# Patient Record
Sex: FEMALE | Race: WHITE | NOT HISPANIC OR LATINO | Employment: OTHER | URBAN - METROPOLITAN AREA
[De-identification: names, ages, dates, MRNs, and addresses within clinical notes are randomized per-mention and may not be internally consistent; named-entity substitution may affect disease eponyms.]

---

## 2017-01-30 DIAGNOSIS — E78.5 HYPERLIPIDEMIA, UNSPECIFIED HYPERLIPIDEMIA TYPE: ICD-10-CM

## 2017-01-30 RX ORDER — ATORVASTATIN CALCIUM 20 MG/1
TABLET, FILM COATED ORAL
Qty: 90 TABLET | Refills: 1 | Status: SHIPPED | OUTPATIENT
Start: 2017-01-30 | End: 2017-05-25 | Stop reason: SDUPTHER

## 2017-05-08 ENCOUNTER — LAB VISIT (OUTPATIENT)
Dept: LAB | Facility: HOSPITAL | Age: 77
End: 2017-05-08
Attending: INTERNAL MEDICINE
Payer: MEDICARE

## 2017-05-08 DIAGNOSIS — I10 ESSENTIAL HYPERTENSION: ICD-10-CM

## 2017-05-08 DIAGNOSIS — E78.5 HYPERLIPIDEMIA, UNSPECIFIED HYPERLIPIDEMIA TYPE: ICD-10-CM

## 2017-05-08 LAB
ALBUMIN SERPL BCP-MCNC: 3.6 G/DL
ALP SERPL-CCNC: 83 U/L
ALT SERPL W/O P-5'-P-CCNC: 18 U/L
ANION GAP SERPL CALC-SCNC: 8 MMOL/L
AST SERPL-CCNC: 19 U/L
BASOPHILS # BLD AUTO: 0.03 K/UL
BASOPHILS NFR BLD: 0.4 %
BILIRUB SERPL-MCNC: 0.5 MG/DL
BUN SERPL-MCNC: 15 MG/DL
CALCIUM SERPL-MCNC: 9.4 MG/DL
CHLORIDE SERPL-SCNC: 107 MMOL/L
CHOLEST/HDLC SERPL: 3.1 {RATIO}
CO2 SERPL-SCNC: 27 MMOL/L
CREAT SERPL-MCNC: 0.8 MG/DL
DIFFERENTIAL METHOD: NORMAL
EOSINOPHIL # BLD AUTO: 0.5 K/UL
EOSINOPHIL NFR BLD: 5.6 %
ERYTHROCYTE [DISTWIDTH] IN BLOOD BY AUTOMATED COUNT: 14.1 %
EST. GFR  (AFRICAN AMERICAN): >60 ML/MIN/1.73 M^2
EST. GFR  (NON AFRICAN AMERICAN): >60 ML/MIN/1.73 M^2
GLUCOSE SERPL-MCNC: 98 MG/DL
HCT VFR BLD AUTO: 40.1 %
HDL/CHOLESTEROL RATIO: 32.8 %
HDLC SERPL-MCNC: 177 MG/DL
HDLC SERPL-MCNC: 58 MG/DL
HGB BLD-MCNC: 13.3 G/DL
LDLC SERPL CALC-MCNC: 77.4 MG/DL
LYMPHOCYTES # BLD AUTO: 3.5 K/UL
LYMPHOCYTES NFR BLD: 43 %
MCH RBC QN AUTO: 29.4 PG
MCHC RBC AUTO-ENTMCNC: 33.2 %
MCV RBC AUTO: 89 FL
MONOCYTES # BLD AUTO: 0.7 K/UL
MONOCYTES NFR BLD: 8.3 %
NEUTROPHILS # BLD AUTO: 3.5 K/UL
NEUTROPHILS NFR BLD: 42.5 %
NONHDLC SERPL-MCNC: 119 MG/DL
PLATELET # BLD AUTO: 198 K/UL
PMV BLD AUTO: 11.3 FL
POTASSIUM SERPL-SCNC: 4.2 MMOL/L
PROT SERPL-MCNC: 6.4 G/DL
RBC # BLD AUTO: 4.53 M/UL
SODIUM SERPL-SCNC: 142 MMOL/L
TRIGL SERPL-MCNC: 208 MG/DL
TSH SERPL DL<=0.005 MIU/L-ACNC: 3.13 UIU/ML
WBC # BLD AUTO: 8.24 K/UL

## 2017-05-08 PROCEDURE — 85025 COMPLETE CBC W/AUTO DIFF WBC: CPT

## 2017-05-08 PROCEDURE — 80053 COMPREHEN METABOLIC PANEL: CPT

## 2017-05-08 PROCEDURE — 84443 ASSAY THYROID STIM HORMONE: CPT

## 2017-05-08 PROCEDURE — 36415 COLL VENOUS BLD VENIPUNCTURE: CPT | Mod: PO

## 2017-05-08 PROCEDURE — 80061 LIPID PANEL: CPT

## 2017-05-25 ENCOUNTER — OFFICE VISIT (OUTPATIENT)
Dept: INTERNAL MEDICINE | Facility: CLINIC | Age: 77
End: 2017-05-25
Payer: MEDICARE

## 2017-05-25 ENCOUNTER — HOSPITAL ENCOUNTER (OUTPATIENT)
Dept: RADIOLOGY | Facility: HOSPITAL | Age: 77
Discharge: HOME | End: 2017-05-25
Attending: INTERNAL MEDICINE
Payer: MEDICARE

## 2017-05-25 VITALS
SYSTOLIC BLOOD PRESSURE: 118 MMHG | OXYGEN SATURATION: 97 % | HEART RATE: 88 BPM | WEIGHT: 141.13 LBS | HEIGHT: 64 IN | DIASTOLIC BLOOD PRESSURE: 72 MMHG | BODY MASS INDEX: 24.1 KG/M2

## 2017-05-25 DIAGNOSIS — M85.80 OSTEOPENIA, UNSPECIFIED LOCATION: ICD-10-CM

## 2017-05-25 DIAGNOSIS — I77.9 MILD CAROTID ARTERY DISEASE: ICD-10-CM

## 2017-05-25 DIAGNOSIS — Z12.39 BREAST CANCER SCREENING: ICD-10-CM

## 2017-05-25 DIAGNOSIS — Z12.31 ENCOUNTER FOR SCREENING MAMMOGRAM FOR MALIGNANT NEOPLASM OF BREAST: ICD-10-CM

## 2017-05-25 DIAGNOSIS — E78.5 HYPERLIPIDEMIA, UNSPECIFIED HYPERLIPIDEMIA TYPE: ICD-10-CM

## 2017-05-25 DIAGNOSIS — Z00.00 PREVENTATIVE HEALTH CARE: Primary | ICD-10-CM

## 2017-05-25 DIAGNOSIS — I10 ESSENTIAL HYPERTENSION: ICD-10-CM

## 2017-05-25 DIAGNOSIS — M79.671 RIGHT FOOT PAIN: ICD-10-CM

## 2017-05-25 PROCEDURE — 77067 SCR MAMMO BI INCL CAD: CPT | Mod: TC

## 2017-05-25 PROCEDURE — 99499 UNLISTED E&M SERVICE: CPT | Mod: S$GLB,,, | Performed by: INTERNAL MEDICINE

## 2017-05-25 PROCEDURE — 99999 PR PBB SHADOW E&M-EST. PATIENT-LVL III: CPT | Mod: PBBFAC,,, | Performed by: INTERNAL MEDICINE

## 2017-05-25 PROCEDURE — 99397 PER PM REEVAL EST PAT 65+ YR: CPT | Mod: S$GLB,,, | Performed by: INTERNAL MEDICINE

## 2017-05-25 PROCEDURE — 77067 SCR MAMMO BI INCL CAD: CPT | Mod: 26,,, | Performed by: RADIOLOGY

## 2017-05-25 PROCEDURE — 77063 BREAST TOMOSYNTHESIS BI: CPT | Mod: 26,,, | Performed by: RADIOLOGY

## 2017-05-25 RX ORDER — LISINOPRIL 2.5 MG/1
2.5 TABLET ORAL DAILY
Qty: 90 TABLET | Refills: 1 | Status: SHIPPED | OUTPATIENT
Start: 2017-05-25 | End: 2017-09-26 | Stop reason: SDUPTHER

## 2017-05-25 RX ORDER — ATORVASTATIN CALCIUM 20 MG/1
TABLET, FILM COATED ORAL
Qty: 90 TABLET | Refills: 1 | Status: SHIPPED | OUTPATIENT
Start: 2017-05-25 | End: 2017-12-01 | Stop reason: SDUPTHER

## 2017-05-25 NOTE — PROGRESS NOTES
Subjective:       Patient ID: Sofía Mcwilliams is a 76 y.o. female.    Chief Complaint: Annual Exam    HPI 76-year-old female presents to clinic today for annual physical exam and follow-up of hypertension dyslipidemia.  She is due for her annual mammogram.  She does have some problems with the right foot would like to see  treating for consultation she underwent podiatry surgery in the past.  Review of Systems  otherwise negative  Objective:      Physical Exam  General: Well-appearing, well-nourished.  No distress  HEENT: conjunctivae are normal.  Pupils are equal and reative to light.  TM's are clear and intact bilaterally.  Hearing is grossly normal.  Nasopharynx is clear.  Oropharynx is clear.  Neck: Supple.  No thyroid megaly.  No bruits.  Lymph: No cervical or supraclavicular adenopathy.  Heart: Regular rate and rhythm, without murmur, rub or gallop.  Lungs: Clear to auscultation; respiratory effort normal.  Abdomen: Soft, nontender, nondistended.  Normoactive bowel sounds.  No hepatomegaly.  No masses.  Extremities: Good distal pulses.  No edema.  Musculoskeletal: 5/5 Strength bilateral upper and lower extremities; free range of motion.  Neuro: No focal deficits.  DTR's are 2+ and equal throughout.  Sensation intact. Normal gait.  Skin: No lesions seen.  Psychiatric: Oriented to time, person, place.  Judgment and insight seem unimpaired.  Breasts: No masses, discharge, or tenderness.    Assessment:       1. Preventative health care    2. Breast cancer screening    3. Encounter for screening mammogram for malignant neoplasm of breast     4. Osteopenia, unspecified location    5. Essential hypertension    6. Right foot pain    7. Hyperlipidemia, unspecified hyperlipidemia type    8. Mild carotid artery disease        Plan:       Soífa CARRILLO was seen today for annual exam.    Diagnoses and all orders for this visit:    Preventative health care  Performed and updated today  Breast cancer screening  -   Mammo Digital  Screening Bilat with CAD; Future    Encounter for screening mammogram for malignant neoplasm of breast   -    Mammo Digital Screening Bilat with CAD; Future    Osteopenia, unspecified location  Continue current regimen  Essential hypertension  -     lisinopril (PRINIVIL,ZESTRIL) 2.5 MG tablet; Take 1 tablet (2.5 mg total) by mouth once daily.  Controlled.  Continue current medical regimen.  Prescription refills addressed.  Followup advised. See after visit summary.  Right foot pain  -     Ambulatory consult to Orthopedics    Hyperlipidemia, unspecified hyperlipidemia type  -     atorvastatin (LIPITOR) 20 MG tablet; TAKE 1 TABLET(20 MG) BY MOUTH EVERY DAY  Controlled.  Continue current medical regimen.  Prescription refills addressed.  Followup advised. See after visit summary.  Mild carotid artery disease  Asymptomatic surveillance as needed

## 2017-06-26 ENCOUNTER — HOSPITAL ENCOUNTER (OUTPATIENT)
Dept: RADIOLOGY | Facility: HOSPITAL | Age: 77
Discharge: HOME | End: 2017-06-26
Attending: ORTHOPAEDIC SURGERY
Payer: MEDICARE

## 2017-06-26 ENCOUNTER — OFFICE VISIT (OUTPATIENT)
Dept: ORTHOPEDICS | Facility: CLINIC | Age: 77
End: 2017-06-26
Payer: MEDICARE

## 2017-06-26 VITALS — HEIGHT: 64 IN | BODY MASS INDEX: 23.73 KG/M2 | WEIGHT: 139 LBS

## 2017-06-26 DIAGNOSIS — M19.079 ARTHRITIS OF BIG TOE: ICD-10-CM

## 2017-06-26 DIAGNOSIS — M79.671 FOOT PAIN, RIGHT: ICD-10-CM

## 2017-06-26 DIAGNOSIS — M79.671 FOOT PAIN, RIGHT: Primary | ICD-10-CM

## 2017-06-26 PROCEDURE — 73630 X-RAY EXAM OF FOOT: CPT | Mod: 26,RT,, | Performed by: RADIOLOGY

## 2017-06-26 PROCEDURE — 1125F AMNT PAIN NOTED PAIN PRSNT: CPT | Mod: S$GLB,,, | Performed by: ORTHOPAEDIC SURGERY

## 2017-06-26 PROCEDURE — 99213 OFFICE O/P EST LOW 20 MIN: CPT | Mod: S$GLB,,, | Performed by: ORTHOPAEDIC SURGERY

## 2017-06-26 PROCEDURE — 73630 X-RAY EXAM OF FOOT: CPT | Mod: TC,RT

## 2017-06-26 PROCEDURE — 99999 PR PBB SHADOW E&M-EST. PATIENT-LVL II: CPT | Mod: PBBFAC,,, | Performed by: ORTHOPAEDIC SURGERY

## 2017-06-26 PROCEDURE — 1159F MED LIST DOCD IN RCRD: CPT | Mod: S$GLB,,, | Performed by: ORTHOPAEDIC SURGERY

## 2017-06-26 NOTE — PROGRESS NOTES
DATE: 6/26/2017  PATIENT: Sofía Mcwilliams    CHIEF COMPLAINT: right great toe pain    HISTORY:  Sofía Mcwilliams is a 76 y.o. female  here for initial evaluation of right great toe pain. The pain has been present for 2 years.  Pt has had 2 surgeries to right great toe including hallux valgus reconstruction in ~2000 and Distal metatarsal osteotomy for hallux valgus revision in 2009.  Reports that her alignment is good but now with stiffness and dorsal bump. There is no history of trauma. The patient describes the pain as dull and like her other arthritis.  The pain is worse with ambulation and improved by rest. There is no associated numbness and tingling.  Prior treatments have included none; unable to take NSAIDs 2/2 GI upset.      PAST MEDICAL/SURGICAL HISTORY:  Past Medical History:   Diagnosis Date    Anxiety 5/2/2016    Arthritis     Basal cell carcinoma 2013    left buttock    Cataract     Hypertension     Other and unspecified hyperlipidemia      Past Surgical History:   Procedure Laterality Date    APPENDECTOMY      bunion      right    COLONOSCOPY N/A 9/28/2015    Procedure: COLONOSCOPY;  Surgeon: Joe Amos MD;  Location: [address]);  Service: Endoscopy;  Laterality: N/A;    HYSTERECTOMY      partial    TONSILLECTOMY         Current Medications:   Current Outpatient Prescriptions:     aspirin (ECOTRIN) 81 MG EC tablet, Take 1 tablet by mouth., Disp: , Rfl:     atorvastatin (LIPITOR) 20 MG tablet, TAKE 1 TABLET(20 MG) BY MOUTH EVERY DAY, Disp: 90 tablet, Rfl: 1    CALCIUM CARB/VIT D3/MINERALS (CALCIUM-VITAMIN D ORAL), once daily. , Disp: , Rfl:     cetirizine (ZYRTEC) 10 MG tablet, Take 10 mg by mouth once daily., Disp: , Rfl:     coenzyme Q10 10 mg capsule, Take by mouth., Disp: , Rfl:     lisinopril (PRINIVIL,ZESTRIL) 2.5 MG tablet, Take 1 tablet (2.5 mg total) by mouth once daily., Disp: 90 tablet, Rfl: 1    magnesium glycinate 100 mg Tab, Take 1 tablet by mouth every evening.,  "Disp: , Rfl:     multivitamin (MULTIPLE VITAMIN) per tablet, Take 1 tablet by mouth., Disp: , Rfl:     tretinoin (RETIN-A) 0.05 % cream, Apply topically every evening., Disp: 45 g, Rfl: 1    VITAMIN B COMPLEX ORAL, Take by mouth once daily. , Disp: , Rfl:     vitamin D 1000 units Tab, Take 185 mg by mouth once daily., Disp: , Rfl:     Social History:   Social History     Social History    Marital status:      Spouse name: N/A    Number of children: N/A    Years of education: N/A     Occupational History    RN      Social History Main Topics    Smoking status: Former Smoker     Quit date: 11/13/1965    Smokeless tobacco: Never Used    Alcohol use Yes      Comment: Occ.    Drug use: No    Sexual activity: Yes     Other Topics Concern    Are You Pregnant Or Think You May Be? No    Breast-Feeding No     Social History Narrative    No narrative on file       REVIEW OF SYSTEMS:  Constitution: Negative. Negative for chills, fever and night sweats.   Cardiovascular: Negative for chest pain and syncope.   Respiratory: Negative for cough and shortness of breath.   Gastrointestinal: See HPI. Negative for nausea/vomiting. Negative for abdominal pain.  Genitourinary: See HPI. Negative for discoloration or dysuria.  Skin: Negative for dry skin, itching and rash.   Hematologic/Lymphatic: Negative for bleeding problem. Does not bruise/bleed easily.   Musculoskeletal: Negative for falls and muscle weakness.   Neurological: See HPI. No seizures.   Endocrine: Negative for polydipsia, polyphagia and polyuria.   Allergic/Immunologic: Negative for hives and persistent infections.    PHYSICAL EXAMINATION:    Ht 5' 4" (1.626 m)   Wt 63 kg (139 lb)   BMI 23.86 kg/m²     General: The patient is a  76 y.o. female in no apparent distress, the patient is orientatied to person, place and time.   Psych: Normal mood and affect  HEENT:  NCAT  Lungs:  Respirations are equal and unlabored.  CV:  2+ bilateral upper and lower " extremity pulses.  Skin:  Intact throughout.    MSK:    RLE:  - right great toe MTPJ with stiffness and dorsal protuberance  - TA/EHL/Gastroc/FHL assessed in isolation without deficit  - SILT throughout  - DP and PT palpated  2+  - Capillary Refill <3s          IMAGING:     Radiographs of the right foot were ordered and personally reviewed with the patient today.  They show hardware in place, 1st MTPJ significant DJD with dorsal osteophyte.    ASSESSMENT/PLAN:    Sofía CARRILLO was seen today for big toe.    Diagnoses and all orders for this visit:    Foot pain, right  -     X-Ray Foot Complete Right; Future    Arthritis of big toe, right      No Follow-up on file.    Pt not interested in surgical intervention at this time.  Unable to take NSAIDs, recommend stiff shoe that will limit great toe motion. Pt will call if she decides to pursue surgery.  I have personally taken the history and examined this patient and agree with the residents note as stated above.

## 2017-06-26 NOTE — LETTER
June 27, 2017      Natalia Stubbs MD  [address] [address]           [facility] - Orthopedics  [address], [address]  [facility] [address]  Phone: [phone]          Patient: Sofía Mcwilliams   MR Number: [id number]   YOB: 1940   Date of Visit: 6/26/2017       Dear Dr. Natalia Stubbs:    Thank you for referring Sofía Mcwilliams to me for evaluation. Attached you will find relevant portions of my assessment and plan of care.    If you have questions, please do not hesitate to call me. I look forward to following Sofía Mcwilliams along with you.    Sincerely,    Cj Garsia MD    Enclosure  CC:  No Recipients    If you would like to receive this communication electronically, please contact [email or web address] or [phone] to request more information on [email or web address] Link access.    For providers and/or their staff who would like to refer a patient to [facility], please contact us through our one-stop-shop provider referral line, [facility] , at [phone].    If you feel you have received this communication in error or would no longer like to receive these types of communications, please e-mail [email or web address]

## 2017-09-26 DIAGNOSIS — I10 ESSENTIAL HYPERTENSION: ICD-10-CM

## 2017-09-27 RX ORDER — LISINOPRIL 2.5 MG/1
TABLET ORAL
Qty: 90 TABLET | Refills: 1 | Status: SHIPPED | OUTPATIENT
Start: 2017-09-27 | End: 2018-04-30 | Stop reason: SDUPTHER

## 2017-10-18 ENCOUNTER — OFFICE VISIT (OUTPATIENT)
Dept: OPTOMETRY | Facility: CLINIC | Age: 77
End: 2017-10-18
Payer: MEDICARE

## 2017-10-18 DIAGNOSIS — Z98.890 HISTORY OF REPAIR OF RETINAL TEAR BY LASER PHOTOCOAGULATION: Primary | ICD-10-CM

## 2017-10-18 DIAGNOSIS — H25.13 NUCLEAR SCLEROSIS, BILATERAL: ICD-10-CM

## 2017-10-18 DIAGNOSIS — H52.4 PRESBYOPIA: ICD-10-CM

## 2017-10-18 DIAGNOSIS — Z13.5 SCREENING FOR GLAUCOMA: ICD-10-CM

## 2017-10-18 PROCEDURE — 92014 COMPRE OPH EXAM EST PT 1/>: CPT | Mod: S$GLB,,, | Performed by: OPTOMETRIST

## 2017-10-18 PROCEDURE — 99999 PR PBB SHADOW E&M-EST. PATIENT-LVL II: CPT | Mod: PBBFAC,,, | Performed by: OPTOMETRIST

## 2017-10-18 PROCEDURE — 92015 DETERMINE REFRACTIVE STATE: CPT | Mod: S$GLB,,, | Performed by: OPTOMETRIST

## 2017-11-27 ENCOUNTER — LAB VISIT (OUTPATIENT)
Dept: LAB | Facility: HOSPITAL | Age: 77
End: 2017-11-27
Attending: INTERNAL MEDICINE
Payer: MEDICARE

## 2017-11-27 DIAGNOSIS — I10 ESSENTIAL HYPERTENSION: ICD-10-CM

## 2017-11-27 DIAGNOSIS — E78.5 HYPERLIPIDEMIA, UNSPECIFIED HYPERLIPIDEMIA TYPE: ICD-10-CM

## 2017-11-27 LAB
ALBUMIN SERPL BCP-MCNC: 3.7 G/DL
ALP SERPL-CCNC: 82 U/L
ALT SERPL W/O P-5'-P-CCNC: 19 U/L
ANION GAP SERPL CALC-SCNC: 7 MMOL/L
AST SERPL-CCNC: 17 U/L
BILIRUB SERPL-MCNC: 0.5 MG/DL
BUN SERPL-MCNC: 23 MG/DL
CALCIUM SERPL-MCNC: 9.5 MG/DL
CHLORIDE SERPL-SCNC: 106 MMOL/L
CHOLEST SERPL-MCNC: 181 MG/DL
CHOLEST/HDLC SERPL: 2.5 {RATIO}
CO2 SERPL-SCNC: 29 MMOL/L
CREAT SERPL-MCNC: 0.8 MG/DL
EST. GFR  (AFRICAN AMERICAN): >60 ML/MIN/1.73 M^2
EST. GFR  (NON AFRICAN AMERICAN): >60 ML/MIN/1.73 M^2
GLUCOSE SERPL-MCNC: 92 MG/DL
HDLC SERPL-MCNC: 71 MG/DL
HDLC SERPL: 39.2 %
LDLC SERPL CALC-MCNC: 92 MG/DL
NONHDLC SERPL-MCNC: 110 MG/DL
POTASSIUM SERPL-SCNC: 4.8 MMOL/L
PROT SERPL-MCNC: 6.6 G/DL
SODIUM SERPL-SCNC: 142 MMOL/L
TRIGL SERPL-MCNC: 90 MG/DL

## 2017-11-27 PROCEDURE — 36415 COLL VENOUS BLD VENIPUNCTURE: CPT | Mod: PO

## 2017-11-27 PROCEDURE — 80061 LIPID PANEL: CPT

## 2017-11-27 PROCEDURE — 80053 COMPREHEN METABOLIC PANEL: CPT

## 2017-12-01 ENCOUNTER — OFFICE VISIT (OUTPATIENT)
Dept: INTERNAL MEDICINE | Facility: CLINIC | Age: 77
End: 2017-12-01
Payer: MEDICARE

## 2017-12-01 VITALS
BODY MASS INDEX: 24.1 KG/M2 | DIASTOLIC BLOOD PRESSURE: 70 MMHG | WEIGHT: 141.13 LBS | HEIGHT: 64 IN | HEART RATE: 60 BPM | SYSTOLIC BLOOD PRESSURE: 110 MMHG

## 2017-12-01 DIAGNOSIS — I10 ESSENTIAL HYPERTENSION: Primary | ICD-10-CM

## 2017-12-01 DIAGNOSIS — Z23 NEED FOR IMMUNIZATION AGAINST INFLUENZA: ICD-10-CM

## 2017-12-01 DIAGNOSIS — E78.5 HYPERLIPIDEMIA, UNSPECIFIED HYPERLIPIDEMIA TYPE: ICD-10-CM

## 2017-12-01 PROCEDURE — 90662 IIV NO PRSV INCREASED AG IM: CPT | Mod: S$GLB,,, | Performed by: INTERNAL MEDICINE

## 2017-12-01 PROCEDURE — 99999 PR PBB SHADOW E&M-EST. PATIENT-LVL III: CPT | Mod: PBBFAC,,, | Performed by: INTERNAL MEDICINE

## 2017-12-01 PROCEDURE — 99499 UNLISTED E&M SERVICE: CPT | Mod: S$GLB,,, | Performed by: INTERNAL MEDICINE

## 2017-12-01 PROCEDURE — 99213 OFFICE O/P EST LOW 20 MIN: CPT | Mod: S$GLB,,, | Performed by: INTERNAL MEDICINE

## 2017-12-01 PROCEDURE — G0008 ADMIN INFLUENZA VIRUS VAC: HCPCS | Mod: S$GLB,,, | Performed by: INTERNAL MEDICINE

## 2017-12-01 RX ORDER — MELOXICAM 15 MG/1
15 TABLET ORAL DAILY
COMMUNITY
End: 2019-07-12

## 2017-12-01 RX ORDER — ATORVASTATIN CALCIUM 20 MG/1
TABLET, FILM COATED ORAL
Qty: 90 TABLET | Refills: 1 | Status: SHIPPED | OUTPATIENT
Start: 2017-12-01 | End: 2018-06-01 | Stop reason: SDUPTHER

## 2017-12-01 NOTE — PROGRESS NOTES
Subjective:       Patient ID: Sofía Mcwilliams is a 77 y.o. female.    Chief Complaint: Follow-up    HPI 77-year-old female presents to clinic today for follow-up of hypertension dyslipidemia compliant with medication denies side effects or issues.  Needs a flu shot.  Review of Systems  otherwise negative  Objective:      Physical Exam  General: Well-appearing, well-nourished.  No distress  HEENT: conjunctivae are normal.  Pupils are equal and reative to light.  TM's are clear and intact bilaterally.  Hearing is grossly normal.  Nasopharynx is clear.  Oropharynx is clear.  Neck: Supple.  No thyroid megaly.  No bruits.  Lymph: No cervical or supraclavicular adenopathy.  Heart: Regular rate and rhythm, without murmur, rub or gallop.  Lungs: Clear to auscultation; respiratory effort normal.  Abdomen: Soft, nontender, nondistended.  Normoactive bowel sounds.  No hepatomegaly.  No masses.  Extremities: Good distal pulses.  No edema.  Psych: Oriented to time person place.  Judgment and insight seem unimpaired.  Mood and affect are appropriate.  Assessment:       1. Essential hypertension    2. Need for immunization against influenza    3. Hyperlipidemia, unspecified hyperlipidemia type        Plan:       Sofía CARRILLO was seen today for follow-up.    Diagnoses and all orders for this visit:    Essential hypertension  -     CBC auto differential; Future  -     TSH; Future  Controlled.  Continue current medical regimen.  Prescription refills addressed.  Followup advised. See after visit summary.  Need for immunization against influenza  -     Influenza - High Dose (65+) (PF) (IM)    Hyperlipidemia, unspecified hyperlipidemia type  -     atorvastatin (LIPITOR) 20 MG tablet; TAKE 1 TABLET(20 MG) BY MOUTH EVERY DAY  -     CBC auto differential; Future  -     TSH; Future  Controlled.  Continue current medical regimen.  Prescription refills addressed.  Followup advised. See after visit summary.

## 2018-01-18 ENCOUNTER — PES CALL (OUTPATIENT)
Dept: ADMINISTRATIVE | Facility: CLINIC | Age: 78
End: 2018-01-18

## 2018-02-16 ENCOUNTER — NURSE TRIAGE (OUTPATIENT)
Dept: ADMINISTRATIVE | Facility: CLINIC | Age: 78
End: 2018-02-16

## 2018-02-17 ENCOUNTER — OFFICE VISIT (OUTPATIENT)
Dept: URGENT CARE | Facility: CLINIC | Age: 78
End: 2018-02-17
Payer: MEDICARE

## 2018-02-17 VITALS
BODY MASS INDEX: 24.59 KG/M2 | TEMPERATURE: 98 F | SYSTOLIC BLOOD PRESSURE: 130 MMHG | HEART RATE: 90 BPM | DIASTOLIC BLOOD PRESSURE: 68 MMHG | HEIGHT: 64 IN | WEIGHT: 144 LBS | OXYGEN SATURATION: 97 % | RESPIRATION RATE: 18 BRPM

## 2018-02-17 DIAGNOSIS — J02.9 PHARYNGITIS, UNSPECIFIED ETIOLOGY: Primary | ICD-10-CM

## 2018-02-17 DIAGNOSIS — J02.9 SORE THROAT: ICD-10-CM

## 2018-02-17 LAB
CTP QC/QA: YES
FLUAV AG NPH QL: NEGATIVE
FLUBV AG NPH QL: NEGATIVE

## 2018-02-17 PROCEDURE — 1159F MED LIST DOCD IN RCRD: CPT | Mod: S$GLB,,, | Performed by: NURSE PRACTITIONER

## 2018-02-17 PROCEDURE — 99213 OFFICE O/P EST LOW 20 MIN: CPT | Mod: S$GLB,,, | Performed by: NURSE PRACTITIONER

## 2018-02-17 PROCEDURE — 87804 INFLUENZA ASSAY W/OPTIC: CPT | Mod: QW,S$GLB,, | Performed by: NURSE PRACTITIONER

## 2018-02-17 PROCEDURE — 3008F BODY MASS INDEX DOCD: CPT | Mod: S$GLB,,, | Performed by: NURSE PRACTITIONER

## 2018-02-17 NOTE — PATIENT INSTRUCTIONS
Please drink plenty of fluids.  Please get plenty of rest.  Please return here or go to the Emergency Department for any concerns or worsening of condition.  If you were prescribed antibiotics, please take them to completion.  If you were given wait & see antibiotics, please wait 5-7 days before taking them, and only take them if your symptoms have worsened or not improved.  If you do begin taking the antibiotics, please take them to completion.  If you were prescribed a narcotic medication, do not drive or operate heavy equipment or machinery while taking these medications.  If you were given a steroid shot in the clinic and have also been given a prescription for a steroid such as Prednisone or a Medrol Dose Pack, please begin taking them tomorrow.  If you do not have Hypertension or any history of palpitations, it is ok to take over the counter Sudafed or Mucinex D or Allegra-D or Claritin-D or Zyrtec-D.  If you do take one of the above, it is ok to combine that with plain over the counter Mucinex or Allegra or Claritin or Zyrtec.  If for example you are taking Zyrtec -D, you can combine that with Mucinex, but not Mucinex-D.  If you are taking Mucinex-D, you can combine that with plain Allegra or Claritin or Zyrtec.   If you do have Hypertension or palpitations, it is safe to take Coricidin HBP for relief of sinus symptoms.  If not allergic, please take over the counter Tylenol (Acetaminophen) and/or Motrin (Ibuprofen) as directed for control of pain and/or fever.  Please follow up with your primary care doctor or specialist as needed.    If you  smoke, please stop smoking.  When You Have a Sore Throat    A sore throat can be painful. There are many reasons why you may have a sore throat. Your healthcare provider will work with you to find the cause of your sore throat. He or she will also find the best treatment for you.  What causes a sore throat?  Sore throats can be caused or worsened by:  · Cold or flu  viruses  · Bacteria  · Irritants such as tobacco smoke or air pollution  · Acid reflux  A healthy throat  The tonsils are on the sides of the throat near the base of the tongue. They collect viruses and bacteria and help fight infection. The throat (pharynx) is the passage for air. Mucus from the nasal cavity also moves down the passage.  An inflamed throat  The tonsils and pharynx can become inflamed due to a cold or flu virus. Postnasal drip (excess mucus draining from the nasal cavity) can irritate the throat. It can also make the throat or tonsils more likely to be infected by bacteria. Severe, untreated tonsillitis in children or adults can cause a pocket of pus (abscess) to form near the tonsil.  Your evaluation  A medical evaluation can help find the cause of your sore throat. It can also help your healthcare provider choose the best treatment for you. The evaluation may include a health history, physical exam, and diagnostic tests.  Health history  Your healthcare provider may ask you the following:  · How long has the sore throat lasted and how have you been treating it?  · Do you have any other symptoms, such as body aches, fever, or cough?  · Does your sore throat recur? If so, how often? How many days of school or work have you missed because of a sore throat?  · Do you have trouble eating or swallowing?  · Have you been told that you snore or have other sleep problems?  · Do you have bad breath?  · Do you cough up bad-tasting mucus?  Physical exam  During the exam, your healthcare provider checks your ears, nose, and throat for problems. He or she also checks for swelling in the neck, and may listen to your chest.  Possible tests  Other tests your healthcare provider may perform include:  · A throat swab to check for bacteria such as streptococcus (the bacteria that causes strep throat)  · A blood test to check for mononucleosis (a viral infection)  · A chest X-ray to rule out pneumonia, especially if  "you have a cough  Treating a sore throat  Treatment depends on many factors. What is the likely cause? Is the problem recent? Does it keep coming back? In many cases, the best thing to do is to treat the symptoms, rest, and let the problem heal itself. Antibiotics may help clear up some bacterial infections. For cases of severe or recurring tonsillitis, the tonsils may need to be removed.  Relieving your symptoms  · Dont smoke, and avoid secondhand smoke.  · For children, try throat sprays or Popsicles. Adults and older children may try lozenges.  · Drink warm liquids to soothe the throat and help thin mucus. Avoid alcohol, spicy foods, and acidic drinks such as orange juice. These can irritate the throat.  · Gargle with warm saltwater (1 teaspoon of salt to 8 ounces of warm water).  · Use a humidifier to keep air moist and relieve throat dryness.  · Try over-the-counter pain relievers such as acetaminophen or ibuprofen. Use as directed, and dont exceed the recommended dose. Dont give aspirin to children.   Are antibiotics needed?  If your sore throat is due to a bacterial infection, antibiotics may speed healing and prevent complications. Although group A streptococcus ("strep throat" or GAS) is the major treatable infection for a sore throat, GAS causes only 5% to 15% of sore throats in adults who seek medical care. Most sore throats are caused by cold or flu viruses. And antibiotics dont treat viral illness. In fact, using antibiotics when theyre not needed may produce bacteria that are harder to kill. Your healthcare provider will prescribe antibiotics only if he or she thinks they are likely to help.  If antibiotics are prescribed  Take the medicine exactly as directed. Be sure to finish your prescription even if youre feeling better. And be sure to ask your healthcare provider or pharmacist what side effects are common and what to do about them.  Is surgery needed?  In some cases, tonsils need to be " removed. This is often done as outpatient (same-day) surgery. Your healthcare provider may advise removing the tonsils in cases of:  · Several severe bouts of tonsillitis in a year. Severe episodes include those that lead to missed days of school or work, or that need to be treated with antibiotics.  · Tonsillitis that causes breathing problems during sleep  · Tonsillitis caused by food particles collecting in pouches in the tonsils (cryptic tonsillitis)  Call your healthcare provider if any of the following occur:  · Symptoms worsen, or new symptoms develop.  · Swollen tonsils make breathing difficult.  · The pain is severe enough to keep you from drinking liquids.  · A skin rash, hives, or wheezing develops. Any of these could signal an allergic reaction to antibiotics.  · Symptoms dont improve within a week.  · Symptoms dont improve within 2 to 3 days of starting antibiotics.   Date Last Reviewed: 10/1/2016  © 3141-8106 [employer]. [address] [place] [address], [place] [address]. All rights reserved. This information is not intended as a substitute for professional medical care. Always follow your healthcare professional's instructions.

## 2018-02-17 NOTE — PROGRESS NOTES
"Subjective:       Patient ID: Sofía Mcwilliams is a 77 y.o. female.    Vitals:  height is 5' 4" (1.626 m) and weight is 65.3 kg (144 lb). Her temperature is 97.8 °F (36.6 °C). Her blood pressure is 130/68 and her pulse is 90. Her respiration is 18 and oxygen saturation is 97%.     Chief Complaint: Cough    PATIENT STATES THAT SHE BEGAN HAVING A SORE THROAT YESTERDAY. SHE WENT TO [facility] AND WAS GIVEN AMOXICILLIN. SHE PRESENTS TODAY TO GET FLU TESTED.       Cough   This is a new problem. The current episode started yesterday. The problem has been unchanged. The problem occurs constantly. The cough is non-productive. Associated symptoms include chills, a fever and a sore throat. Pertinent negatives include no chest pain, ear pain, eye redness, headaches, myalgias, shortness of breath or wheezing. Nothing aggravates the symptoms. She has tried nothing for the symptoms. The treatment provided no relief.     Review of Systems   Constitution: Positive for chills and fever. Negative for malaise/fatigue.   HENT: Positive for sore throat. Negative for congestion, ear pain and hoarse voice.    Eyes: Negative for discharge and redness.   Cardiovascular: Negative for chest pain, dyspnea on exertion and leg swelling.   Respiratory: Negative for cough, shortness of breath, sputum production and wheezing.    Musculoskeletal: Negative for myalgias.   Gastrointestinal: Negative for abdominal pain and nausea.   Neurological: Negative for headaches.       Objective:      Physical Exam   Constitutional: She is oriented to person, place, and time. She appears well-developed and well-nourished. She is cooperative.  Non-toxic appearance. She does not appear ill. No distress.   HENT:   Head: Normocephalic and atraumatic.   Right Ear: Hearing, tympanic membrane, external ear and ear canal normal.   Left Ear: Hearing, tympanic membrane, external ear and ear canal normal.   Nose: Nose normal. No mucosal edema, rhinorrhea or nasal deformity. No " epistaxis. Right sinus exhibits no maxillary sinus tenderness and no frontal sinus tenderness. Left sinus exhibits no maxillary sinus tenderness and no frontal sinus tenderness.   Mouth/Throat: Uvula is midline, oropharynx is clear and moist and mucous membranes are normal. No trismus in the jaw. Normal dentition. No uvula swelling. No posterior oropharyngeal erythema.   Eyes: Conjunctivae and lids are normal. No scleral icterus.   Sclera clear bilat   Neck: Trachea normal, full passive range of motion without pain and phonation normal. Neck supple.   Cardiovascular: Normal rate, regular rhythm, normal heart sounds, intact distal pulses and normal pulses.    Pulmonary/Chest: Effort normal and breath sounds normal. No respiratory distress.   Abdominal: Soft. Normal appearance and bowel sounds are normal. She exhibits no distension. There is no tenderness.   Musculoskeletal: Normal range of motion. She exhibits no edema or deformity.   Neurological: She is alert and oriented to person, place, and time. She exhibits normal muscle tone. Coordination normal.   Skin: Skin is warm, dry and intact. She is not diaphoretic. No pallor.   Psychiatric: She has a normal mood and affect. Her speech is normal and behavior is normal. Judgment and thought content normal. Cognition and memory are normal.   Nursing note and vitals reviewed.      Assessment:       1. Pharyngitis, unspecified etiology    2. Sore throat        Plan:         Pharyngitis, unspecified etiology    Sore throat  -     POCT Influenza A/B      Patient Instructions     Please drink plenty of fluids.  Please get plenty of rest.  Please return here or go to the Emergency Department for any concerns or worsening of condition.  If you were prescribed antibiotics, please take them to completion.  If you were given wait & see antibiotics, please wait 5-7 days before taking them, and only take them if your symptoms have worsened or not improved.  If you do begin taking  the antibiotics, please take them to completion.  If you were prescribed a narcotic medication, do not drive or operate heavy equipment or machinery while taking these medications.  If you were given a steroid shot in the clinic and have also been given a prescription for a steroid such as Prednisone or a Medrol Dose Pack, please begin taking them tomorrow.  If you do not have Hypertension or any history of palpitations, it is ok to take over the counter Sudafed or Mucinex D or Allegra-D or Claritin-D or Zyrtec-D.  If you do take one of the above, it is ok to combine that with plain over the counter Mucinex or Allegra or Claritin or Zyrtec.  If for example you are taking Zyrtec -D, you can combine that with Mucinex, but not Mucinex-D.  If you are taking Mucinex-D, you can combine that with plain Allegra or Claritin or Zyrtec.   If you do have Hypertension or palpitations, it is safe to take Coricidin HBP for relief of sinus symptoms.  If not allergic, please take over the counter Tylenol (Acetaminophen) and/or Motrin (Ibuprofen) as directed for control of pain and/or fever.  Please follow up with your primary care doctor or specialist as needed.    If you  smoke, please stop smoking.  When You Have a Sore Throat    A sore throat can be painful. There are many reasons why you may have a sore throat. Your healthcare provider will work with you to find the cause of your sore throat. He or she will also find the best treatment for you.  What causes a sore throat?  Sore throats can be caused or worsened by:  · Cold or flu viruses  · Bacteria  · Irritants such as tobacco smoke or air pollution  · Acid reflux  A healthy throat  The tonsils are on the sides of the throat near the base of the tongue. They collect viruses and bacteria and help fight infection. The throat (pharynx) is the passage for air. Mucus from the nasal cavity also moves down the passage.  An inflamed throat  The tonsils and pharynx can become inflamed  due to a cold or flu virus. Postnasal drip (excess mucus draining from the nasal cavity) can irritate the throat. It can also make the throat or tonsils more likely to be infected by bacteria. Severe, untreated tonsillitis in children or adults can cause a pocket of pus (abscess) to form near the tonsil.  Your evaluation  A medical evaluation can help find the cause of your sore throat. It can also help your healthcare provider choose the best treatment for you. The evaluation may include a health history, physical exam, and diagnostic tests.  Health history  Your healthcare provider may ask you the following:  · How long has the sore throat lasted and how have you been treating it?  · Do you have any other symptoms, such as body aches, fever, or cough?  · Does your sore throat recur? If so, how often? How many days of school or work have you missed because of a sore throat?  · Do you have trouble eating or swallowing?  · Have you been told that you snore or have other sleep problems?  · Do you have bad breath?  · Do you cough up bad-tasting mucus?  Physical exam  During the exam, your healthcare provider checks your ears, nose, and throat for problems. He or she also checks for swelling in the neck, and may listen to your chest.  Possible tests  Other tests your healthcare provider may perform include:  · A throat swab to check for bacteria such as streptococcus (the bacteria that causes strep throat)  · A blood test to check for mononucleosis (a viral infection)  · A chest X-ray to rule out pneumonia, especially if you have a cough  Treating a sore throat  Treatment depends on many factors. What is the likely cause? Is the problem recent? Does it keep coming back? In many cases, the best thing to do is to treat the symptoms, rest, and let the problem heal itself. Antibiotics may help clear up some bacterial infections. For cases of severe or recurring tonsillitis, the tonsils may need to be removed.  Relieving  "your symptoms  · Dont smoke, and avoid secondhand smoke.  · For children, try throat sprays or Popsicles. Adults and older children may try lozenges.  · Drink warm liquids to soothe the throat and help thin mucus. Avoid alcohol, spicy foods, and acidic drinks such as orange juice. These can irritate the throat.  · Gargle with warm saltwater (1 teaspoon of salt to 8 ounces of warm water).  · Use a humidifier to keep air moist and relieve throat dryness.  · Try over-the-counter pain relievers such as acetaminophen or ibuprofen. Use as directed, and dont exceed the recommended dose. Dont give aspirin to children.   Are antibiotics needed?  If your sore throat is due to a bacterial infection, antibiotics may speed healing and prevent complications. Although group A streptococcus ("strep throat" or GAS) is the major treatable infection for a sore throat, GAS causes only 5% to 15% of sore throats in adults who seek medical care. Most sore throats are caused by cold or flu viruses. And antibiotics dont treat viral illness. In fact, using antibiotics when theyre not needed may produce bacteria that are harder to kill. Your healthcare provider will prescribe antibiotics only if he or she thinks they are likely to help.  If antibiotics are prescribed  Take the medicine exactly as directed. Be sure to finish your prescription even if youre feeling better. And be sure to ask your healthcare provider or pharmacist what side effects are common and what to do about them.  Is surgery needed?  In some cases, tonsils need to be removed. This is often done as outpatient (same-day) surgery. Your healthcare provider may advise removing the tonsils in cases of:  · Several severe bouts of tonsillitis in a year. Severe episodes include those that lead to missed days of school or work, or that need to be treated with antibiotics.  · Tonsillitis that causes breathing problems during sleep  · Tonsillitis caused by food particles " collecting in pouches in the tonsils (cryptic tonsillitis)  Call your healthcare provider if any of the following occur:  · Symptoms worsen, or new symptoms develop.  · Swollen tonsils make breathing difficult.  · The pain is severe enough to keep you from drinking liquids.  · A skin rash, hives, or wheezing develops. Any of these could signal an allergic reaction to antibiotics.  · Symptoms dont improve within a week.  · Symptoms dont improve within 2 to 3 days of starting antibiotics.   Date Last Reviewed: 10/1/2016  © 1905-1409 [employer]. [address] [place] [id number]. All rights reserved. This information is not intended as a substitute for professional medical care. Always follow your healthcare professional's instructions.

## 2018-02-17 NOTE — TELEPHONE ENCOUNTER
"    Reason for Disposition   [1] Probable mild influenza (no fever) or a common cold, with no complications (all triage questions negative) AND [2] NOT HIGH RISK    Answer Assessment - Initial Assessment Questions  1. WORST SYMPTOM: "What is your worst symptom?" (e.g., cough, runny nose, muscle aches, headache, sore throat, fever)       Sore throat and aches  2. ONSET: "When did your flu symptoms start?"       Today   3. COUGH: "How bad is the cough?"        no  4. RESPIRATORY DISTRESS: "Describe your breathing."       no  5. FEVER: "Do you have a fever?" If so, ask: "What is your temperature, how was it measured, and when did it start?"      unknown  6. EXPOSURE: "Were you exposed to someone with influenza?"        unknown  7. FLU VACCINE: "Did you get a flu shot this year?"      yes  8. HIGH RISK DISEASE: "Do you any major health problems?" (e.g., heart or lung disease, asthma, weak immune system, or other HIGH RISK conditions)      no  9. PREGNANCY: "Is there any chance you are pregnant?" "When was your last menstrual period?"      n/a  10. OTHER SYMPTOMS: "Do you have any other symptoms?"  (e.g., runny nose, muscle aches, headache, sore throat)        Sore throat, generalized achiness different from normal arthritis pain.    Protocols used:  INFLUENZA - SEASONAL-A-    Patient started having chills and a sore throat today. She went to an urgent care and was not swabbed for the flu. She states after she took the tylenol, the chills  went away. She had her flu shot this season. Patient given the information for her nearest [facility] urgent care to get tested tomorrow and to see if she is a candidate for tamiflu. Ms. Mcwilliams verbalized understanding.   "

## 2018-04-25 ENCOUNTER — PES CALL (OUTPATIENT)
Dept: ADMINISTRATIVE | Facility: CLINIC | Age: 78
End: 2018-04-25

## 2018-04-30 DIAGNOSIS — I10 ESSENTIAL HYPERTENSION: ICD-10-CM

## 2018-04-30 RX ORDER — LISINOPRIL 2.5 MG/1
TABLET ORAL
Qty: 90 TABLET | Refills: 1 | Status: SHIPPED | OUTPATIENT
Start: 2018-04-30 | End: 2018-11-16 | Stop reason: SDUPTHER

## 2018-05-28 ENCOUNTER — LAB VISIT (OUTPATIENT)
Dept: LAB | Facility: HOSPITAL | Age: 78
End: 2018-05-28
Attending: INTERNAL MEDICINE
Payer: MEDICARE

## 2018-05-28 DIAGNOSIS — E78.5 HYPERLIPIDEMIA, UNSPECIFIED HYPERLIPIDEMIA TYPE: ICD-10-CM

## 2018-05-28 DIAGNOSIS — I10 ESSENTIAL HYPERTENSION: ICD-10-CM

## 2018-05-28 LAB
ALBUMIN SERPL BCP-MCNC: 3.8 G/DL
ALP SERPL-CCNC: 86 U/L
ALT SERPL W/O P-5'-P-CCNC: 22 U/L
ANION GAP SERPL CALC-SCNC: 7 MMOL/L
AST SERPL-CCNC: 22 U/L
BASOPHILS # BLD AUTO: 0.03 K/UL
BASOPHILS NFR BLD: 0.4 %
BILIRUB SERPL-MCNC: 0.6 MG/DL
BUN SERPL-MCNC: 20 MG/DL
CALCIUM SERPL-MCNC: 9.6 MG/DL
CHLORIDE SERPL-SCNC: 105 MMOL/L
CHOLEST SERPL-MCNC: 165 MG/DL
CHOLEST/HDLC SERPL: 2.6 {RATIO}
CO2 SERPL-SCNC: 27 MMOL/L
CREAT SERPL-MCNC: 0.7 MG/DL
DIFFERENTIAL METHOD: ABNORMAL
EOSINOPHIL # BLD AUTO: 0.4 K/UL
EOSINOPHIL NFR BLD: 5.1 %
ERYTHROCYTE [DISTWIDTH] IN BLOOD BY AUTOMATED COUNT: 13.7 %
EST. GFR  (AFRICAN AMERICAN): >60 ML/MIN/1.73 M^2
EST. GFR  (NON AFRICAN AMERICAN): >60 ML/MIN/1.73 M^2
GLUCOSE SERPL-MCNC: 99 MG/DL
HCT VFR BLD AUTO: 42.7 %
HDLC SERPL-MCNC: 63 MG/DL
HDLC SERPL: 38.2 %
HGB BLD-MCNC: 13.6 G/DL
IMM GRANULOCYTES # BLD AUTO: 0.01 K/UL
IMM GRANULOCYTES NFR BLD AUTO: 0.1 %
LDLC SERPL CALC-MCNC: 77 MG/DL
LYMPHOCYTES # BLD AUTO: 3.4 K/UL
LYMPHOCYTES NFR BLD: 42.9 %
MCH RBC QN AUTO: 29.2 PG
MCHC RBC AUTO-ENTMCNC: 31.9 G/DL
MCV RBC AUTO: 92 FL
MONOCYTES # BLD AUTO: 0.6 K/UL
MONOCYTES NFR BLD: 7.7 %
NEUTROPHILS # BLD AUTO: 3.4 K/UL
NEUTROPHILS NFR BLD: 43.8 %
NONHDLC SERPL-MCNC: 102 MG/DL
NRBC BLD-RTO: 0 /100 WBC
PLATELET # BLD AUTO: 191 K/UL
PMV BLD AUTO: 11.5 FL
POTASSIUM SERPL-SCNC: 4.4 MMOL/L
PROT SERPL-MCNC: 6.4 G/DL
RBC # BLD AUTO: 4.66 M/UL
SODIUM SERPL-SCNC: 139 MMOL/L
TRIGL SERPL-MCNC: 125 MG/DL
TSH SERPL DL<=0.005 MIU/L-ACNC: 2.25 UIU/ML
WBC # BLD AUTO: 7.83 K/UL

## 2018-05-28 PROCEDURE — 36415 COLL VENOUS BLD VENIPUNCTURE: CPT | Mod: PO

## 2018-05-28 PROCEDURE — 80061 LIPID PANEL: CPT

## 2018-05-28 PROCEDURE — 80053 COMPREHEN METABOLIC PANEL: CPT

## 2018-05-28 PROCEDURE — 84443 ASSAY THYROID STIM HORMONE: CPT

## 2018-05-28 PROCEDURE — 85025 COMPLETE CBC W/AUTO DIFF WBC: CPT

## 2018-06-01 ENCOUNTER — OFFICE VISIT (OUTPATIENT)
Dept: INTERNAL MEDICINE | Facility: CLINIC | Age: 78
End: 2018-06-01
Payer: MEDICARE

## 2018-06-01 VITALS
WEIGHT: 141.13 LBS | HEART RATE: 68 BPM | DIASTOLIC BLOOD PRESSURE: 82 MMHG | SYSTOLIC BLOOD PRESSURE: 136 MMHG | BODY MASS INDEX: 24.1 KG/M2 | HEIGHT: 64 IN

## 2018-06-01 DIAGNOSIS — E78.5 HYPERLIPIDEMIA, UNSPECIFIED HYPERLIPIDEMIA TYPE: ICD-10-CM

## 2018-06-01 DIAGNOSIS — Z00.00 PREVENTATIVE HEALTH CARE: Primary | ICD-10-CM

## 2018-06-01 DIAGNOSIS — Z12.39 BREAST CANCER SCREENING: ICD-10-CM

## 2018-06-01 DIAGNOSIS — Z78.0 ASYMPTOMATIC MENOPAUSAL STATE: ICD-10-CM

## 2018-06-01 DIAGNOSIS — M85.80 OSTEOPENIA, UNSPECIFIED LOCATION: ICD-10-CM

## 2018-06-01 DIAGNOSIS — I10 ESSENTIAL HYPERTENSION: ICD-10-CM

## 2018-06-01 DIAGNOSIS — I77.9 MILD CAROTID ARTERY DISEASE: ICD-10-CM

## 2018-06-01 PROCEDURE — 99397 PER PM REEVAL EST PAT 65+ YR: CPT | Mod: S$GLB,,, | Performed by: INTERNAL MEDICINE

## 2018-06-01 PROCEDURE — 99999 PR PBB SHADOW E&M-EST. PATIENT-LVL III: CPT | Mod: PBBFAC,,, | Performed by: INTERNAL MEDICINE

## 2018-06-01 PROCEDURE — 99499 UNLISTED E&M SERVICE: CPT | Mod: S$PBB,,, | Performed by: INTERNAL MEDICINE

## 2018-06-01 PROCEDURE — 3075F SYST BP GE 130 - 139MM HG: CPT | Mod: CPTII,S$GLB,, | Performed by: INTERNAL MEDICINE

## 2018-06-01 PROCEDURE — 3079F DIAST BP 80-89 MM HG: CPT | Mod: CPTII,S$GLB,, | Performed by: INTERNAL MEDICINE

## 2018-06-01 RX ORDER — ATORVASTATIN CALCIUM 20 MG/1
TABLET, FILM COATED ORAL
Qty: 90 TABLET | Refills: 1 | Status: SHIPPED | OUTPATIENT
Start: 2018-06-01 | End: 2019-01-31 | Stop reason: SDUPTHER

## 2018-06-01 RX ORDER — ATORVASTATIN CALCIUM 20 MG/1
TABLET, FILM COATED ORAL
Qty: 90 TABLET | Refills: 1 | Status: SHIPPED | OUTPATIENT
Start: 2018-06-01 | End: 2018-06-01 | Stop reason: SDUPTHER

## 2018-06-01 NOTE — PROGRESS NOTES
Subjective:       Patient ID: Sofía Mcwilliams is a 77 y.o. female.    Chief Complaint: Annual Exam    HPI 77-year-old female presents to clinic today for annual physical exam follow-up of hypertension dyslipidemia she is expecting her 1st grandchild in September very happy about this.  She is without any acute complaints.  Review of Systems  otherwise negative  Objective:      Physical Exam  General: Well-appearing, well-nourished.  No distress  HEENT: conjunctivae are normal.  Pupils are equal and reative to light.  TM's are clear and intact bilaterally.  Hearing is grossly normal.  Nasopharynx is clear.  Oropharynx is clear.  Neck: Supple.  No thyroid megaly.  No bruits.  Lymph: No cervical or supraclavicular adenopathy.  Heart: Regular rate and rhythm, without murmur, rub or gallop.  Lungs: Clear to auscultation; respiratory effort normal.  Abdomen: Soft, nontender, nondistended.  Normoactive bowel sounds.  No hepatomegaly.  No masses.  Extremities: Good distal pulses.  No edema.  Musculoskeletal: 5/5 Strength bilateral upper and lower extremities; free range of motion.  Neuro: No focal deficits.  DTR's are 2+ and equal throughout.  Sensation intact. Normal gait.  Skin: No lesions seen.  Psychiatric: Oriented to time, person, place.  Judgment and insight seem unimpaired.  Breasts: No masses, discharge, or tenderness.    Assessment:       1. Preventative health care    2. Essential hypertension    3. Hyperlipidemia, unspecified hyperlipidemia type    4. Mild carotid artery disease    5. Breast cancer screening    6. Osteopenia, unspecified location    7. Asymptomatic menopausal state         Plan:       Sofía CARRILLO was seen today for annual exam.    Diagnoses and all orders for this visit:    Preventative health care  Healthcare maintenance updated review today  Essential hypertension  -     Comprehensive metabolic panel; Future  -     Lipid panel; Future  Controlled.  Continue current medical regimen.  Prescription  refills addressed.  Followup advised. See after visit summary.  Hyperlipidemia, unspecified hyperlipidemia type  -     -     atorvastatin (LIPITOR) 20 MG tablet; TAKE 1 TABLET(20 MG) BY MOUTH EVERY DAY  -     Comprehensive metabolic panel; Future  -     Lipid panel; Future  Controlled.  Continue current medical regimen.  Prescription refills addressed.  Followup advised. See after visit summary.  Mild carotid artery disease  Continue risk factor management  Breast cancer screening  -     Mammo Digital Screening Bilat with CAD; Future    Osteopenia, unspecified location  -     DXA Bone Density Spine And Hip; Future    Asymptomatic menopausal state   -     DXA Bone Density Spine And Hip; Future

## 2018-06-08 ENCOUNTER — HOSPITAL ENCOUNTER (OUTPATIENT)
Dept: RADIOLOGY | Facility: CLINIC | Age: 78
Discharge: HOME | End: 2018-06-08
Attending: INTERNAL MEDICINE
Payer: MEDICARE

## 2018-06-08 ENCOUNTER — HOSPITAL ENCOUNTER (OUTPATIENT)
Dept: RADIOLOGY | Facility: HOSPITAL | Age: 78
Discharge: HOME | End: 2018-06-08
Attending: INTERNAL MEDICINE
Payer: MEDICARE

## 2018-06-08 DIAGNOSIS — Z78.0 ASYMPTOMATIC MENOPAUSAL STATE: ICD-10-CM

## 2018-06-08 DIAGNOSIS — Z12.39 BREAST CANCER SCREENING: ICD-10-CM

## 2018-06-08 DIAGNOSIS — M85.80 OSTEOPENIA, UNSPECIFIED LOCATION: ICD-10-CM

## 2018-06-08 PROCEDURE — 77063 BREAST TOMOSYNTHESIS BI: CPT | Mod: 26,,, | Performed by: RADIOLOGY

## 2018-06-08 PROCEDURE — 77067 SCR MAMMO BI INCL CAD: CPT | Mod: 26,,, | Performed by: RADIOLOGY

## 2018-06-08 PROCEDURE — 77080 DXA BONE DENSITY AXIAL: CPT | Mod: TC

## 2018-06-08 PROCEDURE — 77080 DXA BONE DENSITY AXIAL: CPT | Mod: 26,,, | Performed by: INTERNAL MEDICINE

## 2018-06-08 PROCEDURE — 77067 SCR MAMMO BI INCL CAD: CPT | Mod: TC

## 2018-07-17 ENCOUNTER — PES CALL (OUTPATIENT)
Dept: ADMINISTRATIVE | Facility: CLINIC | Age: 78
End: 2018-07-17

## 2018-08-30 ENCOUNTER — PATIENT MESSAGE (OUTPATIENT)
Dept: INTERNAL MEDICINE | Facility: CLINIC | Age: 78
End: 2018-08-30

## 2018-10-19 ENCOUNTER — OFFICE VISIT (OUTPATIENT)
Dept: OPTOMETRY | Facility: CLINIC | Age: 78
End: 2018-10-19
Payer: MEDICARE

## 2018-10-19 DIAGNOSIS — Z98.890 HISTORY OF REPAIR OF RETINAL TEAR BY LASER PHOTOCOAGULATION: Primary | ICD-10-CM

## 2018-10-19 DIAGNOSIS — Z13.5 SCREENING FOR GLAUCOMA: ICD-10-CM

## 2018-10-19 DIAGNOSIS — H52.4 PRESBYOPIA: ICD-10-CM

## 2018-10-19 DIAGNOSIS — H25.13 NUCLEAR SCLEROSIS, BILATERAL: ICD-10-CM

## 2018-10-19 PROCEDURE — 99999 PR PBB SHADOW E&M-EST. PATIENT-LVL II: CPT | Mod: PBBFAC,,, | Performed by: OPTOMETRIST

## 2018-10-19 PROCEDURE — 92015 DETERMINE REFRACTIVE STATE: CPT | Mod: ,,, | Performed by: OPTOMETRIST

## 2018-10-19 PROCEDURE — 92014 COMPRE OPH EXAM EST PT 1/>: CPT | Mod: S$PBB,,, | Performed by: OPTOMETRIST

## 2018-10-19 PROCEDURE — 99212 OFFICE O/P EST SF 10 MIN: CPT | Mod: PBBFAC,PO | Performed by: OPTOMETRIST

## 2018-11-16 DIAGNOSIS — I10 ESSENTIAL HYPERTENSION: ICD-10-CM

## 2018-11-16 RX ORDER — LISINOPRIL 2.5 MG/1
TABLET ORAL
Qty: 90 TABLET | Refills: 1 | Status: SHIPPED | OUTPATIENT
Start: 2018-11-16 | End: 2019-06-21 | Stop reason: SDUPTHER

## 2018-11-30 ENCOUNTER — LAB VISIT (OUTPATIENT)
Dept: LAB | Facility: HOSPITAL | Age: 78
End: 2018-11-30
Attending: INTERNAL MEDICINE
Payer: MEDICARE

## 2018-11-30 DIAGNOSIS — I10 ESSENTIAL HYPERTENSION: ICD-10-CM

## 2018-11-30 DIAGNOSIS — E78.5 HYPERLIPIDEMIA, UNSPECIFIED HYPERLIPIDEMIA TYPE: ICD-10-CM

## 2018-11-30 LAB
ALBUMIN SERPL BCP-MCNC: 3.7 G/DL
ALP SERPL-CCNC: 90 U/L
ALT SERPL W/O P-5'-P-CCNC: 23 U/L
ANION GAP SERPL CALC-SCNC: 5 MMOL/L
AST SERPL-CCNC: 22 U/L
BILIRUB SERPL-MCNC: 0.5 MG/DL
BUN SERPL-MCNC: 15 MG/DL
CALCIUM SERPL-MCNC: 9.4 MG/DL
CHLORIDE SERPL-SCNC: 107 MMOL/L
CHOLEST SERPL-MCNC: 151 MG/DL
CHOLEST/HDLC SERPL: 2.4 {RATIO}
CO2 SERPL-SCNC: 30 MMOL/L
CREAT SERPL-MCNC: 0.7 MG/DL
EST. GFR  (AFRICAN AMERICAN): >60 ML/MIN/1.73 M^2
EST. GFR  (NON AFRICAN AMERICAN): >60 ML/MIN/1.73 M^2
GLUCOSE SERPL-MCNC: 97 MG/DL
HDLC SERPL-MCNC: 63 MG/DL
HDLC SERPL: 41.7 %
LDLC SERPL CALC-MCNC: 66.2 MG/DL
NONHDLC SERPL-MCNC: 88 MG/DL
POTASSIUM SERPL-SCNC: 4.3 MMOL/L
PROT SERPL-MCNC: 6.5 G/DL
SODIUM SERPL-SCNC: 142 MMOL/L
TRIGL SERPL-MCNC: 109 MG/DL

## 2018-11-30 PROCEDURE — 80061 LIPID PANEL: CPT | Mod: HCNC

## 2018-11-30 PROCEDURE — 80053 COMPREHEN METABOLIC PANEL: CPT | Mod: HCNC

## 2018-11-30 PROCEDURE — 36415 COLL VENOUS BLD VENIPUNCTURE: CPT | Mod: HCNC,PO

## 2018-12-03 ENCOUNTER — HOSPITAL ENCOUNTER (OUTPATIENT)
Dept: RADIOLOGY | Facility: HOSPITAL | Age: 78
Discharge: HOME | End: 2018-12-03
Attending: INTERNAL MEDICINE
Payer: MEDICARE

## 2018-12-03 ENCOUNTER — OFFICE VISIT (OUTPATIENT)
Dept: INTERNAL MEDICINE | Facility: CLINIC | Age: 78
End: 2018-12-03
Payer: MEDICARE

## 2018-12-03 VITALS
SYSTOLIC BLOOD PRESSURE: 120 MMHG | DIASTOLIC BLOOD PRESSURE: 60 MMHG | WEIGHT: 140.88 LBS | HEART RATE: 64 BPM | HEIGHT: 64 IN | BODY MASS INDEX: 24.05 KG/M2

## 2018-12-03 DIAGNOSIS — M25.552 PAIN OF LEFT HIP JOINT: Primary | ICD-10-CM

## 2018-12-03 DIAGNOSIS — M25.552 PAIN OF LEFT HIP JOINT: ICD-10-CM

## 2018-12-03 DIAGNOSIS — I10 HYPERTENSION, UNSPECIFIED TYPE: ICD-10-CM

## 2018-12-03 DIAGNOSIS — M16.11 ARTHRITIS OF RIGHT HIP: ICD-10-CM

## 2018-12-03 DIAGNOSIS — M54.42 LOW BACK PAIN WITH LEFT-SIDED SCIATICA, UNSPECIFIED BACK PAIN LATERALITY, UNSPECIFIED CHRONICITY: ICD-10-CM

## 2018-12-03 DIAGNOSIS — M70.72 BURSITIS OF LEFT HIP, UNSPECIFIED BURSA: ICD-10-CM

## 2018-12-03 DIAGNOSIS — Z00.00 ROUTINE GENERAL MEDICAL EXAMINATION AT A HEALTH CARE FACILITY: ICD-10-CM

## 2018-12-03 PROCEDURE — 73521 X-RAY EXAM HIPS BI 2 VIEWS: CPT | Mod: TC,FY,HCNC,PO

## 2018-12-03 PROCEDURE — 3288F FALL RISK ASSESSMENT DOCD: CPT | Mod: CPTII,HCNC,S$GLB, | Performed by: INTERNAL MEDICINE

## 2018-12-03 PROCEDURE — 72100 X-RAY EXAM L-S SPINE 2/3 VWS: CPT | Mod: 26,HCNC,, | Performed by: RADIOLOGY

## 2018-12-03 PROCEDURE — 73521 X-RAY EXAM HIPS BI 2 VIEWS: CPT | Mod: 26,HCNC,, | Performed by: RADIOLOGY

## 2018-12-03 PROCEDURE — 99999 PR PBB SHADOW E&M-EST. PATIENT-LVL V: CPT | Mod: PBBFAC,HCNC,, | Performed by: INTERNAL MEDICINE

## 2018-12-03 PROCEDURE — 99214 OFFICE O/P EST MOD 30 MIN: CPT | Mod: HCNC,S$GLB,, | Performed by: INTERNAL MEDICINE

## 2018-12-03 PROCEDURE — 3074F SYST BP LT 130 MM HG: CPT | Mod: CPTII,HCNC,S$GLB, | Performed by: INTERNAL MEDICINE

## 2018-12-03 PROCEDURE — 72100 X-RAY EXAM L-S SPINE 2/3 VWS: CPT | Mod: TC,FY,HCNC,PO

## 2018-12-03 PROCEDURE — 3078F DIAST BP <80 MM HG: CPT | Mod: CPTII,HCNC,S$GLB, | Performed by: INTERNAL MEDICINE

## 2018-12-03 PROCEDURE — 1100F PTFALLS ASSESS-DOCD GE2>/YR: CPT | Mod: CPTII,HCNC,S$GLB, | Performed by: INTERNAL MEDICINE

## 2018-12-03 NOTE — PROGRESS NOTES
Subjective:       Patient ID: Sofía Mcwilliams is a 78 y.o. female.    Chief Complaint: Follow-up    HPI 78-year-old female presents to clinic today she complains of persistent pain in her left hip.  She has been diagnosed with arthritis advanced the right before.  The previous orthopedic specialist that she saw felt the left hip was more consistent with bursitis she received 2 steroid injections maybe a year apart for this helped for a couple months when she received on.  She really is looking for more sustained pain relief and wants to resume her regular exercise and aerobic activities.  She enjoys most when she is exercising and gardening.  She does have some pain in the lower back and buttock region this is not previously been evaluated.  Review of Systems  otherwise negative  Objective:      Physical Exam  General: Well-appearing, well-nourished.  No distress  HEENT: conjunctivae are normal.  Pupils are equal and reative to light.  TM's are clear and intact bilaterally.  Hearing is grossly normal.  Nasopharynx is clear.  Oropharynx is clear.  Neck: Supple.  No thyroid megaly.  No bruits.  Lymph: No cervical or supraclavicular adenopathy.  Heart: Regular rate and rhythm, without murmur, rub or gallop.  Lungs: Clear to auscultation; respiratory effort normal.  Abdomen: Soft, nontender, nondistended.  Normoactive bowel sounds.  No hepatomegaly.  No masses.  Extremities: Good distal pulses.  No edema.  Psych: Oriented to time person place.  Judgment and insight seem unimpaired.  Mood and affect are appropriate.  Muscle skeletal left hip pain to the lateral trochanteric bursa region  Assessment:       1. Pain of left hip joint    2. Bursitis of left hip, unspecified bursa    3. Low back pain with left-sided sciatica, unspecified back pain laterality, unspecified chronicity    4. Arthritis of right hip    5. Routine general medical examination at a health care facility    6. Hypertension, unspecified type        Plan:        Sofía CARRILLO was seen today for follow-up.    Diagnoses and all orders for this visit:    Pain of left hip joint  -     Cancel: Ambulatory consult to Orthopedics  -     X-Ray Hips Bilateral 2 View Incl AP Pelvis; Future  -     Ambulatory referral to Pain Clinic  -     Ambulatory referral to Physical Medicine Rehab    Bursitis of left hip, unspecified bursa  -     Cancel: Ambulatory consult to Orthopedics  -     Ambulatory referral to Pain Clinic  -     Ambulatory referral to Physical Medicine Rehab  Plan to refer to pain management patient may benefit from a bursa left hip injection and also possibly may require some type of SI injection as well.  Also after the pain management visit and like for her to see physical medicine rehab specialist Dr. Sen as she will be an excellent fit to help advise patient on physical activities that will help her get stronger and not exacerbate her problems or pain.  Low back pain with left-sided sciatica, unspecified back pain laterality, unspecified chronicity  -     X-Ray Lumbar Spine AP And Lateral; Future  -     Ambulatory referral to Pain Clinic  -     Ambulatory referral to Physical Medicine Rehab    Arthritis of right hip  -     X-Ray Hips Bilateral 2 View Incl AP Pelvis; Future  -     Ambulatory referral to Pain Clinic  -     Ambulatory referral to Physical Medicine Rehab    Routine general medical examination at a health care facility    Hypertension, unspecified type  -     Lipid panel; Future  -     TSH; Future  -     CBC auto differential; Future  -     Comprehensive metabolic panel; Future

## 2018-12-04 ENCOUNTER — PATIENT MESSAGE (OUTPATIENT)
Dept: INTERNAL MEDICINE | Facility: CLINIC | Age: 78
End: 2018-12-04

## 2018-12-28 ENCOUNTER — TELEPHONE (OUTPATIENT)
Dept: SPINE | Facility: CLINIC | Age: 78
End: 2018-12-28

## 2018-12-28 NOTE — TELEPHONE ENCOUNTER
Staff contacted the patient to reschedule 1/24/19 9 am Consultation with Dr. Herb MD due to the provider not being available. At this time the appointment is cancelled. Staff apologize for any inconvenience. Patient can reschedule by contacting our office at [phone].    Patient accepted an appointment for 1/28/19 at 2:30pm. Patient stated that worked out great due to having a dentist appointment on 1/24/19. Patient was pleased, verbalized understanding and expressed thanks.

## 2019-01-03 ENCOUNTER — OFFICE VISIT (OUTPATIENT)
Dept: PAIN MEDICINE | Facility: CLINIC | Age: 79
End: 2019-01-03
Attending: ANESTHESIOLOGY
Payer: MEDICARE

## 2019-01-03 VITALS — WEIGHT: 145.5 LBS | HEIGHT: 64 IN | TEMPERATURE: 98 F | RESPIRATION RATE: 18 BRPM | BODY MASS INDEX: 24.84 KG/M2

## 2019-01-03 DIAGNOSIS — G89.29 HIP PAIN, CHRONIC, UNSPECIFIED LATERALITY: Primary | ICD-10-CM

## 2019-01-03 DIAGNOSIS — M25.559 HIP PAIN, CHRONIC, UNSPECIFIED LATERALITY: Primary | ICD-10-CM

## 2019-01-03 DIAGNOSIS — M25.552 LEFT HIP PAIN: Primary | ICD-10-CM

## 2019-01-03 DIAGNOSIS — M16.10 PRIMARY OSTEOARTHRITIS OF HIP, UNSPECIFIED LATERALITY: ICD-10-CM

## 2019-01-03 DIAGNOSIS — M16.12 OSTEOARTHRITIS OF LEFT HIP, UNSPECIFIED OSTEOARTHRITIS TYPE: ICD-10-CM

## 2019-01-03 PROCEDURE — 99204 OFFICE O/P NEW MOD 45 MIN: CPT | Mod: HCNC,GC,S$GLB, | Performed by: ANESTHESIOLOGY

## 2019-01-03 PROCEDURE — 1101F PR PT FALLS ASSESS DOC 0-1 FALLS W/OUT INJ PAST YR: ICD-10-PCS | Mod: CPTII,HCNC,S$GLB, | Performed by: ANESTHESIOLOGY

## 2019-01-03 PROCEDURE — 99999 PR PBB SHADOW E&M-EST. PATIENT-LVL III: CPT | Mod: PBBFAC,HCNC,, | Performed by: ANESTHESIOLOGY

## 2019-01-03 PROCEDURE — 99204 PR OFFICE/OUTPT VISIT, NEW, LEVL IV, 45-59 MIN: ICD-10-PCS | Mod: HCNC,GC,S$GLB, | Performed by: ANESTHESIOLOGY

## 2019-01-03 PROCEDURE — 99999 PR PBB SHADOW E&M-EST. PATIENT-LVL III: ICD-10-PCS | Mod: PBBFAC,HCNC,, | Performed by: ANESTHESIOLOGY

## 2019-01-03 PROCEDURE — 1101F PT FALLS ASSESS-DOCD LE1/YR: CPT | Mod: CPTII,HCNC,S$GLB, | Performed by: ANESTHESIOLOGY

## 2019-01-03 NOTE — PROGRESS NOTES
Chronic Pain - New Consult    Referring Physician: Natalia Stubbs MD    Chief Complaint: hip pain     SUBJECTIVE:    Sofía Mcwilliams presents to the clinic for the evaluation of left hip pain. The pain started 2 1/2 years ago following unknown and symptoms have been worsening.The pain is located in the left hip area and radiates to the left buttock and left leg pain.  The pain is described as aching, burning, throbbing and constant and intermittent and is rated as 0/10. The pain is rated with a score of  7/10 on the BEST day and a score of 4/10 on the WORST day.  Symptoms interfere with daily activity. The pain is exacerbated by Walking.  The pain is mitigated by heat, physical therapy and rest. She reports spending  hours per day reclining. The patient reports 7-8 hours of uninterrupted sleep per night. She does wake up occassionally with left hip     Ms. Mcwilliams presents with atraumatic left hip pain for roughly 2/12 years. She was evaluated by Dr. [facility] roughly 2 years ago, no surgery performed at the time, and she  also evaluated by Dr. Cohen for  left hip pain and had a left hip/bursa injection, prescribed meloxicam and started PT all of which provided relief. She noticed her left hip pain has worsened over the past 5 months and is interfering with her daily activities.      Patient denies night fever/night sweats, urinary incontinence, bowel incontinence, significant weight loss, significant motor weakness and loss of sensations.    Physical Therapy/Home Exercise: yes provides temporary relief    Pain Disability Index Review:  Last 3 PDI Scores 1/3/2019   Pain Disability Index (PDI) 25       Pain Medications:    - Adjuvant Medications: meloxicam     report:  Reviewed and consistent with medication use as prescribed.    Pain Procedures: none    Imagin18 Bilateral Hip xray  Narrative     EXAMINATION:  XR HIPS BILATERAL 2 VIEW INCL AP PELVIS    CLINICAL HISTORY:  Pain in left hip    TECHNIQUE:  AP  view of the pelvis and frogleg lateral views of both hips were performed.    COMPARISON:  None.    FINDINGS:  Significant DJD and hip joint space narrowing identified bilaterally.  No fracture or bone destruction identified.  Mild coxa valga      Impression       See above      Electronically signed by: Papo Montero MD  Date: 12/03/2018  Time: 09:41        12/3/18 Xray lumbar spine   Narrative     EXAMINATION:  XR LUMBAR SPINE AP AND LATERAL    CLINICAL HISTORY:  Low back pain, >6wks conservative tx, persistent-progressive sx, surgical candidate;Lumbago with sciatica, left side    TECHNIQUE:  AP, lateral and spot images were performed of the lumbar spine.    COMPARISON:  None    FINDINGS:  Mild DJD and lumbar scoliosis.  The disc spaces are narrowed between the L2 and L5 vertebral segments.  Slight narrowing of the lumbosacral disc space also noted.  No fracture, spondylolisthesis or bone destruction identified      Impression       See above      Electronically signed by: Papo Montero MD  Date: 12/03/2018  Time: 09:41         Past Medical History:   Diagnosis Date    Anxiety 5/2/2016    Arthritis     Basal cell carcinoma 2013    left buttock    Cataract     Hypertension     Other and unspecified hyperlipidemia      Past Surgical History:   Procedure Laterality Date    APPENDECTOMY      bunion      right    COLONOSCOPY N/A 9/28/2015    Performed by [facility] at [facility] ([facility] F[facility])    HYSTERECTOMY      partial    TONSILLECTOMY       Social History     Socioeconomic History    Marital status:      Spouse name: Not on file    Number of children: Not on file    Years of education: Not on file    Highest education level: Not on file   Social Needs    Financial resource strain: Not on file    Food insecurity - worry: Not on file    Food insecurity - inability: Not on file    Transportation needs - medical: Not on file    Transportation needs - non-medical: Not on file   Occupational  History    Occupation: RN   Tobacco Use    Smoking status: Former Smoker     Last attempt to quit: 1965     Years since quittin.1    Smokeless tobacco: Never Used   Substance and Sexual Activity    Alcohol use: Yes     Comment: Occ.    Drug use: No    Sexual activity: Yes   Other Topics Concern    Are you pregnant or think you may be? No    Breast-feeding No   Social History Narrative    Not on file     Family History   Adopted: Yes       Review of patient's allergies indicates:   Allergen Reactions    Celecoxib      Other reaction(s): Swelling    Sulfa (sulfonamide antibiotics)      Other reaction(s): Hives       Current Outpatient Medications   Medication Sig    aspirin (ECOTRIN) 81 MG EC tablet Take 1 tablet by mouth.    atorvastatin (LIPITOR) 20 MG tablet TAKE 1 TABLET(20 MG) BY MOUTH EVERY DAY    CALCIUM CARB/VIT D3/MINERALS (CALCIUM-VITAMIN D ORAL) once daily.     coenzyme Q10 10 mg capsule Take by mouth.    lisinopril (PRINIVIL,ZESTRIL) 2.5 MG tablet TAKE 1 TABLET EVERY DAY    meloxicam (MOBIC) 15 MG tablet Take 15 mg by mouth once daily.    multivitamin (MULTIPLE VITAMIN) per tablet Take 1 tablet by mouth.    VITAMIN B COMPLEX ORAL Take by mouth once daily.     vitamin D 1000 units Tab Take 185 mg by mouth once daily.     No current facility-administered medications for this visit.        REVIEW OF SYSTEMS:    GENERAL:  No weight loss, malaise or fevers.  HEENT:  Negative for frequent or significant headaches.  NECK:  Negative for lumps, goiter, pain and significant neck swelling.  RESPIRATORY:  Negative for cough, wheezing or shortness of breath.  CARDIOVASCULAR:  Negative for chest pain, leg swelling or palpitations. +HTN   GI:  Negative for abdominal discomfort, blood in stools or black stools or change in bowel habits.  MUSCULOSKELETAL:  See HPI.  SKIN:  Negative for lesions, rash, and itching.  PSYCH:  Negative for sleep disturbance, mood disorder and recent psychosocial  "stressors.  HEMATOLOGY/LYMPHOLOGY:  Negative for prolonged bleeding, bruising easily or swollen nodes.  NEURO:   No history of headaches, syncope, paralysis, seizures or tremors.  All other reviewed and negative other than HPI.    OBJECTIVE:    Temp 97.9 °F (36.6 °C) (Oral)   Resp 18   Ht 5' 4" (1.626 m)   Wt 66 kg (145 lb 8 oz)   BMI 24.98 kg/m²     PHYSICAL EXAMINATION:    General appearance: Well appearing, in no acute distress, alert and oriented x3.  Psych:  Mood and affect appropriate.  Skin: Skin color, texture, turgor normal, no rashes or lesions, in both upper and lower body.  Head/face:  Normocephalic, atraumatic. No palpable lymph nodes.  Cor: RRR  Pulm: CTA  GI:  Soft and non-tender.  Back: Straight leg raising in the sitting and supine positions is negative to radicular pain. No pain to palpation over the spine or costovertebral angles. Normal range of motion without pain reproduction.  Extremities: Peripheral joint ROM is full and pain free without obvious instability or laxity in all four extremities. No deformities, edema, or skin discoloration. Good capillary refill.  Musculoskeletal:  hip, sacroiliac and knee provocative maneuvers are positive on the left . TTP over left GTB  Bilateral  lower extremity strength is normal and symmetric.  No atrophy or tone abnormalities are noted.  Neuro: Bilateral  lower extremity coordination and muscle stretch reflexes are physiologic and symmetric.  Plantar response are downgoing. No loss of sensation is noted.  Gait: normal.    ASSESSMENT: 78 y.o. year old female with left hip  pain, consistent with diagnosis below     1. Hip pain, chronic, unspecified laterality     2. Primary osteoarthritis of hip, unspecified laterality           PLAN:     Previous imaging was reviewed and discussed with the patient today.     -Schedule for left hip intraarticular hip injection and left bursa injection    -Consider left hip RFA in the future if the hip injections do not " provide relief, she was provided a coolief pamphlet    -Consider a left SIJ injection in the future, positive left Scarlett's and TTP on the left sacroiliac joint     - I have stressed the importance of physical activity and a home exercise plan to help with pain and improve health.    - Patient can continue with medications for now since they are providing benefits, using them appropriately, and without side effects.    - RTC 4 weeks after left hip intraarticular injection and greater trochanteric bursa injection     - Counseled patient regarding the importance of activity modification, constant sleeping habits and physical therapy.    The above plan and management options were discussed at length with patient. Patient is in agreement with the above and verbalized understanding. It will be communicated with the referring physician via electronic record, fax, or mail.    Sherri Grossman MD PGY-5  [facility]    I have personally reviewed the history and exam of this patient and agree with the resident/fellow/NPs note as stated above.  Omega Estevez MD    01/03/2019

## 2019-01-03 NOTE — LETTER
January 3, 2019      Natalia Stubbs MD  [address]  [place] [address]           [facility]  [address]  [place] [address]  Phone: [phone]  Fax: [phone]          Patient: Sofía Mcwilliams   MR Number: [id number]   YOB: 1940   Date of Visit: 1/3/2019       Dear Dr. Natalia Stubbs:    Thank you for referring Sofía Mcwilliams to me for evaluation. Attached you will find relevant portions of my assessment and plan of care.    If you have questions, please do not hesitate to call me. I look forward to following Sofía Mcwilliams along with you.    Sincerely,    Omega Estevez MD    Enclosure  CC:  No Recipients    If you would like to receive this communication electronically, please contact [email or web address] or [phone] to request more information on [employer] access.    For providers and/or their staff who would like to refer a patient to [facility], please contact us through our one-stop-shop provider referral line, [facility], at [phone].    If you feel you have received this communication in error or would no longer like to receive these types of communications, please e-mail [email or web address]

## 2019-01-09 ENCOUNTER — HOSPITAL ENCOUNTER (OUTPATIENT)
Facility: OTHER | Age: 79
Discharge: HOME | End: 2019-01-09
Attending: ANESTHESIOLOGY | Admitting: ANESTHESIOLOGY
Payer: MEDICARE

## 2019-01-09 VITALS
BODY MASS INDEX: 23.9 KG/M2 | SYSTOLIC BLOOD PRESSURE: 172 MMHG | WEIGHT: 140 LBS | HEART RATE: 76 BPM | DIASTOLIC BLOOD PRESSURE: 73 MMHG | HEIGHT: 64 IN | OXYGEN SATURATION: 98 % | RESPIRATION RATE: 18 BRPM | TEMPERATURE: 98 F

## 2019-01-09 DIAGNOSIS — M70.60 GREATER TROCHANTERIC BURSITIS, UNSPECIFIED LATERALITY: ICD-10-CM

## 2019-01-09 DIAGNOSIS — M16.10 PRIMARY OSTEOARTHRITIS OF HIP, UNSPECIFIED LATERALITY: Primary | ICD-10-CM

## 2019-01-09 DIAGNOSIS — G89.29 CHRONIC PAIN: ICD-10-CM

## 2019-01-09 PROCEDURE — 20610 DRAIN/INJ JOINT/BURSA W/O US: CPT | Mod: HCNC | Performed by: ANESTHESIOLOGY

## 2019-01-09 PROCEDURE — 20610 DRAIN/INJ JOINT/BURSA W/O US: CPT | Mod: HCNC,LT,, | Performed by: ANESTHESIOLOGY

## 2019-01-09 PROCEDURE — 25000003 PHARM REV CODE 250: Mod: HCNC | Performed by: ANESTHESIOLOGY

## 2019-01-09 PROCEDURE — 20610 PR DRAIN/INJECT LARGE JOINT/BURSA: ICD-10-PCS | Mod: 59,HCNC,LT, | Performed by: ANESTHESIOLOGY

## 2019-01-09 PROCEDURE — 77002 NEEDLE LOCALIZATION BY XRAY: CPT | Mod: 26,HCNC,, | Performed by: ANESTHESIOLOGY

## 2019-01-09 PROCEDURE — 77002 NEEDLE LOCALIZATION BY XRAY: CPT | Mod: HCNC | Performed by: ANESTHESIOLOGY

## 2019-01-09 PROCEDURE — 63600175 PHARM REV CODE 636 W HCPCS: Mod: HCNC | Performed by: ANESTHESIOLOGY

## 2019-01-09 PROCEDURE — 25500020 PHARM REV CODE 255: Mod: HCNC | Performed by: ANESTHESIOLOGY

## 2019-01-09 PROCEDURE — 77002 PR FLUOROSCOPIC GUIDANCE NEEDLE PLACEMENT: ICD-10-PCS | Mod: 26,HCNC,, | Performed by: ANESTHESIOLOGY

## 2019-01-09 RX ORDER — BUPIVACAINE HYDROCHLORIDE 2.5 MG/ML
INJECTION, SOLUTION EPIDURAL; INFILTRATION; INTRACAUDAL
Status: DISCONTINUED | OUTPATIENT
Start: 2019-01-09 | End: 2019-01-09 | Stop reason: HOSPADM

## 2019-01-09 RX ORDER — ALPRAZOLAM 0.5 MG/1
0.5 TABLET, ORALLY DISINTEGRATING ORAL
Status: DISCONTINUED | OUTPATIENT
Start: 2019-01-09 | End: 2019-01-09 | Stop reason: HOSPADM

## 2019-01-09 RX ORDER — LIDOCAINE HYDROCHLORIDE 10 MG/ML
INJECTION INFILTRATION; PERINEURAL
Status: DISCONTINUED | OUTPATIENT
Start: 2019-01-09 | End: 2019-01-09 | Stop reason: HOSPADM

## 2019-01-09 RX ORDER — TRIAMCINOLONE ACETONIDE 40 MG/ML
INJECTION, SUSPENSION INTRA-ARTICULAR; INTRAMUSCULAR
Status: DISCONTINUED | OUTPATIENT
Start: 2019-01-09 | End: 2019-01-09 | Stop reason: HOSPADM

## 2019-01-09 NOTE — OP NOTE
Patient Name: Sofía Mcwilliams  MRN: [id number]    INFORMED CONSENT: The procedure, risks, benefits and options were discussed with patient. There are no contraindications to the procedure. The patient expressed understanding and agreed to proceed. The personnel performing the procedure was discussed. I verify that I personally obtained Sofía CARRILLO's consent prior to the start of the procedure and the signed consent can be found on the patient's chart.    Procedure Date: 01/09/2019    Anesthesia: Topical    Pre Procedure diagnosis: Left hip pain [M25.552]  Osteoarthritis of left hip, unspecified osteoarthritis type [M16.12]  1. Primary osteoarthritis of hip, unspecified laterality    2. Greater trochanteric bursitis, unspecified laterality    3. Chronic pain      Post-Procedure diagnosis: SAME    Sedation: None    PROCEDURE: left ACETABULOFEMORAL JOINT INJECTION (HIP)      DESCRIPTION OF PROCEDURE: The patient was brought to the procedure room.  After performing time out. IV access was obtained prior to the procedure.  The patient was positioned supine on the fluoroscopy table.  Continuous hemodynamic monitoring was initiated including blood pressure, EKG, and pulse oximetry. .  The skin overlying the hip was prepped with chlorhexidine and draped in a sterile fashion.  The skin and subcutaneous tissue was anesthetized using 3 cc of lidocaine 1%.  A 22 gauge, 3 1/2 spinal needle was slowly advanced through under fluoroscopic guidance into the acetabulofemoral joint. The needle position was confirmed using oblique, AP and lateral fluoroscopic imaging.  Negative aspiration was confirmed. 2 cc of Omnipaque 300 was injected confirming intra-articular contrast spread. A combination of 5 cc of bupivacaine 0.25% and 30 mg kenalog was easily injected. The needle was removed and bleeding was nill.  A sterile dressing was applied.      PROCEDURE: left GREATER TROCHANTERIC BURSA INJECTION      DESCRIPTION OF PROCEDURE: The skin overlying  "the greater trochanter was prepped with povidone-iodine three times and draped in a sterile fashion. The skin and subcutaneous tissue was anesthetized using 3 cc of lidocaine 1%. A  22 gauge 5" spinal needle was slowly advanced through under fluoroscopic guidance into the greater trochanteric bursa. The needle position was confirmed using oblique, AP and lateral fluoroscopic imaging. Negative aspiration was confirmed. 2 cc of Omnipaque 300 was injected confirming appropriate contrast spread. A combination of 5 cc of Bupivacaine 0.25% and 30 mg kenalog was easily injected. The needle was removed and bleeding was nil. A sterile dressing was applied. Sofía CARRILLO was taken back to the recovery room for further observation.     Blood Loss: Nill  Specimen: None    Omega Estevez MD    "

## 2019-01-09 NOTE — DISCHARGE SUMMARY
Discharge Note  Short Stay      SUMMARY     Admit Date: 1/9/2019    Attending Physician: Omega Estevez      Discharge Physician: Omega Estevez      Discharge Date: 1/9/2019 11:34 AM    Procedure(s) (LRB):  Injection, LEFT HIP INTRAARTICULAR INJECTION (Left)  Injection LEFT GREATER TROCHANTERIC BURSA INJECTION (Left)    Final Diagnosis: Left hip pain [M25.552]  Osteoarthritis of left hip, unspecified osteoarthritis type [M16.12]    Disposition: Home or self care    Patient Instructions:   Current Discharge Medication List      CONTINUE these medications which have NOT CHANGED    Details   aspirin (ECOTRIN) 81 MG EC tablet Take 1 tablet by mouth.      atorvastatin (LIPITOR) 20 MG tablet TAKE 1 TABLET(20 MG) BY MOUTH EVERY DAY  Qty: 90 tablet, Refills: 1    Associated Diagnoses: Hyperlipidemia, unspecified hyperlipidemia type      CALCIUM CARB/VIT D3/MINERALS (CALCIUM-VITAMIN D ORAL) once daily.       coenzyme Q10 10 mg capsule Take by mouth.      lisinopril (PRINIVIL,ZESTRIL) 2.5 MG tablet TAKE 1 TABLET EVERY DAY  Qty: 90 tablet, Refills: 1    Associated Diagnoses: Essential hypertension      meloxicam (MOBIC) 15 MG tablet Take 15 mg by mouth once daily.      multivitamin (MULTIPLE VITAMIN) per tablet Take 1 tablet by mouth.      VITAMIN B COMPLEX ORAL Take by mouth once daily.       vitamin D 1000 units Tab Take 185 mg by mouth once daily.                 Discharge Diagnosis: Left hip pain [M25.552]  Osteoarthritis of left hip, unspecified osteoarthritis type [M16.12]  Condition on Discharge: Stable with no complications to procedure   Diet on Discharge: Same as before.  Activity: as per instruction sheet.  Discharge to: Home with a responsible adult.  Follow up: 2-4 weeks

## 2019-01-09 NOTE — DISCHARGE INSTRUCTIONS
Thank you for allowing us to care for you today. You may receive a survey about the care we provided. Your feedback is valuable and helps us provide excellent care throughout the community.     Home Care Instructions for Pain Management:    1. DIET:   You may resume your normal diet today.   2. BATHING:   You may shower with luke warm water. No tub baths or anything that will soak injection sites under water for the next 24 hours.  3. DRESSING:   You may remove your bandage today.   4. ACTIVITY LEVEL:   You may resume your normal activities 24 hrs after your procedure. Nothing strenuous today.  5. MEDICATIONS:   You may resume your normal medications today. To restart blood thinners, ask your doctor.  6. DRIVING    If you have received any sedatives by mouth today, you may not drive for 12 hours.    If you have received any sedation through your IV, you may not drive for 24 hrs.   7. SPECIAL INSTRUCTIONS:   No heat to the injection site for 24 hrs including, hot bath or shower, heating pad, moist heat, or hot tubs.    Use ice pack to injection site for any pain or discomfort.  Apply ice packs for 20 minute intervals as needed.    IF you have diabetes, be sure to monitor your blood sugar more closely. IF your injection contained steroids your blood sugar levels may become higher than normal.    If you are still having pain upon discharge:  Your pain may improve over the next 48 hours. The anesthetic (numbing medication) works immediately to 48 hours. IF your injection contained a steroid (anti-inflammatory medication), it takes approximately 3 days to start feeling relief and 7-10 days to see your greatest results from the medication. It is possible you may need subsequent injections. This would be discussed at your follow up appointment with pain management or your referring doctor.      PLEASE CALL YOUR DOCTOR IF:  1. Redness or swelling around the injection site.  2. Fever of 101 degrees or more  3. Drainage  (pus) from the injection site.  4. For any continuous bleeding (some dried blood over the incision is normal.)    FOR EMERGENCIES:   If any unusual problems or difficulties occur during clinic hours, call [phone] or [phone].

## 2019-01-23 ENCOUNTER — PES CALL (OUTPATIENT)
Dept: ADMINISTRATIVE | Facility: CLINIC | Age: 79
End: 2019-01-23

## 2019-01-31 DIAGNOSIS — E78.5 HYPERLIPIDEMIA, UNSPECIFIED HYPERLIPIDEMIA TYPE: ICD-10-CM

## 2019-02-01 RX ORDER — ATORVASTATIN CALCIUM 20 MG/1
TABLET, FILM COATED ORAL
Qty: 90 TABLET | Refills: 1 | Status: SHIPPED | OUTPATIENT
Start: 2019-02-01 | End: 2019-07-12 | Stop reason: SDUPTHER

## 2019-02-04 ENCOUNTER — OFFICE VISIT (OUTPATIENT)
Dept: SPINE | Facility: CLINIC | Age: 79
End: 2019-02-04
Attending: PHYSICAL MEDICINE & REHABILITATION
Payer: MEDICARE

## 2019-02-04 VITALS
WEIGHT: 143 LBS | BODY MASS INDEX: 24.41 KG/M2 | HEIGHT: 64 IN | DIASTOLIC BLOOD PRESSURE: 61 MMHG | HEART RATE: 83 BPM | SYSTOLIC BLOOD PRESSURE: 141 MMHG

## 2019-02-04 DIAGNOSIS — M16.0 PRIMARY OSTEOARTHRITIS OF BOTH HIPS: Primary | ICD-10-CM

## 2019-02-04 DIAGNOSIS — G89.29 CHRONIC BILATERAL LOW BACK PAIN WITHOUT SCIATICA: ICD-10-CM

## 2019-02-04 DIAGNOSIS — M25.552 LEFT HIP PAIN: ICD-10-CM

## 2019-02-04 DIAGNOSIS — M54.50 CHRONIC BILATERAL LOW BACK PAIN WITHOUT SCIATICA: ICD-10-CM

## 2019-02-04 PROCEDURE — 99999 PR PBB SHADOW E&M-EST. PATIENT-LVL III: ICD-10-PCS | Mod: PBBFAC,HCNC,, | Performed by: PHYSICAL MEDICINE & REHABILITATION

## 2019-02-04 PROCEDURE — 99204 PR OFFICE/OUTPT VISIT, NEW, LEVL IV, 45-59 MIN: ICD-10-PCS | Mod: HCNC,S$GLB,, | Performed by: PHYSICAL MEDICINE & REHABILITATION

## 2019-02-04 PROCEDURE — 99999 PR PBB SHADOW E&M-EST. PATIENT-LVL III: CPT | Mod: PBBFAC,HCNC,, | Performed by: PHYSICAL MEDICINE & REHABILITATION

## 2019-02-04 PROCEDURE — 1101F PR PT FALLS ASSESS DOC 0-1 FALLS W/OUT INJ PAST YR: ICD-10-PCS | Mod: HCNC,CPTII,S$GLB, | Performed by: PHYSICAL MEDICINE & REHABILITATION

## 2019-02-04 PROCEDURE — 3077F SYST BP >= 140 MM HG: CPT | Mod: HCNC,CPTII,S$GLB, | Performed by: PHYSICAL MEDICINE & REHABILITATION

## 2019-02-04 PROCEDURE — 3078F PR MOST RECENT DIASTOLIC BLOOD PRESSURE < 80 MM HG: ICD-10-PCS | Mod: HCNC,CPTII,S$GLB, | Performed by: PHYSICAL MEDICINE & REHABILITATION

## 2019-02-04 PROCEDURE — 3078F DIAST BP <80 MM HG: CPT | Mod: HCNC,CPTII,S$GLB, | Performed by: PHYSICAL MEDICINE & REHABILITATION

## 2019-02-04 PROCEDURE — 3077F PR MOST RECENT SYSTOLIC BLOOD PRESSURE >= 140 MM HG: ICD-10-PCS | Mod: HCNC,CPTII,S$GLB, | Performed by: PHYSICAL MEDICINE & REHABILITATION

## 2019-02-04 PROCEDURE — 1101F PT FALLS ASSESS-DOCD LE1/YR: CPT | Mod: HCNC,CPTII,S$GLB, | Performed by: PHYSICAL MEDICINE & REHABILITATION

## 2019-02-04 PROCEDURE — 99204 OFFICE O/P NEW MOD 45 MIN: CPT | Mod: HCNC,S$GLB,, | Performed by: PHYSICAL MEDICINE & REHABILITATION

## 2019-02-04 NOTE — PROGRESS NOTES
Subjective:      Patient ID: Sofía Mcwilliams is a 78 y.o. female.    Chief Complaint: Low-back Pain and Hip Pain (left )    Referred by: Natalia Stubbs MD     Ms Mcwilliams is a 79 yo female here for evaluation of left hip and low back pain.  She had a left hip joint injection on 1/9/2019.  She has had left hip pain for the past 3 years.  She has been going to PT and she had a trochanteric bursa injection with ortho 1.5 years ago, but felt like it wasn't getting better.  She had to stop exercising.  She had an injection in the hip on 1/9 and she feels like it made everything better.  It was not complete relief, about 75% better, but she can walk again.  She can walk 30-45 minutes now, she was not able to make it 15 minutes.  The pain is in the groin and burning in the buttock and tired feeling in the back.  She could not put socks and shoes on and now she can.  She can lift her leg and put it in the car.  The pain is a mild achy back pain.  She has been climbing ladders.  She does have some burning butt pain with walking too much.  She has been going to therapydia with no relief.  She has not continued her exercises.  Pain is 0/10 now, worst 6/10 carrying her new grandchild, best 0/10    X-ray hips 12/2018  Significant DJD and hip joint space narrowing identified bilaterally.  No fracture or bone destruction identified.  Mild coxa valga    X-ray lumbar 12/2018  Mild DJD and lumbar scoliosis.  The disc spaces are narrowed between the L2 and L5 vertebral segments.  Slight narrowing of the lumbosacral disc space also noted.  No fracture, spondylolisthesis or bone destruction identified    Past Medical History:  5/2/2016: Anxiety  No date: Arthritis  2013: Basal cell carcinoma      Comment:  left buttock  No date: Cataract  No date: Hypertension  No date: Other and unspecified hyperlipidemia    Past Surgical History:  No date: APPENDECTOMY  No date: bunion      Comment:  right  9/28/2015: COLONOSCOPY; N/A      Comment:   Performed by [facility]os [facility] at [facility] (4TH FLR)  No date: HYSTERECTOMY      Comment:  partial  2019: Injection LEFT GREATER TROCHANTERIC BURSA INJECTION; Left      Comment:  [facility]  2019: Injection, LEFT HIP INTRAARTICULAR INJECTION; Left      Comment:  Performed by [facility]stevez MD at [facility]  No date: TONSILLECTOMY    Review of patient's family history indicates:  Adopted:  Yes      Social History    Socioeconomic History      Marital status:       Spouse name: None      Number of children: None      Years of education: None      Highest education level: None    Social Needs      Financial resource strain: None      Food insecurity - worry: None      Food insecurity - inability: None      Transportation needs - medical: None      Transportation needs - non-medical: None    Occupational History      Occupation: RN    Tobacco Use      Smoking status: Former Smoker        Quit date: 1965        Years since quittin.2      Smokeless tobacco: Never Used    Substance and Sexual Activity      Alcohol use: Yes        Comment: Occ.      Drug use: No      Sexual activity: Yes    Other Topics      Concerns:        Are you pregnant or think you may be?: No        Breast-feeding: No    Social History Narrative      None      Current Outpatient Medications:  aspirin (ECOTRIN) 81 MG EC tablet, Take 1 tablet by mouth., Disp: , Rfl:   atorvastatin (LIPITOR) 20 MG tablet, TAKE 1 TABLET EVERY DAY, Disp: 90 tablet, Rfl: 1  CALCIUM CARB/VIT D3/MINERALS (CALCIUM-VITAMIN D ORAL), once daily. , Disp: , Rfl:   coenzyme Q10 10 mg capsule, Take by mouth., Disp: , Rfl:   lisinopril (PRINIVIL,ZESTRIL) 2.5 MG tablet, TAKE 1 TABLET EVERY DAY, Disp: 90 tablet, Rfl: 1  meloxicam (MOBIC) 15 MG tablet, Take 15 mg by mouth once daily., Disp: , Rfl:   multivitamin (MULTIPLE VITAMIN) per tablet, Take 1 tablet by mouth., Disp: , Rfl:   VITAMIN B COMPLEX ORAL, Take  by mouth once daily. , Disp: , Rfl:   vitamin D 1000 units Tab, Take 185 mg by mouth once daily., Disp: , Rfl:     No current facility-administered medications for this visit.       Review of patient's allergies indicates:   -- Celecoxib     --  Other reaction(s): Swelling   -- Sulfa (sulfonamide antibiotics)     --  Other reaction(s): Hives                Review of Systems   Constitution: Negative for weight gain and weight loss.   Cardiovascular: Negative for chest pain.   Respiratory: Negative for shortness of breath.    Musculoskeletal: Positive for back pain (and left groin). Negative for joint pain and joint swelling.   Gastrointestinal: Negative for abdominal pain, bowel incontinence, nausea and vomiting.   Genitourinary: Negative for bladder incontinence.   Neurological: Positive for paresthesias (left toes tingle morning only). Negative for numbness.           Objective:          General    Vitals reviewed.  Constitutional: She is oriented to person, place, and time. She appears well-developed and well-nourished.   HENT:   Head: Normocephalic and atraumatic.   Pulmonary/Chest: Effort normal.   Neurological: She is alert and oriented to person, place, and time.   Psychiatric: She has a normal mood and affect. Her behavior is normal. Judgment and thought content normal.     General Musculoskeletal Exam   Gait: normal     Right Ankle/Foot Exam     Tests   Heel Walk: able to perform  Tiptoe Walk: able to perform    Left Ankle/Foot Exam     Tests   Heel Walk: able to perform  Tiptoe Walk: able to perform      Right Hip Exam     Range of Motion   External rotation: 50   Internal rotation: 5     Tests   Pain w/ forced internal rotation (MARTIN): absent  Left Hip Exam     Tenderness   The patient tender to palpation of the trochanteric bursa. Also left side trochanteric tenderness.    Range of Motion   External rotation: 40   Internal rotation: 0     Tests   Pain w/ forced internal rotation (MARTIN): absent      Back  (L-Spine & T-Spine) / Neck (C-Spine) Exam     Tenderness   The patient is tender to palpation of the left side trochanteric.     Back (L-Spine & T-Spine) Range of Motion   Extension: 10   Flexion: 80   Lateral bend right: 10   Lateral bend left: 10   Rotation right: 30   Rotation left: 30     Spinal Sensation   Right Side Sensation  C-Spine Level: normal   L-Spine Level: normal  S-Spine Level: normal  Left Side Sensation  C-Spine Level: normal  L-Spine Level: normal  S-Spine Level: normal    Back (L-Spine & T-Spine) Tests   Right Side Tests  Straight leg raise:      Sitting SLR: > 70 degrees      Left Side Tests  Straight leg raise:     Sitting SLR: > 70 degrees          Other She has no scoliosis .  Spinal Kyphosis:  Absent      Muscle Strength   Right Upper Extremity   Biceps: 5/5/5   Deltoid:  5/5  Triceps:  5/5  Wrist extension: 5/5/5   Finger Flexors:  5/5  Left Upper Extremity  Biceps: 5/5/5   Deltoid:  5/5  Triceps:  5/5  Wrist extension: 5/5/5   Finger Flexors:  5/5  Right Lower Extremity   Hip Flexion: 5/5   Quadriceps:  5/5   Anterior tibial:  5/5/5  EHL:  5/5  Left Lower Extremity   Hip Flexion: 5/5   Quadriceps:  5/5   Anterior tibial:  5/5/5   EHL:  5/5    Reflexes     Left Side  Biceps:  2+  Triceps:  2+  Brachioradialis:  2+  Quadriceps:  2+  Achilles:  2+  Left Berkowitz's Sign:  Absent  Babinski Sign:  absent    Right Side   Biceps:  2+  Triceps:  2+  Brachioradialis:  2+  Quadriceps:  2+  Achilles:  2+  Right Berkowitz's Sign:  absent  Babinski Sign:  absent    Vascular Exam     Right Pulses        Carotid:                  2+    Left Pulses        Carotid:                  2+        Assessment:       Encounter Diagnoses   Name Primary?    Primary osteoarthritis of both hips Yes    Left hip pain     Chronic bilateral low back pain without sciatica          Plan:       Sofía CARRILLO was seen today for low-back pain and hip pain.    Diagnoses and all orders for this visit:    Primary osteoarthritis of both  hips    Left hip pain    Chronic bilateral low back pain without sciatica       More than 50% of the total time of 45 minutes was spent in counseling on diagnosis and treatment options. We discussed hip and back pain and the nature of hip and back pain.  We discussed the hip DJD and the relief with the injection points to the hip as the source of the pain.  She is going to follow up with .  She would like to stay active.  We discussed the importance of continuing to exercise and stay active.  We did discuss when limits life and activity a hip replacement is a good option.    1.  She will follow up with Dr. Estevez, we discussed repeat injections, we discussed cool RFA  2.  We discussed gardening on a stool and taking breaks.  We discussed getting on spin bike gradually and trying to see how hips feel  3.  She is going to continue exercise and strengthening and restart her HEP from PT  4.  We did discuss SHANNAN in the future and not waiting too long.  Her  has some health issues and she needs to wait until after he is better.  She has seen Brayden Cohen in the past. She is not ready yet  5.  RTC PRN

## 2019-02-04 NOTE — LETTER
February 4, 2019      Natalia Stubbs MD  [address] [place] [address]           [facility]  [address], [address]  [place] [address]  Phone: [phone]  Fax: [phone]          Patient: Sofía Mcwilliams   MR Number: [id number]   YOB: 1940   Date of Visit: 2/4/2019       Dear Dr. Natalia Stubbs:    Thank you for referring Sofía Mcwilliams to me for evaluation. Attached you will find relevant portions of my assessment and plan of care.    If you have questions, please do not hesitate to call me. I look forward to following Sofía Mcwilliams along with you.    Sincerely,    Iris Sen MD    Enclosure  CC:  No Recipients    If you would like to receive this communication electronically, please contact [email or web address] or [phone] to request more information on [employer] access.    For providers and/or their staff who would like to refer a patient to [facility], please contact us through our one-stop-shop provider referral line, [facility], at [phone].    If you feel you have received this communication in error or would no longer like to receive these types of communications, please e-mail [email or web address]

## 2019-02-07 ENCOUNTER — OFFICE VISIT (OUTPATIENT)
Dept: PAIN MEDICINE | Facility: CLINIC | Age: 79
End: 2019-02-07
Attending: ANESTHESIOLOGY
Payer: MEDICARE

## 2019-02-07 VITALS
DIASTOLIC BLOOD PRESSURE: 72 MMHG | HEIGHT: 64 IN | BODY MASS INDEX: 24.1 KG/M2 | SYSTOLIC BLOOD PRESSURE: 139 MMHG | HEART RATE: 73 BPM | WEIGHT: 141.13 LBS | TEMPERATURE: 98 F

## 2019-02-07 DIAGNOSIS — M70.60 GREATER TROCHANTERIC BURSITIS, UNSPECIFIED LATERALITY: Primary | ICD-10-CM

## 2019-02-07 DIAGNOSIS — M16.12 OSTEOARTHRITIS OF LEFT HIP, UNSPECIFIED OSTEOARTHRITIS TYPE: ICD-10-CM

## 2019-02-07 DIAGNOSIS — M47.819 SPONDYLOSIS WITHOUT MYELOPATHY: ICD-10-CM

## 2019-02-07 PROCEDURE — 99214 OFFICE O/P EST MOD 30 MIN: CPT | Mod: HCNC,GC,S$GLB, | Performed by: ANESTHESIOLOGY

## 2019-02-07 PROCEDURE — 3075F PR MOST RECENT SYSTOLIC BLOOD PRESS GE 130-139MM HG: ICD-10-PCS | Mod: HCNC,CPTII,S$GLB, | Performed by: ANESTHESIOLOGY

## 2019-02-07 PROCEDURE — 1101F PR PT FALLS ASSESS DOC 0-1 FALLS W/OUT INJ PAST YR: ICD-10-PCS | Mod: HCNC,CPTII,S$GLB, | Performed by: ANESTHESIOLOGY

## 2019-02-07 PROCEDURE — 99999 PR PBB SHADOW E&M-EST. PATIENT-LVL III: ICD-10-PCS | Mod: PBBFAC,HCNC,, | Performed by: ANESTHESIOLOGY

## 2019-02-07 PROCEDURE — 99214 PR OFFICE/OUTPT VISIT, EST, LEVL IV, 30-39 MIN: ICD-10-PCS | Mod: HCNC,GC,S$GLB, | Performed by: ANESTHESIOLOGY

## 2019-02-07 PROCEDURE — 1101F PT FALLS ASSESS-DOCD LE1/YR: CPT | Mod: HCNC,CPTII,S$GLB, | Performed by: ANESTHESIOLOGY

## 2019-02-07 PROCEDURE — 3078F DIAST BP <80 MM HG: CPT | Mod: HCNC,CPTII,S$GLB, | Performed by: ANESTHESIOLOGY

## 2019-02-07 PROCEDURE — 99999 PR PBB SHADOW E&M-EST. PATIENT-LVL III: CPT | Mod: PBBFAC,HCNC,, | Performed by: ANESTHESIOLOGY

## 2019-02-07 PROCEDURE — 3078F PR MOST RECENT DIASTOLIC BLOOD PRESSURE < 80 MM HG: ICD-10-PCS | Mod: HCNC,CPTII,S$GLB, | Performed by: ANESTHESIOLOGY

## 2019-02-07 PROCEDURE — 3075F SYST BP GE 130 - 139MM HG: CPT | Mod: HCNC,CPTII,S$GLB, | Performed by: ANESTHESIOLOGY

## 2019-02-07 NOTE — PROGRESS NOTES
Chronic patient Established Note (Follow up visit)      SUBJECTIVE:    Sofía Mcwilliams presents to the clinic for a follow-up appointment for left hip. Since the last visit, Sofía Mcwilliams states the pain has been improving. Current pain intensity is 1/10.    Patient states she is finally able to lift her leg and states if she has had 80% reduction in pain, especially when she is not active. She is able to climb a ladder. Throughout the last week, the pain comes back sooner. Still taking mobic.  She has decided that she will proceed with SHANNAN likely in  or July.   Patient will be taking a trip in first week of April and would like to get injection prior to her trip.    Pain Disability Index Review:  Last 3 PDI Scores 2019 1/3/2019   Pain Disability Index (PDI) 9 25       Pain Medications:    - Adjuvant Medications: meloxicam    Opioid Contract: no     report:  Reviewed and consistent with medication use as prescribed.    Pain Procedures:   19 Left intraarticular injection with 80% reduction in pain    Physical Therapy/Home Exercise: yes with benefit    Imagin/03/18 Bilateral Hip xray  Narrative       EXAMINATION:  XR HIPS BILATERAL 2 VIEW INCL AP PELVIS    CLINICAL HISTORY:  Pain in left hip    TECHNIQUE:  AP view of the pelvis and frogleg lateral views of both hips were performed.    COMPARISON:  None.    FINDINGS:  Significant DJD and hip joint space narrowing identified bilaterally.  No fracture or bone destruction identified.  Mild coxa valga       Impression         See above      Electronically signed by: Papo Montero MD  Date: 2018  Time: 09:41          12/3/18 Xray lumbar spine   Narrative       EXAMINATION:  XR LUMBAR SPINE AP AND LATERAL    CLINICAL HISTORY:  Low back pain, >6wks conservative tx, persistent-progressive sx, surgical candidate;Lumbago with sciatica, left side    TECHNIQUE:  AP, lateral and spot images were performed of the lumbar  spine.    COMPARISON:  None    FINDINGS:  Mild DJD and lumbar scoliosis.  The disc spaces are narrowed between the L2 and L5 vertebral segments.  Slight narrowing of the lumbosacral disc space also noted.  No fracture, spondylolisthesis or bone destruction identified       Impression         See above      Electronically signed by: Papo Montero MD  Date: 12/03/2018  Time: 09:41             Allergies:   Review of patient's allergies indicates:   Allergen Reactions    Celecoxib      Other reaction(s): Swelling    Sulfa (sulfonamide antibiotics)      Other reaction(s): Hives       Current Medications:   Current Outpatient Medications   Medication Sig Dispense Refill    aspirin (ECOTRIN) 81 MG EC tablet Take 1 tablet by mouth.      atorvastatin (LIPITOR) 20 MG tablet TAKE 1 TABLET EVERY DAY 90 tablet 1    CALCIUM CARB/VIT D3/MINERALS (CALCIUM-VITAMIN D ORAL) once daily.       coenzyme Q10 10 mg capsule Take by mouth.      lisinopril (PRINIVIL,ZESTRIL) 2.5 MG tablet TAKE 1 TABLET EVERY DAY 90 tablet 1    meloxicam (MOBIC) 15 MG tablet Take 15 mg by mouth once daily.      multivitamin (MULTIPLE VITAMIN) per tablet Take 1 tablet by mouth.      VITAMIN B COMPLEX ORAL Take by mouth once daily.       vitamin D 1000 units Tab Take 185 mg by mouth once daily.       No current facility-administered medications for this visit.        REVIEW OF SYSTEMS:    GENERAL:  No weight loss, malaise or fevers.  HEENT:  Negative for frequent or significant headaches.  NECK:  Negative for lumps, goiter, pain and significant neck swelling.  RESPIRATORY:  Negative for cough, wheezing or shortness of breath.  CARDIOVASCULAR:  Negative for chest pain, leg swelling or palpitations.  GI:  Negative for abdominal discomfort, blood in stools or black stools or change in bowel habits.  MUSCULOSKELETAL:  See HPI.  SKIN:  Negative for lesions, rash, and itching.  PSYCH:  Negative for sleep disturbance, mood disorder and recent psychosocial  "stressors.  HEMATOLOGY/LYMPHOLOGY:  Negative for prolonged bleeding, bruising easily or swollen nodes.  NEURO:   No history of headaches, syncope, paralysis, seizures or tremors.  All other reviewed and negative other than HPI.    Past Medical History:  Past Medical History:   Diagnosis Date    Anxiety 2016    Arthritis     Basal cell carcinoma 2013    left buttock    Cataract     Hypertension     Other and unspecified hyperlipidemia        Past Surgical History:  Past Surgical History:   Procedure Laterality Date    APPENDECTOMY      bunion      right    COLONOSCOPY N/A 2015    [facility] ([facility])    HYSTERECTOMY      partial    Injection LEFT GREATER TROCHANTERIC BURSA INJECTION Left 2019    [facility] PAIN MGT    Injection, LEFT HIP INTRAARTICULAR INJECTION Left 2019    [facility]    TONSILLECTOMY         Family History:  Family History   Adopted: Yes       Social History:  Social History     Socioeconomic History    Marital status:      Spouse name: None    Number of children: None    Years of education: None    Highest education level: None   Social Needs    Financial resource strain: None    Food insecurity - worry: None    Food insecurity - inability: None    Transportation needs - medical: None    Transportation needs - non-medical: None   Occupational History    Occupation: RN   Tobacco Use    Smoking status: Former Smoker     Last attempt to quit: 1965     Years since quittin.2    Smokeless tobacco: Never Used   Substance and Sexual Activity    Alcohol use: Yes     Comment: Occ.    Drug use: No    Sexual activity: Yes   Other Topics Concern    Are you pregnant or think you may be? No    Breast-feeding No   Social History Narrative    None       OBJECTIVE:    /72   Pulse 73   Temp 98 °F (36.7 °C) (Oral)   Ht 5' 4" (1.626 m)   " Wt 64 kg (141 lb 1.6 oz)   BMI 24.22 kg/m²     PHYSICAL EXAMINATION:    General appearance: Well appearing, in no acute distress, alert and oriented x3.  Psych:  Mood and affect appropriate.  Skin: Skin color, texture, turgor normal, no rashes or lesions, in both upper and lower body.  Head/face:  Atraumatic, normocephalic. No palpable lymph nodes  Neck: No pain to palpation over the cervical paraspinous muscles. Spurling Negative. No pain with neck flexion, extension, or lateral flexion. .  Cor: RRR  Pulm: CTA  GI: Abdomen soft and non-tender.  Back: Straight leg raising in the sitting and supine positions is negative to radicular pain. mild pain to palpation over the spine or costovertebral angles. Normal range of motion without pain reproduction.  Extremities: Peripheral joint ROM is full and pain free without obvious instability or laxity in all four extremities. No deformities, edema, or skin discoloration. Good capillary refill.  Musculoskeletal: . Bilateral upper and lower extremity strength is normal and symmetric.  No atrophy or tone abnormalities are noted. Pain to palpation of L trochanter bursa  Neuro: Bilateral upper and lower extremity coordination and muscle stretch reflexes are physiologic and symmetric.  Plantar response are downgoing. No loss of sensation is noted.  Gait: Normal.    ASSESSMENT: 78 y.o. year old female with L hip pain from osteoarthritis. Patient is s/p L intra-articular injection on 1/9/2019 with good relief.     1. Primary osteoarthritis of hip, unspecified laterality     2. Osteoarthritis of left hip, unspecified osteoarthritis type           PLAN:     - Schedule for repeat intra-articular hip injection of Left hip and greater tronchanteric bursa injection  - RTC 4 weeks after procedure  - Counseled patient regarding the importance of continuing physical therapy.    The above plan and management options were discussed at length with patient. Patient is in agreement with the  above and verbalized understanding.    Maria L Dominguez  02/07/2019  PGY3   I have personally reviewed the history and exam of this patient and agree with the resident/fellow/NPs note as stated above.    Omega Estevez MD  02/07/2019

## 2019-03-13 ENCOUNTER — HOSPITAL ENCOUNTER (OUTPATIENT)
Facility: OTHER | Age: 79
Discharge: HOME | End: 2019-03-13
Attending: ANESTHESIOLOGY | Admitting: ANESTHESIOLOGY
Payer: MEDICARE

## 2019-03-13 VITALS
TEMPERATURE: 98 F | HEIGHT: 64 IN | SYSTOLIC BLOOD PRESSURE: 148 MMHG | BODY MASS INDEX: 23.9 KG/M2 | HEART RATE: 70 BPM | OXYGEN SATURATION: 98 % | RESPIRATION RATE: 18 BRPM | DIASTOLIC BLOOD PRESSURE: 70 MMHG | WEIGHT: 140 LBS

## 2019-03-13 DIAGNOSIS — M25.552 PAIN OF LEFT HIP JOINT: Primary | ICD-10-CM

## 2019-03-13 DIAGNOSIS — M70.62 GREATER TROCHANTERIC BURSITIS OF LEFT HIP: ICD-10-CM

## 2019-03-13 DIAGNOSIS — G89.29 CHRONIC PAIN: ICD-10-CM

## 2019-03-13 PROCEDURE — 20610 PR DRAIN/INJECT LARGE JOINT/BURSA: ICD-10-PCS | Mod: LT,,, | Performed by: PHYSICAL MEDICINE & REHABILITATION

## 2019-03-13 PROCEDURE — 25000003 PHARM REV CODE 250: Mod: HCNC | Performed by: ANESTHESIOLOGY

## 2019-03-13 PROCEDURE — 63600175 PHARM REV CODE 636 W HCPCS: Mod: HCNC | Performed by: ANESTHESIOLOGY

## 2019-03-13 PROCEDURE — 20610 DRAIN/INJ JOINT/BURSA W/O US: CPT | Mod: LT,,, | Performed by: PHYSICAL MEDICINE & REHABILITATION

## 2019-03-13 PROCEDURE — 25500020 PHARM REV CODE 255: Mod: HCNC | Performed by: ANESTHESIOLOGY

## 2019-03-13 PROCEDURE — 20610 DRAIN/INJ JOINT/BURSA W/O US: CPT | Mod: HCNC | Performed by: ANESTHESIOLOGY

## 2019-03-13 PROCEDURE — 77002 NEEDLE LOCALIZATION BY XRAY: CPT | Mod: HCNC | Performed by: ANESTHESIOLOGY

## 2019-03-13 RX ORDER — LIDOCAINE HYDROCHLORIDE 10 MG/ML
INJECTION INFILTRATION; PERINEURAL
Status: DISCONTINUED | OUTPATIENT
Start: 2019-03-13 | End: 2019-03-13 | Stop reason: HOSPADM

## 2019-03-13 RX ORDER — TRIAMCINOLONE ACETONIDE 40 MG/ML
INJECTION, SUSPENSION INTRA-ARTICULAR; INTRAMUSCULAR
Status: DISCONTINUED | OUTPATIENT
Start: 2019-03-13 | End: 2019-03-13 | Stop reason: HOSPADM

## 2019-03-13 RX ORDER — SODIUM CHLORIDE 9 MG/ML
500 INJECTION, SOLUTION INTRAVENOUS CONTINUOUS
Status: DISCONTINUED | OUTPATIENT
Start: 2019-03-13 | End: 2019-03-13 | Stop reason: HOSPADM

## 2019-03-13 RX ORDER — BUPIVACAINE HYDROCHLORIDE 2.5 MG/ML
INJECTION, SOLUTION EPIDURAL; INFILTRATION; INTRACAUDAL
Status: DISCONTINUED | OUTPATIENT
Start: 2019-03-13 | End: 2019-03-13 | Stop reason: HOSPADM

## 2019-03-13 NOTE — DISCHARGE INSTRUCTIONS
Thank you for allowing us to care for you today. You may receive a survey about the care we provided. Your feedback is valuable and helps us provide excellent care throughout the community.     Home Care Instructions for Pain Management:    1. DIET:   You may resume your normal diet today.   2. BATHING:   You may shower with luke warm water. No tub baths or anything that will soak injection sites under water for the next 24 hours.  3. DRESSING:   You may remove your bandage today.   4. ACTIVITY LEVEL:   You may resume your normal activities 24 hrs after your procedure. Nothing strenuous today.  5. MEDICATIONS:   You may resume your normal medications today. To restart blood thinners, ask your doctor.  6. DRIVING    If you have received any sedatives by mouth today, you may not drive for 12 hours.    If you have received any sedation through your IV, you may not drive for 24 hrs.   7. SPECIAL INSTRUCTIONS:   No heat to the injection site for 24 hrs including, hot bath or shower, heating pad, moist heat, or hot tubs.    Use ice pack to injection site for any pain or discomfort.  Apply ice packs for 20 minute intervals as needed.    IF you have diabetes, be sure to monitor your blood sugar more closely. IF your injection contained steroids your blood sugar levels may become higher than normal.    If you are still having pain upon discharge:  Your pain may improve over the next 48 hours. The anesthetic (numbing medication) works immediately to 48 hours. IF your injection contained a steroid (anti-inflammatory medication), it takes approximately 3 days to start feeling relief and 7-10 days to see your greatest results from the medication. It is possible you may need subsequent injections. This would be discussed at your follow up appointment with pain management or your referring doctor.      PLEASE CALL YOUR DOCTOR IF:  1. Redness or swelling around the injection site.  2. Fever of 101 degrees or more  3. Drainage  (pus) from the injection site.  4. For any continuous bleeding (some dried blood over the incision is normal.)    FOR EMERGENCIES:   If any unusual problems or difficulties occur during clinic hours, call [phone] or [phone].

## 2019-04-11 ENCOUNTER — OFFICE VISIT (OUTPATIENT)
Dept: PAIN MEDICINE | Facility: CLINIC | Age: 79
End: 2019-04-11
Attending: ANESTHESIOLOGY
Payer: MEDICARE

## 2019-04-11 VITALS
RESPIRATION RATE: 18 BRPM | HEIGHT: 64 IN | HEART RATE: 72 BPM | WEIGHT: 138.88 LBS | TEMPERATURE: 98 F | DIASTOLIC BLOOD PRESSURE: 63 MMHG | SYSTOLIC BLOOD PRESSURE: 123 MMHG | BODY MASS INDEX: 23.71 KG/M2

## 2019-04-11 DIAGNOSIS — M16.9 OSTEOARTHRITIS OF HIP, UNSPECIFIED LATERALITY, UNSPECIFIED OSTEOARTHRITIS TYPE: Primary | ICD-10-CM

## 2019-04-11 PROCEDURE — 1101F PT FALLS ASSESS-DOCD LE1/YR: CPT | Mod: HCNC,CPTII,S$GLB, | Performed by: ANESTHESIOLOGY

## 2019-04-11 PROCEDURE — 3078F PR MOST RECENT DIASTOLIC BLOOD PRESSURE < 80 MM HG: ICD-10-PCS | Mod: HCNC,CPTII,S$GLB, | Performed by: ANESTHESIOLOGY

## 2019-04-11 PROCEDURE — 3078F DIAST BP <80 MM HG: CPT | Mod: HCNC,CPTII,S$GLB, | Performed by: ANESTHESIOLOGY

## 2019-04-11 PROCEDURE — 99213 PR OFFICE/OUTPT VISIT, EST, LEVL III, 20-29 MIN: ICD-10-PCS | Mod: HCNC,GC,S$GLB, | Performed by: ANESTHESIOLOGY

## 2019-04-11 PROCEDURE — 99999 PR PBB SHADOW E&M-EST. PATIENT-LVL III: ICD-10-PCS | Mod: PBBFAC,HCNC,, | Performed by: ANESTHESIOLOGY

## 2019-04-11 PROCEDURE — 3074F SYST BP LT 130 MM HG: CPT | Mod: HCNC,CPTII,S$GLB, | Performed by: ANESTHESIOLOGY

## 2019-04-11 PROCEDURE — 3074F PR MOST RECENT SYSTOLIC BLOOD PRESSURE < 130 MM HG: ICD-10-PCS | Mod: HCNC,CPTII,S$GLB, | Performed by: ANESTHESIOLOGY

## 2019-04-11 PROCEDURE — 99213 OFFICE O/P EST LOW 20 MIN: CPT | Mod: HCNC,GC,S$GLB, | Performed by: ANESTHESIOLOGY

## 2019-04-11 PROCEDURE — 99999 PR PBB SHADOW E&M-EST. PATIENT-LVL III: CPT | Mod: PBBFAC,HCNC,, | Performed by: ANESTHESIOLOGY

## 2019-04-11 PROCEDURE — 1101F PR PT FALLS ASSESS DOC 0-1 FALLS W/OUT INJ PAST YR: ICD-10-PCS | Mod: HCNC,CPTII,S$GLB, | Performed by: ANESTHESIOLOGY

## 2019-04-11 NOTE — PROGRESS NOTES
Chronic patient Established Note (Follow up visit)      SUBJECTIVE:    Sofía Mcwilliams presents to the clinic for a follow-up appointment for 4 week follow up. Since the last visit, Sofía Mcwilliams states the pain has been improving. Current pain intensity is 2/10.    Ms. Mcwilliams presents for follow up left hip and GTB injection with 98% relief. She is scheduled for left hip SHANNAN  surgery with Dr. Cohen in [place] on 19. She continues to take mobic with some benefit. She denies changes in her health history.      Pain Disability Index Review:  Last 3 PDI Scores 2019 1/3/2019   Pain Disability Index (PDI) 9 25       Pain Medications:    - Opioids: n/a  - Adjuvant Medications: Mobic (Meloxicam)  - Anti-Coagulants: Aspirin  - Others: n/a    Opioid Contract: no     report:  Reviewed and consistent with medication use as prescribed.    Pain Procedures:  3/13/19 Left Hip GTB 97% relief  19 Left intraarticular injection with 80% reduction in pain         Physical Therapy/Home Exercise: yes    Imagin18 Bilateral Hip xray  Narrative       EXAMINATION:  XR HIPS BILATERAL 2 VIEW INCL AP PELVIS    CLINICAL HISTORY:  Pain in left hip    TECHNIQUE:  AP view of the pelvis and frogleg lateral views of both hips were performed.    COMPARISON:  None.    FINDINGS:  Significant DJD and hip joint space narrowing identified bilaterally.  No fracture or bone destruction identified.  Mild coxa valga       Impression         See above      Electronically signed by: Papo Montero MD  Date: 2018  Time: 09:41          12/3/18 Xray lumbar spine   Narrative       EXAMINATION:  XR LUMBAR SPINE AP AND LATERAL    CLINICAL HISTORY:  Low back pain, >6wks conservative tx, persistent-progressive sx, surgical candidate;Lumbago with sciatica, left side    TECHNIQUE:  AP, lateral and spot images were performed of the lumbar spine.    COMPARISON:  None    FINDINGS:  Mild DJD and lumbar scoliosis.  The disc spaces are narrowed  between the L2 and L5 vertebral segments.  Slight narrowing of the lumbosacral disc space also noted.  No fracture, spondylolisthesis or bone destruction identified       Impression         See above                        Allergies:   Review of patient's allergies indicates:   Allergen Reactions    Celecoxib      Other reaction(s): Swelling    Sulfa (sulfonamide antibiotics)      Other reaction(s): Hives       Current Medications:   Current Outpatient Medications   Medication Sig Dispense Refill    aspirin (ECOTRIN) 81 MG EC tablet Take 1 tablet by mouth.      atorvastatin (LIPITOR) 20 MG tablet TAKE 1 TABLET EVERY DAY 90 tablet 1    CALCIUM CARB/VIT D3/MINERALS (CALCIUM-VITAMIN D ORAL) once daily.       coenzyme Q10 10 mg capsule Take by mouth.      lisinopril (PRINIVIL,ZESTRIL) 2.5 MG tablet TAKE 1 TABLET EVERY DAY 90 tablet 1    meloxicam (MOBIC) 15 MG tablet Take 15 mg by mouth once daily.      multivitamin (MULTIPLE VITAMIN) per tablet Take 1 tablet by mouth.      VITAMIN B COMPLEX ORAL Take by mouth once daily.       vitamin D 1000 units Tab Take 185 mg by mouth once daily.       No current facility-administered medications for this visit.        REVIEW OF SYSTEMS:    GENERAL:  No weight loss, malaise or fevers.  HEENT:  Negative for frequent or significant headaches.  NECK:  Negative for lumps, goiter, pain and significant neck swelling.  RESPIRATORY:  Negative for cough, wheezing or shortness of breath.  CARDIOVASCULAR:  Negative for chest pain, leg swelling or palpitations.  GI:  Negative for abdominal discomfort, blood in stools or black stools or change in bowel habits.  MUSCULOSKELETAL:  See HPI.  SKIN:  Negative for lesions, rash, and itching.  PSYCH:  + for sleep disturbance, mood disorder and recent psychosocial stressors.  HEMATOLOGY/LYMPHOLOGY:  Negative for prolonged bleeding, bruising easily or swollen nodes.  NEURO:   No history of headaches, syncope, paralysis, seizures or  tremors.  All other reviewed and negative other than HPI.    Past Medical History:  Past Medical History:   Diagnosis Date    Anxiety 2016    Arthritis     Basal cell carcinoma 2013    left buttock    Cataract     Hypertension     Other and unspecified hyperlipidemia        Past Surgical History:  Past Surgical History:   Procedure Laterality Date    APPENDECTOMY      bunion      right    COLONOSCOPY N/A 2015    Performed by [facility] at [facility])    HYSTERECTOMY      partial    Injection LEFT GREATER TROCHANTERIC BURSA INJECTION Left 2019    Performed by Omega Estevez MD at [facility] [place], operated by [facility]    INJECTION LEFT GTB Left 3/13/2019    Performed by Omega Estevez MD at [facility] [place]    INJECTION, JOINT LEFT HIP Left 3/13/2019    Performed by Omega Estevez MD at [facility] [place]    Injection, LEFT HIP INTRAARTICULAR INJECTION Left 2019    Performed by Omega Estevez MD at [facility] [place]    TONSILLECTOMY         Family History:  Family History   Adopted: Yes       Social History:  Social History     Socioeconomic History    Marital status:      Spouse name: Not on file    Number of children: Not on file    Years of education: Not on file    Highest education level: Not on file   Occupational History    Occupation: RN   Social Needs    Financial resource strain: Not on file    Food insecurity:     Worry: Not on file     Inability: Not on file    Transportation needs:     Medical: Not on file     Non-medical: Not on file   Tobacco Use    Smoking status: Former Smoker     Last attempt to quit: 1965     Years since quittin.4    Smokeless tobacco: Never Used   Substance and Sexual Activity    Alcohol use: Yes     Comment: Occ.    Drug use: No    Sexual activity: Yes   Lifestyle    Physical activity:     Days per week: Not on file     Minutes per session: Not on file    Stress: Not on file   Relationships    Social connections:      "Talks on phone: Not on file     Gets together: Not on file     Attends Sikhism service: Not on file     Active member of club or organization: Not on file     Attends meetings of clubs or organizations: Not on file     Relationship status: Not on file   Other Topics Concern    Are you pregnant or think you may be? No    Breast-feeding No   Social History Narrative    Not on file       OBJECTIVE:    Resp 18   Ht 5' 4" (1.626 m)   BMI 24.03 kg/m²     PHYSICAL EXAMINATION:    General appearance: Well appearing, in no acute distress, alert and oriented x3.  Psych:  Mood and affect appropriate.  Skin: Skin color, texture, turgor normal, no rashes or lesions, in both upper and lower body.  Head/face:  Atraumatic, normocephalic. No palpable lymph nodes  Neck: No pain to palpation over the cervical paraspinous muscles. Spurling Negative. No pain with neck flexion, extension, or lateral flexion. .  Cor: RRR  Pulm: CTA  GI: Abdomen soft and non-tender.  Back: Straight leg raising in the sitting and supine positions is negative to radicular pain. mild pain to palpation over the spine or costovertebral angles. Normal range of motion without pain reproduction.  Extremities: Peripheral joint ROM is full and pain free without obvious instability or laxity in all four extremities. No deformities, edema, or skin discoloration. Good capillary refill.  Musculoskeletal: . Bilateral upper and lower extremity strength is normal and symmetric.  No atrophy or tone abnormalities are noted.  Mild tenderness to palpation of L trochanter bursa  Neuro: Bilateral upper and lower extremity coordination and muscle stretch reflexes are physiologic and symmetric.  Plantar response are downgoing. No loss of sensation is noted.  Gait: Normal.          ASSESSMENT: 78 y.o. year old female with left hip pain, consistent with      1. Osteoarthritis of hip, unspecified laterality, unspecified osteoarthritis type           PLAN:     -She is scheduled " for left SHANNAN on 5/22/19 with Dr. Cohen    - I have stressed the importance of physical activity and a home exercise plan to help with pain and improve health.    - Patient can continue with medications for now since they are providing benefits, using them appropriately, and without side effects  .  -She reports some pain on the right hip, as of today it is tolerable however we can provide hip injections for the right after her left SHANNAN     - RTC PRN    - Counseled patient regarding the importance of activity modification, constant sleeping habits and physical therapy.    The above plan and management options were discussed at length with patient. Patient is in agreement with the above and verbalized understanding.    Sherri Grossman MD PGY-5  [facility] Fellow    I have personally reviewed the history and exam of this patient and agree with the resident/fellow/NPs note as stated above.    Omega Estevez MD    04/11/2019

## 2019-05-08 ENCOUNTER — OFFICE VISIT (OUTPATIENT)
Dept: INTERNAL MEDICINE | Facility: CLINIC | Age: 79
End: 2019-05-08
Payer: MEDICARE

## 2019-05-08 VITALS
BODY MASS INDEX: 24.24 KG/M2 | SYSTOLIC BLOOD PRESSURE: 114 MMHG | WEIGHT: 142 LBS | HEIGHT: 64 IN | DIASTOLIC BLOOD PRESSURE: 58 MMHG | OXYGEN SATURATION: 98 % | HEART RATE: 72 BPM

## 2019-05-08 DIAGNOSIS — M16.9 OSTEOARTHRITIS OF HIP, UNSPECIFIED LATERALITY, UNSPECIFIED OSTEOARTHRITIS TYPE: Primary | ICD-10-CM

## 2019-05-08 DIAGNOSIS — I10 ESSENTIAL HYPERTENSION: ICD-10-CM

## 2019-05-08 DIAGNOSIS — Z12.39 BREAST CANCER SCREENING: ICD-10-CM

## 2019-05-08 DIAGNOSIS — E78.5 HYPERLIPIDEMIA, UNSPECIFIED HYPERLIPIDEMIA TYPE: ICD-10-CM

## 2019-05-08 PROCEDURE — 1101F PR PT FALLS ASSESS DOC 0-1 FALLS W/OUT INJ PAST YR: ICD-10-PCS | Mod: HCNC,CPTII,S$GLB, | Performed by: INTERNAL MEDICINE

## 2019-05-08 PROCEDURE — 99214 OFFICE O/P EST MOD 30 MIN: CPT | Mod: HCNC,S$GLB,, | Performed by: INTERNAL MEDICINE

## 2019-05-08 PROCEDURE — 3074F PR MOST RECENT SYSTOLIC BLOOD PRESSURE < 130 MM HG: ICD-10-PCS | Mod: HCNC,CPTII,S$GLB, | Performed by: INTERNAL MEDICINE

## 2019-05-08 PROCEDURE — 99999 PR PBB SHADOW E&M-EST. PATIENT-LVL III: CPT | Mod: PBBFAC,HCNC,, | Performed by: INTERNAL MEDICINE

## 2019-05-08 PROCEDURE — 1101F PT FALLS ASSESS-DOCD LE1/YR: CPT | Mod: HCNC,CPTII,S$GLB, | Performed by: INTERNAL MEDICINE

## 2019-05-08 PROCEDURE — 99214 PR OFFICE/OUTPT VISIT, EST, LEVL IV, 30-39 MIN: ICD-10-PCS | Mod: HCNC,S$GLB,, | Performed by: INTERNAL MEDICINE

## 2019-05-08 PROCEDURE — 3074F SYST BP LT 130 MM HG: CPT | Mod: HCNC,CPTII,S$GLB, | Performed by: INTERNAL MEDICINE

## 2019-05-08 PROCEDURE — 3078F PR MOST RECENT DIASTOLIC BLOOD PRESSURE < 80 MM HG: ICD-10-PCS | Mod: HCNC,CPTII,S$GLB, | Performed by: INTERNAL MEDICINE

## 2019-05-08 PROCEDURE — 3078F DIAST BP <80 MM HG: CPT | Mod: HCNC,CPTII,S$GLB, | Performed by: INTERNAL MEDICINE

## 2019-05-08 PROCEDURE — 99999 PR PBB SHADOW E&M-EST. PATIENT-LVL III: ICD-10-PCS | Mod: PBBFAC,HCNC,, | Performed by: INTERNAL MEDICINE

## 2019-05-08 NOTE — PROGRESS NOTES
Subjective:       Patient ID: Sofía Mcwilliams is a 78 y.o. female.    Chief Complaint: Pre-op Exam (for hip replacement) and Hip Pain (left side)    HPI 78-year-old female presents to clinic today for preoperative consultation for upcoming hip replacement surgery of her left hip.  Surgeon is Dr. Brayden Cohen surgery scheduled for 05/22/2019 at [facility].  Patient denies any chest pain shortness of breath no fever chills no productive coughing no dysuria no easy bruising.  She does not recall any problems with anesthesia but has had only foot surgery prior.  She is advised by her surgeon to hold aspirin and Mobic and lisinopril  Review of Systems  .  Otherwise negative  Objective:      Physical Exam  General: Well-appearing, well-nourished.  No distress  HEENT: conjunctivae are normal.  Pupils are equal and reative to light.  TM's are clear and intact bilaterally.  Hearing is grossly normal.  Nasopharynx is clear.  Oropharynx is clear.  Neck: Supple.  No thyroid megaly.  No bruits.  Lymph: No cervical or supraclavicular adenopathy.  Heart: Regular rate and rhythm, without murmur, rub or gallop.  Lungs: Clear to auscultation; respiratory effort normal.  Abdomen: Soft, nontender, nondistended.  Normoactive bowel sounds.  No hepatomegaly.  No masses.  Extremities: Good distal pulses.  No edema.  Psych: Oriented to time person place.  Judgment and insight seem unimpaired.  Mood and affect are appropriate.  Assessment:       1. Osteoarthritis of hip, unspecified laterality, unspecified osteoarthritis type    2. Essential hypertension    3. Hyperlipidemia, unspecified hyperlipidemia type    4. Breast cancer screening        Plan:       Sofía CARRILLO was seen today for pre-op exam and hip pain.    Diagnoses and all orders for this visit:    Osteoarthritis of hip, unspecified laterality, unspecified osteoarthritis type  Reviewed preoperative labs, EKG, chest x-ray.  Patient has no contraindications and may proceed with  surgery.  Essential hypertension  Controlled.  Continue current medical regimen.  Prescription refills addressed.  Followup advised. See after visit summary.  Hyperlipidemia, unspecified hyperlipidemia type  Controlled.  Continue current medical regimen.  Prescription refills addressed.  Followup advised. See after visit summary.  Breast cancer screening  -     Mammo Digital Screening Bilat w/ Jaime; Future

## 2019-06-04 ENCOUNTER — OFFICE VISIT (OUTPATIENT)
Dept: URGENT CARE | Facility: CLINIC | Age: 79
End: 2019-06-04
Payer: MEDICARE

## 2019-06-04 VITALS
DIASTOLIC BLOOD PRESSURE: 65 MMHG | HEART RATE: 77 BPM | OXYGEN SATURATION: 98 % | WEIGHT: 138 LBS | BODY MASS INDEX: 23.56 KG/M2 | SYSTOLIC BLOOD PRESSURE: 133 MMHG | TEMPERATURE: 98 F | RESPIRATION RATE: 20 BRPM | HEIGHT: 64 IN

## 2019-06-04 DIAGNOSIS — R21 RASH: Primary | ICD-10-CM

## 2019-06-04 PROCEDURE — 1101F PT FALLS ASSESS-DOCD LE1/YR: CPT | Mod: CPTII,S$GLB,, | Performed by: PHYSICIAN ASSISTANT

## 2019-06-04 PROCEDURE — 99214 OFFICE O/P EST MOD 30 MIN: CPT | Mod: S$GLB,,, | Performed by: PHYSICIAN ASSISTANT

## 2019-06-04 PROCEDURE — 3075F SYST BP GE 130 - 139MM HG: CPT | Mod: CPTII,S$GLB,, | Performed by: PHYSICIAN ASSISTANT

## 2019-06-04 PROCEDURE — 3078F PR MOST RECENT DIASTOLIC BLOOD PRESSURE < 80 MM HG: ICD-10-PCS | Mod: CPTII,S$GLB,, | Performed by: PHYSICIAN ASSISTANT

## 2019-06-04 PROCEDURE — 1101F PR PT FALLS ASSESS DOC 0-1 FALLS W/OUT INJ PAST YR: ICD-10-PCS | Mod: CPTII,S$GLB,, | Performed by: PHYSICIAN ASSISTANT

## 2019-06-04 PROCEDURE — 3075F PR MOST RECENT SYSTOLIC BLOOD PRESS GE 130-139MM HG: ICD-10-PCS | Mod: CPTII,S$GLB,, | Performed by: PHYSICIAN ASSISTANT

## 2019-06-04 PROCEDURE — 3078F DIAST BP <80 MM HG: CPT | Mod: CPTII,S$GLB,, | Performed by: PHYSICIAN ASSISTANT

## 2019-06-04 PROCEDURE — 99214 PR OFFICE/OUTPT VISIT, EST, LEVL IV, 30-39 MIN: ICD-10-PCS | Mod: S$GLB,,, | Performed by: PHYSICIAN ASSISTANT

## 2019-06-04 RX ORDER — TRIAMCINOLONE ACETONIDE 1 MG/G
OINTMENT TOPICAL 2 TIMES DAILY
Qty: 30 G | Refills: 1 | Status: SHIPPED | OUTPATIENT
Start: 2019-06-04 | End: 2021-02-03

## 2019-06-04 RX ORDER — CETIRIZINE HYDROCHLORIDE 5 MG/1
5 TABLET ORAL DAILY PRN
COMMUNITY
End: 2020-08-05

## 2019-06-04 NOTE — PATIENT INSTRUCTIONS
Apply cream to area twice daily as needed to relieve itchiness.  Take Zyrtec daily.  Follow-up with your surgeon in 2 days as scheduled.  Return to clinic for any new or worsening symptoms.    Please follow up with your primary care provider within 2-5 days if your signs and symptoms have not resolved or worsen.     If your condition worsens or fails to improve we recommend that you receive another evaluation at the emergency room immediately or contact your primary medical clinic to discuss your concerns.   You must understand that you have received an Urgent Care treatment only and that you may be released before all of your medical problems are known or treated. You, the patient, will arrange for follow up care as instructed.         Self-Care for Skin Rashes     Pat your skin dry. Do not rub.     When your skin reacts to a substance your body is sensitive to, it can cause a rash. You can treat most rashes at home by keeping the skin clean and dry. Many rashes may get better on their own within 2 to 3 days. You may need medical attention if your rash itches, drains, or hurts, particularly if the rash is getting worse.  What can cause a skin rash?  · Sun poisoning, caused by too much exposure to the sun  · An irritant or allergic reaction to a certain type of food, plant, or chemical, such as  shellfish, poison ivy, and or cleaning products  · An infection caused by a fungus (ringworm), virus (chickenpox), or bacteria (strep)  · Bites or infestation caused by insects or pests, such as ticks, lice, or mites  · Dry skin, which is often seen during the winter months and in older people  How can I control itching and skin damage?  · Take soothing lukewarm baths in a colloidal oatmeal product. You can buy this at the [employer].  · Do your best not to scratch. Clip fingernails short, especially in young children, to reduce skin damage if scratching does occur.  · Use moisturizing skin lotion instead of scratching your  dry skin.  · Use sunscreen whenever going out into direct sun.  · Use only mild cleansing agents whenever possible.  · Wash with mild, nonirritating soap and warm water.  · Wear clothing that breathes, such as cotton shirts or canvas shoes.  · If fluid is seeping from the rash, cover it loosely with clean gauze to absorb the discharge.  · Many rashes are contagious. Prevent the rash from spreading to others by washing your hands often before or after touching others with any skin rash.  Use medicine  · Antihistamines such as diphenhydramine can help control itching. But use with caution because they can make you drowsy.  · Using over-the-counter hydrocortisone cream on small rashes may help reduce swelling and itching  · Most over-the-counter antifungal medicines can treat athletes foot and many other fungal infections of the skin.  Check with your healthcare provider  Call your healthcare provider if:  · You were told that you have a fungal infection on your skin to make sure you have the correct type of medicine.  · You have questions or concerns about medicines or their side effects.     Call 911  Call 911 if either of these occur:  · Your tongue or lips start to swell  · You have difficulty breathing      Call your healthcare provider  Call your healthcare provider if any of these occur:  · Temperature of more than 101.0°F (38.3°C), or as directed  · Sore throat, a cough, or unusual fatigue  · Red, oozy, or painful rash gets worse. These are signs of infection.  · Rash covers your face, genitals, or most of your body  · Crusty sores or red rings that begin to spread  · You were exposed to someone who has a contagious rash, such as scabies or lice.  · Red bulls-eye rash with a white center (a sign of Lyme disease)  · You were told that you have resistant bacteria (MRSA) on your skin.   Date Last Reviewed: 5/12/2015  © 0465-5164 [employer]. [address] [place] [address], [place] [address]. All rights  reserved. This information is not intended as a substitute for professional medical care. Always follow your healthcare professional's instructions.

## 2019-06-04 NOTE — PROGRESS NOTES
"Subjective:       Patient ID: Sofía Mcwilliams is a 78 y.o. female.    Vitals:  height is 5' 4" (1.626 m) and weight is 62.6 kg (138 lb). Her oral temperature is 98.1 °F (36.7 °C). Her blood pressure is 133/65 and her pulse is 77. Her respiration is 20 and oxygen saturation is 98%.     Chief Complaint: Rash    This is a 78 y.o. female   who presents today with a chief complaint of a rash that began two days ago. The rash is located on her left lower back and left upper leg. She's complaining of pain and itching. She's been using hydrocortisone and benadryl to help relieve her symptoms. She had a left hip replacement 2 weeks ago and is healing well.    Rash   This is a new problem. The current episode started in the past 7 days. The problem is unchanged. The affected locations include the back. The rash is characterized by pain, redness and itchiness. It is unknown if there was an exposure to a precipitant. Pertinent negatives include no cough, fever or sore throat. Treatments tried: benadryl and hydrocortisone. The treatment provided no relief. Her past medical history is significant for varicella.       Constitution: Negative for chills and fever.   HENT: Negative for facial swelling and sore throat.    Neck: Negative for painful lymph nodes.   Eyes: Negative for eye itching and eyelid swelling.   Respiratory: Negative for cough.    Musculoskeletal: Positive for pain. Negative for joint pain and joint swelling.   Skin: Positive for rash and erythema. Negative for color change, pale, wound, abrasion, laceration, lesion, skin thickening/induration, puncture wound, bruising, abscess, avulsion and hives.   Allergic/Immunologic: Positive for itching. Negative for environmental allergies, immunocompromised state and hives.   Hematologic/Lymphatic: Negative for swollen lymph nodes.       Objective:      Physical Exam   Constitutional: She is oriented to person, place, and time. Vital signs are normal. She appears " well-developed and well-nourished. She does not appear ill. No distress.   HENT:   Head: Normocephalic and atraumatic.   Right Ear: External ear normal.   Left Ear: External ear normal.   Nose: Nose normal.   Eyes: Conjunctivae, EOM and lids are normal. Right eye exhibits no discharge. Left eye exhibits no discharge.   Neck: Normal range of motion. Neck supple.   Cardiovascular: Normal rate, regular rhythm and normal heart sounds. Exam reveals no gallop and no friction rub.   No murmur heard.  Pulmonary/Chest: Effort normal and breath sounds normal. No stridor. No respiratory distress. She has no decreased breath sounds. She has no wheezes. She has no rhonchi. She has no rales.   Musculoskeletal: Normal range of motion.   Neurological: She is alert and oriented to person, place, and time.   Skin: Skin is warm and dry. Rash noted. Rash is maculopapular. She is not diaphoretic. There is erythema. No pallor.   Erythematous maculopapular rash of left side of back and left upper leg; no open wounds or drainage; no inflammation    Psychiatric: She has a normal mood and affect. Her behavior is normal.   Nursing note and vitals reviewed.                        Assessment:       1. Rash        Plan:         Patient has a follow-up appointment with her surgeon in 2 days.  This may be an allergic reaction to something they used or cleaned patient with during the surgery.  Treating with topical steroids and advised she take Zyrtec.    Rash  -     triamcinolone acetonide 0.1% (KENALOG) 0.1 % ointment; Apply topically 2 (two) times daily. for 5 days  Dispense: 30 g; Refill: 1      Patient Instructions     Apply cream to area twice daily as needed to relieve itchiness.  Take Zyrtec daily.  Follow-up with your surgeon in 2 days as scheduled.  Return to clinic for any new or worsening symptoms.    Please follow up with your primary care provider within 2-5 days if your signs and symptoms have not resolved or worsen.     If your  condition worsens or fails to improve we recommend that you receive another evaluation at the emergency room immediately or contact your primary medical clinic to discuss your concerns.   You must understand that you have received an Urgent Care treatment only and that you may be released before all of your medical problems are known or treated. You, the patient, will arrange for follow up care as instructed.         Self-Care for Skin Rashes     Pat your skin dry. Do not rub.     When your skin reacts to a substance your body is sensitive to, it can cause a rash. You can treat most rashes at home by keeping the skin clean and dry. Many rashes may get better on their own within 2 to 3 days. You may need medical attention if your rash itches, drains, or hurts, particularly if the rash is getting worse.  What can cause a skin rash?  · Sun poisoning, caused by too much exposure to the sun  · An irritant or allergic reaction to a certain type of food, plant, or chemical, such as  shellfish, poison ivy, and or cleaning products  · An infection caused by a fungus (ringworm), virus (chickenpox), or bacteria (strep)  · Bites or infestation caused by insects or pests, such as ticks, lice, or mites  · Dry skin, which is often seen during the winter months and in older people  How can I control itching and skin damage?  · Take soothing lukewarm baths in a colloidal oatmeal product. You can buy this at the [employer].  · Do your best not to scratch. Clip fingernails short, especially in young children, to reduce skin damage if scratching does occur.  · Use moisturizing skin lotion instead of scratching your dry skin.  · Use sunscreen whenever going out into direct sun.  · Use only mild cleansing agents whenever possible.  · Wash with mild, nonirritating soap and warm water.  · Wear clothing that breathes, such as cotton shirts or canvas shoes.  · If fluid is seeping from the rash, cover it loosely with clean gauze to absorb the  discharge.  · Many rashes are contagious. Prevent the rash from spreading to others by washing your hands often before or after touching others with any skin rash.  Use medicine  · Antihistamines such as diphenhydramine can help control itching. But use with caution because they can make you drowsy.  · Using over-the-counter hydrocortisone cream on small rashes may help reduce swelling and itching  · Most over-the-counter antifungal medicines can treat athletes foot and many other fungal infections of the skin.  Check with your healthcare provider  Call your healthcare provider if:  · You were told that you have a fungal infection on your skin to make sure you have the correct type of medicine.  · You have questions or concerns about medicines or their side effects.     Call 911  Call 911 if either of these occur:  · Your tongue or lips start to swell  · You have difficulty breathing      Call your healthcare provider  Call your healthcare provider if any of these occur:  · Temperature of more than 101.0°F (38.3°C), or as directed  · Sore throat, a cough, or unusual fatigue  · Red, oozy, or painful rash gets worse. These are signs of infection.  · Rash covers your face, genitals, or most of your body  · Crusty sores or red rings that begin to spread  · You were exposed to someone who has a contagious rash, such as scabies or lice.  · Red bulls-eye rash with a white center (a sign of Lyme disease)  · You were told that you have resistant bacteria (MRSA) on your skin.   Date Last Reviewed: 5/12/2015  © 2802-7431 [employer] [address] [place] [address], [place] [address]. All rights reserved. This information is not intended as a substitute for professional medical care. Always follow your healthcare professional's instructions.

## 2019-06-21 DIAGNOSIS — I10 ESSENTIAL HYPERTENSION: ICD-10-CM

## 2019-06-24 RX ORDER — LISINOPRIL 2.5 MG/1
TABLET ORAL
Qty: 90 TABLET | Refills: 1 | Status: SHIPPED | OUTPATIENT
Start: 2019-06-24 | End: 2019-07-12

## 2019-06-28 ENCOUNTER — HOSPITAL ENCOUNTER (OUTPATIENT)
Dept: RADIOLOGY | Facility: HOSPITAL | Age: 79
Discharge: HOME | End: 2019-06-28
Attending: INTERNAL MEDICINE
Payer: MEDICARE

## 2019-06-28 DIAGNOSIS — Z12.39 BREAST CANCER SCREENING: ICD-10-CM

## 2019-06-28 PROCEDURE — 77063 BREAST TOMOSYNTHESIS BI: CPT | Mod: 26,HCNC,, | Performed by: RADIOLOGY

## 2019-06-28 PROCEDURE — 77067 SCR MAMMO BI INCL CAD: CPT | Mod: 26,HCNC,, | Performed by: RADIOLOGY

## 2019-06-28 PROCEDURE — 77067 SCR MAMMO BI INCL CAD: CPT | Mod: TC,HCNC

## 2019-06-28 PROCEDURE — 77067 MAMMO DIGITAL SCREENING BILAT WITH TOMOSYNTHESIS_CAD: ICD-10-PCS | Mod: 26,HCNC,, | Performed by: RADIOLOGY

## 2019-06-28 PROCEDURE — 77063 MAMMO DIGITAL SCREENING BILAT WITH TOMOSYNTHESIS_CAD: ICD-10-PCS | Mod: 26,HCNC,, | Performed by: RADIOLOGY

## 2019-07-10 ENCOUNTER — LAB VISIT (OUTPATIENT)
Dept: LAB | Facility: HOSPITAL | Age: 79
End: 2019-07-10
Attending: INTERNAL MEDICINE
Payer: MEDICARE

## 2019-07-10 DIAGNOSIS — I10 HYPERTENSION, UNSPECIFIED TYPE: ICD-10-CM

## 2019-07-10 LAB
ALBUMIN SERPL BCP-MCNC: 3.8 G/DL (ref 3.5–5.2)
ALP SERPL-CCNC: 92 U/L (ref 55–135)
ALT SERPL W/O P-5'-P-CCNC: 14 U/L (ref 10–44)
ANION GAP SERPL CALC-SCNC: 9 MMOL/L (ref 8–16)
AST SERPL-CCNC: 17 U/L (ref 10–40)
BASOPHILS # BLD AUTO: 0.04 K/UL (ref 0–0.2)
BASOPHILS NFR BLD: 0.5 % (ref 0–1.9)
BILIRUB SERPL-MCNC: 0.5 MG/DL (ref 0.1–1)
BUN SERPL-MCNC: 14 MG/DL (ref 8–23)
CALCIUM SERPL-MCNC: 10.1 MG/DL (ref 8.7–10.5)
CHLORIDE SERPL-SCNC: 105 MMOL/L (ref 95–110)
CHOLEST SERPL-MCNC: 170 MG/DL (ref 120–199)
CHOLEST/HDLC SERPL: 2.7 {RATIO} (ref 2–5)
CO2 SERPL-SCNC: 28 MMOL/L (ref 23–29)
CREAT SERPL-MCNC: 0.7 MG/DL (ref 0.5–1.4)
DIFFERENTIAL METHOD: ABNORMAL
EOSINOPHIL # BLD AUTO: 0.3 K/UL (ref 0–0.5)
EOSINOPHIL NFR BLD: 3.9 % (ref 0–8)
ERYTHROCYTE [DISTWIDTH] IN BLOOD BY AUTOMATED COUNT: 13.5 % (ref 11.5–14.5)
EST. GFR  (AFRICAN AMERICAN): >60 ML/MIN/1.73 M^2
EST. GFR  (NON AFRICAN AMERICAN): >60 ML/MIN/1.73 M^2
GLUCOSE SERPL-MCNC: 96 MG/DL (ref 70–110)
HCT VFR BLD AUTO: 43.2 % (ref 37–48.5)
HDLC SERPL-MCNC: 62 MG/DL (ref 40–75)
HDLC SERPL: 36.5 % (ref 20–50)
HGB BLD-MCNC: 13.3 G/DL (ref 12–16)
IMM GRANULOCYTES # BLD AUTO: 0.02 K/UL (ref 0–0.04)
IMM GRANULOCYTES NFR BLD AUTO: 0.2 % (ref 0–0.5)
LDLC SERPL CALC-MCNC: 74.2 MG/DL (ref 63–159)
LYMPHOCYTES # BLD AUTO: 3.1 K/UL (ref 1–4.8)
LYMPHOCYTES NFR BLD: 38 % (ref 18–48)
MCH RBC QN AUTO: 28.7 PG (ref 27–31)
MCHC RBC AUTO-ENTMCNC: 30.8 G/DL (ref 32–36)
MCV RBC AUTO: 93 FL (ref 82–98)
MONOCYTES # BLD AUTO: 0.6 K/UL (ref 0.3–1)
MONOCYTES NFR BLD: 6.9 % (ref 4–15)
NEUTROPHILS # BLD AUTO: 4.1 K/UL (ref 1.8–7.7)
NEUTROPHILS NFR BLD: 50.5 % (ref 38–73)
NONHDLC SERPL-MCNC: 108 MG/DL
NRBC BLD-RTO: 0 /100 WBC
PLATELET # BLD AUTO: 252 K/UL (ref 150–350)
PMV BLD AUTO: 11.1 FL (ref 9.2–12.9)
POTASSIUM SERPL-SCNC: 4.3 MMOL/L (ref 3.5–5.1)
PROT SERPL-MCNC: 6.5 G/DL (ref 6–8.4)
RBC # BLD AUTO: 4.64 M/UL (ref 4–5.4)
SODIUM SERPL-SCNC: 142 MMOL/L (ref 136–145)
TRIGL SERPL-MCNC: 169 MG/DL (ref 30–150)
TSH SERPL DL<=0.005 MIU/L-ACNC: 1.78 UIU/ML (ref 0.4–4)
WBC # BLD AUTO: 8.16 K/UL (ref 3.9–12.7)

## 2019-07-10 PROCEDURE — 80061 LIPID PANEL: CPT | Mod: HCNC

## 2019-07-10 PROCEDURE — 85025 COMPLETE CBC W/AUTO DIFF WBC: CPT | Mod: HCNC

## 2019-07-10 PROCEDURE — 36415 COLL VENOUS BLD VENIPUNCTURE: CPT | Mod: HCNC,PO

## 2019-07-10 PROCEDURE — 80053 COMPREHEN METABOLIC PANEL: CPT | Mod: HCNC

## 2019-07-10 PROCEDURE — 84443 ASSAY THYROID STIM HORMONE: CPT | Mod: HCNC

## 2019-07-12 ENCOUNTER — OFFICE VISIT (OUTPATIENT)
Dept: INTERNAL MEDICINE | Facility: CLINIC | Age: 79
End: 2019-07-12
Payer: MEDICARE

## 2019-07-12 VITALS
HEIGHT: 64 IN | SYSTOLIC BLOOD PRESSURE: 128 MMHG | WEIGHT: 140 LBS | OXYGEN SATURATION: 99 % | BODY MASS INDEX: 23.9 KG/M2 | HEART RATE: 92 BPM | DIASTOLIC BLOOD PRESSURE: 66 MMHG

## 2019-07-12 DIAGNOSIS — I10 ESSENTIAL HYPERTENSION: ICD-10-CM

## 2019-07-12 DIAGNOSIS — M19.90 ARTHRITIS: ICD-10-CM

## 2019-07-12 DIAGNOSIS — Z00.00 PREVENTATIVE HEALTH CARE: Primary | ICD-10-CM

## 2019-07-12 DIAGNOSIS — G25.81 RLS (RESTLESS LEGS SYNDROME): ICD-10-CM

## 2019-07-12 DIAGNOSIS — E78.5 HYPERLIPIDEMIA, UNSPECIFIED HYPERLIPIDEMIA TYPE: ICD-10-CM

## 2019-07-12 PROCEDURE — 99999 PR PBB SHADOW E&M-EST. PATIENT-LVL III: CPT | Mod: PBBFAC,HCNC,, | Performed by: INTERNAL MEDICINE

## 2019-07-12 PROCEDURE — 3074F PR MOST RECENT SYSTOLIC BLOOD PRESSURE < 130 MM HG: ICD-10-PCS | Mod: HCNC,CPTII,S$GLB, | Performed by: INTERNAL MEDICINE

## 2019-07-12 PROCEDURE — 99397 PR PREVENTIVE VISIT,EST,65 & OVER: ICD-10-PCS | Mod: HCNC,S$GLB,, | Performed by: INTERNAL MEDICINE

## 2019-07-12 PROCEDURE — 3078F PR MOST RECENT DIASTOLIC BLOOD PRESSURE < 80 MM HG: ICD-10-PCS | Mod: HCNC,CPTII,S$GLB, | Performed by: INTERNAL MEDICINE

## 2019-07-12 PROCEDURE — 99499 UNLISTED E&M SERVICE: CPT | Mod: HCNC,S$GLB,, | Performed by: INTERNAL MEDICINE

## 2019-07-12 PROCEDURE — 99999 PR PBB SHADOW E&M-EST. PATIENT-LVL III: ICD-10-PCS | Mod: PBBFAC,HCNC,, | Performed by: INTERNAL MEDICINE

## 2019-07-12 PROCEDURE — 99397 PER PM REEVAL EST PAT 65+ YR: CPT | Mod: HCNC,S$GLB,, | Performed by: INTERNAL MEDICINE

## 2019-07-12 PROCEDURE — 3078F DIAST BP <80 MM HG: CPT | Mod: HCNC,CPTII,S$GLB, | Performed by: INTERNAL MEDICINE

## 2019-07-12 PROCEDURE — 3074F SYST BP LT 130 MM HG: CPT | Mod: HCNC,CPTII,S$GLB, | Performed by: INTERNAL MEDICINE

## 2019-07-12 PROCEDURE — 99499 RISK ADDL DX/OHS AUDIT: ICD-10-PCS | Mod: HCNC,S$GLB,, | Performed by: INTERNAL MEDICINE

## 2019-07-12 RX ORDER — ATORVASTATIN CALCIUM 20 MG/1
TABLET, FILM COATED ORAL
Qty: 90 TABLET | Refills: 1 | Status: SHIPPED | OUTPATIENT
Start: 2019-07-12 | End: 2019-12-16 | Stop reason: SDUPTHER

## 2019-07-12 RX ORDER — LOSARTAN POTASSIUM 25 MG/1
25 TABLET ORAL DAILY
Qty: 90 TABLET | Refills: 3 | Status: SHIPPED | OUTPATIENT
Start: 2019-07-12 | End: 2020-06-18

## 2019-07-12 RX ORDER — CLONAZEPAM 0.5 MG/1
0.5 TABLET, ORALLY DISINTEGRATING ORAL DAILY PRN
Qty: 30 TABLET | Refills: 5 | Status: SHIPPED | OUTPATIENT
Start: 2019-07-12 | End: 2020-01-17

## 2019-07-12 NOTE — PROGRESS NOTES
Subjective:       Patient ID: Sofía Mcwilliams is a 78 y.o. female.    Chief Complaint: Annual Exam    HPI 78-year-old female presents to clinic today for annual physical follow-up hypertension dyslipidemia patient complains of restless leg symptoms which are bothersome aam she states that she has tried Requip which did not help.  Review of Systems  otherwise negative  Objective:      Physical Exam  General: Well-appearing, well-nourished.  No distress  HEENT: conjunctivae are normal.  Pupils are equal and reative to light.  TM's are clear and intact bilaterally.  Hearing is grossly normal.  Nasopharynx is clear.  Oropharynx is clear.  Neck: Supple.  No thyroid megaly.  No bruits.  Lymph: No cervical or supraclavicular adenopathy.  Heart: Regular rate and rhythm, without murmur, rub or gallop.  Lungs: Clear to auscultation; respiratory effort normal.  Abdomen: Soft, nontender, nondistended.  Normoactive bowel sounds.  No hepatomegaly.  No masses.  Extremities: Good distal pulses.  No edema.  Psych: Oriented to time person place.  Judgment and insight seem unimpaired.  Mood and affect are appropriate.  Assessment:       1. Preventative health care    2. Essential hypertension    3. Hyperlipidemia, unspecified hyperlipidemia type    4. Arthritis    5. RLS (restless legs syndrome)        Plan:       Sofía CARRILLO was seen today for annual exam.    Diagnoses and all orders for this visit:    Preventative health care  Performed reviewed and updated today  Essential hypertension  -     losartan (COZAAR) 25 MG tablet; Take 1 tablet (25 mg total) by mouth once daily.  -     Comprehensive metabolic panel; Standing  -     Lipid panel; Standing  Discontinue ACE-inhibitor transition ARB discuss use benefit as well as potential adverse side effects  Hyperlipidemia, unspecified hyperlipidemia type  -     atorvastatin (LIPITOR) 20 MG tablet; TAKE 1 TABLET EVERY DAY  -     Comprehensive metabolic panel; Standing  -     Lipid panel;  Standing  Controlled.  Continue current medical regimen.  Prescription refills addressed.  Followup advised. See after visit summary.  Arthritis    RLS (restless legs syndrome)  -     clonazePAM (KLONOPIN) 0.5 MG disintegrating tablet; Take 1 tablet (0.5 mg total) by mouth daily as needed.  Discuss use benefit as well as potential adverse side effects

## 2019-10-17 ENCOUNTER — PATIENT OUTREACH (OUTPATIENT)
Dept: ADMINISTRATIVE | Facility: OTHER | Age: 79
End: 2019-10-17

## 2019-10-18 ENCOUNTER — TELEPHONE (OUTPATIENT)
Dept: OPTOMETRY | Facility: CLINIC | Age: 79
End: 2019-10-18

## 2019-10-22 ENCOUNTER — OFFICE VISIT (OUTPATIENT)
Dept: OPTOMETRY | Facility: CLINIC | Age: 79
End: 2019-10-22
Payer: COMMERCIAL

## 2019-10-22 DIAGNOSIS — H52.4 PRESBYOPIA: ICD-10-CM

## 2019-10-22 DIAGNOSIS — H25.13 NUCLEAR SCLEROSIS, BILATERAL: ICD-10-CM

## 2019-10-22 DIAGNOSIS — Z98.890 HISTORY OF REPAIR OF RETINAL TEAR BY LASER PHOTOCOAGULATION: Primary | ICD-10-CM

## 2019-10-22 DIAGNOSIS — Z13.5 SCREENING FOR GLAUCOMA: ICD-10-CM

## 2019-10-22 PROCEDURE — 92014 PR EYE EXAM, EST PATIENT,COMPREHESV: ICD-10-PCS | Mod: S$GLB,,, | Performed by: OPTOMETRIST

## 2019-10-22 PROCEDURE — 92015 PR REFRACTION: ICD-10-PCS | Mod: S$GLB,,, | Performed by: OPTOMETRIST

## 2019-10-22 PROCEDURE — 99999 PR PBB SHADOW E&M-EST. PATIENT-LVL II: CPT | Mod: PBBFAC,,, | Performed by: OPTOMETRIST

## 2019-10-22 PROCEDURE — 99999 PR PBB SHADOW E&M-EST. PATIENT-LVL II: ICD-10-PCS | Mod: PBBFAC,,, | Performed by: OPTOMETRIST

## 2019-10-22 PROCEDURE — 92015 DETERMINE REFRACTIVE STATE: CPT | Mod: S$GLB,,, | Performed by: OPTOMETRIST

## 2019-10-22 PROCEDURE — 92014 COMPRE OPH EXAM EST PT 1/>: CPT | Mod: S$GLB,,, | Performed by: OPTOMETRIST

## 2019-10-22 NOTE — PROGRESS NOTES
HPI     DLS; 10/19/18  Pt states no [facility] problems, would like updated RX to order second glasses.   No f/f  No gtts   Hx focal laser for periph hole    Last edited by Kareem Muhammad, OD on 10/22/2019  8:44 AM. (History)        ROS     Positive for: Eyes (focal laser OD for periph hole, vit traction OS)    Negative for: Constitutional, Gastrointestinal, Neurological, Skin,   Genitourinary, Musculoskeletal, HENT, Endocrine, Cardiovascular,   Respiratory, Psychiatric, Allergic/Imm, Heme/Lymph    Last edited by Kareem Muhammad, OD on 10/22/2019  8:44 AM. (History)        Assessment /Plan     For exam results, see Encounter Report.    History of repair of retinal tear by laser photocoagulation    Nuclear sclerosis, bilateral    Screening for glaucoma    Presbyopia      1. Cat OU--pt wishes new Rx  2. Sp focal laser for periph hole OD--stable  3. Vit traction OS--stable  4. JOLENE--advised SYSTANE or REFRESH ATs prn    PLAN:    rtc 1 yr, or immediately if F/F

## 2019-12-04 ENCOUNTER — PES CALL (OUTPATIENT)
Dept: ADMINISTRATIVE | Facility: CLINIC | Age: 79
End: 2019-12-04

## 2019-12-16 DIAGNOSIS — E78.5 HYPERLIPIDEMIA, UNSPECIFIED HYPERLIPIDEMIA TYPE: ICD-10-CM

## 2019-12-16 RX ORDER — ATORVASTATIN CALCIUM 20 MG/1
TABLET, FILM COATED ORAL
Qty: 90 TABLET | Refills: 0 | Status: SHIPPED | OUTPATIENT
Start: 2019-12-16 | End: 2020-02-21

## 2020-01-14 ENCOUNTER — LAB VISIT (OUTPATIENT)
Dept: LAB | Facility: HOSPITAL | Age: 80
End: 2020-01-14
Attending: INTERNAL MEDICINE
Payer: MEDICARE

## 2020-01-14 DIAGNOSIS — I10 ESSENTIAL HYPERTENSION: ICD-10-CM

## 2020-01-14 DIAGNOSIS — E78.5 HYPERLIPIDEMIA, UNSPECIFIED HYPERLIPIDEMIA TYPE: ICD-10-CM

## 2020-01-14 LAB
ALBUMIN SERPL BCP-MCNC: 3.9 G/DL (ref 3.5–5.2)
ALP SERPL-CCNC: 86 U/L (ref 55–135)
ALT SERPL W/O P-5'-P-CCNC: 22 U/L (ref 10–44)
ANION GAP SERPL CALC-SCNC: 7 MMOL/L (ref 8–16)
AST SERPL-CCNC: 23 U/L (ref 10–40)
BILIRUB SERPL-MCNC: 0.5 MG/DL (ref 0.1–1)
BUN SERPL-MCNC: 14 MG/DL (ref 8–23)
CALCIUM SERPL-MCNC: 9.7 MG/DL (ref 8.7–10.5)
CHLORIDE SERPL-SCNC: 106 MMOL/L (ref 95–110)
CHOLEST SERPL-MCNC: 164 MG/DL (ref 120–199)
CHOLEST/HDLC SERPL: 2.6 {RATIO} (ref 2–5)
CO2 SERPL-SCNC: 28 MMOL/L (ref 23–29)
CREAT SERPL-MCNC: 0.8 MG/DL (ref 0.5–1.4)
EST. GFR  (AFRICAN AMERICAN): >60 ML/MIN/1.73 M^2
EST. GFR  (NON AFRICAN AMERICAN): >60 ML/MIN/1.73 M^2
GLUCOSE SERPL-MCNC: 103 MG/DL (ref 70–110)
HDLC SERPL-MCNC: 64 MG/DL (ref 40–75)
HDLC SERPL: 39 % (ref 20–50)
LDLC SERPL CALC-MCNC: 79 MG/DL (ref 63–159)
NONHDLC SERPL-MCNC: 100 MG/DL
POTASSIUM SERPL-SCNC: 4.3 MMOL/L (ref 3.5–5.1)
PROT SERPL-MCNC: 6.6 G/DL (ref 6–8.4)
SODIUM SERPL-SCNC: 141 MMOL/L (ref 136–145)
TRIGL SERPL-MCNC: 105 MG/DL (ref 30–150)

## 2020-01-14 PROCEDURE — 36415 COLL VENOUS BLD VENIPUNCTURE: CPT | Mod: HCNC,PO

## 2020-01-14 PROCEDURE — 80061 LIPID PANEL: CPT | Mod: HCNC

## 2020-01-14 PROCEDURE — 80053 COMPREHEN METABOLIC PANEL: CPT | Mod: HCNC

## 2020-01-17 ENCOUNTER — OFFICE VISIT (OUTPATIENT)
Dept: INTERNAL MEDICINE | Facility: CLINIC | Age: 80
End: 2020-01-17
Payer: MEDICARE

## 2020-01-17 VITALS
DIASTOLIC BLOOD PRESSURE: 66 MMHG | OXYGEN SATURATION: 97 % | HEIGHT: 64 IN | BODY MASS INDEX: 24.28 KG/M2 | SYSTOLIC BLOOD PRESSURE: 122 MMHG | HEART RATE: 70 BPM | WEIGHT: 142.19 LBS

## 2020-01-17 DIAGNOSIS — M79.642 PAIN IN BOTH HANDS: Primary | ICD-10-CM

## 2020-01-17 DIAGNOSIS — M19.90 ARTHRITIS: ICD-10-CM

## 2020-01-17 DIAGNOSIS — M79.641 PAIN IN BOTH HANDS: Primary | ICD-10-CM

## 2020-01-17 DIAGNOSIS — I10 ESSENTIAL HYPERTENSION: ICD-10-CM

## 2020-01-17 DIAGNOSIS — Z00.00 PREVENTATIVE HEALTH CARE: ICD-10-CM

## 2020-01-17 DIAGNOSIS — M19.041 PRIMARY OSTEOARTHRITIS OF BOTH HANDS: ICD-10-CM

## 2020-01-17 DIAGNOSIS — E78.5 HYPERLIPIDEMIA, UNSPECIFIED HYPERLIPIDEMIA TYPE: ICD-10-CM

## 2020-01-17 DIAGNOSIS — M19.042 PRIMARY OSTEOARTHRITIS OF BOTH HANDS: ICD-10-CM

## 2020-01-17 DIAGNOSIS — M67.442 DIGITAL MUCINOUS CYST OF FINGER OF LEFT HAND: ICD-10-CM

## 2020-01-17 DIAGNOSIS — G25.81 RLS (RESTLESS LEGS SYNDROME): ICD-10-CM

## 2020-01-17 PROCEDURE — 99999 PR PBB SHADOW E&M-EST. PATIENT-LVL III: CPT | Mod: PBBFAC,HCNC,, | Performed by: INTERNAL MEDICINE

## 2020-01-17 PROCEDURE — 1101F PR PT FALLS ASSESS DOC 0-1 FALLS W/OUT INJ PAST YR: ICD-10-PCS | Mod: HCNC,CPTII,S$GLB, | Performed by: INTERNAL MEDICINE

## 2020-01-17 PROCEDURE — 3078F DIAST BP <80 MM HG: CPT | Mod: HCNC,CPTII,S$GLB, | Performed by: INTERNAL MEDICINE

## 2020-01-17 PROCEDURE — 99214 OFFICE O/P EST MOD 30 MIN: CPT | Mod: HCNC,S$GLB,, | Performed by: INTERNAL MEDICINE

## 2020-01-17 PROCEDURE — 1159F MED LIST DOCD IN RCRD: CPT | Mod: HCNC,S$GLB,, | Performed by: INTERNAL MEDICINE

## 2020-01-17 PROCEDURE — 3078F PR MOST RECENT DIASTOLIC BLOOD PRESSURE < 80 MM HG: ICD-10-PCS | Mod: HCNC,CPTII,S$GLB, | Performed by: INTERNAL MEDICINE

## 2020-01-17 PROCEDURE — 1101F PT FALLS ASSESS-DOCD LE1/YR: CPT | Mod: HCNC,CPTII,S$GLB, | Performed by: INTERNAL MEDICINE

## 2020-01-17 PROCEDURE — 3074F SYST BP LT 130 MM HG: CPT | Mod: HCNC,CPTII,S$GLB, | Performed by: INTERNAL MEDICINE

## 2020-01-17 PROCEDURE — 1159F PR MEDICATION LIST DOCUMENTED IN MEDICAL RECORD: ICD-10-PCS | Mod: HCNC,S$GLB,, | Performed by: INTERNAL MEDICINE

## 2020-01-17 PROCEDURE — 1126F AMNT PAIN NOTED NONE PRSNT: CPT | Mod: HCNC,S$GLB,, | Performed by: INTERNAL MEDICINE

## 2020-01-17 PROCEDURE — 99214 PR OFFICE/OUTPT VISIT, EST, LEVL IV, 30-39 MIN: ICD-10-PCS | Mod: HCNC,S$GLB,, | Performed by: INTERNAL MEDICINE

## 2020-01-17 PROCEDURE — 99999 PR PBB SHADOW E&M-EST. PATIENT-LVL III: ICD-10-PCS | Mod: PBBFAC,HCNC,, | Performed by: INTERNAL MEDICINE

## 2020-01-17 PROCEDURE — 3074F PR MOST RECENT SYSTOLIC BLOOD PRESSURE < 130 MM HG: ICD-10-PCS | Mod: HCNC,CPTII,S$GLB, | Performed by: INTERNAL MEDICINE

## 2020-01-17 PROCEDURE — 1126F PR PAIN SEVERITY QUANTIFIED, NO PAIN PRESENT: ICD-10-PCS | Mod: HCNC,S$GLB,, | Performed by: INTERNAL MEDICINE

## 2020-01-17 RX ORDER — MELOXICAM 15 MG/1
TABLET ORAL
Refills: 3 | COMMUNITY
Start: 2019-10-31 | End: 2020-01-17 | Stop reason: SDUPTHER

## 2020-01-17 RX ORDER — MELOXICAM 15 MG/1
15 TABLET ORAL DAILY
Qty: 90 TABLET | Refills: 3 | Status: SHIPPED | OUTPATIENT
Start: 2020-01-17 | End: 2021-05-04 | Stop reason: SDUPTHER

## 2020-01-17 RX ORDER — ROPINIROLE 0.5 MG/1
TABLET, FILM COATED ORAL
Qty: 90 TABLET | Refills: 1 | Status: SHIPPED | OUTPATIENT
Start: 2020-01-17 | End: 2020-05-25

## 2020-01-17 NOTE — PROGRESS NOTES
Subjective:       Patient ID: Sofía Mcwilliams is a 79 y.o. female.    Chief Complaint: Follow-up (HTN)    HPI 79-year-old female presents to clinic today for follow-up hypertension dyslipidemia patient has generalized arthritis of her hands she has a mucinous cyst involving 1 of her digits of her left hand.  Patient is experiencing pain and feels like this area gets hit often.  Patient is also bothered by restless leg symptoms.  Clonazepam is not work she tried Requip years ago but really never titrated the dose.  She really would like to try to get some benefit and relief from this problem that is bothersome for her in the evenings.  She has an upcoming flight to [place] and she is very nervous that this condition will aggravate her.  Review of Systems  otherwise negative otherwise  Objective:      Physical Exam  General: Well-appearing, well-nourished.  No distress  HEENT: conjunctivae are normal.  Pupils are equal and reative to light.  TM's are clear and intact bilaterally.  Hearing is grossly normal.  Nasopharynx is clear.  Oropharynx is clear.  Neck: Supple.  No thyroid megaly.  No bruits.  Lymph: No cervical or supraclavicular adenopathy.  Heart: Regular rate and rhythm, without murmur, rub or gallop.  Lungs: Clear to auscultation; respiratory effort normal.  Abdomen: Soft, nontender, nondistended.  Normoactive bowel sounds.  No hepatomegaly.  No masses.  Extremities: Good distal pulses.  No edema.  Psych: Oriented to time person place.  Judgment and insight seem unimpaired.  Mood and affect are appropriate.  Muscle skeletal notable for distal interphalangeal osteoarthritis and she has a mucinous cyst on the left hand 3rd finger near her distal interphalangeal joint  Assessment:       1. Pain in both hands    2. 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 Primary osteoarthritis of both hands    4. Essential hypertension    5. Hyperlipidemia, unspecified hyperlipidemia type    6. RLS (restless legs  syndrome)    7. Preventative health care    8. Arthritis        Plan:       Sofía was seen today for follow-up.    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  Continue current medical regimen.  Prescription refills addressed.  Followup advised. See after visit summary.  Hyperlipidemia, unspecified hyperlipidemia type  -     CBC auto differential; Future  -     TSH; Future  -     Comprehensive metabolic panel; Standing  -     Lipid panel; Standing    RLS (restless legs syndrome)  -     rOPINIRole (REQUIP) 0.5 MG tablet; I tab 2 hours prior to bedtime for 1 week and then increase to 2 tabs  She is to contact me in the next couple weeks so we can titrate dosing.  Preventative health care    Arthritis  -     meloxicam (MOBIC) 15 MG tablet; Take 1 tablet (15 mg total) by mouth once daily.  Controlled.  Continue current medical regimen.  Prescription refills addressed.  Followup advised. See after visit summary.

## 2020-02-05 ENCOUNTER — TELEPHONE (OUTPATIENT)
Dept: ORTHOPEDICS | Facility: CLINIC | Age: 80
End: 2020-02-05

## 2020-02-05 DIAGNOSIS — M79.642 HAND PAIN, LEFT: Primary | ICD-10-CM

## 2020-02-05 DIAGNOSIS — M79.643 PAIN OF HAND, UNSPECIFIED LATERALITY: ICD-10-CM

## 2020-02-06 ENCOUNTER — HOSPITAL ENCOUNTER (OUTPATIENT)
Dept: RADIOLOGY | Facility: HOSPITAL | Age: 80
Discharge: HOME | End: 2020-02-06
Attending: ORTHOPAEDIC SURGERY
Payer: MEDICARE

## 2020-02-06 ENCOUNTER — OFFICE VISIT (OUTPATIENT)
Dept: ORTHOPEDICS | Facility: CLINIC | Age: 80
End: 2020-02-06
Payer: MEDICARE

## 2020-02-06 VITALS — BODY MASS INDEX: 24.24 KG/M2 | HEIGHT: 64 IN | WEIGHT: 142 LBS

## 2020-02-06 DIAGNOSIS — M79.642 PAIN IN BOTH HANDS: ICD-10-CM

## 2020-02-06 DIAGNOSIS — M79.643 PAIN OF HAND, UNSPECIFIED LATERALITY: ICD-10-CM

## 2020-02-06 DIAGNOSIS — M67.442 DIGITAL MUCINOUS CYST OF FINGER OF LEFT HAND: ICD-10-CM

## 2020-02-06 DIAGNOSIS — M19.042 PRIMARY OSTEOARTHRITIS OF BOTH HANDS: ICD-10-CM

## 2020-02-06 DIAGNOSIS — M79.641 PAIN IN BOTH HANDS: ICD-10-CM

## 2020-02-06 DIAGNOSIS — M19.041 PRIMARY OSTEOARTHRITIS OF BOTH HANDS: ICD-10-CM

## 2020-02-06 PROCEDURE — 1125F AMNT PAIN NOTED PAIN PRSNT: CPT | Mod: HCNC,S$GLB,, | Performed by: ORTHOPAEDIC SURGERY

## 2020-02-06 PROCEDURE — 1125F PR PAIN SEVERITY QUANTIFIED, PAIN PRESENT: ICD-10-PCS | Mod: HCNC,S$GLB,, | Performed by: ORTHOPAEDIC SURGERY

## 2020-02-06 PROCEDURE — 99203 OFFICE O/P NEW LOW 30 MIN: CPT | Mod: HCNC,25,S$GLB, | Performed by: ORTHOPAEDIC SURGERY

## 2020-02-06 PROCEDURE — 99203 PR OFFICE/OUTPT VISIT, NEW, LEVL III, 30-44 MIN: ICD-10-PCS | Mod: HCNC,25,S$GLB, | Performed by: ORTHOPAEDIC SURGERY

## 2020-02-06 PROCEDURE — 73120 X-RAY EXAM OF HAND: CPT | Mod: 26,50,HCNC, | Performed by: RADIOLOGY

## 2020-02-06 PROCEDURE — 20600 PR DRAIN/INJECT SMALL JOINT/BURSA: ICD-10-PCS | Mod: HCNC,F2,S$GLB, | Performed by: ORTHOPAEDIC SURGERY

## 2020-02-06 PROCEDURE — 73120 X-RAY EXAM OF HAND: CPT | Mod: TC,50,HCNC,PN

## 2020-02-06 PROCEDURE — 1159F MED LIST DOCD IN RCRD: CPT | Mod: HCNC,S$GLB,, | Performed by: ORTHOPAEDIC SURGERY

## 2020-02-06 PROCEDURE — 99999 PR PBB SHADOW E&M-EST. PATIENT-LVL III: CPT | Mod: PBBFAC,HCNC,, | Performed by: ORTHOPAEDIC SURGERY

## 2020-02-06 PROCEDURE — 1101F PR PT FALLS ASSESS DOC 0-1 FALLS W/OUT INJ PAST YR: ICD-10-PCS | Mod: HCNC,CPTII,S$GLB, | Performed by: ORTHOPAEDIC SURGERY

## 2020-02-06 PROCEDURE — 73120 XR HAND 2 VIEW BILAT: ICD-10-PCS | Mod: 26,50,HCNC, | Performed by: RADIOLOGY

## 2020-02-06 PROCEDURE — 99999 PR PBB SHADOW E&M-EST. PATIENT-LVL III: ICD-10-PCS | Mod: PBBFAC,HCNC,, | Performed by: ORTHOPAEDIC SURGERY

## 2020-02-06 PROCEDURE — 1101F PT FALLS ASSESS-DOCD LE1/YR: CPT | Mod: HCNC,CPTII,S$GLB, | Performed by: ORTHOPAEDIC SURGERY

## 2020-02-06 PROCEDURE — 20600 DRAIN/INJ JOINT/BURSA W/O US: CPT | Mod: HCNC,F2,S$GLB, | Performed by: ORTHOPAEDIC SURGERY

## 2020-02-06 PROCEDURE — 1159F PR MEDICATION LIST DOCUMENTED IN MEDICAL RECORD: ICD-10-PCS | Mod: HCNC,S$GLB,, | Performed by: ORTHOPAEDIC SURGERY

## 2020-02-06 RX ORDER — DICLOFENAC SODIUM 10 MG/G
2 GEL TOPICAL 3 TIMES DAILY
Qty: 1 TUBE | Refills: 3 | Status: SHIPPED | OUTPATIENT
Start: 2020-02-06 | End: 2020-08-05

## 2020-02-06 RX ORDER — TRIAMCINOLONE ACETONIDE 40 MG/ML
20 INJECTION, SUSPENSION INTRA-ARTICULAR; INTRAMUSCULAR
Status: COMPLETED | OUTPATIENT
Start: 2020-02-06 | End: 2020-02-06

## 2020-02-06 RX ADMIN — TRIAMCINOLONE ACETONIDE 20 MG: 40 INJECTION, SUSPENSION INTRA-ARTICULAR; INTRAMUSCULAR at 03:02

## 2020-02-06 NOTE — PROGRESS NOTES
Subjective:      Patient ID: Sofía Mcwilliams is a 79 y.o. female.    Chief Complaint: Cyst of the Left Hand      HPI  Sofía Mcwilliams is a  79 y.o. female presenting today for bilateral finger pain and swelling of the left middle finger.  There was not a history of trauma.  Onset of symptoms began several months ago  She has noticed some swelling on the left middle finger near the tip which bothers her when she bumps it no history of drainage or infection.      Review of patient's allergies indicates:   Allergen Reactions    Celecoxib      Other reaction(s): Swelling    Sulfa (sulfonamide antibiotics)      Other reaction(s): Hives         Current Outpatient Medications   Medication Sig Dispense Refill    aspirin (ECOTRIN) 81 MG EC tablet Take 1 tablet by mouth.      atorvastatin (LIPITOR) 20 MG tablet TAKE 1 TABLET EVERY DAY 90 tablet 0    CALCIUM CARB/VIT D3/MINERALS (CALCIUM-VITAMIN D ORAL) once daily.       coenzyme Q10 10 mg capsule Take by mouth.      losartan (COZAAR) 25 MG tablet Take 1 tablet (25 mg total) by mouth once daily. 90 tablet 3    meloxicam (MOBIC) 15 MG tablet Take 1 tablet (15 mg total) by mouth once daily. 90 tablet 3    multivitamin (MULTIPLE VITAMIN) per tablet Take 1 tablet by mouth.      VITAMIN B COMPLEX ORAL Take by mouth once daily.       vitamin D 1000 units Tab Take 185 mg by mouth once daily.      cetirizine (ZYRTEC) 5 MG tablet Take 5 mg by mouth daily as needed.      rOPINIRole (REQUIP) 0.5 MG tablet I tab 2 hours prior to bedtime for 1 week and then increase to 2 tabs (Patient not taking: Reported on 2/6/2020) 90 tablet 1    triamcinolone acetonide 0.1% (KENALOG) 0.1 % ointment Apply topically 2 (two) times daily. for 5 days 30 g 1     No current facility-administered medications for this visit.        Past Medical History:   Diagnosis Date    Anxiety 5/2/2016    Arthritis     Basal cell carcinoma 2013    left buttock    Cataract     Hypertension     Other and  "unspecified hyperlipidemia        Past Surgical History:   Procedure Laterality Date    APPENDECTOMY      bunion      right    COLONOSCOPY N/A 9/28/2015    Procedure: COLONOSCOPY;  Surgeon: Joe Amos MD;  Location: [facility] ([address] [place] [address]);  Service: Endoscopy;  Laterality: N/A;    HYSTERECTOMY      partial    INJECTION OF JOINT Left 1/9/2019    Procedure: Injection, LEFT HIP INTRAARTICULAR INJECTION;  Surgeon: Omega Estevez MD;  Location: [facility];  Service: Pain Management;  Laterality: Left;    INJECTION OF JOINT Left 3/13/2019    Procedure: INJECTION, JOINT LEFT HIP;  Surgeon: Omega Estevez MD;  Location: [facility];  Service: Pain Management;  Laterality: Left;  Left Hip Intrarticular Steroid Injection    TONSILLECTOMY         Review of Systems:  ROS    OBJECTIVE:     PHYSICAL EXAM:  Height: 5' 4" (162.6 cm) Weight: 64.4 kg (142 lb)  Vitals:    02/06/20 1453 02/06/20 1454   Weight: 64.4 kg (142 lb)    Height: 5' 4" (1.626 m)    PainSc:   6   6     Well developed, well nourished female in no acute distress  Alert and oriented x 3  HEENT- Normal exam  Lungs- Clear to auscultation  Heart- Regular rate and rhythm  Abdomen- Soft nontender  Extremity exam- examination of both hands demonstrates some deformities near the DIP joints of the digits most significantly at the index fingers bilaterally  The left middle finger demonstrates small cyst on the dorsal ulnar side of the DIP joint consistent with a mucous cyst there is no drainage no infection and no nail bed deformity  Range of motion the fingers otherwise full      RADIOGRAPHS:  AP lateral x-rays of the digits demonstrate arthritic changes severe at the DIP joints of the index and middle finger both hands  Comments: I have personally reviewed the imaging and I agree with the above radiologist's report.    ASSESSMENT/PLAN:     IMPRESSION:  DIP arthritis bilateral hands  2.  Mucous cyst symptomatic left middle finger    PLAN:  The cyst " seems to be bothering her the most so I recommended injection today  After pause for time-out identified the left middle finger injected into the DIP joint with combination Kenalog 20 mg 0.5 cc xylocaine sterile technique  Tolerated the procedure well without complication  I have also ordered some Voltaren gel for topical use on all of the joints involving the fingers  Follow-up 4-6 weeks for recheck  If symptoms fail to improve we may consider surgical treatment which might  include cyst excision and or DIP fusion       - We talked at length about the anatomy and pathophysiology of   Encounter Diagnoses   Name Primary?   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 There may be typographical errors that have been missed during proof-reading.

## 2020-02-06 NOTE — LETTER
February 6, 2020      Natalia Stubbs MD  [address] [facility]  [place] [address]           [facility]  [address], [address]  [place] [address]  Phone: [phone]          Patient: Sofía Mcwilliams   MR Number: [id number]   YOB: 1940   Date of Visit: 2/6/2020       Dear Dr. Natalia Stubbs:    Thank you for referring Sofía Mcwilliams to me for evaluation. Attached you will find relevant portions of my assessment and plan of care.    If you have questions, please do not hesitate to call me. I look forward to following Sofía Mcwilliams along with you.    Sincerely,    Brennen Prabhakar Jr., MD    Enclosure  CC:  No Recipients    If you would like to receive this communication electronically, please contact [email or web address] or [phone] to request more information on [facility] Link access.    For providers and/or their staff who would like to refer a patient to [facility], please contact us through our one-stop-shop provider referral line, [facility] , at [phone].    If you feel you have received this communication in error or would no longer like to receive these types of communications, please e-mail [email or web address]

## 2020-02-20 DIAGNOSIS — E78.5 HYPERLIPIDEMIA, UNSPECIFIED HYPERLIPIDEMIA TYPE: ICD-10-CM

## 2020-02-21 RX ORDER — ATORVASTATIN CALCIUM 20 MG/1
TABLET, FILM COATED ORAL
Qty: 90 TABLET | Refills: 0 | Status: SHIPPED | OUTPATIENT
Start: 2020-02-21 | End: 2020-06-08

## 2020-05-24 DIAGNOSIS — G25.81 RLS (RESTLESS LEGS SYNDROME): ICD-10-CM

## 2020-05-25 RX ORDER — ROPINIROLE 0.5 MG/1
TABLET, FILM COATED ORAL
Qty: 60 TABLET | Refills: 0 | Status: SHIPPED | OUTPATIENT
Start: 2020-05-25 | End: 2020-05-28

## 2020-05-28 DIAGNOSIS — G25.81 RLS (RESTLESS LEGS SYNDROME): ICD-10-CM

## 2020-05-28 RX ORDER — ROPINIROLE 0.5 MG/1
TABLET, FILM COATED ORAL
Qty: 180 TABLET | Refills: 0 | Status: SHIPPED | OUTPATIENT
Start: 2020-05-28 | End: 2020-06-29

## 2020-07-21 ENCOUNTER — LAB VISIT (OUTPATIENT)
Dept: LAB | Facility: HOSPITAL | Age: 80
End: 2020-07-21
Attending: INTERNAL MEDICINE
Payer: MEDICARE

## 2020-07-21 DIAGNOSIS — I10 ESSENTIAL HYPERTENSION: ICD-10-CM

## 2020-07-21 DIAGNOSIS — E78.5 HYPERLIPIDEMIA, UNSPECIFIED HYPERLIPIDEMIA TYPE: ICD-10-CM

## 2020-07-21 LAB
ALBUMIN SERPL BCP-MCNC: 3.9 G/DL (ref 3.5–5.2)
ALP SERPL-CCNC: 85 U/L (ref 55–135)
ALT SERPL W/O P-5'-P-CCNC: 21 U/L (ref 10–44)
ANION GAP SERPL CALC-SCNC: 7 MMOL/L (ref 8–16)
AST SERPL-CCNC: 23 U/L (ref 10–40)
BASOPHILS # BLD AUTO: 0.04 K/UL (ref 0–0.2)
BASOPHILS NFR BLD: 0.6 % (ref 0–1.9)
BILIRUB SERPL-MCNC: 0.5 MG/DL (ref 0.1–1)
BUN SERPL-MCNC: 17 MG/DL (ref 8–23)
CALCIUM SERPL-MCNC: 9.4 MG/DL (ref 8.7–10.5)
CHLORIDE SERPL-SCNC: 107 MMOL/L (ref 95–110)
CHOLEST SERPL-MCNC: 178 MG/DL (ref 120–199)
CHOLEST/HDLC SERPL: 3.1 {RATIO} (ref 2–5)
CO2 SERPL-SCNC: 28 MMOL/L (ref 23–29)
CREAT SERPL-MCNC: 0.8 MG/DL (ref 0.5–1.4)
DIFFERENTIAL METHOD: NORMAL
EOSINOPHIL # BLD AUTO: 0.4 K/UL (ref 0–0.5)
EOSINOPHIL NFR BLD: 5.1 % (ref 0–8)
ERYTHROCYTE [DISTWIDTH] IN BLOOD BY AUTOMATED COUNT: 13.4 % (ref 11.5–14.5)
EST. GFR  (AFRICAN AMERICAN): >60 ML/MIN/1.73 M^2
EST. GFR  (NON AFRICAN AMERICAN): >60 ML/MIN/1.73 M^2
GLUCOSE SERPL-MCNC: 97 MG/DL (ref 70–110)
HCT VFR BLD AUTO: 41.7 % (ref 37–48.5)
HDLC SERPL-MCNC: 58 MG/DL (ref 40–75)
HDLC SERPL: 32.6 % (ref 20–50)
HGB BLD-MCNC: 13.4 G/DL (ref 12–16)
IMM GRANULOCYTES # BLD AUTO: 0.02 K/UL (ref 0–0.04)
IMM GRANULOCYTES NFR BLD AUTO: 0.3 % (ref 0–0.5)
LDLC SERPL CALC-MCNC: 92.2 MG/DL (ref 63–159)
LYMPHOCYTES # BLD AUTO: 3.1 K/UL (ref 1–4.8)
LYMPHOCYTES NFR BLD: 42.5 % (ref 18–48)
MCH RBC QN AUTO: 29.8 PG (ref 27–31)
MCHC RBC AUTO-ENTMCNC: 32.1 G/DL (ref 32–36)
MCV RBC AUTO: 93 FL (ref 82–98)
MONOCYTES # BLD AUTO: 0.6 K/UL (ref 0.3–1)
MONOCYTES NFR BLD: 8 % (ref 4–15)
NEUTROPHILS # BLD AUTO: 3.2 K/UL (ref 1.8–7.7)
NEUTROPHILS NFR BLD: 43.5 % (ref 38–73)
NONHDLC SERPL-MCNC: 120 MG/DL
NRBC BLD-RTO: 0 /100 WBC
PLATELET # BLD AUTO: 208 K/UL (ref 150–350)
PMV BLD AUTO: 11.1 FL (ref 9.2–12.9)
POTASSIUM SERPL-SCNC: 4.4 MMOL/L (ref 3.5–5.1)
PROT SERPL-MCNC: 6.4 G/DL (ref 6–8.4)
RBC # BLD AUTO: 4.5 M/UL (ref 4–5.4)
SODIUM SERPL-SCNC: 142 MMOL/L (ref 136–145)
TRIGL SERPL-MCNC: 139 MG/DL (ref 30–150)
TSH SERPL DL<=0.005 MIU/L-ACNC: 1.7 UIU/ML (ref 0.4–4)
WBC # BLD AUTO: 7.23 K/UL (ref 3.9–12.7)

## 2020-07-21 PROCEDURE — 80053 COMPREHEN METABOLIC PANEL: CPT | Mod: HCNC

## 2020-07-21 PROCEDURE — 36415 COLL VENOUS BLD VENIPUNCTURE: CPT | Mod: HCNC,PO

## 2020-07-21 PROCEDURE — 80061 LIPID PANEL: CPT | Mod: HCNC

## 2020-07-21 PROCEDURE — 84443 ASSAY THYROID STIM HORMONE: CPT | Mod: HCNC

## 2020-07-21 PROCEDURE — 85025 COMPLETE CBC W/AUTO DIFF WBC: CPT | Mod: HCNC

## 2020-08-05 ENCOUNTER — OFFICE VISIT (OUTPATIENT)
Dept: INTERNAL MEDICINE | Facility: CLINIC | Age: 80
End: 2020-08-05
Payer: MEDICARE

## 2020-08-05 VITALS
SYSTOLIC BLOOD PRESSURE: 118 MMHG | BODY MASS INDEX: 24.81 KG/M2 | HEART RATE: 80 BPM | TEMPERATURE: 98 F | HEIGHT: 64 IN | WEIGHT: 145.31 LBS | DIASTOLIC BLOOD PRESSURE: 60 MMHG | OXYGEN SATURATION: 95 %

## 2020-08-05 DIAGNOSIS — Z86.010 HISTORY OF COLON POLYPS: ICD-10-CM

## 2020-08-05 DIAGNOSIS — Z12.39 BREAST CANCER SCREENING: ICD-10-CM

## 2020-08-05 DIAGNOSIS — Z12.31 ENCOUNTER FOR SCREENING MAMMOGRAM FOR MALIGNANT NEOPLASM OF BREAST: ICD-10-CM

## 2020-08-05 DIAGNOSIS — G25.81 RLS (RESTLESS LEGS SYNDROME): ICD-10-CM

## 2020-08-05 DIAGNOSIS — Z00.00 PREVENTATIVE HEALTH CARE: Primary | ICD-10-CM

## 2020-08-05 PROCEDURE — 3078F DIAST BP <80 MM HG: CPT | Mod: HCNC,CPTII,S$GLB, | Performed by: INTERNAL MEDICINE

## 2020-08-05 PROCEDURE — 3074F SYST BP LT 130 MM HG: CPT | Mod: HCNC,CPTII,S$GLB, | Performed by: INTERNAL MEDICINE

## 2020-08-05 PROCEDURE — 99999 PR PBB SHADOW E&M-EST. PATIENT-LVL IV: CPT | Mod: PBBFAC,HCNC,, | Performed by: INTERNAL MEDICINE

## 2020-08-05 PROCEDURE — 99397 PER PM REEVAL EST PAT 65+ YR: CPT | Mod: HCNC,S$GLB,, | Performed by: INTERNAL MEDICINE

## 2020-08-05 PROCEDURE — 3074F PR MOST RECENT SYSTOLIC BLOOD PRESSURE < 130 MM HG: ICD-10-PCS | Mod: HCNC,CPTII,S$GLB, | Performed by: INTERNAL MEDICINE

## 2020-08-05 PROCEDURE — 99397 PR PREVENTIVE VISIT,EST,65 & OVER: ICD-10-PCS | Mod: HCNC,S$GLB,, | Performed by: INTERNAL MEDICINE

## 2020-08-05 PROCEDURE — 3078F PR MOST RECENT DIASTOLIC BLOOD PRESSURE < 80 MM HG: ICD-10-PCS | Mod: HCNC,CPTII,S$GLB, | Performed by: INTERNAL MEDICINE

## 2020-08-05 PROCEDURE — 99999 PR PBB SHADOW E&M-EST. PATIENT-LVL IV: ICD-10-PCS | Mod: PBBFAC,HCNC,, | Performed by: INTERNAL MEDICINE

## 2020-08-05 RX ORDER — ROPINIROLE 1 MG/1
1 TABLET, FILM COATED ORAL 2 TIMES DAILY
Qty: 60 TABLET | Refills: 5 | Status: SHIPPED | OUTPATIENT
Start: 2020-08-05 | End: 2020-11-23

## 2020-08-05 NOTE — PROGRESS NOTES
Subjective:       Patient ID: Sofía Mcwilliams is a 79 y.o. female.    Chief Complaint: Annual Exam    HPI 79-year-old female presents to clinic today for annual physical also preop she will have right hip replacement on August 10th with Dr. Cohen.  Patient is denying chest pain no fever chills no shortness of breath no urinary symptoms she had labs EKG and chest x-ray which all been reviewed.  Patient is having problems with persistent restless leg throughout the day and night starts around mid day interferes with reading and television watching  Review of Systems    otherwise negative  Objective:      Physical Exam  General: Well-appearing, well-nourished.  No distress  HEENT: conjunctivae are normal.  Pupils are equal and reative to light.  TM's are clear and intact bilaterally.  Hearing is grossly normal.  Nasopharynx is clear.  Oropharynx is clear.  Neck: Supple.  No thyroid megaly.  No bruits.  Lymph: No cervical or supraclavicular adenopathy.  Heart: Regular rate and rhythm, without murmur, rub or gallop.  Lungs: Clear to auscultation; respiratory effort normal.  Abdomen: Soft, nontender, nondistended.  Normoactive bowel sounds.  No hepatomegaly.  No masses.  Extremities: Good distal pulses.  No edema.  Psych: Oriented to time person place.  Judgment and insight seem unimpaired.  Mood and affect are appropriate.  Assessment:       1. Breast cancer screening    2. Encounter for screening mammogram for malignant neoplasm of breast     3. RLS (restless legs syndrome)    4. History of colon polyps      five.  Preventive healthcare age-appropriate healthcare screening prevention performed today  6.  Preoperative evaluation patient has no contraindications may proceed with surgery this time forms completed and faxed for patient  Plan:       Sofía was seen today for annual exam.    Diagnoses and all orders for this visit:    Breast cancer screening  -     Mammo Digital Screening Bilat w/ Jaime; Future    Encounter for  screening mammogram for malignant neoplasm of breast   -     Mammo Digital Screening Bilat w/ Jaime; Future    RLS (restless legs syndrome)  -     rOPINIRole (REQUIP) 1 MG tablet; Take 1 tablet (1 mg total) by mouth 2 (two) times a day.  Discussed titration from current 1 mg cumulative dose to 1.5 over the next couple weeks she can then slowly increased to a cumulative dose of 2 mg plan to transition to long-acting once we have determined what is the most effective dosing.  History of colon polyps  -     Case request GI: COLONOSCOPY

## 2020-08-07 ENCOUNTER — HOSPITAL ENCOUNTER (OUTPATIENT)
Dept: RADIOLOGY | Facility: HOSPITAL | Age: 80
Discharge: HOME | End: 2020-08-07
Attending: INTERNAL MEDICINE
Payer: MEDICARE

## 2020-08-07 DIAGNOSIS — Z12.31 ENCOUNTER FOR SCREENING MAMMOGRAM FOR MALIGNANT NEOPLASM OF BREAST: ICD-10-CM

## 2020-08-07 DIAGNOSIS — Z12.39 BREAST CANCER SCREENING: ICD-10-CM

## 2020-08-07 PROCEDURE — 77067 SCR MAMMO BI INCL CAD: CPT | Mod: TC,HCNC

## 2020-08-07 PROCEDURE — 77067 SCR MAMMO BI INCL CAD: CPT | Mod: 26,HCNC,, | Performed by: RADIOLOGY

## 2020-08-07 PROCEDURE — 77063 MAMMO DIGITAL SCREENING BILAT WITH TOMOSYNTHESIS_CAD: ICD-10-PCS | Mod: 26,HCNC,, | Performed by: RADIOLOGY

## 2020-08-07 PROCEDURE — 77067 MAMMO DIGITAL SCREENING BILAT WITH TOMOSYNTHESIS_CAD: ICD-10-PCS | Mod: 26,HCNC,, | Performed by: RADIOLOGY

## 2020-08-07 PROCEDURE — 77063 BREAST TOMOSYNTHESIS BI: CPT | Mod: 26,HCNC,, | Performed by: RADIOLOGY

## 2020-09-03 DIAGNOSIS — Z01.818 PRE-OP TESTING: ICD-10-CM

## 2020-09-03 DIAGNOSIS — Z12.11 SPECIAL SCREENING FOR MALIGNANT NEOPLASMS, COLON: Primary | ICD-10-CM

## 2020-09-03 RX ORDER — SODIUM, POTASSIUM,MAG SULFATES 17.5-3.13G
1 SOLUTION, RECONSTITUTED, ORAL ORAL DAILY
Qty: 1 KIT | Refills: 0 | Status: SHIPPED | OUTPATIENT
Start: 2020-09-03 | End: 2020-09-05

## 2020-09-21 ENCOUNTER — TELEPHONE (OUTPATIENT)
Dept: GASTROENTEROLOGY | Facility: CLINIC | Age: 80
End: 2020-09-21

## 2020-09-21 NOTE — TELEPHONE ENCOUNTER
----- Message from Chey Swenosn sent at 9/21/2020  8:49 AM CDT -----  Regarding: Reschedule Surgery  Contact: Patient Called [phone]  Patient is calling to see if she can reschedule her surgery from 11/30/20 to 11/23/20.  Contact patient to discuss further

## 2020-09-29 ENCOUNTER — PATIENT MESSAGE (OUTPATIENT)
Dept: OTHER | Facility: OTHER | Age: 80
End: 2020-09-29

## 2020-10-01 ENCOUNTER — PATIENT OUTREACH (OUTPATIENT)
Dept: ADMINISTRATIVE | Facility: OTHER | Age: 80
End: 2020-10-01

## 2020-10-02 ENCOUNTER — OFFICE VISIT (OUTPATIENT)
Dept: OPTOMETRY | Facility: CLINIC | Age: 80
End: 2020-10-02
Payer: COMMERCIAL

## 2020-10-02 DIAGNOSIS — Z13.5 SCREENING FOR GLAUCOMA: ICD-10-CM

## 2020-10-02 DIAGNOSIS — H52.4 PRESBYOPIA: ICD-10-CM

## 2020-10-02 DIAGNOSIS — H25.13 NUCLEAR SCLEROSIS, BILATERAL: ICD-10-CM

## 2020-10-02 DIAGNOSIS — Z98.890 HISTORY OF REPAIR OF RETINAL TEAR BY LASER PHOTOCOAGULATION: Primary | ICD-10-CM

## 2020-10-02 PROCEDURE — 92014 COMPRE OPH EXAM EST PT 1/>: CPT | Mod: S$GLB,,, | Performed by: OPTOMETRIST

## 2020-10-02 PROCEDURE — 92015 PR REFRACTION: ICD-10-PCS | Mod: S$GLB,,, | Performed by: OPTOMETRIST

## 2020-10-02 PROCEDURE — 92014 PR EYE EXAM, EST PATIENT,COMPREHESV: ICD-10-PCS | Mod: S$GLB,,, | Performed by: OPTOMETRIST

## 2020-10-02 PROCEDURE — 99999 PR PBB SHADOW E&M-EST. PATIENT-LVL III: ICD-10-PCS | Mod: PBBFAC,,, | Performed by: OPTOMETRIST

## 2020-10-02 PROCEDURE — 92015 DETERMINE REFRACTIVE STATE: CPT | Mod: S$GLB,,, | Performed by: OPTOMETRIST

## 2020-10-02 PROCEDURE — 99999 PR PBB SHADOW E&M-EST. PATIENT-LVL III: CPT | Mod: PBBFAC,,, | Performed by: OPTOMETRIST

## 2020-10-02 NOTE — PROGRESS NOTES
HPI     DLS: //last year  Pt states no [facility] problems  No f/f  No gtts     Last edited by Kareem Muhammad, OD on 10/2/2020  8:11 AM. (History)        ROS     Positive for: Eyes (focal laser OD for periph hole, vit traction OS)    Negative for: Constitutional, Gastrointestinal, Neurological, Skin,   Genitourinary, Musculoskeletal, HENT, Endocrine, Cardiovascular,   Respiratory, Psychiatric, Allergic/Imm, Heme/Lymph    Last edited by Kareem Muhammad, OD on 10/2/2020  8:11 AM. (History)        Assessment /Plan     For exam results, see Encounter Report.    History of repair of retinal tear by laser photocoagulation    Nuclear sclerosis, bilateral    Screening for glaucoma    Presbyopia      HPI     DLS: //last year  Pt states no [facility] problems  No f/f  No gtts     Last edited by Kareem Muhammad, OD on 10/2/2020  8:11 AM. (History)        ROS     Positive for: Eyes (focal laser OD for periph hole, vit traction OS)    Negative for: Constitutional, Gastrointestinal, Neurological, Skin,   Genitourinary, Musculoskeletal, HENT, Endocrine, Cardiovascular,   Respiratory, Psychiatric, Allergic/Imm, Heme/Lymph    Last edited by Kareem Muhammad, OD on 10/2/2020  8:11 AM. (History)        Assessment /Plan     For exam results, see Encounter Report.    History of repair of retinal tear by laser photocoagulation    Nuclear sclerosis, bilateral    Screening for glaucoma    Presbyopia      1. Cat OU--pt happy w Rx  2. Sp focal laser for periph hole OD--stable  3. Vit traction OS--stable  4. JOLENE--advised SYSTANE or REFRESH ATs prn    PLAN:    rtc 1 yr, or immediately if F/F

## 2020-11-02 ENCOUNTER — PES CALL (OUTPATIENT)
Dept: ADMINISTRATIVE | Facility: CLINIC | Age: 80
End: 2020-11-02

## 2020-11-13 ENCOUNTER — TELEPHONE (OUTPATIENT)
Dept: ENDOSCOPY | Facility: HOSPITAL | Age: 80
End: 2020-11-13

## 2020-11-13 NOTE — TELEPHONE ENCOUNTER
Returned Pt call, no answer, left message for Pt to call endoscopy schedulers with any questions regarding the scheduled procedure at [phone].

## 2020-11-23 ENCOUNTER — PATIENT MESSAGE (OUTPATIENT)
Dept: INTERNAL MEDICINE | Facility: CLINIC | Age: 80
End: 2020-11-23

## 2020-11-23 DIAGNOSIS — G25.81 RLS (RESTLESS LEGS SYNDROME): Primary | ICD-10-CM

## 2020-11-23 RX ORDER — ROPINIROLE HYDROCHLORIDE 2 MG/1
2 TABLET, FILM COATED, EXTENDED RELEASE ORAL NIGHTLY
Qty: 90 TABLET | Refills: 1 | Status: SHIPPED | OUTPATIENT
Start: 2020-11-23 | End: 2020-12-04 | Stop reason: SDUPTHER

## 2020-12-04 RX ORDER — ROPINIROLE HYDROCHLORIDE 2 MG/1
2 TABLET, FILM COATED, EXTENDED RELEASE ORAL NIGHTLY
Qty: 90 TABLET | Refills: 1 | Status: SHIPPED | OUTPATIENT
Start: 2020-12-04 | End: 2021-02-03

## 2020-12-10 ENCOUNTER — TELEPHONE (OUTPATIENT)
Dept: OPTOMETRY | Facility: CLINIC | Age: 80
End: 2020-12-10

## 2020-12-10 NOTE — TELEPHONE ENCOUNTER
----- Message from Theresa Dennis sent at 12/10/2020  9:06 AM CST -----  Contact: Pt @ [phone]  Pt needs copy of glasses script faxed over to [email or web address]. Fax # [phone]. Pt would also like a copy placed in the mail for her too.

## 2020-12-11 ENCOUNTER — PATIENT MESSAGE (OUTPATIENT)
Dept: OTHER | Facility: OTHER | Age: 80
End: 2020-12-11

## 2020-12-22 ENCOUNTER — NURSE TRIAGE (OUTPATIENT)
Dept: ADMINISTRATIVE | Facility: CLINIC | Age: 80
End: 2020-12-22

## 2020-12-22 NOTE — TELEPHONE ENCOUNTER
Pt has had two episodes of diarrhea today. Pt is concerned because her friend had COVID-19 a month ago and she presented with diarrhea. Pt triaged. Advised to monitor for further symptoms and report to PCP.    Reason for Disposition   MILD-MODERATE diarrhea (e.g., 1-6 times / day more than normal)    Additional Information   Negative: Shock suspected (e.g., cold/pale/clammy skin, too weak to stand, low BP, rapid pulse)   Negative: Difficult to awaken or acting confused (e.g., disoriented, slurred speech)   Negative: Sounds like a life-threatening emergency to the triager   Negative: SEVERE abdominal pain (e.g., excruciating) and present > 1 hour   Negative: SEVERE abdominal pain and age > 60   Negative: Bloody, black, or tarry bowel movements (Exception: chronic-unchanged black-grey bowel movements and is taking iron pills or Pepto-bismol)   Negative: SEVERE diarrhea (e.g., 7 or more times / day more than normal) and age > 60 years   Negative: Constant abdominal pain lasting > 2 hours   Negative: Drinking very little and has signs of dehydration (e.g., no urine > 12 hours, very dry mouth, very lightheaded)   Negative: Patient sounds very sick or weak to the triager   Negative: SEVERE diarrhea (e.g., 7 or more times / day more than normal) and present > 24 hours (1 day)   Negative: MODERATE diarrhea (e.g., 4-6 times / day more than normal) and present > 48 hours (2 days)   Negative: MODERATE diarrhea (e.g., 4-6 times / day more than normal) and age > 70 years   Negative: Abdominal pain  (Exception: pain clears completely with each passage of diarrhea stool)   Negative: Fever > 101 F (38.3 C)   Negative: Blood in the stool   Negative: Mucus or pus in stool has been present > 2 days and diarrhea is more than mild   Negative: Weak immune system (e.g., HIV positive, cancer chemo, splenectomy, organ transplant, chronic steroids)   Negative: Travel to a foreign country in past month   Negative: Recent  antibiotic therapy (i.e., within last 2 months) and diarrhea present > 3 days since antibiotic was stopped   Negative: Recent hospitalization and diarrhea present > 3 days   Negative: Tube feedings (e.g., nasogastric, g-tube, j-tube)   Negative: MILD diarrhea (e.g., 1-3 or more stools than normal in past 24 hours) diarrhea without known cause and present > 7 days   Negative: Patient wants to be seen   Negative: Diarrhea is a chronic symptom (recurrent or ongoing AND lasting > 4 weeks)   Negative: SEVERE diarrhea (e.g., 7 or more times / day more than normal)    Protocols used: DIARRHEA-A-OH

## 2020-12-23 ENCOUNTER — LAB VISIT (OUTPATIENT)
Dept: INTERNAL MEDICINE | Facility: CLINIC | Age: 80
End: 2020-12-23
Payer: MEDICARE

## 2020-12-23 DIAGNOSIS — Z01.818 PRE-OP TESTING: ICD-10-CM

## 2020-12-23 PROCEDURE — U0003 INFECTIOUS AGENT DETECTION BY NUCLEIC ACID (DNA OR RNA); SEVERE ACUTE RESPIRATORY SYNDROME CORONAVIRUS 2 (SARS-COV-2) (CORONAVIRUS DISEASE [COVID-19]), AMPLIFIED PROBE TECHNIQUE, MAKING USE OF HIGH THROUGHPUT TECHNOLOGIES AS DESCRIBED BY CMS-2020-01-R: HCPCS | Mod: HCNC

## 2020-12-24 LAB — SARS-COV-2 RNA RESP QL NAA+PROBE: NOT DETECTED

## 2021-01-08 ENCOUNTER — IMMUNIZATION (OUTPATIENT)
Dept: OBSTETRICS AND GYNECOLOGY | Facility: CLINIC | Age: 81
End: 2021-01-08
Payer: MEDICARE

## 2021-01-08 DIAGNOSIS — Z23 NEED FOR VACCINATION: ICD-10-CM

## 2021-01-08 PROCEDURE — 91300 COVID-19, MRNA, LNP-S, PF, 30 MCG/0.3 ML DOSE VACCINE: CPT | Mod: PBBFAC | Performed by: FAMILY MEDICINE

## 2021-01-29 ENCOUNTER — IMMUNIZATION (OUTPATIENT)
Dept: OBSTETRICS AND GYNECOLOGY | Facility: CLINIC | Age: 81
End: 2021-01-29
Payer: MEDICARE

## 2021-01-29 DIAGNOSIS — Z23 NEED FOR VACCINATION: Primary | ICD-10-CM

## 2021-01-29 PROCEDURE — 91300 COVID-19, MRNA, LNP-S, PF, 30 MCG/0.3 ML DOSE VACCINE: CPT | Mod: PBBFAC | Performed by: FAMILY MEDICINE

## 2021-01-29 PROCEDURE — 0002A COVID-19, MRNA, LNP-S, PF, 30 MCG/0.3 ML DOSE VACCINE: CPT | Mod: PBBFAC | Performed by: FAMILY MEDICINE

## 2021-02-01 ENCOUNTER — LAB VISIT (OUTPATIENT)
Dept: LAB | Facility: HOSPITAL | Age: 81
End: 2021-02-01
Attending: INTERNAL MEDICINE
Payer: MEDICARE

## 2021-02-01 DIAGNOSIS — I10 ESSENTIAL HYPERTENSION: ICD-10-CM

## 2021-02-01 DIAGNOSIS — E78.5 HYPERLIPIDEMIA, UNSPECIFIED HYPERLIPIDEMIA TYPE: ICD-10-CM

## 2021-02-01 LAB
ALBUMIN SERPL BCP-MCNC: 3.7 G/DL (ref 3.5–5.2)
ALP SERPL-CCNC: 107 U/L (ref 55–135)
ALT SERPL W/O P-5'-P-CCNC: 21 U/L (ref 10–44)
ANION GAP SERPL CALC-SCNC: 9 MMOL/L (ref 8–16)
AST SERPL-CCNC: 23 U/L (ref 10–40)
BILIRUB SERPL-MCNC: 0.4 MG/DL (ref 0.1–1)
BUN SERPL-MCNC: 14 MG/DL (ref 8–23)
CALCIUM SERPL-MCNC: 9.1 MG/DL (ref 8.7–10.5)
CHLORIDE SERPL-SCNC: 105 MMOL/L (ref 95–110)
CHOLEST SERPL-MCNC: 154 MG/DL (ref 120–199)
CHOLEST/HDLC SERPL: 2.8 {RATIO} (ref 2–5)
CO2 SERPL-SCNC: 27 MMOL/L (ref 23–29)
CREAT SERPL-MCNC: 0.7 MG/DL (ref 0.5–1.4)
EST. GFR  (AFRICAN AMERICAN): >60 ML/MIN/1.73 M^2
EST. GFR  (NON AFRICAN AMERICAN): >60 ML/MIN/1.73 M^2
GLUCOSE SERPL-MCNC: 109 MG/DL (ref 70–110)
HDLC SERPL-MCNC: 56 MG/DL (ref 40–75)
HDLC SERPL: 36.4 % (ref 20–50)
LDLC SERPL CALC-MCNC: 68.8 MG/DL (ref 63–159)
NONHDLC SERPL-MCNC: 98 MG/DL
POTASSIUM SERPL-SCNC: 4.4 MMOL/L (ref 3.5–5.1)
PROT SERPL-MCNC: 6.4 G/DL (ref 6–8.4)
SODIUM SERPL-SCNC: 141 MMOL/L (ref 136–145)
TRIGL SERPL-MCNC: 146 MG/DL (ref 30–150)

## 2021-02-01 PROCEDURE — 80061 LIPID PANEL: CPT

## 2021-02-01 PROCEDURE — 80053 COMPREHEN METABOLIC PANEL: CPT

## 2021-02-01 PROCEDURE — 36415 COLL VENOUS BLD VENIPUNCTURE: CPT | Mod: PO

## 2021-02-03 ENCOUNTER — OFFICE VISIT (OUTPATIENT)
Dept: INTERNAL MEDICINE | Facility: CLINIC | Age: 81
End: 2021-02-03
Payer: MEDICARE

## 2021-02-03 DIAGNOSIS — I10 ESSENTIAL HYPERTENSION: ICD-10-CM

## 2021-02-03 DIAGNOSIS — G25.81 RLS (RESTLESS LEGS SYNDROME): ICD-10-CM

## 2021-02-03 DIAGNOSIS — Z00.00 PREVENTATIVE HEALTH CARE: ICD-10-CM

## 2021-02-03 DIAGNOSIS — E78.5 HYPERLIPIDEMIA, UNSPECIFIED HYPERLIPIDEMIA TYPE: Primary | ICD-10-CM

## 2021-02-03 DIAGNOSIS — M19.049 HAND ARTHRITIS: ICD-10-CM

## 2021-02-03 DIAGNOSIS — R29.898 HAND WEAKNESS: ICD-10-CM

## 2021-02-03 PROCEDURE — 1159F PR MEDICATION LIST DOCUMENTED IN MEDICAL RECORD: ICD-10-PCS | Mod: 95,,, | Performed by: INTERNAL MEDICINE

## 2021-02-03 PROCEDURE — 99442 PR PHYSICIAN TELEPHONE EVALUATION 11-20 MIN: CPT | Mod: 95,,, | Performed by: INTERNAL MEDICINE

## 2021-02-03 PROCEDURE — 99442 PR PHYSICIAN TELEPHONE EVALUATION 11-20 MIN: ICD-10-PCS | Mod: 95,,, | Performed by: INTERNAL MEDICINE

## 2021-02-03 PROCEDURE — 1159F MED LIST DOCD IN RCRD: CPT | Mod: 95,,, | Performed by: INTERNAL MEDICINE

## 2021-02-03 RX ORDER — ROPINIROLE 4 MG/1
4 TABLET, FILM COATED, EXTENDED RELEASE ORAL NIGHTLY
Qty: 30 TABLET | Refills: 3 | Status: SHIPPED | OUTPATIENT
Start: 2021-02-03 | End: 2021-02-05 | Stop reason: SDUPTHER

## 2021-02-05 ENCOUNTER — TELEPHONE (OUTPATIENT)
Dept: INTERNAL MEDICINE | Facility: CLINIC | Age: 81
End: 2021-02-05

## 2021-02-05 DIAGNOSIS — G25.81 RLS (RESTLESS LEGS SYNDROME): ICD-10-CM

## 2021-02-05 RX ORDER — ROPINIROLE HYDROCHLORIDE 2 MG/1
4 TABLET, FILM COATED, EXTENDED RELEASE ORAL NIGHTLY
Qty: 60 TABLET | Refills: 5 | Status: SHIPPED | OUTPATIENT
Start: 2021-02-05 | End: 2021-02-24

## 2021-02-11 ENCOUNTER — CLINICAL SUPPORT (OUTPATIENT)
Dept: REHABILITATION | Facility: HOSPITAL | Age: 81
End: 2021-02-11
Payer: MEDICARE

## 2021-02-11 DIAGNOSIS — R29.898 HAND WEAKNESS: ICD-10-CM

## 2021-02-11 DIAGNOSIS — Z78.9 DECREASED ACTIVITIES OF DAILY LIVING (ADL): ICD-10-CM

## 2021-02-11 DIAGNOSIS — R29.898 DECREASED GRIP STRENGTH: ICD-10-CM

## 2021-02-11 DIAGNOSIS — M19.049 HAND ARTHRITIS: ICD-10-CM

## 2021-02-11 DIAGNOSIS — R29.898 DECREASED PINCH STRENGTH: ICD-10-CM

## 2021-02-11 DIAGNOSIS — R20.1 REDUCED SENSATION: ICD-10-CM

## 2021-02-11 PROCEDURE — 97165 OT EVAL LOW COMPLEX 30 MIN: CPT

## 2021-02-11 PROCEDURE — 97022 WHIRLPOOL THERAPY: CPT

## 2021-02-18 ENCOUNTER — CLINICAL SUPPORT (OUTPATIENT)
Dept: REHABILITATION | Facility: HOSPITAL | Age: 81
End: 2021-02-18
Payer: MEDICARE

## 2021-02-18 DIAGNOSIS — R20.1 REDUCED SENSATION: ICD-10-CM

## 2021-02-18 DIAGNOSIS — R29.898 DECREASED GRIP STRENGTH: Primary | ICD-10-CM

## 2021-02-18 DIAGNOSIS — R29.898 DECREASED PINCH STRENGTH: ICD-10-CM

## 2021-02-18 DIAGNOSIS — Z78.9 DECREASED ACTIVITIES OF DAILY LIVING (ADL): ICD-10-CM

## 2021-02-18 PROCEDURE — 97110 THERAPEUTIC EXERCISES: CPT

## 2021-02-22 DIAGNOSIS — E78.5 HYPERLIPIDEMIA, UNSPECIFIED HYPERLIPIDEMIA TYPE: ICD-10-CM

## 2021-02-23 ENCOUNTER — CLINICAL SUPPORT (OUTPATIENT)
Dept: REHABILITATION | Facility: HOSPITAL | Age: 81
End: 2021-02-23
Payer: MEDICARE

## 2021-02-23 ENCOUNTER — PATIENT MESSAGE (OUTPATIENT)
Dept: INTERNAL MEDICINE | Facility: CLINIC | Age: 81
End: 2021-02-23

## 2021-02-23 DIAGNOSIS — G25.81 RLS (RESTLESS LEGS SYNDROME): ICD-10-CM

## 2021-02-23 DIAGNOSIS — R20.1 REDUCED SENSATION: ICD-10-CM

## 2021-02-23 DIAGNOSIS — Z78.9 DECREASED ACTIVITIES OF DAILY LIVING (ADL): ICD-10-CM

## 2021-02-23 DIAGNOSIS — R29.898 DECREASED PINCH STRENGTH: ICD-10-CM

## 2021-02-23 DIAGNOSIS — R29.898 DECREASED GRIP STRENGTH: Primary | ICD-10-CM

## 2021-02-23 PROCEDURE — 97140 MANUAL THERAPY 1/> REGIONS: CPT

## 2021-02-23 PROCEDURE — 97110 THERAPEUTIC EXERCISES: CPT

## 2021-02-24 RX ORDER — ATORVASTATIN CALCIUM 20 MG/1
TABLET, FILM COATED ORAL
Qty: 90 TABLET | Refills: 0 | Status: SHIPPED | OUTPATIENT
Start: 2021-02-24 | End: 2021-04-30

## 2021-02-24 RX ORDER — ROPINIROLE 4 MG/1
4 TABLET, FILM COATED, EXTENDED RELEASE ORAL NIGHTLY
Qty: 30 TABLET | Refills: 5 | Status: SHIPPED | OUTPATIENT
Start: 2021-02-24 | End: 2021-08-04 | Stop reason: SDUPTHER

## 2021-02-25 ENCOUNTER — TELEPHONE (OUTPATIENT)
Dept: INTERNAL MEDICINE | Facility: CLINIC | Age: 81
End: 2021-02-25

## 2021-02-25 DIAGNOSIS — Z86.010 HISTORY OF COLON POLYPS: Primary | ICD-10-CM

## 2021-02-26 ENCOUNTER — PATIENT MESSAGE (OUTPATIENT)
Dept: INTERNAL MEDICINE | Facility: CLINIC | Age: 81
End: 2021-02-26

## 2021-03-02 ENCOUNTER — CLINICAL SUPPORT (OUTPATIENT)
Dept: REHABILITATION | Facility: HOSPITAL | Age: 81
End: 2021-03-02
Payer: MEDICARE

## 2021-03-02 DIAGNOSIS — R20.1 REDUCED SENSATION: ICD-10-CM

## 2021-03-02 DIAGNOSIS — R29.898 DECREASED GRIP STRENGTH: Primary | ICD-10-CM

## 2021-03-02 DIAGNOSIS — Z78.9 DECREASED ACTIVITIES OF DAILY LIVING (ADL): ICD-10-CM

## 2021-03-02 DIAGNOSIS — R29.898 DECREASED PINCH STRENGTH: ICD-10-CM

## 2021-03-02 PROCEDURE — 97140 MANUAL THERAPY 1/> REGIONS: CPT

## 2021-03-02 PROCEDURE — 97110 THERAPEUTIC EXERCISES: CPT

## 2021-03-04 DIAGNOSIS — Z01.818 PRE-OP TESTING: ICD-10-CM

## 2021-03-09 ENCOUNTER — CLINICAL SUPPORT (OUTPATIENT)
Dept: REHABILITATION | Facility: HOSPITAL | Age: 81
End: 2021-03-09
Payer: MEDICARE

## 2021-03-09 DIAGNOSIS — R29.898 DECREASED GRIP STRENGTH: Primary | ICD-10-CM

## 2021-03-09 DIAGNOSIS — Z78.9 DECREASED ACTIVITIES OF DAILY LIVING (ADL): ICD-10-CM

## 2021-03-09 DIAGNOSIS — R20.1 REDUCED SENSATION: ICD-10-CM

## 2021-03-09 DIAGNOSIS — R29.898 DECREASED PINCH STRENGTH: ICD-10-CM

## 2021-03-09 PROCEDURE — 97110 THERAPEUTIC EXERCISES: CPT

## 2021-03-13 ENCOUNTER — LAB VISIT (OUTPATIENT)
Dept: INTERNAL MEDICINE | Facility: CLINIC | Age: 81
End: 2021-03-13
Payer: MEDICARE

## 2021-03-13 DIAGNOSIS — Z01.818 PRE-OP TESTING: ICD-10-CM

## 2021-03-13 LAB — SARS-COV-2 RNA RESP QL NAA+PROBE: NOT DETECTED

## 2021-03-13 PROCEDURE — U0003 INFECTIOUS AGENT DETECTION BY NUCLEIC ACID (DNA OR RNA); SEVERE ACUTE RESPIRATORY SYNDROME CORONAVIRUS 2 (SARS-COV-2) (CORONAVIRUS DISEASE [COVID-19]), AMPLIFIED PROBE TECHNIQUE, MAKING USE OF HIGH THROUGHPUT TECHNOLOGIES AS DESCRIBED BY CMS-2020-01-R: HCPCS | Performed by: FAMILY MEDICINE

## 2021-03-13 PROCEDURE — U0005 INFEC AGEN DETEC AMPLI PROBE: HCPCS | Performed by: FAMILY MEDICINE

## 2021-03-16 ENCOUNTER — ANESTHESIA (OUTPATIENT)
Dept: ENDOSCOPY | Facility: HOSPITAL | Age: 81
End: 2021-03-16
Payer: MEDICARE

## 2021-03-16 ENCOUNTER — HOSPITAL ENCOUNTER (OUTPATIENT)
Facility: HOSPITAL | Age: 81
Discharge: HOME | End: 2021-03-16
Attending: INTERNAL MEDICINE | Admitting: INTERNAL MEDICINE
Payer: MEDICARE

## 2021-03-16 ENCOUNTER — ANESTHESIA EVENT (OUTPATIENT)
Dept: ENDOSCOPY | Facility: HOSPITAL | Age: 81
End: 2021-03-16
Payer: MEDICARE

## 2021-03-16 VITALS
OXYGEN SATURATION: 98 % | BODY MASS INDEX: 24.75 KG/M2 | WEIGHT: 145 LBS | SYSTOLIC BLOOD PRESSURE: 145 MMHG | HEIGHT: 64 IN | DIASTOLIC BLOOD PRESSURE: 67 MMHG | RESPIRATION RATE: 18 BRPM | TEMPERATURE: 98 F | HEART RATE: 68 BPM

## 2021-03-16 DIAGNOSIS — Z86.010 HISTORY OF COLON POLYPS: ICD-10-CM

## 2021-03-16 PROBLEM — Z86.0100 HISTORY OF COLON POLYPS: Status: ACTIVE | Noted: 2021-03-16

## 2021-03-16 PROCEDURE — 37000008 HC ANESTHESIA 1ST 15 MINUTES: Performed by: INTERNAL MEDICINE

## 2021-03-16 PROCEDURE — G0105 COLORECTAL SCRN; HI RISK IND: ICD-10-PCS | Mod: ,,, | Performed by: INTERNAL MEDICINE

## 2021-03-16 PROCEDURE — E9220 PRA ENDO ANESTHESIA: ICD-10-PCS | Mod: ,,, | Performed by: NURSE ANESTHETIST, CERTIFIED REGISTERED

## 2021-03-16 PROCEDURE — G0105 COLORECTAL SCRN; HI RISK IND: HCPCS | Mod: ,,, | Performed by: INTERNAL MEDICINE

## 2021-03-16 PROCEDURE — G0105 COLORECTAL SCRN; HI RISK IND: HCPCS | Performed by: INTERNAL MEDICINE

## 2021-03-16 PROCEDURE — 25000003 PHARM REV CODE 250: Performed by: INTERNAL MEDICINE

## 2021-03-16 PROCEDURE — E9220 PRA ENDO ANESTHESIA: HCPCS | Mod: ,,, | Performed by: NURSE ANESTHETIST, CERTIFIED REGISTERED

## 2021-03-16 PROCEDURE — 63600175 PHARM REV CODE 636 W HCPCS: Performed by: NURSE ANESTHETIST, CERTIFIED REGISTERED

## 2021-03-16 PROCEDURE — 37000009 HC ANESTHESIA EA ADD 15 MINS: Performed by: INTERNAL MEDICINE

## 2021-03-16 RX ORDER — PROPOFOL 10 MG/ML
VIAL (ML) INTRAVENOUS
Status: DISCONTINUED | OUTPATIENT
Start: 2021-03-16 | End: 2021-03-16

## 2021-03-16 RX ORDER — SODIUM CHLORIDE 9 MG/ML
INJECTION, SOLUTION INTRAVENOUS CONTINUOUS
Status: DISCONTINUED | OUTPATIENT
Start: 2021-03-16 | End: 2021-03-16 | Stop reason: HOSPADM

## 2021-03-16 RX ORDER — LIDOCAINE HCL/PF 100 MG/5ML
SYRINGE (ML) INTRAVENOUS
Status: DISCONTINUED | OUTPATIENT
Start: 2021-03-16 | End: 2021-03-16

## 2021-03-16 RX ORDER — PROPOFOL 10 MG/ML
VIAL (ML) INTRAVENOUS CONTINUOUS PRN
Status: DISCONTINUED | OUTPATIENT
Start: 2021-03-16 | End: 2021-03-16

## 2021-03-16 RX ADMIN — PROPOFOL 30 MG: 10 INJECTION, EMULSION INTRAVENOUS at 11:03

## 2021-03-16 RX ADMIN — Medication 40 MG: at 10:03

## 2021-03-16 RX ADMIN — SODIUM CHLORIDE: 0.9 INJECTION, SOLUTION INTRAVENOUS at 10:03

## 2021-03-16 RX ADMIN — PROPOFOL 50 MG: 10 INJECTION, EMULSION INTRAVENOUS at 10:03

## 2021-03-16 RX ADMIN — SODIUM CHLORIDE: 0.9 INJECTION, SOLUTION INTRAVENOUS at 11:03

## 2021-03-16 RX ADMIN — PROPOFOL 150 MCG/KG/MIN: 10 INJECTION, EMULSION INTRAVENOUS at 10:03

## 2021-03-17 DIAGNOSIS — I10 ESSENTIAL HYPERTENSION: ICD-10-CM

## 2021-03-18 ENCOUNTER — PATIENT MESSAGE (OUTPATIENT)
Dept: RESEARCH | Facility: HOSPITAL | Age: 81
End: 2021-03-18

## 2021-03-22 RX ORDER — LOSARTAN POTASSIUM 25 MG/1
25 TABLET ORAL DAILY
Qty: 90 TABLET | Refills: 3 | Status: SHIPPED | OUTPATIENT
Start: 2021-03-22 | End: 2022-05-31

## 2021-03-26 ENCOUNTER — PATIENT MESSAGE (OUTPATIENT)
Dept: RESEARCH | Facility: HOSPITAL | Age: 81
End: 2021-03-26

## 2021-04-22 ENCOUNTER — PES CALL (OUTPATIENT)
Dept: ADMINISTRATIVE | Facility: CLINIC | Age: 81
End: 2021-04-22

## 2021-04-29 DIAGNOSIS — E78.5 HYPERLIPIDEMIA, UNSPECIFIED HYPERLIPIDEMIA TYPE: ICD-10-CM

## 2021-04-30 RX ORDER — ATORVASTATIN CALCIUM 20 MG/1
TABLET, FILM COATED ORAL
Qty: 90 TABLET | Refills: 3 | Status: SHIPPED | OUTPATIENT
Start: 2021-04-30 | End: 2022-05-30

## 2021-05-04 ENCOUNTER — PATIENT MESSAGE (OUTPATIENT)
Dept: INTERNAL MEDICINE | Facility: CLINIC | Age: 81
End: 2021-05-04

## 2021-05-04 DIAGNOSIS — M19.90 ARTHRITIS: ICD-10-CM

## 2021-05-05 RX ORDER — MELOXICAM 15 MG/1
15 TABLET ORAL DAILY
Qty: 90 TABLET | Refills: 3 | Status: SHIPPED | OUTPATIENT
Start: 2021-05-05 | End: 2022-04-27

## 2021-06-05 ENCOUNTER — OFFICE VISIT (OUTPATIENT)
Dept: URGENT CARE | Facility: CLINIC | Age: 81
End: 2021-06-05
Payer: MEDICARE

## 2021-06-05 VITALS
TEMPERATURE: 98 F | SYSTOLIC BLOOD PRESSURE: 134 MMHG | WEIGHT: 145 LBS | OXYGEN SATURATION: 100 % | RESPIRATION RATE: 16 BRPM | BODY MASS INDEX: 24.75 KG/M2 | HEIGHT: 64 IN | DIASTOLIC BLOOD PRESSURE: 73 MMHG | HEART RATE: 86 BPM

## 2021-06-05 DIAGNOSIS — L91.8 SKIN TAG: Primary | ICD-10-CM

## 2021-06-05 PROCEDURE — 99214 OFFICE O/P EST MOD 30 MIN: CPT | Mod: S$GLB,,, | Performed by: PHYSICIAN ASSISTANT

## 2021-06-05 PROCEDURE — 99214 PR OFFICE/OUTPT VISIT, EST, LEVL IV, 30-39 MIN: ICD-10-PCS | Mod: S$GLB,,, | Performed by: PHYSICIAN ASSISTANT

## 2021-06-26 ENCOUNTER — LAB VISIT (OUTPATIENT)
Dept: LAB | Facility: HOSPITAL | Age: 81
End: 2021-06-26
Payer: MEDICARE

## 2021-06-26 DIAGNOSIS — M16.12 UNILATERAL PRIMARY OSTEOARTHRITIS, LEFT HIP: Primary | ICD-10-CM

## 2021-06-26 LAB — CRP SERPL-MCNC: 1.3 MG/L (ref 0–8.2)

## 2021-06-26 PROCEDURE — 85652 RBC SED RATE AUTOMATED: CPT

## 2021-06-26 PROCEDURE — 36415 COLL VENOUS BLD VENIPUNCTURE: CPT

## 2021-06-26 PROCEDURE — 86140 C-REACTIVE PROTEIN: CPT

## 2021-06-28 LAB — ERYTHROCYTE [SEDIMENTATION RATE] IN BLOOD BY WESTERGREN METHOD: 4 MM/HR (ref 0–36)

## 2021-08-02 ENCOUNTER — LAB VISIT (OUTPATIENT)
Dept: LAB | Facility: HOSPITAL | Age: 81
End: 2021-08-02
Attending: INTERNAL MEDICINE
Payer: MEDICARE

## 2021-08-02 DIAGNOSIS — I10 ESSENTIAL HYPERTENSION: ICD-10-CM

## 2021-08-02 DIAGNOSIS — E78.5 HYPERLIPIDEMIA, UNSPECIFIED HYPERLIPIDEMIA TYPE: ICD-10-CM

## 2021-08-02 DIAGNOSIS — Z00.00 PREVENTATIVE HEALTH CARE: ICD-10-CM

## 2021-08-02 LAB
BASOPHILS # BLD AUTO: 0.03 K/UL (ref 0–0.2)
BASOPHILS NFR BLD: 0.4 % (ref 0–1.9)
CHOLEST SERPL-MCNC: 183 MG/DL (ref 120–199)
CHOLEST/HDLC SERPL: 2.9 {RATIO} (ref 2–5)
DIFFERENTIAL METHOD: NORMAL
EOSINOPHIL # BLD AUTO: 0.4 K/UL (ref 0–0.5)
EOSINOPHIL NFR BLD: 5.2 % (ref 0–8)
ERYTHROCYTE [DISTWIDTH] IN BLOOD BY AUTOMATED COUNT: 14.2 % (ref 11.5–14.5)
HCT VFR BLD AUTO: 40.7 % (ref 37–48.5)
HDLC SERPL-MCNC: 64 MG/DL (ref 40–75)
HDLC SERPL: 35 % (ref 20–50)
HGB BLD-MCNC: 13.3 G/DL (ref 12–16)
IMM GRANULOCYTES # BLD AUTO: 0.02 K/UL (ref 0–0.04)
IMM GRANULOCYTES NFR BLD AUTO: 0.3 % (ref 0–0.5)
LDLC SERPL CALC-MCNC: 95.8 MG/DL (ref 63–159)
LYMPHOCYTES # BLD AUTO: 2.7 K/UL (ref 1–4.8)
LYMPHOCYTES NFR BLD: 38.7 % (ref 18–48)
MCH RBC QN AUTO: 29.2 PG (ref 27–31)
MCHC RBC AUTO-ENTMCNC: 32.7 G/DL (ref 32–36)
MCV RBC AUTO: 89 FL (ref 82–98)
MONOCYTES # BLD AUTO: 0.6 K/UL (ref 0.3–1)
MONOCYTES NFR BLD: 8.7 % (ref 4–15)
NEUTROPHILS # BLD AUTO: 3.2 K/UL (ref 1.8–7.7)
NEUTROPHILS NFR BLD: 46.7 % (ref 38–73)
NONHDLC SERPL-MCNC: 119 MG/DL
NRBC BLD-RTO: 0 /100 WBC
PLATELET # BLD AUTO: 173 K/UL (ref 150–450)
PMV BLD AUTO: 11.5 FL (ref 9.2–12.9)
RBC # BLD AUTO: 4.56 M/UL (ref 4–5.4)
TRIGL SERPL-MCNC: 116 MG/DL (ref 30–150)
TSH SERPL DL<=0.005 MIU/L-ACNC: 1.71 UIU/ML (ref 0.4–4)
WBC # BLD AUTO: 6.93 K/UL (ref 3.9–12.7)

## 2021-08-02 PROCEDURE — 80053 COMPREHEN METABOLIC PANEL: CPT | Performed by: INTERNAL MEDICINE

## 2021-08-02 PROCEDURE — 80061 LIPID PANEL: CPT | Performed by: INTERNAL MEDICINE

## 2021-08-02 PROCEDURE — 36415 COLL VENOUS BLD VENIPUNCTURE: CPT | Mod: PO | Performed by: INTERNAL MEDICINE

## 2021-08-02 PROCEDURE — 84443 ASSAY THYROID STIM HORMONE: CPT | Performed by: INTERNAL MEDICINE

## 2021-08-02 PROCEDURE — 85025 COMPLETE CBC W/AUTO DIFF WBC: CPT | Performed by: INTERNAL MEDICINE

## 2021-08-03 LAB
ALBUMIN SERPL BCP-MCNC: 3.9 G/DL (ref 3.5–5.2)
ALP SERPL-CCNC: 86 U/L (ref 55–135)
ALT SERPL W/O P-5'-P-CCNC: 24 U/L (ref 10–44)
ANION GAP SERPL CALC-SCNC: 10 MMOL/L (ref 8–16)
AST SERPL-CCNC: 25 U/L (ref 10–40)
BILIRUB SERPL-MCNC: 0.5 MG/DL (ref 0.1–1)
BUN SERPL-MCNC: 17 MG/DL (ref 8–23)
CALCIUM SERPL-MCNC: 9.6 MG/DL (ref 8.7–10.5)
CHLORIDE SERPL-SCNC: 109 MMOL/L (ref 95–110)
CO2 SERPL-SCNC: 24 MMOL/L (ref 23–29)
CREAT SERPL-MCNC: 0.8 MG/DL (ref 0.5–1.4)
EST. GFR  (AFRICAN AMERICAN): >60 ML/MIN/1.73 M^2
EST. GFR  (NON AFRICAN AMERICAN): >60 ML/MIN/1.73 M^2
GLUCOSE SERPL-MCNC: 105 MG/DL (ref 70–110)
POTASSIUM SERPL-SCNC: 4.9 MMOL/L (ref 3.5–5.1)
PROT SERPL-MCNC: 6.5 G/DL (ref 6–8.4)
SODIUM SERPL-SCNC: 143 MMOL/L (ref 136–145)

## 2021-08-04 ENCOUNTER — PATIENT OUTREACH (OUTPATIENT)
Dept: ADMINISTRATIVE | Facility: OTHER | Age: 81
End: 2021-08-04

## 2021-08-04 ENCOUNTER — OFFICE VISIT (OUTPATIENT)
Dept: INTERNAL MEDICINE | Facility: CLINIC | Age: 81
End: 2021-08-04
Payer: MEDICARE

## 2021-08-04 VITALS
HEART RATE: 89 BPM | HEIGHT: 64 IN | OXYGEN SATURATION: 97 % | BODY MASS INDEX: 25.36 KG/M2 | WEIGHT: 148.56 LBS | SYSTOLIC BLOOD PRESSURE: 122 MMHG | DIASTOLIC BLOOD PRESSURE: 66 MMHG

## 2021-08-04 DIAGNOSIS — E78.5 HYPERLIPIDEMIA, UNSPECIFIED HYPERLIPIDEMIA TYPE: ICD-10-CM

## 2021-08-04 DIAGNOSIS — I10 ESSENTIAL HYPERTENSION: ICD-10-CM

## 2021-08-04 DIAGNOSIS — Z00.00 PREVENTATIVE HEALTH CARE: Primary | ICD-10-CM

## 2021-08-04 DIAGNOSIS — L65.9 HAIR THINNING: ICD-10-CM

## 2021-08-04 DIAGNOSIS — Z12.39 ENCOUNTER FOR SCREENING FOR MALIGNANT NEOPLASM OF BREAST, UNSPECIFIED SCREENING MODALITY: ICD-10-CM

## 2021-08-04 DIAGNOSIS — M54.9 DORSALGIA, UNSPECIFIED: ICD-10-CM

## 2021-08-04 DIAGNOSIS — G25.81 RLS (RESTLESS LEGS SYNDROME): ICD-10-CM

## 2021-08-04 DIAGNOSIS — Z12.31 ENCOUNTER FOR SCREENING MAMMOGRAM FOR MALIGNANT NEOPLASM OF BREAST: ICD-10-CM

## 2021-08-04 DIAGNOSIS — M54.16 LUMBAR RADICULOPATHY: ICD-10-CM

## 2021-08-04 DIAGNOSIS — Z78.0 ASYMPTOMATIC MENOPAUSAL STATE: ICD-10-CM

## 2021-08-04 DIAGNOSIS — M85.80 OSTEOPENIA, UNSPECIFIED LOCATION: ICD-10-CM

## 2021-08-04 PROCEDURE — 99499 UNLISTED E&M SERVICE: CPT | Mod: S$GLB,,, | Performed by: INTERNAL MEDICINE

## 2021-08-04 PROCEDURE — 99397 PR PREVENTIVE VISIT,EST,65 & OVER: ICD-10-PCS | Mod: S$GLB,,, | Performed by: INTERNAL MEDICINE

## 2021-08-04 PROCEDURE — 3074F PR MOST RECENT SYSTOLIC BLOOD PRESSURE < 130 MM HG: ICD-10-PCS | Mod: CPTII,S$GLB,, | Performed by: INTERNAL MEDICINE

## 2021-08-04 PROCEDURE — 1159F PR MEDICATION LIST DOCUMENTED IN MEDICAL RECORD: ICD-10-PCS | Mod: CPTII,S$GLB,, | Performed by: INTERNAL MEDICINE

## 2021-08-04 PROCEDURE — 99999 PR PBB SHADOW E&M-EST. PATIENT-LVL V: ICD-10-PCS | Mod: PBBFAC,,, | Performed by: INTERNAL MEDICINE

## 2021-08-04 PROCEDURE — 1126F AMNT PAIN NOTED NONE PRSNT: CPT | Mod: CPTII,S$GLB,, | Performed by: INTERNAL MEDICINE

## 2021-08-04 PROCEDURE — 1101F PR PT FALLS ASSESS DOC 0-1 FALLS W/OUT INJ PAST YR: ICD-10-PCS | Mod: CPTII,S$GLB,, | Performed by: INTERNAL MEDICINE

## 2021-08-04 PROCEDURE — 99397 PER PM REEVAL EST PAT 65+ YR: CPT | Mod: S$GLB,,, | Performed by: INTERNAL MEDICINE

## 2021-08-04 PROCEDURE — 3078F PR MOST RECENT DIASTOLIC BLOOD PRESSURE < 80 MM HG: ICD-10-PCS | Mod: CPTII,S$GLB,, | Performed by: INTERNAL MEDICINE

## 2021-08-04 PROCEDURE — 1126F PR PAIN SEVERITY QUANTIFIED, NO PAIN PRESENT: ICD-10-PCS | Mod: CPTII,S$GLB,, | Performed by: INTERNAL MEDICINE

## 2021-08-04 PROCEDURE — 3288F PR FALLS RISK ASSESSMENT DOCUMENTED: ICD-10-PCS | Mod: CPTII,S$GLB,, | Performed by: INTERNAL MEDICINE

## 2021-08-04 PROCEDURE — 3074F SYST BP LT 130 MM HG: CPT | Mod: CPTII,S$GLB,, | Performed by: INTERNAL MEDICINE

## 2021-08-04 PROCEDURE — 99999 PR PBB SHADOW E&M-EST. PATIENT-LVL V: CPT | Mod: PBBFAC,,, | Performed by: INTERNAL MEDICINE

## 2021-08-04 PROCEDURE — 99499 RISK ADDL DX/OHS AUDIT: ICD-10-PCS | Mod: S$GLB,,, | Performed by: INTERNAL MEDICINE

## 2021-08-04 PROCEDURE — 1159F MED LIST DOCD IN RCRD: CPT | Mod: CPTII,S$GLB,, | Performed by: INTERNAL MEDICINE

## 2021-08-04 PROCEDURE — 1160F PR REVIEW ALL MEDS BY PRESCRIBER/CLIN PHARMACIST DOCUMENTED: ICD-10-PCS | Mod: CPTII,S$GLB,, | Performed by: INTERNAL MEDICINE

## 2021-08-04 PROCEDURE — 3288F FALL RISK ASSESSMENT DOCD: CPT | Mod: CPTII,S$GLB,, | Performed by: INTERNAL MEDICINE

## 2021-08-04 PROCEDURE — 1101F PT FALLS ASSESS-DOCD LE1/YR: CPT | Mod: CPTII,S$GLB,, | Performed by: INTERNAL MEDICINE

## 2021-08-04 PROCEDURE — 3078F DIAST BP <80 MM HG: CPT | Mod: CPTII,S$GLB,, | Performed by: INTERNAL MEDICINE

## 2021-08-04 PROCEDURE — 1160F RVW MEDS BY RX/DR IN RCRD: CPT | Mod: CPTII,S$GLB,, | Performed by: INTERNAL MEDICINE

## 2021-08-04 RX ORDER — ROPINIROLE 4 MG/1
4 TABLET, FILM COATED, EXTENDED RELEASE ORAL NIGHTLY
Qty: 30 TABLET | Refills: 5 | Status: SHIPPED | OUTPATIENT
Start: 2021-08-04 | End: 2021-08-16

## 2021-08-05 ENCOUNTER — HOSPITAL ENCOUNTER (OUTPATIENT)
Dept: RADIOLOGY | Facility: CLINIC | Age: 81
Discharge: HOME | End: 2021-08-05
Attending: INTERNAL MEDICINE
Payer: MEDICARE

## 2021-08-05 ENCOUNTER — OFFICE VISIT (OUTPATIENT)
Dept: PAIN MEDICINE | Facility: CLINIC | Age: 81
End: 2021-08-05
Payer: MEDICARE

## 2021-08-05 ENCOUNTER — HOSPITAL ENCOUNTER (OUTPATIENT)
Dept: RADIOLOGY | Facility: HOSPITAL | Age: 81
Discharge: HOME | End: 2021-08-05
Attending: PHYSICAL MEDICINE & REHABILITATION
Payer: MEDICARE

## 2021-08-05 ENCOUNTER — HOSPITAL ENCOUNTER (OUTPATIENT)
Dept: RADIOLOGY | Facility: HOSPITAL | Age: 81
Discharge: HOME | End: 2021-08-05
Attending: STUDENT IN AN ORGANIZED HEALTH CARE EDUCATION/TRAINING PROGRAM
Payer: MEDICARE

## 2021-08-05 VITALS — SYSTOLIC BLOOD PRESSURE: 139 MMHG | HEART RATE: 90 BPM | DIASTOLIC BLOOD PRESSURE: 74 MMHG

## 2021-08-05 DIAGNOSIS — G89.29 CHRONIC LEFT-SIDED LOW BACK PAIN WITH LEFT-SIDED SCIATICA: ICD-10-CM

## 2021-08-05 DIAGNOSIS — Z78.0 ASYMPTOMATIC MENOPAUSAL STATE: ICD-10-CM

## 2021-08-05 DIAGNOSIS — Q76.49 TRANSITIONAL VERTEBRAE: Primary | ICD-10-CM

## 2021-08-05 DIAGNOSIS — M25.559 HIP PAIN: ICD-10-CM

## 2021-08-05 DIAGNOSIS — M54.42 CHRONIC LEFT-SIDED LOW BACK PAIN WITH LEFT-SIDED SCIATICA: ICD-10-CM

## 2021-08-05 DIAGNOSIS — M85.80 OSTEOPENIA, UNSPECIFIED LOCATION: ICD-10-CM

## 2021-08-05 DIAGNOSIS — M54.16 LUMBAR RADICULOPATHY: ICD-10-CM

## 2021-08-05 DIAGNOSIS — Q76.49 TRANSITIONAL VERTEBRAE: ICD-10-CM

## 2021-08-05 PROCEDURE — 1160F PR REVIEW ALL MEDS BY PRESCRIBER/CLIN PHARMACIST DOCUMENTED: ICD-10-PCS | Mod: CPTII,S$GLB,, | Performed by: PHYSICAL MEDICINE & REHABILITATION

## 2021-08-05 PROCEDURE — 99999 PR PBB SHADOW E&M-EST. PATIENT-LVL IV: ICD-10-PCS | Mod: PBBFAC,,, | Performed by: PHYSICAL MEDICINE & REHABILITATION

## 2021-08-05 PROCEDURE — 99214 PR OFFICE/OUTPT VISIT, EST, LEVL IV, 30-39 MIN: ICD-10-PCS | Mod: S$GLB,,, | Performed by: PHYSICAL MEDICINE & REHABILITATION

## 2021-08-05 PROCEDURE — 77080 DXA BONE DENSITY AXIAL: CPT | Mod: TC

## 2021-08-05 PROCEDURE — 73521 X-RAY EXAM HIPS BI 2 VIEWS: CPT | Mod: 26,,, | Performed by: RADIOLOGY

## 2021-08-05 PROCEDURE — 72110 X-RAY EXAM L-2 SPINE 4/>VWS: CPT | Mod: 26,,, | Performed by: RADIOLOGY

## 2021-08-05 PROCEDURE — 73521 XR HIPS BILATERAL 2 VIEW INCL AP PELVIS: ICD-10-PCS | Mod: 26,,, | Performed by: RADIOLOGY

## 2021-08-05 PROCEDURE — 1125F AMNT PAIN NOTED PAIN PRSNT: CPT | Mod: CPTII,S$GLB,, | Performed by: PHYSICAL MEDICINE & REHABILITATION

## 2021-08-05 PROCEDURE — 99999 PR PBB SHADOW E&M-EST. PATIENT-LVL IV: CPT | Mod: PBBFAC,,, | Performed by: PHYSICAL MEDICINE & REHABILITATION

## 2021-08-05 PROCEDURE — 99214 OFFICE O/P EST MOD 30 MIN: CPT | Mod: S$GLB,,, | Performed by: PHYSICAL MEDICINE & REHABILITATION

## 2021-08-05 PROCEDURE — 1125F PR PAIN SEVERITY QUANTIFIED, PAIN PRESENT: ICD-10-PCS | Mod: CPTII,S$GLB,, | Performed by: PHYSICAL MEDICINE & REHABILITATION

## 2021-08-05 PROCEDURE — 3075F PR MOST RECENT SYSTOLIC BLOOD PRESS GE 130-139MM HG: ICD-10-PCS | Mod: CPTII,S$GLB,, | Performed by: PHYSICAL MEDICINE & REHABILITATION

## 2021-08-05 PROCEDURE — 3075F SYST BP GE 130 - 139MM HG: CPT | Mod: CPTII,S$GLB,, | Performed by: PHYSICAL MEDICINE & REHABILITATION

## 2021-08-05 PROCEDURE — 77080 DXA BONE DENSITY AXIAL: CPT | Mod: 26,,, | Performed by: INTERNAL MEDICINE

## 2021-08-05 PROCEDURE — 1159F MED LIST DOCD IN RCRD: CPT | Mod: CPTII,S$GLB,, | Performed by: PHYSICAL MEDICINE & REHABILITATION

## 2021-08-05 PROCEDURE — 1159F PR MEDICATION LIST DOCUMENTED IN MEDICAL RECORD: ICD-10-PCS | Mod: CPTII,S$GLB,, | Performed by: PHYSICAL MEDICINE & REHABILITATION

## 2021-08-05 PROCEDURE — 72110 X-RAY EXAM L-2 SPINE 4/>VWS: CPT | Mod: TC,FY

## 2021-08-05 PROCEDURE — 77080 DEXA BONE DENSITY SPINE HIP: ICD-10-PCS | Mod: 26,,, | Performed by: INTERNAL MEDICINE

## 2021-08-05 PROCEDURE — 73521 X-RAY EXAM HIPS BI 2 VIEWS: CPT | Mod: TC,FY

## 2021-08-05 PROCEDURE — 3078F PR MOST RECENT DIASTOLIC BLOOD PRESSURE < 80 MM HG: ICD-10-PCS | Mod: CPTII,S$GLB,, | Performed by: PHYSICAL MEDICINE & REHABILITATION

## 2021-08-05 PROCEDURE — 1160F RVW MEDS BY RX/DR IN RCRD: CPT | Mod: CPTII,S$GLB,, | Performed by: PHYSICAL MEDICINE & REHABILITATION

## 2021-08-05 PROCEDURE — 3078F DIAST BP <80 MM HG: CPT | Mod: CPTII,S$GLB,, | Performed by: PHYSICAL MEDICINE & REHABILITATION

## 2021-08-05 PROCEDURE — 72110 XR LUMBAR SPINE 5 VIEW WITH FLEX AND EXT: ICD-10-PCS | Mod: 26,,, | Performed by: RADIOLOGY

## 2021-08-06 ENCOUNTER — TELEPHONE (OUTPATIENT)
Dept: INTERNAL MEDICINE | Facility: CLINIC | Age: 81
End: 2021-08-06

## 2021-08-06 ENCOUNTER — TELEPHONE (OUTPATIENT)
Dept: PAIN MEDICINE | Facility: CLINIC | Age: 81
End: 2021-08-06

## 2021-08-06 DIAGNOSIS — G25.81 RLS (RESTLESS LEGS SYNDROME): Primary | ICD-10-CM

## 2021-08-09 ENCOUNTER — PATIENT MESSAGE (OUTPATIENT)
Dept: INTERNAL MEDICINE | Facility: CLINIC | Age: 81
End: 2021-08-09

## 2021-08-09 ENCOUNTER — HOSPITAL ENCOUNTER (OUTPATIENT)
Dept: RADIOLOGY | Facility: HOSPITAL | Age: 81
Discharge: HOME | End: 2021-08-09
Attending: INTERNAL MEDICINE
Payer: MEDICARE

## 2021-08-09 DIAGNOSIS — Z12.31 ENCOUNTER FOR SCREENING MAMMOGRAM FOR MALIGNANT NEOPLASM OF BREAST: ICD-10-CM

## 2021-08-09 DIAGNOSIS — Z12.39 ENCOUNTER FOR SCREENING FOR MALIGNANT NEOPLASM OF BREAST, UNSPECIFIED SCREENING MODALITY: ICD-10-CM

## 2021-08-09 DIAGNOSIS — F40.9 PHOBIA, UNSPECIFIED TYPE: Primary | ICD-10-CM

## 2021-08-09 PROCEDURE — 77067 MAMMO DIGITAL SCREENING BILAT WITH TOMO: ICD-10-PCS | Mod: 26,,, | Performed by: RADIOLOGY

## 2021-08-09 PROCEDURE — 77067 SCR MAMMO BI INCL CAD: CPT | Mod: TC

## 2021-08-09 PROCEDURE — 77063 BREAST TOMOSYNTHESIS BI: CPT | Mod: 26,,, | Performed by: RADIOLOGY

## 2021-08-09 PROCEDURE — 77063 MAMMO DIGITAL SCREENING BILAT WITH TOMO: ICD-10-PCS | Mod: 26,,, | Performed by: RADIOLOGY

## 2021-08-09 PROCEDURE — 77067 SCR MAMMO BI INCL CAD: CPT | Mod: 26,,, | Performed by: RADIOLOGY

## 2021-08-09 RX ORDER — DIAZEPAM 5 MG/1
TABLET ORAL
Qty: 2 TABLET | Refills: 0 | Status: SHIPPED | OUTPATIENT
Start: 2021-08-09 | End: 2022-02-04

## 2021-08-14 ENCOUNTER — HOSPITAL ENCOUNTER (OUTPATIENT)
Dept: RADIOLOGY | Facility: OTHER | Age: 81
Discharge: HOME | End: 2021-08-14
Attending: PHYSICAL MEDICINE & REHABILITATION
Payer: MEDICARE

## 2021-08-14 DIAGNOSIS — G89.29 CHRONIC LEFT-SIDED LOW BACK PAIN WITH LEFT-SIDED SCIATICA: ICD-10-CM

## 2021-08-14 DIAGNOSIS — M54.16 LUMBAR RADICULOPATHY: ICD-10-CM

## 2021-08-14 DIAGNOSIS — M54.42 CHRONIC LEFT-SIDED LOW BACK PAIN WITH LEFT-SIDED SCIATICA: ICD-10-CM

## 2021-08-14 PROCEDURE — 72148 MRI LUMBAR SPINE W/O DYE: CPT | Mod: TC

## 2021-08-14 PROCEDURE — 72148 MRI LUMBAR SPINE WITHOUT CONTRAST: ICD-10-PCS | Mod: 26,,, | Performed by: RADIOLOGY

## 2021-08-14 PROCEDURE — 72148 MRI LUMBAR SPINE W/O DYE: CPT | Mod: 26,,, | Performed by: RADIOLOGY

## 2021-08-16 RX ORDER — PRAMIPEXOLE DIHYDROCHLORIDE 0.5 MG/1
TABLET ORAL
Qty: 90 TABLET | Refills: 1 | Status: SHIPPED | OUTPATIENT
Start: 2021-08-16 | End: 2021-09-10

## 2021-08-17 ENCOUNTER — TELEPHONE (OUTPATIENT)
Dept: PAIN MEDICINE | Facility: CLINIC | Age: 81
End: 2021-08-17

## 2021-08-17 DIAGNOSIS — M54.16 LUMBAR RADICULOPATHY: Primary | ICD-10-CM

## 2021-08-24 ENCOUNTER — TELEPHONE (OUTPATIENT)
Dept: PAIN MEDICINE | Facility: CLINIC | Age: 81
End: 2021-08-24

## 2021-08-25 ENCOUNTER — HOSPITAL ENCOUNTER (OUTPATIENT)
Facility: HOSPITAL | Age: 81
Discharge: HOME | End: 2021-08-25
Attending: PHYSICAL MEDICINE & REHABILITATION | Admitting: PHYSICAL MEDICINE & REHABILITATION
Payer: MEDICARE

## 2021-08-25 VITALS
HEART RATE: 67 BPM | SYSTOLIC BLOOD PRESSURE: 155 MMHG | DIASTOLIC BLOOD PRESSURE: 66 MMHG | RESPIRATION RATE: 16 BRPM | TEMPERATURE: 98 F | OXYGEN SATURATION: 95 %

## 2021-08-25 DIAGNOSIS — M54.16 LUMBAR RADICULOPATHY: Primary | ICD-10-CM

## 2021-08-25 DIAGNOSIS — R52 PAIN: ICD-10-CM

## 2021-08-25 PROCEDURE — 99152 MOD SED SAME PHYS/QHP 5/>YRS: CPT | Performed by: PHYSICAL MEDICINE & REHABILITATION

## 2021-08-25 PROCEDURE — 64483 PR EPIDURAL INJ, ANES/STEROID, TRANSFORAMINAL, LUMB/SACR, SNGL LEVL: ICD-10-PCS | Mod: LT,,, | Performed by: PHYSICAL MEDICINE & REHABILITATION

## 2021-08-25 PROCEDURE — 25500020 PHARM REV CODE 255: Performed by: PHYSICAL MEDICINE & REHABILITATION

## 2021-08-25 PROCEDURE — 64483 NJX AA&/STRD TFRM EPI L/S 1: CPT | Mod: LT,,, | Performed by: PHYSICAL MEDICINE & REHABILITATION

## 2021-08-25 PROCEDURE — 25000003 PHARM REV CODE 250: Performed by: PHYSICAL MEDICINE & REHABILITATION

## 2021-08-25 PROCEDURE — 63600175 PHARM REV CODE 636 W HCPCS: Performed by: PHYSICAL MEDICINE & REHABILITATION

## 2021-08-25 PROCEDURE — 64483 NJX AA&/STRD TFRM EPI L/S 1: CPT | Performed by: PHYSICAL MEDICINE & REHABILITATION

## 2021-08-25 RX ORDER — DEXAMETHASONE SODIUM PHOSPHATE 10 MG/ML
INJECTION INTRAMUSCULAR; INTRAVENOUS
Status: DISCONTINUED | OUTPATIENT
Start: 2021-08-25 | End: 2021-08-25 | Stop reason: HOSPADM

## 2021-08-25 RX ORDER — SODIUM CHLORIDE 9 MG/ML
INJECTION, SOLUTION INTRAVENOUS CONTINUOUS
Status: DISCONTINUED | OUTPATIENT
Start: 2021-08-25 | End: 2021-08-25 | Stop reason: HOSPADM

## 2021-08-25 RX ORDER — MIDAZOLAM HYDROCHLORIDE 1 MG/ML
INJECTION, SOLUTION INTRAMUSCULAR; INTRAVENOUS
Status: DISCONTINUED | OUTPATIENT
Start: 2021-08-25 | End: 2021-08-25 | Stop reason: HOSPADM

## 2021-08-25 RX ORDER — LIDOCAINE HYDROCHLORIDE 10 MG/ML
INJECTION INFILTRATION; PERINEURAL
Status: DISCONTINUED | OUTPATIENT
Start: 2021-08-25 | End: 2021-08-25 | Stop reason: HOSPADM

## 2021-08-25 RX ORDER — LIDOCAINE HYDROCHLORIDE 10 MG/ML
INJECTION, SOLUTION EPIDURAL; INFILTRATION; INTRACAUDAL; PERINEURAL
Status: DISCONTINUED | OUTPATIENT
Start: 2021-08-25 | End: 2021-08-25 | Stop reason: HOSPADM

## 2021-08-25 RX ORDER — FENTANYL CITRATE 50 UG/ML
INJECTION, SOLUTION INTRAMUSCULAR; INTRAVENOUS
Status: DISCONTINUED | OUTPATIENT
Start: 2021-08-25 | End: 2021-08-25 | Stop reason: HOSPADM

## 2021-08-25 RX ADMIN — SODIUM CHLORIDE: 0.9 INJECTION, SOLUTION INTRAVENOUS at 07:08

## 2021-09-05 ENCOUNTER — PATIENT MESSAGE (OUTPATIENT)
Dept: INTERNAL MEDICINE | Facility: CLINIC | Age: 81
End: 2021-09-05

## 2021-09-05 DIAGNOSIS — G25.81 RLS (RESTLESS LEGS SYNDROME): Primary | ICD-10-CM

## 2021-09-10 RX ORDER — ROPINIROLE 1 MG/1
1-2 TABLET, FILM COATED ORAL NIGHTLY
Qty: 60 TABLET | Refills: 3 | Status: SHIPPED | OUTPATIENT
Start: 2021-09-10 | End: 2022-01-19

## 2021-09-16 ENCOUNTER — PATIENT OUTREACH (OUTPATIENT)
Dept: ADMINISTRATIVE | Facility: OTHER | Age: 81
End: 2021-09-16

## 2021-09-17 ENCOUNTER — PATIENT MESSAGE (OUTPATIENT)
Dept: INTERNAL MEDICINE | Facility: CLINIC | Age: 81
End: 2021-09-17

## 2021-09-20 ENCOUNTER — OFFICE VISIT (OUTPATIENT)
Dept: PAIN MEDICINE | Facility: CLINIC | Age: 81
End: 2021-09-20
Payer: MEDICARE

## 2021-09-20 VITALS
WEIGHT: 148.56 LBS | SYSTOLIC BLOOD PRESSURE: 143 MMHG | HEART RATE: 72 BPM | BODY MASS INDEX: 25.51 KG/M2 | DIASTOLIC BLOOD PRESSURE: 70 MMHG

## 2021-09-20 DIAGNOSIS — R52 PAIN: ICD-10-CM

## 2021-09-20 DIAGNOSIS — M25.559 HIP PAIN: ICD-10-CM

## 2021-09-20 DIAGNOSIS — M54.16 LUMBAR RADICULOPATHY: Primary | ICD-10-CM

## 2021-09-20 DIAGNOSIS — G89.29 CHRONIC LEFT-SIDED LOW BACK PAIN WITH LEFT-SIDED SCIATICA: ICD-10-CM

## 2021-09-20 DIAGNOSIS — Q76.49 TRANSITIONAL VERTEBRAE: ICD-10-CM

## 2021-09-20 DIAGNOSIS — M54.42 CHRONIC LEFT-SIDED LOW BACK PAIN WITH LEFT-SIDED SCIATICA: ICD-10-CM

## 2021-09-20 PROCEDURE — 3078F PR MOST RECENT DIASTOLIC BLOOD PRESSURE < 80 MM HG: ICD-10-PCS | Mod: CPTII,S$GLB,, | Performed by: NURSE PRACTITIONER

## 2021-09-20 PROCEDURE — 1160F PR REVIEW ALL MEDS BY PRESCRIBER/CLIN PHARMACIST DOCUMENTED: ICD-10-PCS | Mod: CPTII,S$GLB,, | Performed by: NURSE PRACTITIONER

## 2021-09-20 PROCEDURE — 3077F SYST BP >= 140 MM HG: CPT | Mod: CPTII,S$GLB,, | Performed by: NURSE PRACTITIONER

## 2021-09-20 PROCEDURE — 99999 PR PBB SHADOW E&M-EST. PATIENT-LVL III: ICD-10-PCS | Mod: PBBFAC,,, | Performed by: NURSE PRACTITIONER

## 2021-09-20 PROCEDURE — 1159F PR MEDICATION LIST DOCUMENTED IN MEDICAL RECORD: ICD-10-PCS | Mod: CPTII,S$GLB,, | Performed by: NURSE PRACTITIONER

## 2021-09-20 PROCEDURE — 3077F PR MOST RECENT SYSTOLIC BLOOD PRESSURE >= 140 MM HG: ICD-10-PCS | Mod: CPTII,S$GLB,, | Performed by: NURSE PRACTITIONER

## 2021-09-20 PROCEDURE — 1160F RVW MEDS BY RX/DR IN RCRD: CPT | Mod: CPTII,S$GLB,, | Performed by: NURSE PRACTITIONER

## 2021-09-20 PROCEDURE — 1126F PR PAIN SEVERITY QUANTIFIED, NO PAIN PRESENT: ICD-10-PCS | Mod: CPTII,S$GLB,, | Performed by: NURSE PRACTITIONER

## 2021-09-20 PROCEDURE — 1159F MED LIST DOCD IN RCRD: CPT | Mod: CPTII,S$GLB,, | Performed by: NURSE PRACTITIONER

## 2021-09-20 PROCEDURE — 99999 PR PBB SHADOW E&M-EST. PATIENT-LVL III: CPT | Mod: PBBFAC,,, | Performed by: NURSE PRACTITIONER

## 2021-09-20 PROCEDURE — 1126F AMNT PAIN NOTED NONE PRSNT: CPT | Mod: CPTII,S$GLB,, | Performed by: NURSE PRACTITIONER

## 2021-09-20 PROCEDURE — 99213 OFFICE O/P EST LOW 20 MIN: CPT | Mod: S$GLB,,, | Performed by: NURSE PRACTITIONER

## 2021-09-20 PROCEDURE — 3078F DIAST BP <80 MM HG: CPT | Mod: CPTII,S$GLB,, | Performed by: NURSE PRACTITIONER

## 2021-09-20 PROCEDURE — 99213 PR OFFICE/OUTPT VISIT, EST, LEVL III, 20-29 MIN: ICD-10-PCS | Mod: S$GLB,,, | Performed by: NURSE PRACTITIONER

## 2021-09-29 ENCOUNTER — IMMUNIZATION (OUTPATIENT)
Dept: INTERNAL MEDICINE | Facility: CLINIC | Age: 81
End: 2021-09-29
Payer: MEDICARE

## 2021-09-29 DIAGNOSIS — Z23 NEED FOR VACCINATION: Primary | ICD-10-CM

## 2021-09-29 PROCEDURE — 91300 COVID-19, MRNA, LNP-S, PF, 30 MCG/0.3 ML DOSE VACCINE: CPT | Mod: HCNC,PBBFAC | Performed by: INTERNAL MEDICINE

## 2021-09-29 PROCEDURE — 0003A COVID-19, MRNA, LNP-S, PF, 30 MCG/0.3 ML DOSE VACCINE: CPT | Mod: HCNC,CV19,PBBFAC | Performed by: INTERNAL MEDICINE

## 2022-01-05 ENCOUNTER — TELEPHONE (OUTPATIENT)
Dept: ADMINISTRATIVE | Facility: OTHER | Age: 82
End: 2022-01-05
Payer: MEDICARE

## 2022-01-06 ENCOUNTER — TELEPHONE (OUTPATIENT)
Dept: PAIN MEDICINE | Facility: CLINIC | Age: 82
End: 2022-01-06
Payer: MEDICARE

## 2022-01-06 NOTE — TELEPHONE ENCOUNTER
----- Message from Reina Mcginnis sent at 1/6/2022  8:25 AM CST -----  Regarding: call back  Contact: [phone]  Who Called: PT     Patient is calling to talk to nurse in regards to scheduling an injection. Please advice

## 2022-01-07 ENCOUNTER — TELEPHONE (OUTPATIENT)
Dept: PAIN MEDICINE | Facility: CLINIC | Age: 82
End: 2022-01-07
Payer: MEDICARE

## 2022-01-07 NOTE — TELEPHONE ENCOUNTER
----- Message from Geraldine Coreas sent at 1/7/2022  4:06 PM CST -----  Needs advice from nurse:      Who Called:pt  Regarding:returning a call  Would the patient rather a call back or VIA [facility]?  Best Call Back number:[phone] Or [phone]    Additional Info:

## 2022-01-07 NOTE — TELEPHONE ENCOUNTER
----- Message from Nanci Nolan MA sent at 1/7/2022  9:03 AM CST -----  Type:  Needs Medical Advice    Who Called: pt  Regarding: needs appt for injection  Would the patient rather a call back or a response via [facility]? Call back  Best Call Back Number: [phone] cell phone  Additional Information: pt is requesting staff call cell phone

## 2022-01-11 ENCOUNTER — TELEPHONE (OUTPATIENT)
Dept: PAIN MEDICINE | Facility: CLINIC | Age: 82
End: 2022-01-11
Payer: MEDICARE

## 2022-01-11 NOTE — TELEPHONE ENCOUNTER
----- Message from Nanci Nolan MA sent at 1/10/2022  8:08 AM CST -----  Type:  Needs Medical Advice    Who Called: pt  Regarding: returning call  Would the patient rather a call back or a response via [facility]? Call back  Best Call Back Number:  cell [phone]  Additional Information: n/a

## 2022-01-17 DIAGNOSIS — G25.81 RLS (RESTLESS LEGS SYNDROME): ICD-10-CM

## 2022-01-18 ENCOUNTER — PATIENT OUTREACH (OUTPATIENT)
Dept: ADMINISTRATIVE | Facility: OTHER | Age: 82
End: 2022-01-18
Payer: MEDICARE

## 2022-01-18 NOTE — PROGRESS NOTES
Health Maintenance Due   Topic Date Due    Hepatitis C Screening  Never done    Influenza Vaccine (1) 09/01/2021     Updates were requested from care everywhere.  Chart was reviewed for overdue Proactive [facility] Encounters ([facility]) topics (CRS, Breast Cancer Screening, Eye exam)  Health Maintenance has been updated.  LINKS immunization registry triggered.  Immunizations were reconciled.

## 2022-01-19 RX ORDER — ROPINIROLE 1 MG/1
TABLET, FILM COATED ORAL
Qty: 60 TABLET | Refills: 3 | Status: SHIPPED | OUTPATIENT
Start: 2022-01-19 | End: 2022-04-20

## 2022-01-28 ENCOUNTER — LAB VISIT (OUTPATIENT)
Dept: LAB | Facility: HOSPITAL | Age: 82
End: 2022-01-28
Attending: INTERNAL MEDICINE
Payer: MEDICARE

## 2022-01-28 DIAGNOSIS — E78.5 HYPERLIPIDEMIA, UNSPECIFIED HYPERLIPIDEMIA TYPE: ICD-10-CM

## 2022-01-28 DIAGNOSIS — I10 ESSENTIAL HYPERTENSION: ICD-10-CM

## 2022-01-28 LAB
ALBUMIN SERPL BCP-MCNC: 3.7 G/DL (ref 3.5–5.2)
ALP SERPL-CCNC: 80 U/L (ref 55–135)
ALT SERPL W/O P-5'-P-CCNC: 24 U/L (ref 10–44)
ANION GAP SERPL CALC-SCNC: 8 MMOL/L (ref 8–16)
AST SERPL-CCNC: 35 U/L (ref 10–40)
BILIRUB SERPL-MCNC: 0.5 MG/DL (ref 0.1–1)
BUN SERPL-MCNC: 19 MG/DL (ref 8–23)
CALCIUM SERPL-MCNC: 9.4 MG/DL (ref 8.7–10.5)
CHLORIDE SERPL-SCNC: 106 MMOL/L (ref 95–110)
CHOLEST SERPL-MCNC: 168 MG/DL (ref 120–199)
CHOLEST/HDLC SERPL: 2.9 {RATIO} (ref 2–5)
CO2 SERPL-SCNC: 27 MMOL/L (ref 23–29)
CREAT SERPL-MCNC: 0.7 MG/DL (ref 0.5–1.4)
EST. GFR  (AFRICAN AMERICAN): >60 ML/MIN/1.73 M^2
EST. GFR  (NON AFRICAN AMERICAN): >60 ML/MIN/1.73 M^2
GLUCOSE SERPL-MCNC: 101 MG/DL (ref 70–110)
HDLC SERPL-MCNC: 58 MG/DL (ref 40–75)
HDLC SERPL: 34.5 % (ref 20–50)
LDLC SERPL CALC-MCNC: 83 MG/DL (ref 63–159)
NONHDLC SERPL-MCNC: 110 MG/DL
POTASSIUM SERPL-SCNC: 4.3 MMOL/L (ref 3.5–5.1)
PROT SERPL-MCNC: 6.2 G/DL (ref 6–8.4)
SODIUM SERPL-SCNC: 141 MMOL/L (ref 136–145)
TRIGL SERPL-MCNC: 135 MG/DL (ref 30–150)

## 2022-01-28 PROCEDURE — 36415 COLL VENOUS BLD VENIPUNCTURE: CPT | Mod: HCNC,PO | Performed by: INTERNAL MEDICINE

## 2022-01-28 PROCEDURE — 80061 LIPID PANEL: CPT | Mod: HCNC | Performed by: INTERNAL MEDICINE

## 2022-01-28 PROCEDURE — 80053 COMPREHEN METABOLIC PANEL: CPT | Mod: HCNC | Performed by: INTERNAL MEDICINE

## 2022-02-03 ENCOUNTER — OFFICE VISIT (OUTPATIENT)
Dept: PAIN MEDICINE | Facility: CLINIC | Age: 82
End: 2022-02-03
Payer: MEDICARE

## 2022-02-03 ENCOUNTER — TELEPHONE (OUTPATIENT)
Dept: PAIN MEDICINE | Facility: CLINIC | Age: 82
End: 2022-02-03
Payer: MEDICARE

## 2022-02-03 VITALS — DIASTOLIC BLOOD PRESSURE: 77 MMHG | SYSTOLIC BLOOD PRESSURE: 130 MMHG | HEART RATE: 88 BPM

## 2022-02-03 DIAGNOSIS — M51.36 DDD (DEGENERATIVE DISC DISEASE), LUMBAR: ICD-10-CM

## 2022-02-03 DIAGNOSIS — M47.819 ARTHROPATHY OF FACET JOINTS AT MULTIPLE LEVELS: ICD-10-CM

## 2022-02-03 DIAGNOSIS — M54.16 LUMBAR RADICULOPATHY: Primary | ICD-10-CM

## 2022-02-03 DIAGNOSIS — R52 PAIN: ICD-10-CM

## 2022-02-03 DIAGNOSIS — Q76.49 TRANSITIONAL VERTEBRAE: ICD-10-CM

## 2022-02-03 PROCEDURE — 3075F PR MOST RECENT SYSTOLIC BLOOD PRESS GE 130-139MM HG: ICD-10-PCS | Mod: HCNC,CPTII,S$GLB, | Performed by: NURSE PRACTITIONER

## 2022-02-03 PROCEDURE — 1159F PR MEDICATION LIST DOCUMENTED IN MEDICAL RECORD: ICD-10-PCS | Mod: HCNC,CPTII,S$GLB, | Performed by: NURSE PRACTITIONER

## 2022-02-03 PROCEDURE — 1125F PR PAIN SEVERITY QUANTIFIED, PAIN PRESENT: ICD-10-PCS | Mod: HCNC,CPTII,S$GLB, | Performed by: NURSE PRACTITIONER

## 2022-02-03 PROCEDURE — 99213 OFFICE O/P EST LOW 20 MIN: CPT | Mod: HCNC,S$GLB,, | Performed by: NURSE PRACTITIONER

## 2022-02-03 PROCEDURE — 3078F PR MOST RECENT DIASTOLIC BLOOD PRESSURE < 80 MM HG: ICD-10-PCS | Mod: HCNC,CPTII,S$GLB, | Performed by: NURSE PRACTITIONER

## 2022-02-03 PROCEDURE — 1160F RVW MEDS BY RX/DR IN RCRD: CPT | Mod: HCNC,CPTII,S$GLB, | Performed by: NURSE PRACTITIONER

## 2022-02-03 PROCEDURE — 1160F PR REVIEW ALL MEDS BY PRESCRIBER/CLIN PHARMACIST DOCUMENTED: ICD-10-PCS | Mod: HCNC,CPTII,S$GLB, | Performed by: NURSE PRACTITIONER

## 2022-02-03 PROCEDURE — 3075F SYST BP GE 130 - 139MM HG: CPT | Mod: HCNC,CPTII,S$GLB, | Performed by: NURSE PRACTITIONER

## 2022-02-03 PROCEDURE — 99213 PR OFFICE/OUTPT VISIT, EST, LEVL III, 20-29 MIN: ICD-10-PCS | Mod: HCNC,S$GLB,, | Performed by: NURSE PRACTITIONER

## 2022-02-03 PROCEDURE — 3078F DIAST BP <80 MM HG: CPT | Mod: HCNC,CPTII,S$GLB, | Performed by: NURSE PRACTITIONER

## 2022-02-03 PROCEDURE — 99999 PR PBB SHADOW E&M-EST. PATIENT-LVL III: ICD-10-PCS | Mod: PBBFAC,HCNC,, | Performed by: NURSE PRACTITIONER

## 2022-02-03 PROCEDURE — 1159F MED LIST DOCD IN RCRD: CPT | Mod: HCNC,CPTII,S$GLB, | Performed by: NURSE PRACTITIONER

## 2022-02-03 PROCEDURE — 1125F AMNT PAIN NOTED PAIN PRSNT: CPT | Mod: HCNC,CPTII,S$GLB, | Performed by: NURSE PRACTITIONER

## 2022-02-03 PROCEDURE — 99999 PR PBB SHADOW E&M-EST. PATIENT-LVL III: CPT | Mod: PBBFAC,HCNC,, | Performed by: NURSE PRACTITIONER

## 2022-02-03 NOTE — TELEPHONE ENCOUNTER
----- Message from Lila Ngo MA sent at 2/3/2022  2:29 PM CST -----  Regarding: med clearance  Pt is scheduled to have a Bilateral TFESI with Dr. Mederos on 2/23 and pt needs med clearance to hold ASA 5 days prior to his procedure .     Please advise.    AR

## 2022-02-03 NOTE — H&P (VIEW-ONLY)
[facility]  Established Clinic Visit     Referring Provider: No referring provider defined for this encounter.    Chief Complaint:  Mild left groin, right low back, right hip and right lateral leg.    History of Present Illness: Sofía Mcwilliams is a 81 y.o. female referred by Dr. Natalia Stubbs for left groin pain. She has pmh of bilateral hip replacements which resolved a significant portion of her pain. However she reports that in march of this year she started to develop left groin pain. The pain she decribes as burning and sharp and radiated down the medial aspect of the thigh into the calf. Pain made worse with walking and standing. Made better with sitting and rest. She is still able to cycle without any significant pain. She denies any numbness tingling or weakness or b/b dysfunction. She did complete many courses of PT (up to 3 months) and but did not get pain relief. She did follow up with her orthopedic surgeon in  who repeated her x-rays to evaluate for hardware issues.       Onset: 7 months, started after walking more than usual  Location: left low back and left groin mostly  Radiation: left groin and left medial calf/leg  Timing: constant- groin ; intermittent -lbp  Quality: Aching and Throbbing  Exacerbating Factors: lifting, standing for more than 10 minutes and walking for more than 10-20 minutes  Alleviating Factors: massage, physical therapy and sitting  Associated Symptoms: denies night fever/night sweats, urinary incontinence and significant weight loss    Severity: Currently: 2/10   Typical Range: 2-4/10     Exacerbation: 4/10     Interval History (02/03/2022): pt returns today for right low back, right hip and right lateral leg pain, additionally she has mild left groin pain.. Pt's pain score is 2/10.  Mrs Mcwilliams reports today that for 8 weeks she participated in the get fit program through [facility] which really helped her pain.  She states this pain in her right low back right hip  and right leg started around Gaby.  Pain especially got worse after restarting physical therapy 3 weeks ago.  Pain currently is located in the right low back and right lateral anterior and medial leg stopping above the knee.  She experienced excellent relief following a left TF REID in August of 2021. She does still have mild left groin and leg pain but her worst pain is on the right today.  She denies any profound weakness, denies any bowel or bladder dysfunction, denies any saddle anesthesia denies any recent incident or trauma.  She reports that she is an avid exercise person and feels as though she may have pushed it too much which has now created more pain for her on the right.      Current Pain Scales:  Current: 2/10              Typical Range: 0-1/10    Previous Interventions:  -08/25/2021 Left L5 transforaminal epidural steroid injection 90%   - bilateral hip replacements    Previous Therapies:  PT/OT: yes   Relevant Surgery: no   Previous Medications:   - NSAIDS: Meloxicam 15 mg  - Muscle Relaxants:  None  - TCAs: None  - SNRIs: None  - Topicals: None  - Anticonvulsants: None    - Opioids: None    Current Pain Medications:  1. Meloxicam 15 mg    Blood Thinners: none    Full Medication List:    Current Outpatient Medications:     aspirin (ECOTRIN) 81 MG EC tablet, Take 1 tablet by mouth., Disp: , Rfl:     atorvastatin (LIPITOR) 20 MG tablet, TAKE 1 TABLET EVERY DAY, Disp: 90 tablet, Rfl: 3    CALCIUM CARB/VIT D3/MINERALS (CALCIUM-VITAMIN D ORAL), once daily. , Disp: , Rfl:     coenzyme Q10 10 mg capsule, Take by mouth., Disp: , Rfl:     diazePAM (VALIUM) 5 MG tablet, Take 30 minutes prior to procedure, Disp: 2 tablet, Rfl: 0    losartan (COZAAR) 25 MG tablet, TAKE 1 TABLET (25 MG TOTAL) BY MOUTH ONCE DAILY., Disp: 90 tablet, Rfl: 3    meloxicam (MOBIC) 15 MG tablet, Take 1 tablet (15 mg total) by mouth once daily., Disp: 90 tablet, Rfl: 3    multivitamin (MULTIPLE VITAMIN) per tablet, Take 1  tablet by mouth., Disp: , Rfl:     rOPINIRole (REQUIP) 1 MG tablet, TAKE 1 TO 2 TABLETS(1 TO 2 MG) BY MOUTH EVERY EVENING, Disp: 60 tablet, Rfl: 3    VITAMIN B COMPLEX ORAL, Take by mouth once daily. , Disp: , Rfl:     vitamin D 1000 units Tab, Take 185 mg by mouth once daily., Disp: , Rfl:      Review of Systems:  Review of Systems   Constitutional: Negative for chills and fever.   Respiratory: Negative for cough.    Cardiovascular: Negative for chest pain.   Gastrointestinal: Negative for abdominal pain.   Genitourinary: Negative for dysuria.   Musculoskeletal: Positive for joint pain.   Skin: Negative for rash.   Neurological: Negative for seizures.   Psychiatric/Behavioral: The patient is nervous/anxious.        Allergies:  Celecoxib and Sulfa (sulfonamide antibiotics)     Medical History:   has a past medical history of Anxiety (5/2/2016), Arthritis, Basal cell carcinoma (2013), Cataract, Hypertension, and Other and unspecified hyperlipidemia.    Surgical History:   has a past surgical history that includes Hysterectomy; Appendectomy; Tonsillectomy; bunion; Colonoscopy (N/A, 9/28/2015); Injection of joint (Left, 1/9/2019); Injection of joint (Left, 3/13/2019); Joint replacement; hip surgery x 2; Colonoscopy (N/A, 3/16/2021); and Transforaminal epidural injection of steroid (Left, 8/25/2021).    Family History:  family history is not on file. She was adopted.    Social History:   reports that she quit smoking about 56 years ago. She smoked 0.00 packs per day for 0.00 years. She has never used smokeless tobacco. She reports current alcohol use. She reports that she does not use drugs.    Physical Exam:  /77   Pulse 88   GEN: No acute distress. Calm, comfortable  HENT: Normocephalic, atraumatic, moist mucous membranes  EYE: Anicteric sclera, non-injected.   CV: Non-diaphoretic. Regular Rate. Radial Pulses 2+.  RESP: Breathing comfortably. Chest expansion symmetric.  EXT: No clubbing, cyanosis.   SKIN:  Warm, & dry to palpation. No visible rashes or lesions of exposed skin.   PSYCH: Pleasant mood and appropriate affect. Recent and remote memory intact.   GAIT: Independent, ambulation  Lumbar Spine Exam:       Inspection: No erythema, bruising.       Palpation: (+) TTP of lumbar paraspinals on left and right      ROM: Non Limited in flexion, extension, lateral bending.       (-) Facet loading        (-) Straight Leg Raise bilaterally      (-) MARTIN bilaterally      5/5 strength in both lower extremities      5/5 strength with abduction, adduction  Hip Exam:      Inspection: No gross deformity or apparent leg length discrepancy      Palpation:  TTP to left greater trochanteric bursa. TTP left iliopsoas tendon distal insertion and rectus femoris muscle.       ROM:  No limitation or pain in internal rotation, external rotation       Provocative Maneuvers:   Pain with resisted hip flexion.   - log roll    - FADIR  Neurologic Exam:     Alert. Speech is fluent and appropriate.     Strength:  5/5 throughout bilateral lower extremities     Sensation:  Grossly intact to light touch in bilateral lower extremities     Reflexes: 2+ in b/l patella, achilles     Tone: No abnormality appreciated in bilateral lower extremities     No Clonus     Downgoing toes on plantar stimulation\      Imaging:  -  Lumbar Spine X-ray 12/3/18  Mild DJD and lumbar scoliosis.  The disc spaces are narrowed between the L2 and L5 vertebral segments.  Slight narrowing of the lumbosacral disc space also noted.  No fracture, spondylolisthesis or bone destruction identified    Labs:  BMP  Lab Results   Component Value Date     01/28/2022    K 4.3 01/28/2022     01/28/2022    CO2 27 01/28/2022    BUN 19 01/28/2022    CREATININE 0.7 01/28/2022    CALCIUM 9.4 01/28/2022    ANIONGAP 8 01/28/2022    ESTGFRAFRICA >60.0 01/28/2022    EGFRNONAA >60.0 01/28/2022     Lab Results   Component Value Date    ALT 24 01/28/2022    AST 35 01/28/2022    ALKPHOS  80 01/28/2022    BILITOT 0.5 01/28/2022     Lab Results   Component Value Date     08/02/2021       Assessment:  Sofía Mcwilliams is a 81 y.o. 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 lady presenting with:     - Left groin pain radiating down the medial aspect of the thigh. She has some mild back pain. Outside bonescan revealed something around L4 per patient. Potentially this could be left L2-3-4 radiculopathy or iliopsoas tendinopathy and potential irritation of femoral/saphenous nerve radiating to medial calf area  - Comorbidities: anxiety, HTN, HLD, osteopenia    9/20/21- Sofía Mcwilliams is a 81 y.o. female who  has a past medical history of Anxiety (5/2/2016), Arthritis, Basal cell carcinoma (2013), Cataract, Hypertension, and Other and unspecified hyperlipidemia.  By history and examination this patient has chronic low back pain with radiculopathy.  The underlying cause cause is facet arthritis, degenerative disc disease, foraminal stenosis and central canal stenosis.  Pathology is confirmed by imaging.  We discussed the underlying diagnoses and multiple treatment options including non-opioid medications, injections, home exercise and activity modification / rest.  The risks and benefits of each treatment option were discussed and all questions were answered.      02/03/2022-very pleasant 81 year female that presented today with right low back, right hip right lateral leg pain stopping just above her she did report mild pain in the left groin but mainly her pain was on the right today.  She benefited from a left L5 TF REID last year with appears that now her pain is more on the  right still has mild pain on the left.  Pain is likely related to Moderate narrowing at the L5-S1 level, discussed with patient that I think we should scheduled for a bilateral L5 TF REID in effort to relieve her symptoms.      Treatment Plan:   - PT/OT/HEP:  Discussed benefits of exercise for pain. Completed 3 months of PT and get 50 program for 8 weeks  I encouraged the patient to maintain a home exercise regimen that includes daily, moderate cardiovascular exercise lasting at least 30 minutes.  This may include yoga, reinier chi, walking, swimming, aqua aerobics, or other exercises that maintain a heart rate of 50-70% of the calculated maximum heart rate.  I also encouraged light, daily stretching focused on the target area.  - Procedures:  Scheduled for bilateral L5 TF REID with Dr. Mederos  - Medications: No changes recommended at this time.  - Imaging: Reviewed   - Labs: Reviewed.  Medications are appropriately dosed for current hepatorenal function.    Follow Up: RTC 1-2 weeks after the injection.    Jeet Miller NP-C  [facility]      Disclaimer: This note was partly generated using dictation software which may occasionally result in transcription errors.

## 2022-02-04 ENCOUNTER — OFFICE VISIT (OUTPATIENT)
Dept: DERMATOLOGY | Facility: CLINIC | Age: 82
End: 2022-02-04
Payer: MEDICARE

## 2022-02-04 ENCOUNTER — TELEPHONE (OUTPATIENT)
Dept: PAIN MEDICINE | Facility: CLINIC | Age: 82
End: 2022-02-04
Payer: MEDICARE

## 2022-02-04 ENCOUNTER — TELEPHONE (OUTPATIENT)
Dept: INTERNAL MEDICINE | Facility: CLINIC | Age: 82
End: 2022-02-04
Payer: MEDICARE

## 2022-02-04 DIAGNOSIS — D23.9 DERMATOFIBROMA: ICD-10-CM

## 2022-02-04 DIAGNOSIS — L82.1 SK (SEBORRHEIC KERATOSIS): Primary | ICD-10-CM

## 2022-02-04 DIAGNOSIS — Z85.828 HISTORY OF NONMELANOMA SKIN CANCER: ICD-10-CM

## 2022-02-04 PROCEDURE — 1101F PT FALLS ASSESS-DOCD LE1/YR: CPT | Mod: HCNC,CPTII,S$GLB, | Performed by: DERMATOLOGY

## 2022-02-04 PROCEDURE — 99999 PR PBB SHADOW E&M-EST. PATIENT-LVL III: CPT | Mod: PBBFAC,HCNC,, | Performed by: DERMATOLOGY

## 2022-02-04 PROCEDURE — 1159F MED LIST DOCD IN RCRD: CPT | Mod: HCNC,CPTII,S$GLB, | Performed by: DERMATOLOGY

## 2022-02-04 PROCEDURE — 1126F AMNT PAIN NOTED NONE PRSNT: CPT | Mod: HCNC,CPTII,S$GLB, | Performed by: DERMATOLOGY

## 2022-02-04 PROCEDURE — 3288F PR FALLS RISK ASSESSMENT DOCUMENTED: ICD-10-PCS | Mod: HCNC,CPTII,S$GLB, | Performed by: DERMATOLOGY

## 2022-02-04 PROCEDURE — 1126F PR PAIN SEVERITY QUANTIFIED, NO PAIN PRESENT: ICD-10-PCS | Mod: HCNC,CPTII,S$GLB, | Performed by: DERMATOLOGY

## 2022-02-04 PROCEDURE — 3288F FALL RISK ASSESSMENT DOCD: CPT | Mod: HCNC,CPTII,S$GLB, | Performed by: DERMATOLOGY

## 2022-02-04 PROCEDURE — 1101F PR PT FALLS ASSESS DOC 0-1 FALLS W/OUT INJ PAST YR: ICD-10-PCS | Mod: HCNC,CPTII,S$GLB, | Performed by: DERMATOLOGY

## 2022-02-04 PROCEDURE — 1159F PR MEDICATION LIST DOCUMENTED IN MEDICAL RECORD: ICD-10-PCS | Mod: HCNC,CPTII,S$GLB, | Performed by: DERMATOLOGY

## 2022-02-04 PROCEDURE — 1160F PR REVIEW ALL MEDS BY PRESCRIBER/CLIN PHARMACIST DOCUMENTED: ICD-10-PCS | Mod: HCNC,CPTII,S$GLB, | Performed by: DERMATOLOGY

## 2022-02-04 PROCEDURE — 99999 PR PBB SHADOW E&M-EST. PATIENT-LVL III: ICD-10-PCS | Mod: PBBFAC,HCNC,, | Performed by: DERMATOLOGY

## 2022-02-04 PROCEDURE — 99203 PR OFFICE/OUTPT VISIT, NEW, LEVL III, 30-44 MIN: ICD-10-PCS | Mod: HCNC,S$GLB,, | Performed by: DERMATOLOGY

## 2022-02-04 PROCEDURE — 1160F RVW MEDS BY RX/DR IN RCRD: CPT | Mod: HCNC,CPTII,S$GLB, | Performed by: DERMATOLOGY

## 2022-02-04 PROCEDURE — 99203 OFFICE O/P NEW LOW 30 MIN: CPT | Mod: HCNC,S$GLB,, | Performed by: DERMATOLOGY

## 2022-02-04 NOTE — PROGRESS NOTES
Subjective:       Patient ID:  Sofía Mcwilliams is a 81 y.o. female who presents for   Chief Complaint   Patient presents with    Skin Check     UBSE    Lesion     Right cheek     History of Present Illness: The patient presents for follow up of skin check.    The patient was last seen on: 12/20/2016 for skin check    This is a high risk patient here to check for the development of new lesions.      Patient with new complaint of lesion(s)  Location: right cheek  Duration: 1 1/2 years  Symptoms: red  Relieving factors/Previous treatments: none          Review of Systems   Skin: Positive for daily sunscreen use, activity-related sunscreen use and wears hat (activities). Negative for tendency to form keloidal scars and recent sunburn.   Hematologic/Lymphatic: Does not bruise/bleed easily (on asa).        Objective:    Physical Exam   Constitutional: She appears well-developed and well-nourished. No distress.   Neurological: She is alert and oriented to person, place, and time. She is not disoriented.   Psychiatric: She has a normal mood and affect.   Skin:   Areas Examined (abnormalities noted in diagram):   Scalp / Hair Palpated and Inspected  Head / Face Inspection Performed  Neck Inspection Performed  Chest / Axilla Inspection Performed  Back Inspection Performed  RUE Inspected  LUE Inspection Performed  Nails and Digits Inspection Performed                   Diagram Legend     Erythematous scaling macule/papule c/w actinic keratosis       Vascular papule c/w angioma      Pigmented verrucoid papule/plaque c/w seborrheic keratosis      Yellow umbilicated papule c/w sebaceous hyperplasia      Irregularly shaped tan macule c/w lentigo     1-2 mm smooth white papules consistent with Milia      Movable subcutaneous cyst with punctum c/w epidermal inclusion cyst      Subcutaneous movable cyst c/w pilar cyst      Firm pink to brown papule c/w dermatofibroma      Pedunculated fleshy papule(s) c/w skin tag(s)      Evenly  pigmented macule c/w junctional nevus     Mildly variegated pigmented, slightly irregular-bordered macule c/w mildly atypical nevus      Flesh colored to evenly pigmented papule c/w intradermal nevus       Pink pearly papule/plaque c/w basal cell carcinoma      Erythematous hyperkeratotic cursted plaque c/w SCC      Surgical scar with no sign of skin cancer recurrence      Open and closed comedones      Inflammatory papules and pustules      Verrucoid papule consistent consistent with wart     Erythematous eczematous patches and plaques     Dystrophic onycholytic nail with subungual debris c/w onychomycosis     Umbilicated papule    Erythematous-base heme-crusted tan verrucoid plaque consistent with inflamed seborrheic keratosis     Erythematous Silvery Scaling Plaque c/w Psoriasis     See annotation      Assessment / Plan:        SK (seborrheic keratosis)  These are benign inherited growths without a malignant potential. Reassurance given to patient. No treatment is necessary.       Dermatofibroma  This is a benign scar-like lesion secondary to minor trauma. No treatment required.       History of nonmelanoma skin cancer  Area(s) of previous NMSC evaluated with no signs of recurrence.    Upper body skin examination performed today including at least 6 points as noted in physical examination. No lesions suspicious for malignancy noted.    Recommend daily sun protection/avoidance and use of at least SPF 30, broad spectrum sunscreen (OTC drug).                No follow-ups on file.

## 2022-02-04 NOTE — TELEPHONE ENCOUNTER
I informed pt Dr. Stubbs okay to hold asa 5 days prior to her procedure and restart the day after. Pt voiced understanding.

## 2022-02-04 NOTE — TELEPHONE ENCOUNTER
----- Message from Natalia Stubbs MD sent at 2/4/2022  8:34 AM CST -----  Regarding: RE: med clearance  Yes, she may hold asa for 5 days.  ----- Message -----  From: Lila Ngo MA  Sent: 2/3/2022   2:31 PM CST  To: Natalia Stubbs MD, [facility] Staff  Subject: med clearance                                    Pt is scheduled to have a Bilateral TFESI with Dr. Mederos on 2/23 and pt needs med clearance to hold ASA 5 days prior to his procedure .     Please advise.    AR

## 2022-02-07 ENCOUNTER — OFFICE VISIT (OUTPATIENT)
Dept: INTERNAL MEDICINE | Facility: CLINIC | Age: 82
End: 2022-02-07
Payer: MEDICARE

## 2022-02-07 VITALS
DIASTOLIC BLOOD PRESSURE: 72 MMHG | BODY MASS INDEX: 25.4 KG/M2 | WEIGHT: 148 LBS | HEART RATE: 72 BPM | SYSTOLIC BLOOD PRESSURE: 130 MMHG

## 2022-02-07 DIAGNOSIS — I10 PRIMARY HYPERTENSION: Primary | ICD-10-CM

## 2022-02-07 DIAGNOSIS — E78.5 HYPERLIPIDEMIA, UNSPECIFIED HYPERLIPIDEMIA TYPE: ICD-10-CM

## 2022-02-07 DIAGNOSIS — G25.81 RLS (RESTLESS LEGS SYNDROME): ICD-10-CM

## 2022-02-07 PROCEDURE — 1159F MED LIST DOCD IN RCRD: CPT | Mod: HCNC,CPTII,95, | Performed by: INTERNAL MEDICINE

## 2022-02-07 PROCEDURE — 3078F PR MOST RECENT DIASTOLIC BLOOD PRESSURE < 80 MM HG: ICD-10-PCS | Mod: HCNC,CPTII,95, | Performed by: INTERNAL MEDICINE

## 2022-02-07 PROCEDURE — 1160F RVW MEDS BY RX/DR IN RCRD: CPT | Mod: HCNC,CPTII,95, | Performed by: INTERNAL MEDICINE

## 2022-02-07 PROCEDURE — 1159F PR MEDICATION LIST DOCUMENTED IN MEDICAL RECORD: ICD-10-PCS | Mod: HCNC,CPTII,95, | Performed by: INTERNAL MEDICINE

## 2022-02-07 PROCEDURE — 1160F PR REVIEW ALL MEDS BY PRESCRIBER/CLIN PHARMACIST DOCUMENTED: ICD-10-PCS | Mod: HCNC,CPTII,95, | Performed by: INTERNAL MEDICINE

## 2022-02-07 PROCEDURE — 3075F PR MOST RECENT SYSTOLIC BLOOD PRESS GE 130-139MM HG: ICD-10-PCS | Mod: HCNC,CPTII,95, | Performed by: INTERNAL MEDICINE

## 2022-02-07 PROCEDURE — 3078F DIAST BP <80 MM HG: CPT | Mod: HCNC,CPTII,95, | Performed by: INTERNAL MEDICINE

## 2022-02-07 PROCEDURE — 3075F SYST BP GE 130 - 139MM HG: CPT | Mod: HCNC,CPTII,95, | Performed by: INTERNAL MEDICINE

## 2022-02-07 PROCEDURE — 99214 OFFICE O/P EST MOD 30 MIN: CPT | Mod: HCNC,95,, | Performed by: INTERNAL MEDICINE

## 2022-02-07 PROCEDURE — 99214 PR OFFICE/OUTPT VISIT, EST, LEVL IV, 30-39 MIN: ICD-10-PCS | Mod: HCNC,95,, | Performed by: INTERNAL MEDICINE

## 2022-02-07 NOTE — PROGRESS NOTES
Subjective:       Patient ID: Sofía Mcwilliams is a 81 y.o. female.    Chief Complaint: Hypertension and Hyperlipidemia    HPI 81-year old  female virtual video visit follow-up hypertension dyslipidemia restless leg syndrome.  Patient is taking medications on a regular basis.  Medications are helping to manage the restless leg symptoms.   Review of Systems    otherwise negative  Objective:      Physical Exam  General: Well-appearing, well-nourished.  No distress  HEENT: conjunctivae are normal.   Hearing is grossly normal.  Nasopharynx anderson congestion  Oropharynx is clear.  Neck: Supple.  Visually No thyroid megaly.   Heart: Regular rate   Lungs:  respiratory effort normal.  Abdomen:  nontender  Extremities:   No edema.  Psych: Oriented to time person place.  Judgment and insight seem unimpaired.  Mood and affect are appropriate.  Assessment:       Problem List Items Addressed This Visit     Hypertension - Primary    Hyperlipidemia    RLS (restless legs syndrome)          Plan:       Sofía was seen today for hypertension and hyperlipidemia.    Diagnoses and all orders for this visit:    Primary hypertension  Controlled.  Continue current medical regimen.  Prescription refills addressed.  Followup advised. See after visit summary.  Hyperlipidemia, unspecified hyperlipidemia type  Controlled.  Continue current medical regimen.  Prescription refills addressed.  Followup advised. See after visit summary.  RLS (restless legs syndrome)  Controlled.  Continue current medical regimen.  Prescription refills addressed.  Followup advised. See after visit summary.    The patient location is:  [facility]  The chief complaint leading to consultation is:  Hypertension dyslipidemia restless leg symptoms syndrome    Visit type: audiovisual    Face to Face time with patient:  Twenty  20 minutes of total time spent on the encounter, which includes face to face time and non-face to face time preparing to see the patient (eg, review of tests),  Obtaining and/or reviewing separately obtained history, Documenting clinical information in the electronic or other health record, Independently interpreting results (not separately reported) and communicating results to the patient/family/caregiver, or Care coordination (not separately reported).         Each patient to whom he or she provides medical services by telemedicine is:  (1) informed of the relationship between the physician and patient and the respective role of any other health care provider with respect to management of the patient; and (2) notified that he or she may decline to receive medical services by telemedicine and may withdraw from such care at any time.    Notes:

## 2022-02-17 NOTE — DISCHARGE INSTRUCTIONS
Home Care Instructions Pain Management:    1.  DIET:    You may resume your normal diet today.    2.  BATHING:    You may shower with luke warm water.    3.  DRESSING:    You may remove your bandage today.    4.  ACTIVITY LEVEL:      You may resume your normal activities 24 hours after your procedure.    5.  MEDICATIONS:    You may resume your normal medications today.    6.  SPECIAL INSTRUCTIONS:    No heat to the injection site for 24 hours including bath or shower, heating pad, moist heat or hot tubs.    Use an ice pack to the injection site for any pain or discomfort.  Apply ice packs for 20 minute intervals as needed.    If you have received any sedatives by mouth today, you can not drive for 12 hours.    If you have received sedation through an IV, you can not drive for 24 hours.    PLEASE CALL YOUR DOCTOR FOR THE FOLLOWIN.  Redness or swelling around the injection site.  2.  Fever of 101 degrees.  3.  Drainage (pus) from the injection site.  4.  For any continuous bleeding (some dried blood over the incision is normal.)    FOR EMERGENCIES:    If any unusual problems or difficulties occur during clinic hours, call [phone] or dial [phone].    Follow up with with your physician in 2-3 weeks.

## 2022-02-22 ENCOUNTER — TELEPHONE (OUTPATIENT)
Dept: PAIN MEDICINE | Facility: CLINIC | Age: 82
End: 2022-02-22
Payer: MEDICARE

## 2022-02-23 ENCOUNTER — HOSPITAL ENCOUNTER (OUTPATIENT)
Facility: HOSPITAL | Age: 82
Discharge: HOME | End: 2022-02-23
Attending: PHYSICAL MEDICINE & REHABILITATION | Admitting: PHYSICAL MEDICINE & REHABILITATION
Payer: MEDICARE

## 2022-02-23 VITALS
DIASTOLIC BLOOD PRESSURE: 60 MMHG | TEMPERATURE: 97 F | HEIGHT: 64 IN | HEART RATE: 76 BPM | WEIGHT: 143 LBS | OXYGEN SATURATION: 99 % | BODY MASS INDEX: 24.41 KG/M2 | SYSTOLIC BLOOD PRESSURE: 135 MMHG | RESPIRATION RATE: 16 BRPM

## 2022-02-23 DIAGNOSIS — R52 PAIN: ICD-10-CM

## 2022-02-23 DIAGNOSIS — M54.16 LUMBAR RADICULOPATHY: Primary | ICD-10-CM

## 2022-02-23 PROCEDURE — 25000003 PHARM REV CODE 250: Mod: HCNC | Performed by: PHYSICAL MEDICINE & REHABILITATION

## 2022-02-23 PROCEDURE — 63600175 PHARM REV CODE 636 W HCPCS: Mod: HCNC | Performed by: PHYSICAL MEDICINE & REHABILITATION

## 2022-02-23 PROCEDURE — 64483 NJX AA&/STRD TFRM EPI L/S 1: CPT | Mod: 50,HCNC | Performed by: PHYSICAL MEDICINE & REHABILITATION

## 2022-02-23 PROCEDURE — 64483 PR EPIDURAL INJ, ANES/STEROID, TRANSFORAMINAL, LUMB/SACR, SNGL LEVL: ICD-10-PCS | Mod: 50,HCNC,, | Performed by: PHYSICAL MEDICINE & REHABILITATION

## 2022-02-23 PROCEDURE — 25500020 PHARM REV CODE 255: Mod: HCNC | Performed by: PHYSICAL MEDICINE & REHABILITATION

## 2022-02-23 PROCEDURE — 64483 NJX AA&/STRD TFRM EPI L/S 1: CPT | Mod: 50,HCNC,, | Performed by: PHYSICAL MEDICINE & REHABILITATION

## 2022-02-23 RX ORDER — ALPRAZOLAM 0.5 MG/1
0.5 TABLET, ORALLY DISINTEGRATING ORAL ONCE
Status: COMPLETED | OUTPATIENT
Start: 2022-02-23 | End: 2022-02-23

## 2022-02-23 RX ORDER — LIDOCAINE HYDROCHLORIDE 10 MG/ML
INJECTION INFILTRATION; PERINEURAL
Status: DISCONTINUED | OUTPATIENT
Start: 2022-02-23 | End: 2022-02-23 | Stop reason: HOSPADM

## 2022-02-23 RX ORDER — DEXAMETHASONE SODIUM PHOSPHATE 4 MG/ML
INJECTION, SOLUTION INTRA-ARTICULAR; INTRALESIONAL; INTRAMUSCULAR; INTRAVENOUS; SOFT TISSUE
Status: DISCONTINUED | OUTPATIENT
Start: 2022-02-23 | End: 2022-02-23 | Stop reason: HOSPADM

## 2022-02-23 RX ORDER — LIDOCAINE HYDROCHLORIDE 10 MG/ML
INJECTION, SOLUTION EPIDURAL; INFILTRATION; INTRACAUDAL; PERINEURAL
Status: DISCONTINUED | OUTPATIENT
Start: 2022-02-23 | End: 2022-02-23 | Stop reason: HOSPADM

## 2022-02-23 RX ORDER — INDOMETHACIN 25 MG/1
CAPSULE ORAL
Status: DISCONTINUED | OUTPATIENT
Start: 2022-02-23 | End: 2022-02-23 | Stop reason: HOSPADM

## 2022-02-23 RX ADMIN — ALPRAZOLAM 0.5 MG: 0.5 TABLET, ORALLY DISINTEGRATING ORAL at 11:02

## 2022-02-23 NOTE — OP NOTE
Lumbar Transforaminal Epidural Steroid Injection under fluoroscopic guidance  I have reviewed the patient's medications, allergies and relevant histories prior to the procedure and no contraindications have been identified. The risks, benefits and alternatives to the procedure were discussed with the patient, and all questions regarding the procedure were answered to the patient's satisfaction. I personally obtained Sofía's consent prior to the start of the procedure and the signed consent can be found in the patient's chart.                                                         Time-out was taken to identify patient, procedure, laterality, and allergies prior to starting the procedure.       Date of Service: 02/23/2022  Procedure: Bilateral L5 transforaminal epidural steroid injection under fluoroscopy  Pre-Operative Diagnosis: Lumbar Radiculopathy  Post-Operative Diagnosis: Lumbar Radiculopathy    Physician: Heather Mederos M.D.  Assistants: None    Medications Injected:  Preservative-free dexamethasone 10 mg/mL & 5 mL of Xylocaine 1% MPF (3 mL injected per site).   Local Anesthetic: Xylocaine 1% 10 mL with Sodium Bicarbonate 1ml.   Sedation Medications: Niravam 0.5 mg SL.    Procedural Technique:   Laying in a prone position, the patient was prepped and draped in the usual sterile fashion using ChloraPrep and fenestrated drape.  The vertebral foramen of interest was determined under fluoroscopic guidance.  Local anesthetic was given by raising a wheel and going down to the hub of a 25-gauge 1.5 inch needle.  The 5 inch 22-gauge spinal needle was introduced towards the inferior-medial aspect transverse process of each above named nerve root levels.  The needle was walked medially then hinged into the neural foramen of the levels stated above.  After negative aspiration, 2-3 mL of Omnipaque was injected to confirm appropriate placement and that there was no vascular runoff.  The medication was then injected slowly.  Needles were removed and bandages were applied to the areas. The patient tolerated the procedure well. The procedure was repeated on the contralateral side in a similar manner.     Estimated Blood Loss:  None.  Complications:  None.     Disposition: Vital signs remained stable throughout the procedure. The patient was taken to the recovery area where written discharge instructions for the procedure were given.     Follow-Up: We will see the patient back in two weeks or the patient may call to inform of status. The patient was discharged in a stable condition.

## 2022-02-23 NOTE — DISCHARGE SUMMARY
[facility]  Discharge Note  Short Stay     Admit Date: 2/23/2022    Discharge Date: 2/23/2022     Attending Physician: Heather Mederos M.D.    Diagnoses:  Active Hospital Problems    Diagnosis  POA    *Lumbar radiculopathy [M54.16]  Yes      Resolved Hospital Problems   No resolved problems to display.     Discharged Condition: Good     Hospital Course: Patient was admitted for an outpatient interventional pain management procedure and tolerated the procedure well with no complications.     Final Diagnoses: Same as principal problem.     Disposition: Home or Self Care     Follow up/Patient Instructions:   Follow-up in 1-2 weeks unless otherwise instructed. May return sooner as needed.       Reconciled Medications:     Medication List      CONTINUE taking these medications    aspirin 81 MG EC tablet  Commonly known as: ECOTRIN  Take 1 tablet by mouth.     atorvastatin 20 MG tablet  Commonly known as: LIPITOR  TAKE 1 TABLET EVERY DAY     CALCIUM-VITAMIN D ORAL  once daily.     coenzyme Q10 10 mg capsule  Take by mouth.     losartan 25 MG tablet  Commonly known as: COZAAR  TAKE 1 TABLET (25 MG TOTAL) BY MOUTH ONCE DAILY.     meloxicam 15 MG tablet  Commonly known as: MOBIC  Take 1 tablet (15 mg total) by mouth once daily.     multivitamin per tablet  Commonly known as: THERAGRAN  Take 1 tablet by mouth.     rOPINIRole 1 MG tablet  Commonly known as: REQUIP  TAKE 1 TO 2 TABLETS(1 TO 2 MG) BY MOUTH EVERY EVENING     VITAMIN B COMPLEX ORAL  Take by mouth once daily.     vitamin D 1000 units Tab  Commonly known as: VITAMIN D3  Take 185 mg by mouth once daily.           Discharge Procedure Orders (must include Diet, Follow-up, Activity)   Ice to affected area   Order Comments: 20 minutes of ice or until area numb to the touch if area is sore 2-3 times per day as needed     No driving until:   Order Comments: Until following day     No dressing needed     Notify your health care provider if you experience any of the  following:  temperature >100.4     Notify your health care provider if you experience any of the following:  persistent nausea and vomiting or diarrhea     Notify your health care provider if you experience any of the following:  severe uncontrolled pain     Notify your health care provider if you experience any of the following:  redness, tenderness, or signs of infection (pain, swelling, redness, odor or green/yellow discharge around incision site)     Notify your health care provider if you experience any of the following:  difficulty breathing or increased cough     Notify your health care provider if you experience any of the following:  severe persistent headache     Notify your health care provider if you experience any of the following:  worsening rash     Notify your health care provider if you experience any of the following:  persistent dizziness, light-headedness, or visual disturbances     Notify your health care provider if you experience any of the following:  increased confusion or weakness     Shower on day dressing removed (No bath)       Heather Mederos M.D.  Interventional Pain Medicine / Physical Medicine & Rehabilitation

## 2022-03-02 ENCOUNTER — OFFICE VISIT (OUTPATIENT)
Dept: PAIN MEDICINE | Facility: CLINIC | Age: 82
End: 2022-03-02
Payer: MEDICARE

## 2022-03-02 VITALS
SYSTOLIC BLOOD PRESSURE: 130 MMHG | DIASTOLIC BLOOD PRESSURE: 71 MMHG | BODY MASS INDEX: 24.56 KG/M2 | HEART RATE: 76 BPM | WEIGHT: 143.06 LBS

## 2022-03-02 DIAGNOSIS — G89.29 CHRONIC LEFT-SIDED LOW BACK PAIN WITH LEFT-SIDED SCIATICA: ICD-10-CM

## 2022-03-02 DIAGNOSIS — R52 PAIN: ICD-10-CM

## 2022-03-02 DIAGNOSIS — Q76.49 TRANSITIONAL VERTEBRAE: ICD-10-CM

## 2022-03-02 DIAGNOSIS — M54.16 LUMBAR RADICULOPATHY: Primary | ICD-10-CM

## 2022-03-02 DIAGNOSIS — M47.819 ARTHROPATHY OF FACET JOINTS AT MULTIPLE LEVELS: ICD-10-CM

## 2022-03-02 DIAGNOSIS — M54.42 CHRONIC LEFT-SIDED LOW BACK PAIN WITH LEFT-SIDED SCIATICA: ICD-10-CM

## 2022-03-02 DIAGNOSIS — M25.559 HIP PAIN: ICD-10-CM

## 2022-03-02 DIAGNOSIS — M51.36 DDD (DEGENERATIVE DISC DISEASE), LUMBAR: ICD-10-CM

## 2022-03-02 PROCEDURE — 3078F PR MOST RECENT DIASTOLIC BLOOD PRESSURE < 80 MM HG: ICD-10-PCS | Mod: HCNC,CPTII,S$GLB, | Performed by: NURSE PRACTITIONER

## 2022-03-02 PROCEDURE — 1160F PR REVIEW ALL MEDS BY PRESCRIBER/CLIN PHARMACIST DOCUMENTED: ICD-10-PCS | Mod: HCNC,CPTII,S$GLB, | Performed by: NURSE PRACTITIONER

## 2022-03-02 PROCEDURE — 1159F PR MEDICATION LIST DOCUMENTED IN MEDICAL RECORD: ICD-10-PCS | Mod: HCNC,CPTII,S$GLB, | Performed by: NURSE PRACTITIONER

## 2022-03-02 PROCEDURE — 3075F PR MOST RECENT SYSTOLIC BLOOD PRESS GE 130-139MM HG: ICD-10-PCS | Mod: HCNC,CPTII,S$GLB, | Performed by: NURSE PRACTITIONER

## 2022-03-02 PROCEDURE — 1126F AMNT PAIN NOTED NONE PRSNT: CPT | Mod: HCNC,CPTII,S$GLB, | Performed by: NURSE PRACTITIONER

## 2022-03-02 PROCEDURE — 3075F SYST BP GE 130 - 139MM HG: CPT | Mod: HCNC,CPTII,S$GLB, | Performed by: NURSE PRACTITIONER

## 2022-03-02 PROCEDURE — 1159F MED LIST DOCD IN RCRD: CPT | Mod: HCNC,CPTII,S$GLB, | Performed by: NURSE PRACTITIONER

## 2022-03-02 PROCEDURE — 99999 PR PBB SHADOW E&M-EST. PATIENT-LVL III: CPT | Mod: PBBFAC,HCNC,, | Performed by: NURSE PRACTITIONER

## 2022-03-02 PROCEDURE — 1126F PR PAIN SEVERITY QUANTIFIED, NO PAIN PRESENT: ICD-10-PCS | Mod: HCNC,CPTII,S$GLB, | Performed by: NURSE PRACTITIONER

## 2022-03-02 PROCEDURE — 99999 PR PBB SHADOW E&M-EST. PATIENT-LVL III: ICD-10-PCS | Mod: PBBFAC,HCNC,, | Performed by: NURSE PRACTITIONER

## 2022-03-02 PROCEDURE — 3078F DIAST BP <80 MM HG: CPT | Mod: HCNC,CPTII,S$GLB, | Performed by: NURSE PRACTITIONER

## 2022-03-02 PROCEDURE — 99213 PR OFFICE/OUTPT VISIT, EST, LEVL III, 20-29 MIN: ICD-10-PCS | Mod: HCNC,S$GLB,, | Performed by: NURSE PRACTITIONER

## 2022-03-02 PROCEDURE — 99213 OFFICE O/P EST LOW 20 MIN: CPT | Mod: HCNC,S$GLB,, | Performed by: NURSE PRACTITIONER

## 2022-03-02 PROCEDURE — 1160F RVW MEDS BY RX/DR IN RCRD: CPT | Mod: HCNC,CPTII,S$GLB, | Performed by: NURSE PRACTITIONER

## 2022-03-02 NOTE — PROGRESS NOTES
[facility]  Established Clinic Visit     Referring Provider: No referring provider defined for this encounter.    Chief Complaint:  Mild left groin, right low back, right hip and right lateral leg.    History of Present Illness: Sofía Mcwilliams is a 81 y.o. female referred by Dr. Natalia Stubbs for left groin pain. She has pmh of bilateral hip replacements which resolved a significant portion of her pain. However she reports that in march of this year she started to develop left groin pain. The pain she decribes as burning and sharp and radiated down the medial aspect of the thigh into the calf. Pain made worse with walking and standing. Made better with sitting and rest. She is still able to cycle without any significant pain. She denies any numbness tingling or weakness or b/b dysfunction. She did complete many courses of PT (up to 3 months) and but did not get pain relief. She did follow up with her orthopedic surgeon in  who repeated her x-rays to evaluate for hardware issues.       Onset: 7 months, started after walking more than usual  Location: left low back and left groin mostly  Radiation: left groin and left medial calf/leg  Timing: constant- groin ; intermittent -lbp  Quality: Aching and Throbbing  Exacerbating Factors: lifting, standing for more than 10 minutes and walking for more than 10-20 minutes  Alleviating Factors: massage, physical therapy and sitting  Associated Symptoms: denies night fever/night sweats, urinary incontinence and significant weight loss    Severity: Currently: 2/10   Typical Range: 2-4/10     Exacerbation: 4/10     Interval History  (02/03/2022) pt returns today for right low back, right hip and right lateral leg pain, additionally she has mild left groin pain.. Pt's pain score is 2/10.  Mrs Mcwilliams reports today that for 8 weeks she participated in the get fit program through [facility] which really helped her pain.  She states this pain in her right low back right hip  and right leg started around Gaby.  Pain especially got worse after restarting physical therapy 3 weeks ago.  Pain currently is located in the right low back and right lateral anterior and medial leg stopping above the knee.  She experienced excellent relief following a left TF REID in August of 2021. She does still have mild left groin and leg pain but her worst pain is on the right today.  She denies any profound weakness, denies any bowel or bladder dysfunction, denies any saddle anesthesia denies any recent incident or trauma.  She reports that she is an avid exercise person and feels as though she may have pushed it too much which has now created more pain for her on the right.      Current Pain Scales:  Current: 2/10              Typical Range: 0-1/10      Interval updates:  3/2/2022 -Oj returns to clinic s/p Bilateral L5 transforaminal  on 2/23/22 with 60% relief with improved functionality.   She reports a pain intensity 0/10 today with a weekly range of 0-0/10.  Patient states she is doing very well since her injection she is performing ADLs with minimal to no pain.    Previous Interventions:  -02/23/2022  Bilateral L5 transforaminal   -08/25/2021 Left L5 transforaminal epidural steroid injection 90%   - bilateral hip replacements    Previous Therapies:  PT/OT: yes   Relevant Surgery: no   Previous Medications:   - NSAIDS: Meloxicam 15 mg  - Muscle Relaxants:  None  - TCAs: None  - SNRIs: None  - Topicals: None  - Anticonvulsants: None    - Opioids: None    Current Pain Medications:  1. Meloxicam 15 mg    Blood Thinners: none    Full Medication List:    Current Outpatient Medications:     aspirin (ECOTRIN) 81 MG EC tablet, Take 1 tablet by mouth., Disp: , Rfl:     atorvastatin (LIPITOR) 20 MG tablet, TAKE 1 TABLET EVERY DAY, Disp: 90 tablet, Rfl: 3    CALCIUM CARB/VIT D3/MINERALS (CALCIUM-VITAMIN D ORAL), once daily. , Disp: , Rfl:     coenzyme Q10 10 mg capsule, Take by mouth., Disp: , Rfl:      losartan (COZAAR) 25 MG tablet, TAKE 1 TABLET (25 MG TOTAL) BY MOUTH ONCE DAILY., Disp: 90 tablet, Rfl: 3    meloxicam (MOBIC) 15 MG tablet, Take 1 tablet (15 mg total) by mouth once daily., Disp: 90 tablet, Rfl: 3    multivitamin (THERAGRAN) per tablet, Take 1 tablet by mouth., Disp: , Rfl:     rOPINIRole (REQUIP) 1 MG tablet, TAKE 1 TO 2 TABLETS(1 TO 2 MG) BY MOUTH EVERY EVENING, Disp: 60 tablet, Rfl: 3    VITAMIN B COMPLEX ORAL, Take by mouth once daily. , Disp: , Rfl:     vitamin D 1000 units Tab, Take 185 mg by mouth once daily., Disp: , Rfl:      Review of Systems:  Review of Systems   Constitutional: Negative for chills and fever.   Respiratory: Negative for cough.    Cardiovascular: Negative for chest pain.   Gastrointestinal: Negative for abdominal pain.   Genitourinary: Negative for dysuria.   Musculoskeletal: Positive for joint pain.   Skin: Negative for rash.   Neurological: Negative for seizures.   Psychiatric/Behavioral: The patient is nervous/anxious.        Allergies:  Celecoxib and Sulfa (sulfonamide antibiotics)     Medical History:   has a past medical history of Anxiety (5/2/2016), Arthritis, Basal cell carcinoma (2013), Cataract, Hypertension, and Other and unspecified hyperlipidemia.    Surgical History:   has a past surgical history that includes Hysterectomy; Appendectomy; Tonsillectomy; bunion; Colonoscopy (N/A, 9/28/2015); Injection of joint (Left, 1/9/2019); Injection of joint (Left, 3/13/2019); Joint replacement; hip surgery x 2; Colonoscopy (N/A, 3/16/2021); Transforaminal epidural injection of steroid (Left, 8/25/2021); and Transforaminal epidural injection of steroid (Bilateral, 2/23/2022).    Family History:  family history is not on file. She was adopted.    Social History:   reports that she quit smoking about 56 years ago. She smoked 0.00 packs per day for 0.00 years. She has never used smokeless tobacco. She reports current alcohol use. She reports that she does not use  drugs.    Physical Exam:  /71   Pulse 76   Wt 64.9 kg (143 lb 1.3 oz)   BMI 24.56 kg/m²   GEN: No acute distress. Calm, comfortable  HENT: Normocephalic, atraumatic, moist mucous membranes  EYE: Anicteric sclera, non-injected.   CV: Non-diaphoretic. Regular Rate. Radial Pulses 2+.  RESP: Breathing comfortably. Chest expansion symmetric.  EXT: No clubbing, cyanosis.   SKIN: Warm, & dry to palpation. No visible rashes or lesions of exposed skin.   PSYCH: Pleasant mood and appropriate affect. Recent and remote memory intact.   GAIT: Independent, ambulation  Lumbar Spine Exam:       Inspection: No erythema, bruising.       Palpation: (+) TTP of lumbar paraspinals on left and right      ROM: Non Limited in flexion, extension, lateral bending.       (-) Facet loading        (-) Straight Leg Raise bilaterally      (-) MARTIN bilaterally      5/5 strength in both lower extremities      5/5 strength with abduction, adduction  Hip Exam:      Inspection: No gross deformity or apparent leg length discrepancy      Palpation:  TTP to left greater trochanteric bursa. TTP left iliopsoas tendon distal insertion and rectus femoris muscle.       ROM:  No limitation or pain in internal rotation, external rotation       Provocative Maneuvers:   Pain with resisted hip flexion.   - log roll    - FADIR  Neurologic Exam:     Alert. Speech is fluent and appropriate.     Strength:  5/5 throughout bilateral lower extremities     Sensation:  Grossly intact to light touch in bilateral lower extremities     Reflexes: 2+ in b/l patella, achilles     Tone: No abnormality appreciated in bilateral lower extremities     No Clonus     Downgoing toes on plantar stimulation\      Imaging:  -  Lumbar Spine X-ray 12/3/18  Mild DJD and lumbar scoliosis.  The disc spaces are narrowed between the L2 and L5 vertebral segments.  Slight narrowing of the lumbosacral disc space also noted.  No fracture, spondylolisthesis or bone destruction  identified    Labs:  BMP  Lab Results   Component Value Date     01/28/2022    K 4.3 01/28/2022     01/28/2022    CO2 27 01/28/2022    BUN 19 01/28/2022    CREATININE 0.7 01/28/2022    CALCIUM 9.4 01/28/2022    ANIONGAP 8 01/28/2022    ESTGFRAFRICA >60.0 01/28/2022    EGFRNONAA >60.0 01/28/2022     Lab Results   Component Value Date    ALT 24 01/28/2022    AST 35 01/28/2022    ALKPHOS 80 01/28/2022    BILITOT 0.5 01/28/2022     Lab Results   Component Value Date     08/02/2021       Assessment:  Sofía Mcwilliams is a 81 y.o. female with the following diagnoses based on history, exam, and imaging:    Problem List Items Addressed This Visit    None         This is a pleasant 81 y.o. lady presenting with:     - Left groin pain radiating down the medial aspect of the thigh. She has some mild back pain. Outside bonescan revealed something around L4 per patient. Potentially this could be left L2-3-4 radiculopathy or iliopsoas tendinopathy and potential irritation of femoral/saphenous nerve radiating to medial calf area  - Comorbidities: anxiety, HTN, HLD, osteopenia    9/20/21- Sofía Mcwilliams is a 81 y.o. female who  has a past medical history of Anxiety (5/2/2016), Arthritis, Basal cell carcinoma (2013), Cataract, Hypertension, and Other and unspecified hyperlipidemia.  By history and examination this patient has chronic low back pain with radiculopathy.  The underlying cause cause is facet arthritis, degenerative disc disease, foraminal stenosis and central canal stenosis.  Pathology is confirmed by imaging.  We discussed the underlying diagnoses and multiple treatment options including non-opioid medications, injections, home exercise and activity modification / rest.  The risks and benefits of each treatment option were discussed and all questions were answered.      02/03/2022-very pleasant 81 year female that presented today with right low back, right hip right lateral leg pain stopping just above her  she did report mild pain in the left groin but mainly her pain was on the right today.  She benefited from a left L5 TF REID last year with appears that now her pain is more on the right still has mild pain on the left.  Pain is likely related to Moderate narrowing at the L5-S1 level, discussed with patient that I think we should scheduled for a bilateral L5 TF REID in effort to relieve her symptoms.    3/2/2022- 81-year-old pleasant female status post a bilateral L5 TF REID with Dr. Mederos she is reporting 60% relief and improved functionality.  She is very happy with results of her injection she will continue home exercise plan as well as return to therapy for added instructions/therapy to help keep pain at a minimal       Treatment Plan:   - PT/OT/HEP:  Discussed benefits of exercise for pain. Completed 3 months of PT and get 50 program for 8 weeks  I encouraged the patient to maintain a home exercise regimen that includes daily, moderate cardiovascular exercise lasting at least 30 minutes.  This may include yoga, reinier chi, walking, swimming, aqua aerobics, or other exercises that maintain a heart rate of 50-70% of the calculated maximum heart rate.  I also encouraged light, daily stretching focused on the target area.  - Procedures:  None at this time  - Medications: No changes recommended at this time.  - Imaging: Reviewed   - Labs: Reviewed.  Medications are appropriately dosed for current hepatorenal function.    Follow Up: RTC p.r.n. per patient    Jeet Miller NP-C  Interventional Pain Management      Disclaimer: This note was partly generated using dictation software which may occasionally result in transcription errors.

## 2022-03-28 ENCOUNTER — PATIENT MESSAGE (OUTPATIENT)
Dept: INTERNAL MEDICINE | Facility: CLINIC | Age: 82
End: 2022-03-28
Payer: MEDICARE

## 2022-03-29 ENCOUNTER — OFFICE VISIT (OUTPATIENT)
Dept: FAMILY MEDICINE | Facility: CLINIC | Age: 82
End: 2022-03-29
Payer: MEDICARE

## 2022-03-29 ENCOUNTER — PATIENT MESSAGE (OUTPATIENT)
Dept: INTERNAL MEDICINE | Facility: CLINIC | Age: 82
End: 2022-03-29
Payer: MEDICARE

## 2022-03-29 VITALS
WEIGHT: 147.06 LBS | HEIGHT: 64 IN | HEART RATE: 91 BPM | BODY MASS INDEX: 25.11 KG/M2 | DIASTOLIC BLOOD PRESSURE: 70 MMHG | SYSTOLIC BLOOD PRESSURE: 150 MMHG | OXYGEN SATURATION: 98 % | TEMPERATURE: 101 F

## 2022-03-29 DIAGNOSIS — R39.9 UTI SYMPTOMS: Primary | ICD-10-CM

## 2022-03-29 DIAGNOSIS — R50.9 FEVER AND CHILLS: ICD-10-CM

## 2022-03-29 LAB
CTP QC/QA: YES
FLUAV AG NPH QL: NEGATIVE
FLUBV AG NPH QL: NEGATIVE

## 2022-03-29 PROCEDURE — 1101F PR PT FALLS ASSESS DOC 0-1 FALLS W/OUT INJ PAST YR: ICD-10-PCS | Mod: CPTII,S$GLB,, | Performed by: NURSE PRACTITIONER

## 2022-03-29 PROCEDURE — 99999 PR PBB SHADOW E&M-EST. PATIENT-LVL IV: CPT | Mod: PBBFAC,,, | Performed by: NURSE PRACTITIONER

## 2022-03-29 PROCEDURE — 1160F RVW MEDS BY RX/DR IN RCRD: CPT | Mod: CPTII,S$GLB,, | Performed by: NURSE PRACTITIONER

## 2022-03-29 PROCEDURE — 1125F PR PAIN SEVERITY QUANTIFIED, PAIN PRESENT: ICD-10-PCS | Mod: CPTII,S$GLB,, | Performed by: NURSE PRACTITIONER

## 2022-03-29 PROCEDURE — 1159F MED LIST DOCD IN RCRD: CPT | Mod: CPTII,S$GLB,, | Performed by: NURSE PRACTITIONER

## 2022-03-29 PROCEDURE — 87804 INFLUENZA ASSAY W/OPTIC: CPT | Mod: QW,S$GLB,, | Performed by: NURSE PRACTITIONER

## 2022-03-29 PROCEDURE — 99999 PR PBB SHADOW E&M-EST. PATIENT-LVL IV: ICD-10-PCS | Mod: PBBFAC,,, | Performed by: NURSE PRACTITIONER

## 2022-03-29 PROCEDURE — 99203 PR OFFICE/OUTPT VISIT, NEW, LEVL III, 30-44 MIN: ICD-10-PCS | Mod: S$GLB,,, | Performed by: NURSE PRACTITIONER

## 2022-03-29 PROCEDURE — 99203 OFFICE O/P NEW LOW 30 MIN: CPT | Mod: S$GLB,,, | Performed by: NURSE PRACTITIONER

## 2022-03-29 PROCEDURE — 1125F AMNT PAIN NOTED PAIN PRSNT: CPT | Mod: CPTII,S$GLB,, | Performed by: NURSE PRACTITIONER

## 2022-03-29 PROCEDURE — 1160F PR REVIEW ALL MEDS BY PRESCRIBER/CLIN PHARMACIST DOCUMENTED: ICD-10-PCS | Mod: CPTII,S$GLB,, | Performed by: NURSE PRACTITIONER

## 2022-03-29 PROCEDURE — 3077F PR MOST RECENT SYSTOLIC BLOOD PRESSURE >= 140 MM HG: ICD-10-PCS | Mod: CPTII,S$GLB,, | Performed by: NURSE PRACTITIONER

## 2022-03-29 PROCEDURE — 3288F PR FALLS RISK ASSESSMENT DOCUMENTED: ICD-10-PCS | Mod: CPTII,S$GLB,, | Performed by: NURSE PRACTITIONER

## 2022-03-29 PROCEDURE — 3077F SYST BP >= 140 MM HG: CPT | Mod: CPTII,S$GLB,, | Performed by: NURSE PRACTITIONER

## 2022-03-29 PROCEDURE — 1159F PR MEDICATION LIST DOCUMENTED IN MEDICAL RECORD: ICD-10-PCS | Mod: CPTII,S$GLB,, | Performed by: NURSE PRACTITIONER

## 2022-03-29 PROCEDURE — 3288F FALL RISK ASSESSMENT DOCD: CPT | Mod: CPTII,S$GLB,, | Performed by: NURSE PRACTITIONER

## 2022-03-29 PROCEDURE — 87804 POCT INFLUENZA A/B: ICD-10-PCS | Mod: 59,QW,S$GLB, | Performed by: NURSE PRACTITIONER

## 2022-03-29 PROCEDURE — 1101F PT FALLS ASSESS-DOCD LE1/YR: CPT | Mod: CPTII,S$GLB,, | Performed by: NURSE PRACTITIONER

## 2022-03-29 PROCEDURE — 3078F PR MOST RECENT DIASTOLIC BLOOD PRESSURE < 80 MM HG: ICD-10-PCS | Mod: CPTII,S$GLB,, | Performed by: NURSE PRACTITIONER

## 2022-03-29 PROCEDURE — 3078F DIAST BP <80 MM HG: CPT | Mod: CPTII,S$GLB,, | Performed by: NURSE PRACTITIONER

## 2022-03-29 RX ORDER — NITROFURANTOIN 25; 75 MG/1; MG/1
100 CAPSULE ORAL 2 TIMES DAILY
Qty: 10 CAPSULE | Refills: 0 | Status: SHIPPED | OUTPATIENT
Start: 2022-03-29 | End: 2022-04-03

## 2022-03-29 NOTE — PROGRESS NOTES
Subjective:       Patient ID: Sofía Mcwilliams is a 81 y.o. female.    Chief Complaint: Fever and Urinary Tract Infection    This is a pleasant 82 yo female patient of Dr. Stubbs who is new to me. She presents today with c/o UTI symptoms. PMH includes    Patient Active Problem List:     Retina disorder - Both Eyes     Nuclear sclerosis - Both Eyes     Hypertension     Hyperlipidemia     Arthritis     Osteopenia     Anxiety     Mild carotid artery disease     Chronic pain     Osteoarthritis of hip     Primary osteoarthritis of both hands     RLS (restless legs syndrome)     Decreased  strength     Decreased pinch strength     Decreased activities of daily living (ADL)     Reduced sensation     History of colon polyps     Pain     Lumbar radiculopathy    BP elevated today, slightly improved with recheck. Says her home BPs are typically WNL. Patient reports she was having UTI symptoms 1 week ago that include urinary frequency/urgency, cloudy urine, fever up to 101, chills. Started taking OTC AZO x 2 days that relieved symptoms. Symptoms returned a couple days ago that include fever, fatigue, cloudy urine, chills. No known sick contacts. Says her 3 yo granddaughter may have had pink eye last week. Took a home COVID-19 test a few days ago that was negative. Has taken OTC fever reducers that relieve.     Review of Systems   Constitutional: Positive for chills, fatigue and fever.   HENT: Negative for trouble swallowing.    Eyes: Negative for redness.   Respiratory: Negative for cough, chest tightness and shortness of breath.    Cardiovascular: Negative for chest pain.   Gastrointestinal: Negative for abdominal pain, constipation, diarrhea, nausea and vomiting.   Genitourinary: Positive for frequency. Negative for bladder incontinence, dysuria, flank pain and hematuria.   Musculoskeletal: Negative for gait problem and myalgias.   Neurological: Negative for weakness, disturbances in coordination and coordination difficulties.    Hematological: Negative for adenopathy.         Objective:      Physical Exam  Vitals reviewed.   Constitutional:       General: She is awake. She is not in acute distress.     Appearance: Normal appearance. She is well-developed, well-groomed and overweight.   HENT:      Head: Normocephalic and atraumatic.      Right Ear: Tympanic membrane, ear canal and external ear normal.      Left Ear: Tympanic membrane, ear canal and external ear normal.      Mouth/Throat:      Mouth: Mucous membranes are moist.      Pharynx: No posterior oropharyngeal erythema.      Comments: Postnasal drip visualized  Eyes:      General:         Right eye: No discharge.         Left eye: No discharge.      Extraocular Movements: Extraocular movements intact.      Pupils: Pupils are equal, round, and reactive to light.   Neck:      Vascular: No carotid bruit.   Cardiovascular:      Rate and Rhythm: Normal rate and regular rhythm.      Heart sounds: No murmur heard.  Pulmonary:      Effort: Pulmonary effort is normal. No respiratory distress.      Breath sounds: No wheezing or rhonchi.   Abdominal:      General: There is no distension.   Musculoskeletal:      Right lower leg: No edema.      Left lower leg: No edema.   Lymphadenopathy:      Cervical: No cervical adenopathy.   Skin:     General: Skin is warm and dry.      Capillary Refill: Capillary refill takes less than 2 seconds.      Coloration: Skin is not pale.   Neurological:      Mental Status: She is alert and oriented to person, place, and time.      Coordination: Coordination normal.      Gait: Gait normal.   Psychiatric:         Attention and Perception: Attention normal.         Mood and Affect: Mood and affect normal.         Speech: Speech normal.         Behavior: Behavior normal. Behavior is cooperative.         Thought Content: Thought content normal.         Assessment:       Problem List Items Addressed This Visit    None     Visit Diagnoses     UTI symptoms    -  Primary     Relevant Medications    nitrofurantoin, macrocrystal-monohydrate, (MACROBID) 100 MG capsule    Other Relevant Orders    Urinalysis (Completed)    Urine culture    Fever and chills        Relevant Orders    POCT Influenza A/B (Completed)          Plan:       Sofía was seen today for fever and urinary tract infection.    Diagnoses and all orders for this visit:    UTI symptoms  -     Urinalysis; Future  -     Urine culture; Future  -     nitrofurantoin, macrocrystal-monohydrate, (MACROBID) 100 MG capsule; Take 1 capsule (100 mg total) by mouth 2 (two) times daily. for 5 days    Fever and chills  -     POCT Influenza A/B        - Urine tests ordered today  - Rapid flu test done today: NEGATIVE  - Will treat empirically with Macrobid BID x 5 days  - Drink plenty fluids and eat as tolerated  - May alternate Tylenol and Advil q4h PRN fever  - Advised to send [employer] message this afternoon with home BP reading  - Warning signs discussed  - RTC as needed if symptoms worsen or fail to improve        I spent a total of 30 minutes on the day of the visit.  This includes face to face time and non-face to face time preparing to see the patient (eg, review of tests), obtaining and/or reviewing separately obtained history, documenting clinical information in the electronic or other health record, independently interpreting results and communicating results to the patient/family/caregiver, or care coordinator.

## 2022-03-31 ENCOUNTER — PATIENT MESSAGE (OUTPATIENT)
Dept: INTERNAL MEDICINE | Facility: CLINIC | Age: 82
End: 2022-03-31
Payer: MEDICARE

## 2022-04-20 ENCOUNTER — LAB VISIT (OUTPATIENT)
Dept: LAB | Facility: HOSPITAL | Age: 82
End: 2022-04-20
Payer: MEDICARE

## 2022-04-20 ENCOUNTER — OFFICE VISIT (OUTPATIENT)
Dept: NEUROLOGY | Facility: CLINIC | Age: 82
End: 2022-04-20
Payer: MEDICARE

## 2022-04-20 VITALS
BODY MASS INDEX: 24.69 KG/M2 | SYSTOLIC BLOOD PRESSURE: 145 MMHG | HEIGHT: 64 IN | WEIGHT: 144.63 LBS | HEART RATE: 84 BPM | DIASTOLIC BLOOD PRESSURE: 72 MMHG

## 2022-04-20 DIAGNOSIS — D50.9 RESTLESS LEG SYNDROME DUE TO IRON DEFICIENCY ANEMIA: ICD-10-CM

## 2022-04-20 DIAGNOSIS — R26.89 IMBALANCE: ICD-10-CM

## 2022-04-20 DIAGNOSIS — G25.81 RLS (RESTLESS LEGS SYNDROME): Primary | ICD-10-CM

## 2022-04-20 DIAGNOSIS — G25.81 RESTLESS LEG SYNDROME DUE TO IRON DEFICIENCY ANEMIA: ICD-10-CM

## 2022-04-20 LAB
FERRITIN SERPL-MCNC: 115 NG/ML (ref 20–300)
IRON SERPL-MCNC: 62 UG/DL (ref 30–160)
RPR SER QL: NORMAL
SATURATED IRON: 19 % (ref 20–50)
TOTAL IRON BINDING CAPACITY: 326 UG/DL (ref 250–450)
TRANSFERRIN SERPL-MCNC: 220 MG/DL (ref 200–375)

## 2022-04-20 PROCEDURE — 84207 ASSAY OF VITAMIN B-6: CPT | Performed by: PHYSICIAN ASSISTANT

## 2022-04-20 PROCEDURE — 99204 OFFICE O/P NEW MOD 45 MIN: CPT | Mod: S$GLB,,, | Performed by: PHYSICIAN ASSISTANT

## 2022-04-20 PROCEDURE — 82728 ASSAY OF FERRITIN: CPT | Performed by: PHYSICIAN ASSISTANT

## 2022-04-20 PROCEDURE — 99204 PR OFFICE/OUTPT VISIT, NEW, LEVL IV, 45-59 MIN: ICD-10-PCS | Mod: S$GLB,,, | Performed by: PHYSICIAN ASSISTANT

## 2022-04-20 PROCEDURE — 84425 ASSAY OF VITAMIN B-1: CPT | Performed by: PHYSICIAN ASSISTANT

## 2022-04-20 PROCEDURE — 3078F DIAST BP <80 MM HG: CPT | Mod: CPTII,S$GLB,, | Performed by: PHYSICIAN ASSISTANT

## 2022-04-20 PROCEDURE — 99999 PR PBB SHADOW E&M-EST. PATIENT-LVL III: CPT | Mod: PBBFAC,,, | Performed by: PHYSICIAN ASSISTANT

## 2022-04-20 PROCEDURE — 3288F FALL RISK ASSESSMENT DOCD: CPT | Mod: CPTII,S$GLB,, | Performed by: PHYSICIAN ASSISTANT

## 2022-04-20 PROCEDURE — 84252 ASSAY OF VITAMIN B-2: CPT | Performed by: PHYSICIAN ASSISTANT

## 2022-04-20 PROCEDURE — 1160F PR REVIEW ALL MEDS BY PRESCRIBER/CLIN PHARMACIST DOCUMENTED: ICD-10-PCS | Mod: CPTII,S$GLB,, | Performed by: PHYSICIAN ASSISTANT

## 2022-04-20 PROCEDURE — 3077F SYST BP >= 140 MM HG: CPT | Mod: CPTII,S$GLB,, | Performed by: PHYSICIAN ASSISTANT

## 2022-04-20 PROCEDURE — 1101F PR PT FALLS ASSESS DOC 0-1 FALLS W/OUT INJ PAST YR: ICD-10-PCS | Mod: CPTII,S$GLB,, | Performed by: PHYSICIAN ASSISTANT

## 2022-04-20 PROCEDURE — 84466 ASSAY OF TRANSFERRIN: CPT | Performed by: PHYSICIAN ASSISTANT

## 2022-04-20 PROCEDURE — 1125F AMNT PAIN NOTED PAIN PRSNT: CPT | Mod: CPTII,S$GLB,, | Performed by: PHYSICIAN ASSISTANT

## 2022-04-20 PROCEDURE — 3078F PR MOST RECENT DIASTOLIC BLOOD PRESSURE < 80 MM HG: ICD-10-PCS | Mod: CPTII,S$GLB,, | Performed by: PHYSICIAN ASSISTANT

## 2022-04-20 PROCEDURE — 1160F RVW MEDS BY RX/DR IN RCRD: CPT | Mod: CPTII,S$GLB,, | Performed by: PHYSICIAN ASSISTANT

## 2022-04-20 PROCEDURE — 99999 PR PBB SHADOW E&M-EST. PATIENT-LVL III: ICD-10-PCS | Mod: PBBFAC,,, | Performed by: PHYSICIAN ASSISTANT

## 2022-04-20 PROCEDURE — 3288F PR FALLS RISK ASSESSMENT DOCUMENTED: ICD-10-PCS | Mod: CPTII,S$GLB,, | Performed by: PHYSICIAN ASSISTANT

## 2022-04-20 PROCEDURE — 1159F MED LIST DOCD IN RCRD: CPT | Mod: CPTII,S$GLB,, | Performed by: PHYSICIAN ASSISTANT

## 2022-04-20 PROCEDURE — 86592 SYPHILIS TEST NON-TREP QUAL: CPT | Performed by: PHYSICIAN ASSISTANT

## 2022-04-20 PROCEDURE — 3077F PR MOST RECENT SYSTOLIC BLOOD PRESSURE >= 140 MM HG: ICD-10-PCS | Mod: CPTII,S$GLB,, | Performed by: PHYSICIAN ASSISTANT

## 2022-04-20 PROCEDURE — 1159F PR MEDICATION LIST DOCUMENTED IN MEDICAL RECORD: ICD-10-PCS | Mod: CPTII,S$GLB,, | Performed by: PHYSICIAN ASSISTANT

## 2022-04-20 PROCEDURE — 1101F PT FALLS ASSESS-DOCD LE1/YR: CPT | Mod: CPTII,S$GLB,, | Performed by: PHYSICIAN ASSISTANT

## 2022-04-20 PROCEDURE — 1125F PR PAIN SEVERITY QUANTIFIED, PAIN PRESENT: ICD-10-PCS | Mod: CPTII,S$GLB,, | Performed by: PHYSICIAN ASSISTANT

## 2022-04-20 PROCEDURE — 82607 VITAMIN B-12: CPT | Performed by: PHYSICIAN ASSISTANT

## 2022-04-20 RX ORDER — ROPINIROLE HYDROCHLORIDE 2 MG/1
2 TABLET, FILM COATED, EXTENDED RELEASE ORAL NIGHTLY
Qty: 90 TABLET | Refills: 3 | Status: SHIPPED | OUTPATIENT
Start: 2022-04-20 | End: 2023-03-27

## 2022-04-20 NOTE — PROGRESS NOTES
Name: Sofía Mcwilliams  MRN: [id number]   CSN: [id number]      Date: 04/20/2022    Referring physician:  Aaareferral Self  No address on file    Chief Complaint: RLS     History of Present Illness (HPI): Sofía Mcwilliams is a R HANDED 81 y.o. female with a medical issues significant for lumbar radiculopathy, HTN, HLD, osteoarthritis and anxiety who presents for RLS. First noticed symptoms when she was pregnant with her daughter over 40 years ago. She states that she was using hypnosis and noticed that she couldn't still. This improved after pregnancy. About 4 years ago, she had hip replacements, after both of those surgeries, RLS has been constant and annoying. Starts at 2 pm in the afternoon. This was the time she would sit and read. Takes requip about 7 pm, goes to sleep about an hour after -- causes sleepiness. Takes 1mg qhs. She was previously on the xr requip felt that this helped better because it didn't cause sleepiness.  has PD. Feels imbalanced, asks if its due to medications. No falls. Denies feeling lightheaded or dizziness. She previously walked frequently but has decreased due to lumbar radiculopathy. When she wakes up in the morning, still feels off. Does not recall that she felt like this whenever she was on the ER of requip.   Riding in the car is difficult, going out to dinner is problematic.       Had gabapentin after hip surgery but did not take it so unclear if it helped. Also tried pramipexole in the past.     Supplements with multi vitamin and B vitamin.     Family History: adopted     Neuroleptic Exposure: none     Nonmotor/Premotor ROS:  Anosmia: normal   Dysarthria/Hypophonia: none   Dysphagia/Sialorrhea: none   Urinary changes: recent isolated UTI   Constipation: some  Falls: none   Micrographia: none   Sleep issues:  -RBD: none       Review of Systems:   Review of Systems   Constitutional: Negative for chills, fever and malaise/fatigue.   HENT: Negative for hearing loss.    Eyes: Negative for  blurred vision and double vision.   Respiratory: Negative for cough, shortness of breath and stridor.    Cardiovascular: Negative for chest pain and leg swelling.   Gastrointestinal: Positive for constipation. Negative for diarrhea and nausea.   Genitourinary: Negative for frequency and urgency.   Musculoskeletal: Negative for falls.   Skin: Negative for itching and rash.   Neurological: Positive for sensory change. Negative for dizziness, tremors, loss of consciousness and weakness.   Psychiatric/Behavioral: Negative for hallucinations and memory loss.           Past Medical History: The patient  has a past medical history of Anxiety (5/2/2016), Arthritis, Basal cell carcinoma (2013), Cataract, Hypertension, and Other and unspecified hyperlipidemia.    Social History: The patient  reports that she quit smoking about 56 years ago. She smoked 0.00 packs per day for 0.00 years. She has never used smokeless tobacco. She reports current alcohol use. She reports that she does not use drugs.    Family History: Their family history is not on file. She was adopted.    Allergies: Celecoxib and Sulfa (sulfonamide antibiotics)     Meds:   Current Outpatient Medications on File Prior to Visit   Medication Sig Dispense Refill    aspirin (ECOTRIN) 81 MG EC tablet Take 1 tablet by mouth.      atorvastatin (LIPITOR) 20 MG tablet TAKE 1 TABLET EVERY DAY 90 tablet 3    CALCIUM CARB/VIT D3/MINERALS (CALCIUM-VITAMIN D ORAL) once daily.       coenzyme Q10 10 mg capsule Take by mouth.      losartan (COZAAR) 25 MG tablet TAKE 1 TABLET (25 MG TOTAL) BY MOUTH ONCE DAILY. 90 tablet 3    meloxicam (MOBIC) 15 MG tablet Take 1 tablet (15 mg total) by mouth once daily. 90 tablet 3    multivitamin (THERAGRAN) per tablet Take 1 tablet by mouth.      VITAMIN B COMPLEX ORAL Take by mouth once daily.       vitamin D 1000 units Tab Take 185 mg by mouth once daily.      [DISCONTINUED] rOPINIRole (REQUIP) 1 MG tablet TAKE 1 TO 2 TABLETS(1 TO 2  "MG) BY MOUTH EVERY EVENING 60 tablet 3     No current facility-administered medications on file prior to visit.       Exam:  BP (!) 145/72 (BP Location: Right arm, Patient Position: Sitting)   Pulse 84   Ht 5' 4" (1.626 m)   Wt 65.6 kg (144 lb 10 oz)   BMI 24.82 kg/m²     Constitutional  Well-developed, well-nourished, appears stated age   Ophthalmoscopic  No papilledema with no hemorrhages or exudates bilaterally   Cardiovascular  Radial pulses 2+ and symmetric, no LE edema bilaterally   Neurological    * Mental status  MOCA =      - Orientation  Oriented to person, place, time, and situation     - Memory   Intact recent and remote     - Attention/concentration  Attentive, vigilant during exam     - Language  Naming & repetition intact, +2-step commands     - Fund of knowledge  Aware of current events     - Executive  Well-organized thoughts     - Other     * Cranial nerves       - CN II  PERRL, visual fields full to confrontation     - CN III, IV, VI  Extraocular movements full, normal pursuits and saccades     - CN V  Sensation V1 - V3 intact     - CN VII  Face strong and symmetric bilaterally     - CN VIII  Hearing intact bilaterally     - CN IX, X  Palate raises midline and symmetric     - CN XI  SCM and trapezius 5/5 bilaterally     - CN XII  Tongue midline   * Motor  Muscle bulk normal, strength 5/5 throughout   * Sensory   Intact to temperature and vibration throughout   * Coordination  No dysmetria with finger-to-nose or heel-to-shin   * Gait  See below.   * Deep tendon reflexes  3+ and symmetric throughout   pectoralis spreads    laughlin absent    Babinski downgoing bilaterally   * Specialized movement exam  No hypophonic speech.    No facial masking.   No cogwheel rigidity.     No bradykinesia.   No tremor with rest, posture, kinesis, or intention.    No other dystonia, chorea, athetosis, myoclonus, or tics.   No motor impersistence.   Normal-based gait.   No shortened stride length.   diminished arm " swing bilaterally    No postural instability.    mild vocal tremor      Laboratory/Radiological:  - Results:  Lab Visit on 03/29/2022   Component Date Value Ref Range Status    Specimen UA 03/29/2022 Urine, Clean Catch   Final    Color, UA 03/29/2022 Yellow  Yellow, Straw, Jenifer Final    Appearance, UA 03/29/2022 Clear  Clear Final    pH, UA 03/29/2022 8.0  5.0 - 8.0 Final    Specific [place], UA 03/29/2022 1.005  1.005 - 1.030 Final    Protein, UA 03/29/2022 Trace (A) Negative Final    Glucose, UA 03/29/2022 Negative  Negative Final    Ketones, UA 03/29/2022 Negative  Negative Final    Bilirubin (UA) 03/29/2022 Negative  Negative Final    Occult Blood UA 03/29/2022 3+ (A) Negative Final    Nitrite, UA 03/29/2022 Negative  Negative Final    Urobilinogen, UA 03/29/2022 Negative  <2.0 EU/dL Final    Leukocytes, UA 03/29/2022 3+ (A) Negative Final    Urine Culture, Routine 03/29/2022  (A)  Final                    Value:ESCHERICHIA COLI  >100,000 cfu/ml      RBC, UA 03/29/2022 2  0 - 4 /hpf Final    WBC, UA 03/29/2022 32 (A) 0 - 5 /hpf Final    Bacteria 03/29/2022 Moderate (A) None-Occ /hpf Final    Microscopic Comment 03/29/2022 SEE COMMENT   Final   Office Visit on 03/29/2022   Component Date Value Ref Range Status    Rapid Influenza A Ag 03/29/2022 Negative  Negative Final    Rapid Influenza B Ag 03/29/2022 Negative  Negative Final     Acceptable 03/29/2022 Yes   Final   Lab Visit on 01/28/2022   Component Date Value Ref Range Status    Sodium 01/28/2022 141  136 - 145 mmol/L Final    Potassium 01/28/2022 4.3  3.5 - 5.1 mmol/L Final    Chloride 01/28/2022 106  95 - 110 mmol/L Final    CO2 01/28/2022 27  23 - 29 mmol/L Final    Glucose 01/28/2022 101  70 - 110 mg/dL Final    BUN 01/28/2022 19  8 - 23 mg/dL Final    Creatinine 01/28/2022 0.7  0.5 - 1.4 mg/dL Final    Calcium 01/28/2022 9.4  8.7 - 10.5 mg/dL Final    Total Protein 01/28/2022 6.2  6.0 - 8.4 g/dL Final     Albumin 01/28/2022 3.7  3.5 - 5.2 g/dL Final    Total Bilirubin 01/28/2022 0.5  0.1 - 1.0 mg/dL Final    Alkaline Phosphatase 01/28/2022 80  55 - 135 U/L Final    AST 01/28/2022 35  10 - 40 U/L Final    ALT 01/28/2022 24  10 - 44 U/L Final    Anion Gap 01/28/2022 8  8 - 16 mmol/L Final    eGFR if African American 01/28/2022 >60.0  >60 mL/min/1.73 m^2 Final    eGFR if non African American 01/28/2022 >60.0  >60 mL/min/1.73 m^2 Final    Cholesterol 01/28/2022 168  120 - 199 mg/dL Final    Triglycerides 01/28/2022 135  30 - 150 mg/dL Final    HDL 01/28/2022 58  40 - 75 mg/dL Final    LDL Cholesterol 01/28/2022 83.0  63.0 - 159.0 mg/dL Final    HDL/Cholesterol Ratio 01/28/2022 34.5  20.0 - 50.0 % Final    Total Cholesterol/HDL Ratio 01/28/2022 2.9  2.0 - 5.0 Final    Non-HDL Cholesterol 01/28/2022 110  mg/dL Final       - Independent review of images:      - Independent review of consultant's notes: [facility]     ASSESSMENT/PLAN:  1. Restless Leg Syndrome  - currently on requip 1 mg QHS -- more side effects than the ER, causing sleepiness and generalized fatigue  - previously on requip ER but due to insurance coverage, she was no longer able to get it   - discussed good rx/[facility] retail price which is doable for patient since she seemed to do much better with her symptoms and side effects while on ER   - may consider addition of gabapentin or lyrica next   - check iron levels today      2. Imbalance  - labs as below         Orders Placed This Encounter    IRON AND TIBC    FERRITIN    Vitamin B1    Vitamin B12 Deficiency Panel    RPR    Vitamin B2    VITAMIN B6    rOPINIRole (REQUIP XL) 2 mg 24 hr tablet           Follow up:  In 3 months with [facility]     Collaborating Physician, Dr. Charlton, was available during today's encounter. Any change to plan along with cosign to appear in the EMR.       Total time spent with the patient: 55 minutes.  40 minutes of face-to-face consultation and 15 minutes of chart  review and coordination of care, on the day of the visit. This includes face to face time and non-face to face time preparing to see the patient (eg, review of tests), obtaining and/or reviewing separately obtained history, documenting clinical information in the electronic or other health record, independently interpreting resultsand communicating results to the patient/family/caregiver, or care coordination.         Vi Jimenes PA-C   [facility]  Department of Neurology  Movement Disorders

## 2022-04-21 LAB — VIT B12 SERPL-MCNC: 550 NG/L (ref 180–914)

## 2022-04-23 LAB — VIT B2 SERPL-MCNC: 84 MCG/L (ref 1–19)

## 2022-04-25 LAB
PYRIDOXAL SERPL-MCNC: 61 UG/L (ref 5–50)
VIT B1 BLD-MCNC: 120 UG/L (ref 38–122)

## 2022-06-04 ENCOUNTER — NURSE TRIAGE (OUTPATIENT)
Dept: ADMINISTRATIVE | Facility: CLINIC | Age: 82
End: 2022-06-04
Payer: MEDICARE

## 2022-06-04 NOTE — TELEPHONE ENCOUNTER
Pt called [facility] with complaint of temp 101.3, chills,body aches, nausea, and headache since yesterday morning. Pt states she has taken two covid-19 tests and were both negative.      Pt was advised to see HCP within 4 hour per triage protocol.  Pt verbalized understanding and will be going to ED.    Reason for Disposition   [1] Fever > 101 F (38.3 C) AND [2] age > 60 years    Additional Information   Negative: Shock suspected (e.g., cold/pale/clammy skin, too weak to stand, low BP, rapid pulse)   Negative: Difficult to awaken or acting confused (e.g., disoriented, slurred speech)   Negative: [1] Difficulty breathing AND [2] bluish lips, tongue or face   Negative: New-onset rash with multiple purple (or blood-colored) spots or dots   Negative: Sounds like a life-threatening emergency to the triager   Negative: [1] Headache AND [2] stiff neck (can't touch chin to chest)   Negative: Difficulty breathing   Negative: IV Drug Use (IVDU)   Negative: [1] Drinking very little AND [2] dehydration suspected (e.g., no urine > 12 hours, very dry mouth, very lightheaded)   Negative: Patient sounds very sick or weak to the triager  (Exception: mild weakness and hasn't taken fever medicine)   Negative: Fever > 104 F (40 C)    Protocols used: FEVER-A-

## 2022-07-11 ENCOUNTER — TELEPHONE (OUTPATIENT)
Dept: ORTHOPEDICS | Facility: CLINIC | Age: 82
End: 2022-07-11
Payer: MEDICARE

## 2022-07-11 NOTE — TELEPHONE ENCOUNTER
----- Message from Zaina Holland sent at 7/11/2022  2:40 PM CDT -----  Type:  Sooner Apoointment Request    Caller is requesting a sooner appointment.  Caller declined first available appointment listed below.  Caller will not accept being placed on the waitlist and is requesting a message be sent to doctor.  Name of Caller: pt  When is the first available appointment? 8/2022  Symptoms: little finger doesn't go up - arthristis  Would the patient rather a call back or a response via [facility]? call  Best Call Back Number:[phone]  Additional Information:

## 2022-07-12 ENCOUNTER — TELEPHONE (OUTPATIENT)
Dept: ORTHOPEDICS | Facility: CLINIC | Age: 82
End: 2022-07-12
Payer: MEDICARE

## 2022-07-12 NOTE — TELEPHONE ENCOUNTER
----- Message from Tariq Mart sent at 7/12/2022  8:01 AM CDT -----  Type:  Needs Medical Advice    Who Called: self  Reason:returning caLL  Would the patient rather a call back or a response via [email or web address]? call  Best Call Back Number: [phone]  Additional Information: none

## 2022-07-13 ENCOUNTER — OFFICE VISIT (OUTPATIENT)
Dept: NEUROLOGY | Facility: CLINIC | Age: 82
End: 2022-07-13
Payer: MEDICARE

## 2022-07-13 VITALS
BODY MASS INDEX: 24.79 KG/M2 | WEIGHT: 145.19 LBS | HEART RATE: 79 BPM | SYSTOLIC BLOOD PRESSURE: 134 MMHG | HEIGHT: 64 IN | DIASTOLIC BLOOD PRESSURE: 77 MMHG

## 2022-07-13 DIAGNOSIS — R26.89 IMBALANCE: ICD-10-CM

## 2022-07-13 DIAGNOSIS — Z71.89 COUNSELING REGARDING GOALS OF CARE: ICD-10-CM

## 2022-07-13 DIAGNOSIS — M54.16 LUMBAR RADICULOPATHY: ICD-10-CM

## 2022-07-13 DIAGNOSIS — G25.81 RESTLESS LEG SYNDROME DUE TO IRON DEFICIENCY ANEMIA: Primary | ICD-10-CM

## 2022-07-13 DIAGNOSIS — D50.9 RESTLESS LEG SYNDROME DUE TO IRON DEFICIENCY ANEMIA: Primary | ICD-10-CM

## 2022-07-13 PROCEDURE — 1101F PT FALLS ASSESS-DOCD LE1/YR: CPT | Mod: CPTII,S$GLB,, | Performed by: PHYSICIAN ASSISTANT

## 2022-07-13 PROCEDURE — 99999 PR PBB SHADOW E&M-EST. PATIENT-LVL III: CPT | Mod: PBBFAC,,, | Performed by: PHYSICIAN ASSISTANT

## 2022-07-13 PROCEDURE — 99999 PR PBB SHADOW E&M-EST. PATIENT-LVL III: ICD-10-PCS | Mod: PBBFAC,,, | Performed by: PHYSICIAN ASSISTANT

## 2022-07-13 PROCEDURE — 1125F AMNT PAIN NOTED PAIN PRSNT: CPT | Mod: CPTII,S$GLB,, | Performed by: PHYSICIAN ASSISTANT

## 2022-07-13 PROCEDURE — 3288F FALL RISK ASSESSMENT DOCD: CPT | Mod: CPTII,S$GLB,, | Performed by: PHYSICIAN ASSISTANT

## 2022-07-13 PROCEDURE — 3288F PR FALLS RISK ASSESSMENT DOCUMENTED: ICD-10-PCS | Mod: CPTII,S$GLB,, | Performed by: PHYSICIAN ASSISTANT

## 2022-07-13 PROCEDURE — 3075F SYST BP GE 130 - 139MM HG: CPT | Mod: CPTII,S$GLB,, | Performed by: PHYSICIAN ASSISTANT

## 2022-07-13 PROCEDURE — 1160F RVW MEDS BY RX/DR IN RCRD: CPT | Mod: CPTII,S$GLB,, | Performed by: PHYSICIAN ASSISTANT

## 2022-07-13 PROCEDURE — 99214 PR OFFICE/OUTPT VISIT, EST, LEVL IV, 30-39 MIN: ICD-10-PCS | Mod: S$GLB,,, | Performed by: PHYSICIAN ASSISTANT

## 2022-07-13 PROCEDURE — 1160F PR REVIEW ALL MEDS BY PRESCRIBER/CLIN PHARMACIST DOCUMENTED: ICD-10-PCS | Mod: CPTII,S$GLB,, | Performed by: PHYSICIAN ASSISTANT

## 2022-07-13 PROCEDURE — 1125F PR PAIN SEVERITY QUANTIFIED, PAIN PRESENT: ICD-10-PCS | Mod: CPTII,S$GLB,, | Performed by: PHYSICIAN ASSISTANT

## 2022-07-13 PROCEDURE — 1159F PR MEDICATION LIST DOCUMENTED IN MEDICAL RECORD: ICD-10-PCS | Mod: CPTII,S$GLB,, | Performed by: PHYSICIAN ASSISTANT

## 2022-07-13 PROCEDURE — 3075F PR MOST RECENT SYSTOLIC BLOOD PRESS GE 130-139MM HG: ICD-10-PCS | Mod: CPTII,S$GLB,, | Performed by: PHYSICIAN ASSISTANT

## 2022-07-13 PROCEDURE — 99214 OFFICE O/P EST MOD 30 MIN: CPT | Mod: S$GLB,,, | Performed by: PHYSICIAN ASSISTANT

## 2022-07-13 PROCEDURE — 3078F DIAST BP <80 MM HG: CPT | Mod: CPTII,S$GLB,, | Performed by: PHYSICIAN ASSISTANT

## 2022-07-13 PROCEDURE — 1159F MED LIST DOCD IN RCRD: CPT | Mod: CPTII,S$GLB,, | Performed by: PHYSICIAN ASSISTANT

## 2022-07-13 PROCEDURE — 1101F PR PT FALLS ASSESS DOC 0-1 FALLS W/OUT INJ PAST YR: ICD-10-PCS | Mod: CPTII,S$GLB,, | Performed by: PHYSICIAN ASSISTANT

## 2022-07-13 PROCEDURE — 3078F PR MOST RECENT DIASTOLIC BLOOD PRESSURE < 80 MM HG: ICD-10-PCS | Mod: CPTII,S$GLB,, | Performed by: PHYSICIAN ASSISTANT

## 2022-07-13 RX ORDER — ROPINIROLE 0.5 MG/1
TABLET, FILM COATED ORAL
Qty: 90 TABLET | Refills: 11 | Status: SHIPPED | OUTPATIENT
Start: 2022-07-13

## 2022-07-13 NOTE — PROGRESS NOTES
Name: Sofía Mcwilliams  MRN: [id number]   CSN: [id number]      Date: 07/13/2022    Referring physician:  No referring provider defined for this encounter.    Chief Complaint: RLS     Interval History:  - doing better on ER but around 3 pm she starts to feel her legs bother her, has to get up and walk  - feels balance is slightly worse after she takes requip in the evening  - if she turns too quick, feels off balance   - exercises, weight training       From April 2022 Sofía Mcwilliams is a R HANDED 81 y.o. female with a medical issues significant for lumbar radiculopathy, HTN, HLD, osteoarthritis and anxiety who presents for RLS. First noticed symptoms when she was pregnant with her daughter over 40 years ago. She states that she was using hypnosis and noticed that she couldn't still. This improved after pregnancy. About 4 years ago, she had hip replacements, after both of those surgeries, RLS has been constant and annoying. Starts at 2 pm in the afternoon. This was the time she would sit and read. Takes requip about 7 pm, goes to sleep about an hour after -- causes sleepiness. Takes 1mg qhs. She was previously on the xr requip felt that this helped better because it didn't cause sleepiness.  has PD. Feels imbalanced, asks if its due to medications. No falls. Denies feeling lightheaded or dizziness. She previously walked frequently but has decreased due to lumbar radiculopathy. When she wakes up in the morning, still feels off. Does not recall that she felt like this whenever she was on the ER of requip.   Riding in the car is difficult, going out to dinner is problematic.       Had gabapentin after hip surgery but did not take it so unclear if it helped. Also tried pramipexole in the past.     Supplements with multi vitamin and B vitamin.     Family History: adopted     Neuroleptic Exposure: none     Nonmotor/Premotor ROS:  Anosmia: normal   Dysarthria/Hypophonia: none   Dysphagia/Sialorrhea: none   Urinary changes:  recent isolated UTI   Constipation: some  Falls: none   Micrographia: none   Sleep issues:  -RBD: none       Review of Systems:   Review of Systems   Constitutional: Negative for chills, fever and malaise/fatigue.   HENT: Negative for hearing loss.    Eyes: Negative for blurred vision and double vision.   Respiratory: Negative for cough, shortness of breath and stridor.    Cardiovascular: Negative for chest pain and leg swelling.   Gastrointestinal: Positive for constipation. Negative for diarrhea and nausea.   Genitourinary: Negative for frequency and urgency.   Musculoskeletal: Negative for falls.   Skin: Negative for itching and rash.   Neurological: Positive for sensory change. Negative for dizziness, tremors, loss of consciousness and weakness.   Psychiatric/Behavioral: Negative for hallucinations and memory loss.           Past Medical History: The patient  has a past medical history of Anxiety (5/2/2016), Arthritis, Basal cell carcinoma (2013), Cataract, Hypertension, and Other and unspecified hyperlipidemia.    Social History: The patient  reports that she quit smoking about 56 years ago. She smoked 0.00 packs per day for 0.00 years. She has never used smokeless tobacco. She reports current alcohol use. She reports that she does not use drugs.    Family History: Their family history is not on file. She was adopted.    Allergies: Celecoxib and Sulfa (sulfonamide antibiotics)     Meds:   Current Outpatient Medications on File Prior to Visit   Medication Sig Dispense Refill    aspirin (ECOTRIN) 81 MG EC tablet Take 1 tablet by mouth.      atorvastatin (LIPITOR) 20 MG tablet TAKE 1 TABLET EVERY DAY 90 tablet 3    CALCIUM CARB/VIT D3/MINERALS (CALCIUM-VITAMIN D ORAL) once daily.       coenzyme Q10 10 mg capsule Take by mouth.      losartan (COZAAR) 25 MG tablet TAKE 1 TABLET EVERY DAY 90 tablet 3    meloxicam (MOBIC) 15 MG tablet TAKE 1 TABLET(15 MG) BY MOUTH EVERY DAY 90 tablet 3    multivitamin  "(THERAGRAN) per tablet Take 1 tablet by mouth.      rOPINIRole (REQUIP XL) 2 mg 24 hr tablet Take 1 tablet (2 mg total) by mouth every evening. 90 tablet 3    VITAMIN B COMPLEX ORAL Take by mouth once daily.       vitamin D 1000 units Tab Take 185 mg by mouth once daily.       No current facility-administered medications on file prior to visit.       Exam:  /77 (BP Location: Left arm, Patient Position: Sitting, BP Method: Medium (Automatic))   Pulse 79   Ht 5' 4" (1.626 m)   Wt 65.9 kg (145 lb 2.8 oz)   BMI 24.92 kg/m²     Constitutional  Well-developed, well-nourished, appears stated age   Ophthalmoscopic  No papilledema with no hemorrhages or exudates bilaterally   Cardiovascular  Radial pulses 2+ and symmetric, no LE edema bilaterally   Neurological    * Mental status  MOCA =      - Orientation  Oriented to person, place, time, and situation     - Memory   Intact recent and remote     - Attention/concentration  Attentive, vigilant during exam     - Language  Naming & repetition intact, +2-step commands     - Fund of knowledge  Aware of current events     - Executive  Well-organized thoughts     - Other     * Cranial nerves       - CN II  PERRL, visual fields full to confrontation     - CN III, IV, VI  Extraocular movements full, normal pursuits and saccades     - CN V  Sensation V1 - V3 intact     - CN VII  Face strong and symmetric bilaterally     - CN VIII  Hearing intact bilaterally     - CN IX, X  Palate raises midline and symmetric     - CN XI  SCM and trapezius 5/5 bilaterally     - CN XII  Tongue midline   * Motor  Muscle bulk normal, strength 5/5 throughout   * Sensory   Intact to temperature and vibration throughout   * Coordination  No dysmetria with finger-to-nose or heel-to-shin   * Gait  See below.   * Deep tendon reflexes  3+ and symmetric throughout   pectoralis spreads    laughlin absent    Babinski downgoing bilaterally   * Specialized movement exam  No hypophonic speech.    No facial " masking.   No cogwheel rigidity.     No bradykinesia.   No tremor with rest, posture, kinesis, or intention.    No other dystonia, chorea, athetosis, myoclonus, or tics.   No motor impersistence.   Normal-based gait.   No shortened stride length.   diminished arm swing bilaterally    No postural instability.    mild vocal tremor      Laboratory/Radiological:  - Results:  Lab Visit on 04/20/2022   Component Date Value Ref Range Status    Iron 04/20/2022 62  30 - 160 ug/dL Final    Transferrin 04/20/2022 220  200 - 375 mg/dL Final    TIBC 04/20/2022 326  250 - 450 ug/dL Final    Saturated Iron 04/20/2022 19 (A) 20 - 50 % Final    Ferritin 04/20/2022 115  20.0 - 300.0 ng/mL Final    Thiamine 04/20/2022 120  38 - 122 ug/L Final    Vitamin B-12 04/20/2022 550  180 - 914 ng/L Final    RPR 04/20/2022 Non-reactive  Non-reactive Final    Vitamin B2 04/20/2022 84 (A) 1 - 19 mcg/L Final    Vitamin B6 04/20/2022 61 (A) 5 - 50 ug/L Final       - Independent review of images:      - Independent review of consultant's notes: [facility]     ASSESSMENT/PLAN:  1. Restless Leg Syndrome  - now back on requip 2 mg XR with good results   - still has some breakthrough symptoms around 3 pm    - add requip 0.5 mg IR as needed up to TID for breakthrough symptoms   - may consider addition of gabapentin or lyrica next       Orders Placed This Encounter    rOPINIRole (REQUIP) 0.5 MG tablet           Follow up:  In 6 months with [facility]     Collaborating Physician, Dr. Charlton, was available during today's encounter. Any change to plan along with cosign to appear in the EMR.       Total time spent with the patient: 35 minutes.  20 minutes of face-to-face consultation and 15 minutes of chart review and coordination of care, on the day of the visit. This includes face to face time and non-face to face time preparing to see the patient (eg, review of tests), obtaining and/or reviewing separately obtained history, documenting clinical information  in the electronic or other health record, independently interpreting resultsand communicating results to the patient/family/caregiver, or care coordination.         Vi Jimenes PA-C   [facility]  Department of Neurology  Movement Disorders

## 2022-07-25 ENCOUNTER — HOSPITAL ENCOUNTER (OUTPATIENT)
Dept: RADIOLOGY | Facility: HOSPITAL | Age: 82
Discharge: HOME | End: 2022-07-25
Attending: ORTHOPAEDIC SURGERY
Payer: MEDICARE

## 2022-07-25 ENCOUNTER — OFFICE VISIT (OUTPATIENT)
Dept: ORTHOPEDICS | Facility: CLINIC | Age: 82
End: 2022-07-25
Payer: MEDICARE

## 2022-07-25 VITALS — BODY MASS INDEX: 24.75 KG/M2 | WEIGHT: 145 LBS | HEIGHT: 64 IN

## 2022-07-25 DIAGNOSIS — M66.20 TENDON RUPTURE, NONTRAUMATIC, EXTENSOR: ICD-10-CM

## 2022-07-25 DIAGNOSIS — M25.531 RIGHT WRIST PAIN: ICD-10-CM

## 2022-07-25 DIAGNOSIS — M25.531 RIGHT WRIST PAIN: Primary | ICD-10-CM

## 2022-07-25 PROCEDURE — 99999 PR PBB SHADOW E&M-EST. PATIENT-LVL III: ICD-10-PCS | Mod: PBBFAC,,, | Performed by: ORTHOPAEDIC SURGERY

## 2022-07-25 PROCEDURE — 99214 PR OFFICE/OUTPT VISIT, EST, LEVL IV, 30-39 MIN: ICD-10-PCS | Mod: S$GLB,,, | Performed by: ORTHOPAEDIC SURGERY

## 2022-07-25 PROCEDURE — 73100 X-RAY EXAM OF WRIST: CPT | Mod: 26,RT,, | Performed by: RADIOLOGY

## 2022-07-25 PROCEDURE — 3288F FALL RISK ASSESSMENT DOCD: CPT | Mod: CPTII,S$GLB,, | Performed by: ORTHOPAEDIC SURGERY

## 2022-07-25 PROCEDURE — 73100 X-RAY EXAM OF WRIST: CPT | Mod: TC,PN,RT

## 2022-07-25 PROCEDURE — 99999 PR PBB SHADOW E&M-EST. PATIENT-LVL III: CPT | Mod: PBBFAC,,, | Performed by: ORTHOPAEDIC SURGERY

## 2022-07-25 PROCEDURE — 1159F PR MEDICATION LIST DOCUMENTED IN MEDICAL RECORD: ICD-10-PCS | Mod: CPTII,S$GLB,, | Performed by: ORTHOPAEDIC SURGERY

## 2022-07-25 PROCEDURE — 1101F PT FALLS ASSESS-DOCD LE1/YR: CPT | Mod: CPTII,S$GLB,, | Performed by: ORTHOPAEDIC SURGERY

## 2022-07-25 PROCEDURE — 99214 OFFICE O/P EST MOD 30 MIN: CPT | Mod: S$GLB,,, | Performed by: ORTHOPAEDIC SURGERY

## 2022-07-25 PROCEDURE — 1101F PR PT FALLS ASSESS DOC 0-1 FALLS W/OUT INJ PAST YR: ICD-10-PCS | Mod: CPTII,S$GLB,, | Performed by: ORTHOPAEDIC SURGERY

## 2022-07-25 PROCEDURE — 1159F MED LIST DOCD IN RCRD: CPT | Mod: CPTII,S$GLB,, | Performed by: ORTHOPAEDIC SURGERY

## 2022-07-25 PROCEDURE — 3288F PR FALLS RISK ASSESSMENT DOCUMENTED: ICD-10-PCS | Mod: CPTII,S$GLB,, | Performed by: ORTHOPAEDIC SURGERY

## 2022-07-25 PROCEDURE — 73100 XR WRIST 2 VIEW RIGHT: ICD-10-PCS | Mod: 26,RT,, | Performed by: RADIOLOGY

## 2022-07-25 RX ORDER — LISINOPRIL 2.5 MG/1
2.5 TABLET ORAL
Status: ON HOLD | COMMUNITY
End: 2023-02-23 | Stop reason: HOSPADM

## 2022-07-25 RX ORDER — CEFAZOLIN SODIUM 2 G/50ML
2 SOLUTION INTRAVENOUS
Status: CANCELLED | OUTPATIENT
Start: 2022-07-25

## 2022-07-25 NOTE — H&P (VIEW-ONLY)
CC:  Extensor tendon rupture right small finger        HPI:  Sofía Mcwilliams is a very pleasant 81 y.o. female presents with loss of extension of the right small finger for the past 2-3 weeks  No specific trauma reported  She does have a history of arthritis in the hand and wrist  No numbness or tingling reported         PAST MEDICAL HISTORY:   Past Medical History:   Diagnosis Date    Anxiety 5/2/2016    Arthritis     Basal cell carcinoma 2013    left buttock    Cataract     Hypertension     Other and unspecified hyperlipidemia      PAST SURGICAL HISTORY:   Past Surgical History:   Procedure Laterality Date    APPENDECTOMY      bunion      right    COLONOSCOPY N/A 9/28/2015    Procedure: COLONOSCOPY;  Surgeon: Joe Amos MD;  [facility]  Service: Endoscopy;  Laterality: N/A;    COLONOSCOPY N/A 3/16/2021    Procedure: COLONOSCOPY;  Surgeon: Brice Zayas MD;  [facility]  Service: Endoscopy;  Laterality: N/A;  covid test 3/13-primary care    hip surgery x 2      HYSTERECTOMY      partial    INJECTION OF JOINT Left 1/9/2019    Procedure: Injection, LEFT HIP INTRAARTICULAR INJECTION;  Surgeon: Omega Estevez MD;  Location: [facility] PAIN MGT;  Service: Pain Management;  Laterality: Left;    INJECTION OF JOINT Left 3/13/2019    Procedure: INJECTION, JOINT LEFT HIP;  Surgeon: Omega Estevez MD;  Location: [facility] PAIN MGT;  Service: Pain Management;  Laterality: Left;  Left Hip Intrarticular Steroid Injection    JOINT REPLACEMENT      TONSILLECTOMY      TRANSFORAMINAL EPIDURAL INJECTION OF STEROID Left 8/25/2021    Procedure: Injection,steroid,epidural,transforaminal approach; Levels: L5-S1;  Surgeon: Heather Mederos MD;  Location: [facility] PAIN MGT;  Service: Pain Management;  Laterality: Left;  No pacemaker. Patient is taking ASA.     TRANSFORAMINAL EPIDURAL INJECTION OF STEROID Bilateral 2/23/2022    Procedure: Injection,steroid,epidural,transforaminal  bilateral L5;  Surgeon: Heather Mederos MD;  Location: [facility] PAIN MGT;  Service: Pain Management;  Laterality: Bilateral;  ASA   no pacemaker     FAMILY HISTORY:   Family History   Adopted: Yes     SOCIAL HISTORY:   Social History     Socioeconomic History    Marital status:    Occupational History    Occupation: RN   Tobacco Use    Smoking status: Former Smoker     Packs/day: 0.00     Years: 0.00     Pack years: 0.00     Quit date: 1965     Years since quittin.7    Smokeless tobacco: Never Used   Substance and Sexual Activity    Alcohol use: Yes     Alcohol/week: 0.0 standard drinks     Comment: Occ.    Drug use: No    Sexual activity: Yes   Other Topics Concern    Are you pregnant or think you may be? No    Breast-feeding No     Social Determinants of Health     Financial Resource Strain: Low Risk     Difficulty of Paying Living Expenses: Not hard at all   Food Insecurity: No Food Insecurity    Worried About Running Out of Food in the Last Year: Never true    Ran Out of Food in the Last Year: Never true   Transportation Needs: No Transportation Needs    Lack of Transportation (Medical): No    Lack of Transportation (Non-Medical): No   Physical Activity: Insufficiently Active    Days of Exercise per Week: 3 days    Minutes of Exercise per Session: 30 min   Stress: No Stress Concern Present    Feeling of Stress : Not at all   Social Connections: Unknown    Frequency of Communication with Friends and Family: Three times a week    Frequency of Social Gatherings with Friends and Family: More than three times a week    Active Member of Clubs or Organizations: No    Attends Club or Organization Meetings: Never    Marital Status:    Housing Stability: Low Risk     Unable to Pay for Housing in the Last Year: No    Number of Places Lived in the Last Year: 1    Unstable Housing in the Last Year: No       MEDICATIONS:   Current Outpatient Medications:     aspirin (ECOTRIN) 81  "MG EC tablet, Take 1 tablet by mouth., Disp: , Rfl:     atorvastatin (LIPITOR) 20 MG tablet, TAKE 1 TABLET EVERY DAY, Disp: 90 tablet, Rfl: 3    CALCIUM CARB/VIT D3/MINERALS (CALCIUM-VITAMIN D ORAL), once daily. , Disp: , Rfl:     coenzyme Q10 10 mg capsule, Take by mouth., Disp: , Rfl:     lisinopriL (PRINIVIL,ZESTRIL) 2.5 MG tablet, Take 2.5 mg by mouth., Disp: , Rfl:     losartan (COZAAR) 25 MG tablet, TAKE 1 TABLET EVERY DAY, Disp: 90 tablet, Rfl: 3    meloxicam (MOBIC) 15 MG tablet, TAKE 1 TABLET(15 MG) BY MOUTH EVERY DAY, Disp: 90 tablet, Rfl: 3    multivitamin (THERAGRAN) per tablet, Take 1 tablet by mouth., Disp: , Rfl:     rOPINIRole (REQUIP XL) 2 mg 24 hr tablet, Take 1 tablet (2 mg total) by mouth every evening., Disp: 90 tablet, Rfl: 3    rOPINIRole (REQUIP) 0.5 MG tablet, Take 1 tablet as needed up to 3 times a day for breakthrough RLS symptoms., Disp: 90 tablet, Rfl: 11    VITAMIN B COMPLEX ORAL, Take by mouth once daily. , Disp: , Rfl:     vitamin D 1000 units Tab, Take 185 mg by mouth once daily., Disp: , Rfl:   ALLERGIES:   Review of patient's allergies indicates:   Allergen Reactions    Celecoxib      Other reaction(s): Swelling    Sulfa (sulfonamide antibiotics)      Other reaction(s): Hives       Review of Systems:  Constitutional: no fever or chills  ENT: no nasal congestion or sore throat  Respiratory: no cough or shortness of breath  Cardiovascular: no chest pain or palpitations  Gastrointestinal: no nausea or vomiting, PUD, GERD, NSAID intolerance  Genitourinary: no hematuria or dysuria  Integument/Breast: no rash or pruritis  Hematologic/Lymphatic: no easy bruising or lymphadenopathy  Musculoskeletal: see HPI  Neurological: no seizures or tremors  Behavioral/Psych: no auditory or visual hallucinations      Physical Exam   Vitals:    07/25/22 1014   Weight: 65.8 kg (145 lb)   Height: 5' 4" (1.626 m)       Constitutional: Oriented to person, place, and time. Appears well-developed " "and well-nourished.   HENT:   Head: Normocephalic and atraumatic.   Nose: Nose normal.   Eyes: No scleral icterus.   Neck: Normal range of motion.   Cardiovascular: Normal rate and regular rhythm.    Pulses:       Radial pulses are 2+ on the right side, and 2+ on the left side.   Pulmonary/Chest: Effort normal and breath sounds normal.   Abdominal: Soft.   Neurological: Alert and oriented to person, place, and time.   Skin: Skin is warm.   Psychiatric: Normal mood and affect.     MUSCULOSKELETAL UPPER EXTREMITY:  Examination right hand shows some prominence of the distal ulna over the right wrist  Slight swelling  Range of motion wrist fingers full passively  Loss of active extension of the right small finger tenodesis affect is not intact in the right small finger  There is no subluxation or triggering noted  Sensation intact all digits            Diagnostic Studies:  AP lateral x-ray of the right hand wrist demonstrates some mild degenerative changes and prominence of the distal ulna styloid        Assessment:  Extensor tendon rupture right small finger most likely related to attrition at the DRUJ dorsally    Plan:  I explained the nature of the problem to the patient  I have recommended surgical treatment to include tendon transfer to reconstruct the extensor tendon to the right small finger either using the E IP or side to side of the EDC  I have also recommended debridement of the DRUJ possible partial Darrack procedure  The risks and benefits of surgery explained to the patient she understands      The risks and benefits of surgery were discussed with the patient today and they understand.  The consent was signed in the office for surgery.      Brennen Prabhakar MD (Jay)  [facility]  Orthopedic Upper Extremity Surgery      "

## 2022-07-25 NOTE — PROGRESS NOTES
CC:  Extensor tendon rupture right small finger        HPI:  Sofía Mcwilliams is a very pleasant 81 y.o. female presents with loss of extension of the right small finger for the past 2-3 weeks  No specific trauma reported  She does have a history of arthritis in the hand and wrist  No numbness or tingling reported         PAST MEDICAL HISTORY:   Past Medical History:   Diagnosis Date    Anxiety 5/2/2016    Arthritis     Basal cell carcinoma 2013    left buttock    Cataract     Hypertension     Other and unspecified hyperlipidemia      PAST SURGICAL HISTORY:   Past Surgical History:   Procedure Laterality Date    APPENDECTOMY      bunion      right    COLONOSCOPY N/A 9/28/2015    Procedure: COLONOSCOPY;  Surgeon: Joe Amos MD;  [facility]  Service: Endoscopy;  Laterality: N/A;    COLONOSCOPY N/A 3/16/2021    Procedure: COLONOSCOPY;  Surgeon: Brice Zayas MD;  [facility]  Service: Endoscopy;  Laterality: N/A;  covid test 3/13-primary care    hip surgery x 2      HYSTERECTOMY      partial    INJECTION OF JOINT Left 1/9/2019    Procedure: Injection, LEFT HIP INTRAARTICULAR INJECTION;  Surgeon: Omega Estevez MD;  Location: [facility] PAIN MGT;  Service: Pain Management;  Laterality: Left;    INJECTION OF JOINT Left 3/13/2019    Procedure: INJECTION, JOINT LEFT HIP;  Surgeon: Omega Estevez MD;  Location: [facility] PAIN MGT;  Service: Pain Management;  Laterality: Left;  Left Hip Intrarticular Steroid Injection    JOINT REPLACEMENT      TONSILLECTOMY      TRANSFORAMINAL EPIDURAL INJECTION OF STEROID Left 8/25/2021    Procedure: Injection,steroid,epidural,transforaminal approach; Levels: L5-S1;  Surgeon: Heather Mederos MD;  Location: [facility] PAIN MGT;  Service: Pain Management;  Laterality: Left;  No pacemaker. Patient is taking ASA.     TRANSFORAMINAL EPIDURAL INJECTION OF STEROID Bilateral 2/23/2022    Procedure: Injection,steroid,epidural,transforaminal  bilateral L5;  Surgeon: Heather Mederos MD;  Location: [facility] PAIN MGT;  Service: Pain Management;  Laterality: Bilateral;  ASA   no pacemaker     FAMILY HISTORY:   Family History   Adopted: Yes     SOCIAL HISTORY:   Social History     Socioeconomic History    Marital status:    Occupational History    Occupation: RN   Tobacco Use    Smoking status: Former Smoker     Packs/day: 0.00     Years: 0.00     Pack years: 0.00     Quit date: 1965     Years since quittin.7    Smokeless tobacco: Never Used   Substance and Sexual Activity    Alcohol use: Yes     Alcohol/week: 0.0 standard drinks     Comment: Occ.    Drug use: No    Sexual activity: Yes   Other Topics Concern    Are you pregnant or think you may be? No    Breast-feeding No     Social Determinants of Health     Financial Resource Strain: Low Risk     Difficulty of Paying Living Expenses: Not hard at all   Food Insecurity: No Food Insecurity    Worried About Running Out of Food in the Last Year: Never true    Ran Out of Food in the Last Year: Never true   Transportation Needs: No Transportation Needs    Lack of Transportation (Medical): No    Lack of Transportation (Non-Medical): No   Physical Activity: Insufficiently Active    Days of Exercise per Week: 3 days    Minutes of Exercise per Session: 30 min   Stress: No Stress Concern Present    Feeling of Stress : Not at all   Social Connections: Unknown    Frequency of Communication with Friends and Family: Three times a week    Frequency of Social Gatherings with Friends and Family: More than three times a week    Active Member of Clubs or Organizations: No    Attends Club or Organization Meetings: Never    Marital Status:    Housing Stability: Low Risk     Unable to Pay for Housing in the Last Year: No    Number of Places Lived in the Last Year: 1    Unstable Housing in the Last Year: No       MEDICATIONS:   Current Outpatient Medications:     aspirin (ECOTRIN) 81  "MG EC tablet, Take 1 tablet by mouth., Disp: , Rfl:     atorvastatin (LIPITOR) 20 MG tablet, TAKE 1 TABLET EVERY DAY, Disp: 90 tablet, Rfl: 3    CALCIUM CARB/VIT D3/MINERALS (CALCIUM-VITAMIN D ORAL), once daily. , Disp: , Rfl:     coenzyme Q10 10 mg capsule, Take by mouth., Disp: , Rfl:     lisinopriL (PRINIVIL,ZESTRIL) 2.5 MG tablet, Take 2.5 mg by mouth., Disp: , Rfl:     losartan (COZAAR) 25 MG tablet, TAKE 1 TABLET EVERY DAY, Disp: 90 tablet, Rfl: 3    meloxicam (MOBIC) 15 MG tablet, TAKE 1 TABLET(15 MG) BY MOUTH EVERY DAY, Disp: 90 tablet, Rfl: 3    multivitamin (THERAGRAN) per tablet, Take 1 tablet by mouth., Disp: , Rfl:     rOPINIRole (REQUIP XL) 2 mg 24 hr tablet, Take 1 tablet (2 mg total) by mouth every evening., Disp: 90 tablet, Rfl: 3    rOPINIRole (REQUIP) 0.5 MG tablet, Take 1 tablet as needed up to 3 times a day for breakthrough RLS symptoms., Disp: 90 tablet, Rfl: 11    VITAMIN B COMPLEX ORAL, Take by mouth once daily. , Disp: , Rfl:     vitamin D 1000 units Tab, Take 185 mg by mouth once daily., Disp: , Rfl:   ALLERGIES:   Review of patient's allergies indicates:   Allergen Reactions    Celecoxib      Other reaction(s): Swelling    Sulfa (sulfonamide antibiotics)      Other reaction(s): Hives       Review of Systems:  Constitutional: no fever or chills  ENT: no nasal congestion or sore throat  Respiratory: no cough or shortness of breath  Cardiovascular: no chest pain or palpitations  Gastrointestinal: no nausea or vomiting, PUD, GERD, NSAID intolerance  Genitourinary: no hematuria or dysuria  Integument/Breast: no rash or pruritis  Hematologic/Lymphatic: no easy bruising or lymphadenopathy  Musculoskeletal: see HPI  Neurological: no seizures or tremors  Behavioral/Psych: no auditory or visual hallucinations      Physical Exam   Vitals:    07/25/22 1014   Weight: 65.8 kg (145 lb)   Height: 5' 4" (1.626 m)       Constitutional: Oriented to person, place, and time. Appears well-developed " "and well-nourished.   HENT:   Head: Normocephalic and atraumatic.   Nose: Nose normal.   Eyes: No scleral icterus.   Neck: Normal range of motion.   Cardiovascular: Normal rate and regular rhythm.    Pulses:       Radial pulses are 2+ on the right side, and 2+ on the left side.   Pulmonary/Chest: Effort normal and breath sounds normal.   Abdominal: Soft.   Neurological: Alert and oriented to person, place, and time.   Skin: Skin is warm.   Psychiatric: Normal mood and affect.     MUSCULOSKELETAL UPPER EXTREMITY:  Examination right hand shows some prominence of the distal ulna over the right wrist  Slight swelling  Range of motion wrist fingers full passively  Loss of active extension of the right small finger tenodesis affect is not intact in the right small finger  There is no subluxation or triggering noted  Sensation intact all digits            Diagnostic Studies:  AP lateral x-ray of the right hand wrist demonstrates some mild degenerative changes and prominence of the distal ulna styloid        Assessment:  Extensor tendon rupture right small finger most likely related to attrition at the DRUJ dorsally    Plan:  I explained the nature of the problem to the patient  I have recommended surgical treatment to include tendon transfer to reconstruct the extensor tendon to the right small finger either using the E IP or side to side of the EDC  I have also recommended debridement of the DRUJ possible partial Darrack procedure  The risks and benefits of surgery explained to the patient she understands      The risks and benefits of surgery were discussed with the patient today and they understand.  The consent was signed in the office for surgery.      Brennen Prabhakar MD (Jay)  [facility]  Orthopedic Upper Extremity Surgery      "

## 2022-08-05 ENCOUNTER — LAB VISIT (OUTPATIENT)
Dept: LAB | Facility: HOSPITAL | Age: 82
End: 2022-08-05
Attending: INTERNAL MEDICINE
Payer: MEDICARE

## 2022-08-05 DIAGNOSIS — I10 PRIMARY HYPERTENSION: ICD-10-CM

## 2022-08-05 LAB
ALBUMIN SERPL BCP-MCNC: 3.8 G/DL (ref 3.5–5.2)
ALP SERPL-CCNC: 81 U/L (ref 55–135)
ALT SERPL W/O P-5'-P-CCNC: 23 U/L (ref 10–44)
ANION GAP SERPL CALC-SCNC: 6 MMOL/L (ref 8–16)
AST SERPL-CCNC: 23 U/L (ref 10–40)
BASOPHILS # BLD AUTO: 0.04 K/UL (ref 0–0.2)
BASOPHILS NFR BLD: 0.6 % (ref 0–1.9)
BILIRUB SERPL-MCNC: 0.7 MG/DL (ref 0.1–1)
BUN SERPL-MCNC: 17 MG/DL (ref 8–23)
CALCIUM SERPL-MCNC: 9.6 MG/DL (ref 8.7–10.5)
CHLORIDE SERPL-SCNC: 105 MMOL/L (ref 95–110)
CHOLEST SERPL-MCNC: 170 MG/DL (ref 120–199)
CHOLEST/HDLC SERPL: 2.4 {RATIO} (ref 2–5)
CO2 SERPL-SCNC: 29 MMOL/L (ref 23–29)
CREAT SERPL-MCNC: 0.8 MG/DL (ref 0.5–1.4)
DIFFERENTIAL METHOD: ABNORMAL
EOSINOPHIL # BLD AUTO: 0.4 K/UL (ref 0–0.5)
EOSINOPHIL NFR BLD: 5 % (ref 0–8)
ERYTHROCYTE [DISTWIDTH] IN BLOOD BY AUTOMATED COUNT: 14.7 % (ref 11.5–14.5)
EST. GFR  (NO RACE VARIABLE): >60 ML/MIN/1.73 M^2
GLUCOSE SERPL-MCNC: 101 MG/DL (ref 70–110)
HCT VFR BLD AUTO: 40.9 % (ref 37–48.5)
HDLC SERPL-MCNC: 71 MG/DL (ref 40–75)
HDLC SERPL: 41.8 % (ref 20–50)
HGB BLD-MCNC: 13.2 G/DL (ref 12–16)
IMM GRANULOCYTES # BLD AUTO: 0.02 K/UL (ref 0–0.04)
IMM GRANULOCYTES NFR BLD AUTO: 0.3 % (ref 0–0.5)
LDLC SERPL CALC-MCNC: 75.8 MG/DL (ref 63–159)
LYMPHOCYTES # BLD AUTO: 3.2 K/UL (ref 1–4.8)
LYMPHOCYTES NFR BLD: 45.7 % (ref 18–48)
MCH RBC QN AUTO: 28.8 PG (ref 27–31)
MCHC RBC AUTO-ENTMCNC: 32.3 G/DL (ref 32–36)
MCV RBC AUTO: 89 FL (ref 82–98)
MONOCYTES # BLD AUTO: 0.5 K/UL (ref 0.3–1)
MONOCYTES NFR BLD: 7.1 % (ref 4–15)
NEUTROPHILS # BLD AUTO: 2.9 K/UL (ref 1.8–7.7)
NEUTROPHILS NFR BLD: 41.3 % (ref 38–73)
NONHDLC SERPL-MCNC: 99 MG/DL
NRBC BLD-RTO: 0 /100 WBC
PLATELET # BLD AUTO: 179 K/UL (ref 150–450)
PMV BLD AUTO: 11.5 FL (ref 9.2–12.9)
POTASSIUM SERPL-SCNC: 4.5 MMOL/L (ref 3.5–5.1)
PROT SERPL-MCNC: 6.1 G/DL (ref 6–8.4)
RBC # BLD AUTO: 4.58 M/UL (ref 4–5.4)
SODIUM SERPL-SCNC: 140 MMOL/L (ref 136–145)
TRIGL SERPL-MCNC: 116 MG/DL (ref 30–150)
TSH SERPL DL<=0.005 MIU/L-ACNC: 1.86 UIU/ML (ref 0.4–4)
WBC # BLD AUTO: 7.03 K/UL (ref 3.9–12.7)

## 2022-08-05 PROCEDURE — 80061 LIPID PANEL: CPT | Performed by: INTERNAL MEDICINE

## 2022-08-05 PROCEDURE — 36415 COLL VENOUS BLD VENIPUNCTURE: CPT | Mod: PO | Performed by: INTERNAL MEDICINE

## 2022-08-05 PROCEDURE — 85025 COMPLETE CBC W/AUTO DIFF WBC: CPT | Performed by: INTERNAL MEDICINE

## 2022-08-05 PROCEDURE — 80053 COMPREHEN METABOLIC PANEL: CPT | Performed by: INTERNAL MEDICINE

## 2022-08-05 PROCEDURE — 84443 ASSAY THYROID STIM HORMONE: CPT | Performed by: INTERNAL MEDICINE

## 2022-08-09 ENCOUNTER — ANESTHESIA EVENT (OUTPATIENT)
Dept: SURGERY | Facility: HOSPITAL | Age: 82
End: 2022-08-09
Payer: MEDICARE

## 2022-08-10 ENCOUNTER — OFFICE VISIT (OUTPATIENT)
Dept: INTERNAL MEDICINE | Facility: CLINIC | Age: 82
End: 2022-08-10
Payer: MEDICARE

## 2022-08-10 VITALS
HEART RATE: 64 BPM | DIASTOLIC BLOOD PRESSURE: 74 MMHG | BODY MASS INDEX: 25.09 KG/M2 | SYSTOLIC BLOOD PRESSURE: 128 MMHG | OXYGEN SATURATION: 97 % | WEIGHT: 146.19 LBS

## 2022-08-10 DIAGNOSIS — E78.5 HYPERLIPIDEMIA, UNSPECIFIED HYPERLIPIDEMIA TYPE: ICD-10-CM

## 2022-08-10 DIAGNOSIS — Z00.00 PREVENTATIVE HEALTH CARE: Primary | ICD-10-CM

## 2022-08-10 DIAGNOSIS — Z12.31 ENCOUNTER FOR SCREENING MAMMOGRAM FOR MALIGNANT NEOPLASM OF BREAST: ICD-10-CM

## 2022-08-10 DIAGNOSIS — Z12.39 ENCOUNTER FOR SCREENING FOR MALIGNANT NEOPLASM OF BREAST, UNSPECIFIED SCREENING MODALITY: ICD-10-CM

## 2022-08-10 DIAGNOSIS — G25.81 RLS (RESTLESS LEGS SYNDROME): ICD-10-CM

## 2022-08-10 DIAGNOSIS — I10 PRIMARY HYPERTENSION: ICD-10-CM

## 2022-08-10 PROCEDURE — 1159F MED LIST DOCD IN RCRD: CPT | Mod: CPTII,S$GLB,, | Performed by: INTERNAL MEDICINE

## 2022-08-10 PROCEDURE — 99397 PER PM REEVAL EST PAT 65+ YR: CPT | Mod: S$GLB,,, | Performed by: INTERNAL MEDICINE

## 2022-08-10 PROCEDURE — 3074F PR MOST RECENT SYSTOLIC BLOOD PRESSURE < 130 MM HG: ICD-10-PCS | Mod: CPTII,S$GLB,, | Performed by: INTERNAL MEDICINE

## 2022-08-10 PROCEDURE — 99397 PR PREVENTIVE VISIT,EST,65 & OVER: ICD-10-PCS | Mod: S$GLB,,, | Performed by: INTERNAL MEDICINE

## 2022-08-10 PROCEDURE — 99999 PR PBB SHADOW E&M-EST. PATIENT-LVL IV: ICD-10-PCS | Mod: PBBFAC,,, | Performed by: INTERNAL MEDICINE

## 2022-08-10 PROCEDURE — 1160F PR REVIEW ALL MEDS BY PRESCRIBER/CLIN PHARMACIST DOCUMENTED: ICD-10-PCS | Mod: CPTII,S$GLB,, | Performed by: INTERNAL MEDICINE

## 2022-08-10 PROCEDURE — 99999 PR PBB SHADOW E&M-EST. PATIENT-LVL IV: CPT | Mod: PBBFAC,,, | Performed by: INTERNAL MEDICINE

## 2022-08-10 PROCEDURE — 1126F PR PAIN SEVERITY QUANTIFIED, NO PAIN PRESENT: ICD-10-PCS | Mod: CPTII,S$GLB,, | Performed by: INTERNAL MEDICINE

## 2022-08-10 PROCEDURE — 3074F SYST BP LT 130 MM HG: CPT | Mod: CPTII,S$GLB,, | Performed by: INTERNAL MEDICINE

## 2022-08-10 PROCEDURE — 1160F RVW MEDS BY RX/DR IN RCRD: CPT | Mod: CPTII,S$GLB,, | Performed by: INTERNAL MEDICINE

## 2022-08-10 PROCEDURE — 3078F PR MOST RECENT DIASTOLIC BLOOD PRESSURE < 80 MM HG: ICD-10-PCS | Mod: CPTII,S$GLB,, | Performed by: INTERNAL MEDICINE

## 2022-08-10 PROCEDURE — 3078F DIAST BP <80 MM HG: CPT | Mod: CPTII,S$GLB,, | Performed by: INTERNAL MEDICINE

## 2022-08-10 PROCEDURE — 1159F PR MEDICATION LIST DOCUMENTED IN MEDICAL RECORD: ICD-10-PCS | Mod: CPTII,S$GLB,, | Performed by: INTERNAL MEDICINE

## 2022-08-10 PROCEDURE — 1126F AMNT PAIN NOTED NONE PRSNT: CPT | Mod: CPTII,S$GLB,, | Performed by: INTERNAL MEDICINE

## 2022-08-10 NOTE — PROGRESS NOTES
Subjective:       Patient ID: Sofía Mcwilliams is a 81 y.o. female.    Chief Complaint: Annual Exam    HPI 81-year-old female presents to clinic today for annual physical follow-up hypertension dyslipidemia restless legs syndrome upcoming appointment with Dr. Prabhakar for tendon surgery and her finger.  She is also doing [employer] slow motion exercises and people's program to remain active  Review of Systems    otherwise negative  Objective:      Physical Exam  General: Well-appearing, well-nourished.  No distress  HEENT: conjunctivae are normal.  Pupils are equal and reative to light.  TM's are clear and intact bilaterally.  Hearing is grossly normal.  Nasopharynx is clear.  Oropharynx is clear.  Neck: Supple.  No thyroid megaly.  No bruits.  Lymph: No cervical or supraclavicular adenopathy.  Heart: Regular rate and rhythm, without murmur, rub or gallop.  Lungs: Clear to auscultation; respiratory effort normal.  Abdomen: Soft, nontender, nondistended.  Normoactive bowel sounds.  No hepatomegaly.  No masses.  Extremities: Good distal pulses.  No edema.  Psych: Oriented to time person place.  Judgment and insight seem unimpaired.  Mood and affect are appropriate.  Assessment:       Problem List Items Addressed This Visit     Hypertension    Relevant Orders    Comprehensive Metabolic Panel    Lipid Panel    Hyperlipidemia    Relevant Orders    Comprehensive Metabolic Panel    Lipid Panel    RLS (restless legs syndrome)      Other Visit Diagnoses     Preventative health care    -  Primary    Encounter for screening for malignant neoplasm of breast, unspecified screening modality        Relevant Orders    Mammo Digital Screening Bilat w/ Jaime    Encounter for screening mammogram for malignant neoplasm of breast         Relevant Orders    Mammo Digital Screening Bilat w/ Jaime          Plan:       Sofía was seen today for annual exam.    Diagnoses and all orders for this visit:    Preventative health care  Healthcare maintenance  performed, reviewed, updated and counseled today.  Encounter for screening for malignant neoplasm of breast, unspecified screening modality  -     Mammo Digital Screening Bilat w/ Jaime; Future    Encounter for screening mammogram for malignant neoplasm of breast   -     Mammo Digital Screening Bilat w/ Jaime; Future    Primary hypertension  -     Comprehensive Metabolic Panel; Standing  -     Lipid Panel; Standing  Controlled.  Continue current medical regimen.  Prescription refills addressed.  Followup advised. See after visit summary.  RLS (restless legs syndrome)  Controlled.  Continue current medical regimen.  Prescription refills addressed.  Followup advised. See after visit summary.  Hyperlipidemia, unspecified hyperlipidemia type  -     Comprehensive Metabolic Panel; Standing  -     Lipid Panel; Standing  Controlled.  Continue current medical regimen.  Prescription refills addressed.  Followup advised. See after visit summary.

## 2022-08-16 ENCOUNTER — HOSPITAL ENCOUNTER (OUTPATIENT)
Facility: HOSPITAL | Age: 82
Discharge: HOME | End: 2022-08-16
Attending: ORTHOPAEDIC SURGERY | Admitting: ORTHOPAEDIC SURGERY
Payer: MEDICARE

## 2022-08-16 ENCOUNTER — ANESTHESIA (OUTPATIENT)
Dept: SURGERY | Facility: HOSPITAL | Age: 82
End: 2022-08-16
Payer: MEDICARE

## 2022-08-16 VITALS
DIASTOLIC BLOOD PRESSURE: 71 MMHG | WEIGHT: 143 LBS | SYSTOLIC BLOOD PRESSURE: 136 MMHG | TEMPERATURE: 98 F | HEART RATE: 77 BPM | BODY MASS INDEX: 24.41 KG/M2 | HEIGHT: 64 IN | OXYGEN SATURATION: 97 % | RESPIRATION RATE: 18 BRPM

## 2022-08-16 DIAGNOSIS — M66.20 TENDON RUPTURE, NONTRAUMATIC, EXTENSOR: ICD-10-CM

## 2022-08-16 DIAGNOSIS — M25.531 RIGHT WRIST PAIN: ICD-10-CM

## 2022-08-16 DIAGNOSIS — Z01.818 PREOP TESTING: ICD-10-CM

## 2022-08-16 PROCEDURE — 71000016 HC POSTOP RECOV ADDL HR: Performed by: ORTHOPAEDIC SURGERY

## 2022-08-16 PROCEDURE — 37000008 HC ANESTHESIA 1ST 15 MINUTES: Performed by: ORTHOPAEDIC SURGERY

## 2022-08-16 PROCEDURE — 25000003 PHARM REV CODE 250: Performed by: NURSE ANESTHETIST, CERTIFIED REGISTERED

## 2022-08-16 PROCEDURE — 25240 PARTIAL REMOVAL OF ULNA: CPT | Mod: 51,RT,, | Performed by: ORTHOPAEDIC SURGERY

## 2022-08-16 PROCEDURE — 25310 PR TRANSPLANT FOREARM/WRIST TENDON: ICD-10-PCS | Mod: RT,,, | Performed by: ORTHOPAEDIC SURGERY

## 2022-08-16 PROCEDURE — 71000015 HC POSTOP RECOV 1ST HR: Performed by: ORTHOPAEDIC SURGERY

## 2022-08-16 PROCEDURE — 63600175 PHARM REV CODE 636 W HCPCS: Performed by: NURSE ANESTHETIST, CERTIFIED REGISTERED

## 2022-08-16 PROCEDURE — 64415 NJX AA&/STRD BRCH PLXS IMG: CPT | Performed by: STUDENT IN AN ORGANIZED HEALTH CARE EDUCATION/TRAINING PROGRAM

## 2022-08-16 PROCEDURE — 36000710: Performed by: ORTHOPAEDIC SURGERY

## 2022-08-16 PROCEDURE — 25240 PR EXCIS DISTAL ULNA,PART/COMPLETE: ICD-10-PCS | Mod: 51,RT,, | Performed by: ORTHOPAEDIC SURGERY

## 2022-08-16 PROCEDURE — 37000009 HC ANESTHESIA EA ADD 15 MINS: Performed by: ORTHOPAEDIC SURGERY

## 2022-08-16 PROCEDURE — 63600175 PHARM REV CODE 636 W HCPCS: Performed by: ORTHOPAEDIC SURGERY

## 2022-08-16 PROCEDURE — 63600175 PHARM REV CODE 636 W HCPCS: Performed by: STUDENT IN AN ORGANIZED HEALTH CARE EDUCATION/TRAINING PROGRAM

## 2022-08-16 PROCEDURE — 36000711: Performed by: ORTHOPAEDIC SURGERY

## 2022-08-16 PROCEDURE — C1889 IMPLANT/INSERT DEVICE, NOC: HCPCS | Performed by: ORTHOPAEDIC SURGERY

## 2022-08-16 PROCEDURE — 25310 TRANSPLANT FOREARM TENDON: CPT | Mod: RT,,, | Performed by: ORTHOPAEDIC SURGERY

## 2022-08-16 RX ORDER — CEFAZOLIN SODIUM 2 G/50ML
2 SOLUTION INTRAVENOUS
Status: DISCONTINUED | OUTPATIENT
Start: 2022-08-16 | End: 2022-08-16 | Stop reason: HOSPADM

## 2022-08-16 RX ORDER — ACETAMINOPHEN 10 MG/ML
INJECTION, SOLUTION INTRAVENOUS
Status: DISCONTINUED | OUTPATIENT
Start: 2022-08-16 | End: 2022-08-16

## 2022-08-16 RX ORDER — PROPOFOL 10 MG/ML
VIAL (ML) INTRAVENOUS CONTINUOUS PRN
Status: DISCONTINUED | OUTPATIENT
Start: 2022-08-16 | End: 2022-08-16

## 2022-08-16 RX ORDER — ROPIVACAINE HYDROCHLORIDE 5 MG/ML
INJECTION, SOLUTION EPIDURAL; INFILTRATION; PERINEURAL
Status: DISCONTINUED | OUTPATIENT
Start: 2022-08-16 | End: 2022-08-16

## 2022-08-16 RX ORDER — KETOROLAC TROMETHAMINE 30 MG/ML
INJECTION, SOLUTION INTRAMUSCULAR; INTRAVENOUS
Status: DISCONTINUED | OUTPATIENT
Start: 2022-08-16 | End: 2022-08-16

## 2022-08-16 RX ORDER — ONDANSETRON 8 MG/1
8 TABLET, ORALLY DISINTEGRATING ORAL EVERY 8 HOURS PRN
Status: CANCELLED | OUTPATIENT
Start: 2022-08-16

## 2022-08-16 RX ORDER — PROPOFOL 10 MG/ML
VIAL (ML) INTRAVENOUS
Status: DISCONTINUED | OUTPATIENT
Start: 2022-08-16 | End: 2022-08-16

## 2022-08-16 RX ORDER — LIDOCAINE HCL/PF 100 MG/5ML
SYRINGE (ML) INTRAVENOUS
Status: DISCONTINUED | OUTPATIENT
Start: 2022-08-16 | End: 2022-08-16

## 2022-08-16 RX ORDER — HYDROCODONE BITARTRATE AND ACETAMINOPHEN 5; 325 MG/1; MG/1
1 TABLET ORAL EVERY 4 HOURS PRN
Qty: 20 TABLET | Refills: 0 | Status: SHIPPED | OUTPATIENT
Start: 2022-08-16 | End: 2022-08-30

## 2022-08-16 RX ORDER — MIDAZOLAM HYDROCHLORIDE 1 MG/ML
INJECTION, SOLUTION INTRAMUSCULAR; INTRAVENOUS
Status: DISCONTINUED | OUTPATIENT
Start: 2022-08-16 | End: 2022-08-16

## 2022-08-16 RX ORDER — SODIUM CHLORIDE, SODIUM LACTATE, POTASSIUM CHLORIDE, CALCIUM CHLORIDE 600; 310; 30; 20 MG/100ML; MG/100ML; MG/100ML; MG/100ML
INJECTION, SOLUTION INTRAVENOUS CONTINUOUS PRN
Status: DISCONTINUED | OUTPATIENT
Start: 2022-08-16 | End: 2022-08-16

## 2022-08-16 RX ORDER — OXYCODONE HYDROCHLORIDE 5 MG/1
10 TABLET ORAL EVERY 4 HOURS PRN
Status: CANCELLED | OUTPATIENT
Start: 2022-08-16

## 2022-08-16 RX ORDER — HYDROCODONE BITARTRATE AND ACETAMINOPHEN 5; 325 MG/1; MG/1
1 TABLET ORAL EVERY 4 HOURS PRN
Status: CANCELLED | OUTPATIENT
Start: 2022-08-16

## 2022-08-16 RX ORDER — ACETAMINOPHEN 325 MG/1
650 TABLET ORAL EVERY 4 HOURS PRN
Status: CANCELLED | OUTPATIENT
Start: 2022-08-16

## 2022-08-16 RX ADMIN — PROPOFOL 50 MG: 10 INJECTION, EMULSION INTRAVENOUS at 11:08

## 2022-08-16 RX ADMIN — ROPIVACAINE HYDROCHLORIDE 30 ML: 5 INJECTION, SOLUTION EPIDURAL; INFILTRATION; PERINEURAL at 10:08

## 2022-08-16 RX ADMIN — MIDAZOLAM 2 MG: 1 INJECTION INTRAMUSCULAR; INTRAVENOUS at 10:08

## 2022-08-16 RX ADMIN — CEFAZOLIN SODIUM 2 G: 2 SOLUTION INTRAVENOUS at 11:08

## 2022-08-16 RX ADMIN — SODIUM CHLORIDE, SODIUM LACTATE, POTASSIUM CHLORIDE, AND CALCIUM CHLORIDE: .6; .31; .03; .02 INJECTION, SOLUTION INTRAVENOUS at 11:08

## 2022-08-16 RX ADMIN — PROPOFOL 150 MCG/KG/MIN: 10 INJECTION, EMULSION INTRAVENOUS at 11:08

## 2022-08-16 RX ADMIN — ACETAMINOPHEN 1000 MG: 10 INJECTION, SOLUTION INTRAVENOUS at 12:08

## 2022-08-16 RX ADMIN — LIDOCAINE HYDROCHLORIDE 50 MG: 20 INJECTION, SOLUTION INTRAVENOUS at 11:08

## 2022-08-16 NOTE — TRANSFER OF CARE
"Anesthesia Transfer of Care Note    Patient: Sofía Mcwilliams    Procedure(s) Performed: Procedure(s) (LRB):  TRANSFER, TENDON, HAND (Right)  EXCISION, ULNA, DISTAL (Right)    Patient location: OPS    Anesthesia Type: MAC    Transport from OR: Transported from OR on room air with adequate spontaneous ventilation    Post pain: adequate analgesia    Post assessment: no apparent anesthetic complications and tolerated procedure well    Post vital signs: stable    Level of consciousness: awake, alert and oriented    Nausea/Vomiting: no nausea/vomiting    Complications: none    Transfer of care protocol was followed      Last vitals:   Visit Vitals  /81   Pulse 75   Temp 36.8 °C (98.2 °F) (Temporal)   Resp 18   Ht 5' 4" (1.626 m)   Wt 64.9 kg (143 lb)   SpO2 97%   Breastfeeding No   BMI 24.55 kg/m²     "

## 2022-08-16 NOTE — OP NOTE
[facility] - [facility] ([facility])  Operative Note      Date of Procedure: 8/16/2022     Procedure: Procedure(s) (LRB):  TRANSFER, TENDON, HAND (Right)  EXCISION, ULNA, DISTAL (Right)     Surgeon(s) and Role:     * Brennen Prabhakar Jr., MD - Primary    Assisting Surgeon: None    Pre-Operative Diagnosis: Tendon rupture, nontraumatic, extensor [M66.20]    Post-Operative Diagnosis: Post-Op Diagnosis Codes:     * Tendon rupture, nontraumatic, extensor [M66.20]    Anesthesia: Regional    Operative Findings (including complications, if any):  Extensor tendon rupture right small finger    Description of Technical Procedures:     Preop diagnosis:  Extensor tendon rupture right small finger (EDC, EDQ)    Postop diagnosis:  Same.    Operative procedure:  1. Tendon transfer EDC ring finger to EDC small finger.    2. Tendon transfer E IP (index finger) 2 EDQ (small finger)    3. Debridement distal ulna bone spur (cheilectomy).    Surgeon:  Aki.    Anesthesia:  Regional block.    EBL:  Minimal.    Complications:  None.    Operative procedure in detail as follows:    After operative consent was obtained the patient brought the operating room placed supine operating table.  Anesthesia by regional block performed by the anesthesia staff.    A tourniquet applied to the right arm the right upper extremity prepped and draped out in the normal sterile fashion.  The Esmarch used to exsanguinate the limb and the tourniquet inflated 225 mmHg.    A dorsal longitudinal incision made over the right hand with a 15 blade.  Careful dissection used to identified the extensor tendons.  There was rupture of the extensor tendon to the small finger including the EDC small finger and the EDQ.  The other extensor tendons to the digits were intact.  Prior to tendon transfer the distal ulna was inspected distal ulna was noted to have a sharp bone spur dorsally this was debrided and removed with the rongeur.  Soft tissue was then sewn over the distal ulna  using capsule and repaired with 4-0 FiberWire.  Attention then turned back to the extensor tendons.  The EDC to the ring finger was then sewn side-to-side to the EDC to the small finger with the appropriate tension using multiple sutures of 4-0 FiberWire.  Tenodesis affect was intact.    A 2nd incision then made over the dorsal aspect of the 2nd MP joint with a 15 blade.  Careful dissection used to expose the D IP attachment site which was carefully divided with the [place] blade.  The EIP was then identified in the proximal wound and delivered into the wound at that level.  The IP was then rerouted to the ED Que for attachment.  The tendon transfer was completed with a Pulvertaft weave technique using multiple sutures of 4-0 FiberWire.  Attention was done a little bit more than the other digits to allow for loosening over time.  Tenodesis affect was checked.    The excess tendon was sewn together again with 4-0 FiberWire and wound thoroughly irrigated with antibiotic saline solution excess tendon removed.  There was no impingement on the extensor retinaculum hemostasis achieved with Bovie.  The wounds then closed with interrupted and running 5 0 nylon horizontal mattress suture.  Sterile dressings applied followed by well-padded ulnar gutter splint.  The tourniquet deflated the patient brought to the recovery room in stable condition all sponge needle counts reported as correct no complications    Significant Surgical Tasks Conducted by the Assistant(s), if Applicable: Berny    Estimated Blood Loss (EBL): * No values recorded between 8/16/2022 11:50 AM and 8/16/2022 12:45 PM *           Implants: * No implants in log *    Specimens:   Specimen (24h ago, onward)            None                  Condition: Good    Disposition: PACU - hemodynamically stable.    Attestation: I was present and scrubbed for the entire procedure.    Discharge Note    OUTCOME: Patient tolerated treatment/procedure well without  complication and is now ready for discharge.    DISPOSITION: Home or Self Care    FINAL DIAGNOSIS:  Tendon rupture, nontraumatic, extensor    FOLLOWUP: In clinic    DISCHARGE INSTRUCTIONS:    Discharge Procedure Orders   Diet general     Leave dressing on - Keep it clean, dry, and intact until clinic visit     Call MD for:  temperature >100.4     Call MD for:  persistent nausea and vomiting     Call MD for:  severe uncontrolled pain

## 2022-08-16 NOTE — ANESTHESIA POSTPROCEDURE EVALUATION
Anesthesia Post Evaluation    Patient: Sofía Mcwilliams    Procedure(s) Performed: Procedure(s) (LRB):  TRANSFER, TENDON, HAND (Right)  EXCISION, ULNA, DISTAL (Right)    Final Anesthesia Type: MAC      Patient location during evaluation: OPS  Patient participation: Yes- Able to Participate  Level of consciousness: awake and alert and oriented  Post-procedure vital signs: reviewed and stable  Pain management: adequate  Airway patency: patent    PONV status at discharge: No PONV  Anesthetic complications: no      Cardiovascular status: blood pressure returned to baseline and hemodynamically stable  Respiratory status: unassisted, spontaneous ventilation and room air  Hydration status: euvolemic  Follow-up not needed.            No case tracking events are documented in the log.      Pain/Choco Score: Choco Score: 10 (8/16/2022 11:00 AM)

## 2022-08-16 NOTE — ANESTHESIA PROCEDURE NOTES
Peripheral Block    Patient location during procedure: pre-op   Block not for primary anesthetic.  Reason for block: at surgeon's request and post-op pain management   Post-op Pain Location: Right hand   Start time: 8/16/2022 10:31 AM  Timeout: 8/16/2022 10:30 AM   End time: 8/16/2022 10:35 AM    Staffing  Authorizing Provider: Joe Watson MD  Performing Provider: Brice Hardy MD    Preanesthetic Checklist  Completed: patient identified, IV checked, site marked, risks and benefits discussed, surgical consent, monitors and equipment checked, pre-op evaluation and timeout performed  Peripheral Block  Patient position: supine  Prep: ChloraPrep  Patient monitoring: heart rate, cardiac monitor, continuous pulse ox, continuous capnometry and frequent blood pressure checks  Block type: supraclavicular  Laterality: right  Injection technique: single shot  Needle  Needle type: Stimuplex   Needle gauge: 21 G  Needle length: 4 in  Needle localization: anatomical landmarks and ultrasound guidance     Assessment  Injection assessment: negative aspiration, negative parasthesia and local visualized surrounding nerve  Paresthesia pain: none  Heart rate change: no  Slow fractionated injection: yes  Pain Tolerance: comfortable throughout block and no complaints      Additional Notes  VSS.  [facility] RN monitoring vitals throughout procedure.  Patient tolerated procedure well.     Subclavian artery, supraclavicular brachial plexus, lung and pleura, anterior and middle scalene, and omohyoid muscle identified under ultrasound. Area cleaned with chloraprep prior to insertion of stimuplex needle. Prior to local injection, aspiration negative for heme. Supraclavicular brachial plexus covered by local anesthetic and visualized under ultrasound - tracked proximally to see nerve with continuous local anesthetic coverage. No complaints of parasthesias or discomfort throughout block.

## 2022-08-16 NOTE — ANESTHESIA PREPROCEDURE EVALUATION
08/16/2022  Sofía Mcwilliams is a 81 y.o., female w hx of HLD, HTN, and RLS for right small finger extensor tendon repair and distal ulna excision.       Pre-op Assessment    I have reviewed the Patient Summary Reports.     I have reviewed the Nursing Notes. I have reviewed the NPO Status.      Review of Systems  Anesthesia Hx:   Denies Personal Hx of Anesthesia complications.   Cardiovascular:   Hypertension hyperlipidemia    Pulmonary:   Denies Shortness of breath.    Renal/:  Renal/ Normal     Hepatic/GI:  Hepatic/GI Normal    Musculoskeletal:   Arthritis     Neurological:  Neurology Normal        Physical Exam  General: Well nourished, Cooperative and Alert    Airway:  Mallampati: II / I  Mouth Opening: Normal  TM Distance: Normal  Tongue: Normal    Dental:  Intact    Chest/Lungs:  Clear to auscultation    Heart:  Rate: Normal  Rhythm: Regular Rhythm        Anesthesia Plan  Type of Anesthesia, risks & benefits discussed:    Anesthesia Type: Regional, MAC  Intra-op Monitoring Plan: Standard ASA Monitors  Post Op Pain Control Plan: multimodal analgesia and peripheral nerve block  Induction:  IV  Informed Consent: Informed consent signed with the Patient and all parties understand the risks and agree with anesthesia plan.  All questions answered.   ASA Score: 2    Ready For Surgery From Anesthesia Perspective.     .    Lab Results   Component Value Date    WBC 7.03 08/05/2022    HGB 13.2 08/05/2022    HCT 40.9 08/05/2022     08/05/2022    CHOL 170 08/05/2022    TRIG 116 08/05/2022    HDL 71 08/05/2022    ALT 23 08/05/2022    AST 23 08/05/2022     08/05/2022    K 4.5 08/05/2022     08/05/2022    CREATININE 0.8 08/05/2022    BUN 17 08/05/2022    CO2 29 08/05/2022    TSH 1.857 08/05/2022    INR 0.9 01/22/2004    HGBA1C 5.9 07/29/2020

## 2022-08-17 ENCOUNTER — TELEPHONE (OUTPATIENT)
Dept: ORTHOPEDICS | Facility: CLINIC | Age: 82
End: 2022-08-17
Payer: MEDICARE

## 2022-08-17 NOTE — TELEPHONE ENCOUNTER
----- Message from Zaina Holland sent at 8/17/2022 11:53 AM CDT -----  Type:  Patient Returning Call    Who Called:pt  Who Left Message for Patient:Charisse  Does the patient know what this is regarding?:  Would the patient rather a call back or a response via [facility]? Call   Best Call Back Number:[phone] (M)   Additional Information:

## 2022-08-17 NOTE — TELEPHONE ENCOUNTER
----- Message from Mary Walker sent at 8/17/2022  9:33 AM CDT -----  Contact: [phone]  Who Called: pt  Regarding:  post op appointment concerns   Would the patient rather a call back or a response via [employer]? Call back  Best Call Back Number:[phone]  Additional Information:  n/a

## 2022-08-29 NOTE — PROGRESS NOTES
Subjective:      Patient ID: Sofía Mcwilliams is a 81 y.o. female.    Chief Complaint: No chief complaint on file.    HPI: Sofía Mcwilliams is here for a postoperative visit. she is 2 wks  s/p   1. Tendon transfer EDC ring finger to EDC small finger.  2. Tendon transfer E IP (index finger) 2 EDQ (small finger)  3. Debridement distal ulna bone spur (cheilectomy) (DOS: 8-)    Overall, she is doing very well with limited pain. She denies fevers, new trauma, numbness, or uncontrolled pain.  Past Medical History:   Diagnosis Date    Anxiety 5/2/2016    Arthritis     Basal cell carcinoma 2013    left buttock    Cataract     Hypertension     Other and unspecified hyperlipidemia        Current Outpatient Medications:     aspirin (ECOTRIN) 81 MG EC tablet, Take 1 tablet by mouth., Disp: , Rfl:     atorvastatin (LIPITOR) 20 MG tablet, TAKE 1 TABLET EVERY DAY, Disp: 90 tablet, Rfl: 3    CALCIUM CARB/VIT D3/MINERALS (CALCIUM-VITAMIN D ORAL), once daily. , Disp: , Rfl:     coenzyme Q10 10 mg capsule, Take by mouth., Disp: , Rfl:     HYDROcodone-acetaminophen (NORCO) 5-325 mg per tablet, Take 1 tablet by mouth every 4 (four) hours as needed for Pain., Disp: 20 tablet, Rfl: 0    lisinopriL (PRINIVIL,ZESTRIL) 2.5 MG tablet, Take 2.5 mg by mouth., Disp: , Rfl:     losartan (COZAAR) 25 MG tablet, TAKE 1 TABLET EVERY DAY, Disp: 90 tablet, Rfl: 3    meloxicam (MOBIC) 15 MG tablet, TAKE 1 TABLET(15 MG) BY MOUTH EVERY DAY, Disp: 90 tablet, Rfl: 3    multivitamin (THERAGRAN) per tablet, Take 1 tablet by mouth., Disp: , Rfl:     rOPINIRole (REQUIP XL) 2 mg 24 hr tablet, Take 1 tablet (2 mg total) by mouth every evening., Disp: 90 tablet, Rfl: 3    rOPINIRole (REQUIP) 0.5 MG tablet, Take 1 tablet as needed up to 3 times a day for breakthrough RLS symptoms., Disp: 90 tablet, Rfl: 11    VITAMIN B COMPLEX ORAL, Take by mouth once daily. , Disp: , Rfl:     vitamin D 1000 units Tab, Take 185 mg by mouth once daily., Disp: , Rfl:   Review of  "patient's allergies indicates:   Allergen Reactions    Celecoxib      Other reaction(s): Swelling    Sulfa (sulfonamide antibiotics)      Other reaction(s): Hives       Ht 5' 4" (1.626 m)   Wt 64.9 kg (143 lb)   BMI 24.55 kg/m²      Review of Systems   Constitutional:  Negative for chills and fever.   Respiratory:  Negative for shortness of breath.    Cardiovascular:  Negative for chest pain and leg swelling.   Gastrointestinal:  Negative for nausea and vomiting.   Genitourinary:  Negative for dysuria.   Musculoskeletal:  Negative for falls.   Skin:  Negative for rash.   Neurological:  Negative for dizziness and sensory change.        Objective:    Ortho Exam   Right wrist  Wound margins are well approximated and healing nicely with nylon suture. No redness, warmth, drainage, or other signs of infection. No swelling. Sensation intact. Pulses present.    GEN: Well developed, well nourished female. AAOX3. No acute distress.   Breathing unlabored.  Mood and affect appropriate.       Assessment:         1. Tendon rupture, nontraumatic, extensor          Plan:     Sutures removed at the bedside  DME: ulnar gutter brace applied  Regular wound care explained with soap and water.  Pain control: OTC analgesia prn  Activity: no heavy lifting/pushing/pulling. Activity as guided by therapy protocol  OT: formal referral provided, CHT, custom brace     Follow Up: 4wks for re-evaluation         "

## 2022-08-30 ENCOUNTER — OFFICE VISIT (OUTPATIENT)
Dept: ORTHOPEDICS | Facility: CLINIC | Age: 82
End: 2022-08-30
Payer: MEDICARE

## 2022-08-30 VITALS — WEIGHT: 143 LBS | BODY MASS INDEX: 24.41 KG/M2 | HEIGHT: 64 IN

## 2022-08-30 DIAGNOSIS — M66.20 TENDON RUPTURE, NONTRAUMATIC, EXTENSOR: Primary | ICD-10-CM

## 2022-08-30 PROCEDURE — 1159F PR MEDICATION LIST DOCUMENTED IN MEDICAL RECORD: ICD-10-PCS | Mod: CPTII,S$GLB,, | Performed by: PHYSICIAN ASSISTANT

## 2022-08-30 PROCEDURE — 3288F FALL RISK ASSESSMENT DOCD: CPT | Mod: CPTII,S$GLB,, | Performed by: PHYSICIAN ASSISTANT

## 2022-08-30 PROCEDURE — 1101F PR PT FALLS ASSESS DOC 0-1 FALLS W/OUT INJ PAST YR: ICD-10-PCS | Mod: CPTII,S$GLB,, | Performed by: PHYSICIAN ASSISTANT

## 2022-08-30 PROCEDURE — 1160F PR REVIEW ALL MEDS BY PRESCRIBER/CLIN PHARMACIST DOCUMENTED: ICD-10-PCS | Mod: CPTII,S$GLB,, | Performed by: PHYSICIAN ASSISTANT

## 2022-08-30 PROCEDURE — 1101F PT FALLS ASSESS-DOCD LE1/YR: CPT | Mod: CPTII,S$GLB,, | Performed by: PHYSICIAN ASSISTANT

## 2022-08-30 PROCEDURE — 1126F AMNT PAIN NOTED NONE PRSNT: CPT | Mod: CPTII,S$GLB,, | Performed by: PHYSICIAN ASSISTANT

## 2022-08-30 PROCEDURE — 1160F RVW MEDS BY RX/DR IN RCRD: CPT | Mod: CPTII,S$GLB,, | Performed by: PHYSICIAN ASSISTANT

## 2022-08-30 PROCEDURE — 1126F PR PAIN SEVERITY QUANTIFIED, NO PAIN PRESENT: ICD-10-PCS | Mod: CPTII,S$GLB,, | Performed by: PHYSICIAN ASSISTANT

## 2022-08-30 PROCEDURE — 99024 POSTOP FOLLOW-UP VISIT: CPT | Mod: S$GLB,,, | Performed by: PHYSICIAN ASSISTANT

## 2022-08-30 PROCEDURE — 1159F MED LIST DOCD IN RCRD: CPT | Mod: CPTII,S$GLB,, | Performed by: PHYSICIAN ASSISTANT

## 2022-08-30 PROCEDURE — 3288F PR FALLS RISK ASSESSMENT DOCUMENTED: ICD-10-PCS | Mod: CPTII,S$GLB,, | Performed by: PHYSICIAN ASSISTANT

## 2022-08-30 PROCEDURE — 99999 PR PBB SHADOW E&M-EST. PATIENT-LVL IV: ICD-10-PCS | Mod: PBBFAC,,, | Performed by: PHYSICIAN ASSISTANT

## 2022-08-30 PROCEDURE — 99999 PR PBB SHADOW E&M-EST. PATIENT-LVL IV: CPT | Mod: PBBFAC,,, | Performed by: PHYSICIAN ASSISTANT

## 2022-08-30 PROCEDURE — 99024 PR POST-OP FOLLOW-UP VISIT: ICD-10-PCS | Mod: S$GLB,,, | Performed by: PHYSICIAN ASSISTANT

## 2022-09-02 ENCOUNTER — CLINICAL SUPPORT (OUTPATIENT)
Dept: REHABILITATION | Facility: HOSPITAL | Age: 82
End: 2022-09-02
Payer: MEDICARE

## 2022-09-02 DIAGNOSIS — M66.20 TENDON RUPTURE, NONTRAUMATIC, EXTENSOR: ICD-10-CM

## 2022-09-02 PROCEDURE — L3808 WHFO, RIGID W/O JOINTS: HCPCS

## 2022-09-02 NOTE — PROGRESS NOTES
OCCUPATIONAL THERAPY --- ORTHOSIS  EVALUATION - FITTING - TRAINING     Patient Name: Sofía Mcwilliams  MRN: [id number]    Date: 9/2/2022  Physician: Dr. Prabhakar  Primary Diagnosis:   Tendon transfer EDC ring finger to EDC small finger.     2. Tendon transfer E IP (index finger) 2 EDQ (small finger)    3. Debridement distal ulna bone spur (cheilectomy).  sensation  Treatment Diagnosis:   1. Tendon rupture, nontraumatic, extensor  Ambulatory referral/consult to Physical/Occupational Therapy        (and resulting condition that requires a custom orthosis)     Start time: 10:00 AM  End Time: 11:00 AM  Total Time: 60    SUBJECTIVE:   HISTORY/OCCUPATIONAL PROFILE:   Patient is a 81 year old, Right hand who presents this day for orthotic fabrication/fitting/training.      Patient's chief complaint is not being able to do her daily activities     Occupation: Retired      Pain: 3/10    Date of Onset/Injury/Surgery: 8/16/2022      OBJECTIVE:     Observations:  zero tenderness and edema throughout Right hand       ASSESSMENT:   Type of custom fabricated Orthosis:   Static [facility]     L-code:     Purpose of orthosis:   To protect healing tendon repairs after surgery  For support of fracture/ligament/soft tissue injury during healing phase.     Initial OT Orthosis evaluation completed.  Fabricated custom volar  orthotic  with IPs free per MD orders for the purpose listed above.  Patient/caregiver were provided written instructions on orthosis purpose, wear schedule, care and precautions to monitor for increased pain/edema, pressure points, skin breakdown or redness/skin irritation.  Patient was IND in donning/doffing of orthosis while in clinic.  Patient/caregiver to contact clinic for adjustments as needed.  Patient may require skilled OT services for orthotic adjustments/modifications as needed.     Quality of Fit of orthotic fabricated:    Good Fit achieved  May require adjustments as needed to accommodate for reduction in edema,  wound healing, fracture healing    Rehab Potential: good    Short Term Goals (in 4 wks)  1) Patient to be IND with orthotic use,donning/doffing,  wear and care precautions and modalities for pain/edema management.     PLAN:    Recommend continued  Occupational therapy for 20 visits during the  Certification period of 9/2/2022  to 10/30/2022 in pursuit of OT goals and for orthosis modification/adjustments as needed.      Treatment to include orthotic fabrication/fitting/training, orthotic modification/adjustment as needed, HEP, instruction in modalities for pain/edema management,  and any other treatment deemed necessary.     I CERTIFY THE NEED FOR THESE SERVICES FURNISHED UNDER THIS PLAN OF TREATMENT AND WHILE UNDER MY CARE    Physician's comments: _____________________________________________________________________      Physician's Name: ___________________  Date ______________________________

## 2022-09-06 ENCOUNTER — CLINICAL SUPPORT (OUTPATIENT)
Dept: REHABILITATION | Facility: HOSPITAL | Age: 82
End: 2022-09-06
Payer: MEDICARE

## 2022-09-06 DIAGNOSIS — R60.0 LOCALIZED EDEMA: ICD-10-CM

## 2022-09-06 DIAGNOSIS — M25.60 DECREASED RANGE OF MOTION: ICD-10-CM

## 2022-09-06 PROCEDURE — 97165 OT EVAL LOW COMPLEX 30 MIN: CPT

## 2022-09-06 PROCEDURE — 97760 ORTHOTIC MGMT&TRAING 1ST ENC: CPT

## 2022-09-08 PROBLEM — M25.60 DECREASED RANGE OF MOTION: Status: ACTIVE | Noted: 2022-09-08

## 2022-09-08 PROBLEM — R60.0 LOCALIZED EDEMA: Status: ACTIVE | Noted: 2022-09-08

## 2022-09-08 NOTE — PLAN OF CARE
[facility]  Occupational Therapy Initial Evaluation    Date: 9/6/2022  Name: Sofía Mcwilliams  Clinic Number: [id number]    Therapy Diagnosis:   Encounter Diagnoses   Name Primary?    Decreased range of motion     Localized edema      Physician: Ramona Barrett PA-C    Physician Orders: Eval and treat;   Medical Diagnosis: M66.20 (ICD-10-CM) - Tendon rupture, nontraumatic, extensor  Surgical Procedure and Date:   1. Tendon transfer EDC ring finger to EDC small finger.  2. Tendon transfer E IP (index finger) 2 EDQ (small finger)  3. Debridement distal ulna bone spur (cheilectomy)., 8/16/2022 / Date of Injury/Onset: July 2022  Evaluation Date: 9/6/2022  Insurance Authorization Period Expiration: 11/6/2022  Plan of Care Expiration: 8 weeks; 11/4/2022  Progress Note Due: 4 weeks;    Date of Return to MD: TBD  Visit # / Visits authorized: 1 / 12  FOTO: initial eval     Precautions:  Standard and Weightbearing    Time In: 03:00 PM  Time Out: 04: 00 PM  Total Appointment Time (timed & untimed codes): 60 minutes      SUBJECTIVE     Date of Onset: July 2022     History of Current Condition/Mechanism of Injury: Sofía reports: She was driving one day and noticed she was not able extend her pinky.     Falls: 0    Involved Side: Right  Dominant Side: Right  Imaging: X-ray EXAMINATION:  XR WRIST 2 VIEW RIGHT     CLINICAL HISTORY:  Pain in right wrist     FINDINGS:  Wrist complete two views right: There is baseline DJD most severe at the 1st carpometacarpal joint.  There is ulnar negative variance no acute fracture dislocation bone destruction seen.     Impression:  No acute process seen.  Electronically signed by: Peter Mathew MD  Date:                                            07/25/2022    Prior Therapy: no  Occupation:  Retired  Working presently: unemployed  Duties:     Functional Limitations/Social History:    Previous functional status includes: Independent with all ADLs.     Current Functional  Status   Home/Living environment: lives with their spouse      Limitation of Functional Status as follows:   ADLs/IADLs:     - Feeding: unable to use R    - Bathing: unable to use R    - Dressing/Grooming: unable to use R    - Driving: unable to use R     Leisure: Gardening    Pain:  Functional Pain Scale Rating 0-10: Current 0/10, worst 0/10, best 0/10   Location: n/a      Patient's Goals for Therapy: Pt would like to get full function back in her R hand     Medical History:   Past Medical History:   Diagnosis Date    Anxiety 5/2/2016    Arthritis     Basal cell carcinoma 2013    left buttock    Cataract     Hypertension     Other and unspecified hyperlipidemia        Surgical History:    has a past surgical history that includes Hysterectomy; Appendectomy; Tonsillectomy; bunion; Colonoscopy (N/A, 9/28/2015); Injection of joint (Left, 1/9/2019); Injection of joint (Left, 3/13/2019); Joint replacement; hip surgery x 2; Colonoscopy (N/A, 3/16/2021); Transforaminal epidural injection of steroid (Left, 8/25/2021); Transforaminal epidural injection of steroid (Bilateral, 2/23/2022); Transfer of hand tendon (Right, 8/16/2022); Surgical removal of distal ulna (Right, 8/16/2022); and Surgical removal of bone spur (8/16/2022).    Medications:   has a current medication list which includes the following prescription(s): aspirin, atorvastatin, calcium carb/vit d3/minerals, coenzyme q10, lisinopril, losartan, meloxicam, multivitamin, ropinirole, ropinirole, vitamin b complex, and vitamin d.    Allergies:   Review of patient's allergies indicates:   Allergen Reactions    Celecoxib      Other reaction(s): Swelling    Sulfa (sulfonamide antibiotics)      Other reaction(s): Hives          OBJECTIVE     Observation/Appearance: 3 steri strips still present on dorsal wrist incision     Elbow and Wrist ROM. Measured in degrees.   9/8/2022 9/8/2022    Left Right   Elbow Ext/Flex     Supination/Pronation     Wrist Ext/Flex WNL WNL    Wrist RD/UD WNL  WNL      Hand ROM. Measured in degrees.   9/8/2022 9/8/2022    Left Right        Index: MP  WNL  To be measured next visit due to protocol               PIP                    DIP                BABB          Long:  MP                PIP                DIP                BABB          Ring:   MP                PIP                DIP                BABB          Small:  MP                 PIP                 DIP                BABB          Thumb: MP                  IP         Rad ADD/ABD         Pal ADD/ABD            Opposition        Strength (Dynamometer) and Pinch Strength (Pinch Gauge)  Measured in pounds.   9/8/2022 9/8/2022    Left Right   Rung II  Deferred    Key Pinch     3pt Pinch     2pt Pinch         Limitation/Restriction for FOTO initial eval; hand Survey    Therapist reviewed FOTO scores for Sofía Mcwilliams on 9/6/2022.   FOTO documents entered into [employer] - see Media section.    Limitation Score: needs to be taken %         Treatment   Total Treatment time (time-based codes) separate from Evaluation: 30 minutes    Sofía received the treatments listed below:     Orthotic fit and train:  25 minutes   - adjust splint for better fit     Therapeutic exercises to develop ROM for 5 minutes, including:  - hook grasp performed in splint x 10 reps (2 sets)   - active wave to 30 degrees MP flexion, passive extension return to neutral x 10 reps       Patient Education and Home Exercises      Education provided:   -     Written Home Exercises Provided: yes.  Exercises were reviewed and Sofía was able to demonstrate them prior to the end of the session.  Sofía demonstrated good  understanding of the education provided. See EMR under Patient Instructions for exercises provided during therapy sessions.     Pt was advised to perform these exercises free of pain, and to stop performing them if pain occurs.    Patient/Family Education: role of OT, goals for OT, scheduling/cancellations - pt verbalized  understanding. Discussed insurance limitations with patient.    ASSESSMENT     Sofía Mcwilliams is a 81 y.o. female referred to outpatient occupational therapy and presents with a medical diagnosis of tendon rupture, un traumatic .  Patient presents with the following therapy deficits: Decreased ROM, Decreased  strength, Decreased pinch strength, Decreased functional hand use, Edema, and Joint Stiffness and demonstrates limitations as described in the chart below. Following medical record review it is determined that pt will benefit from occupational therapy services in order to maximize pain free and/or functional use of right hand. The following goals were discussed with the patient and patient is in agreement with them as to be addressed in the treatment plan. The patient's rehab potential is Good.     Anticipated barriers to occupational therapy: n/a  Pt has no cultural, educational or language barriers to learning provided.    Profile and History Assessment of Occupational Performance Level of Clinical Decision Making Complexity Score   Occupational Profile:   Sofía Mcwilliams is a 81 y.o. female who lives with their spouse and is retired Sofía Mcwilliams has difficulty with  ADLs and IADLs as listed previously, which  Affecting herdaily functional abilities.      Comorbidities:    has a past medical history of Anxiety, Arthritis, Basal cell carcinoma, Cataract, Hypertension, and Other and unspecified hyperlipidemia.    Medical and Therapy History Review:   Brief               Performance Deficits    Physical:  Muscle Power/Strength  Muscle Endurance  Skin Integrity/Scar Formation  Edema   Strength  Pinch Strength    Cognitive:  No Deficits    Psychosocial:    Habits  Routines  Rituals     Clinical Decision Making:  low    Assessment Process:  Detailed Assessments    Modification/Need for Assistance:  Not Necessary    Intervention Selection:  Several Treatment Options       low  Based on PMHX, co morbidities , data  from assessments and functional level of assistance required with task and clinical presentation directly impacting function.         Goals:   The following goals were discussed with the patient and patient is in agreement with them as to be addressed in the treatment plan.   Long Term Goals (LTGs); to be met by discharge.  LTG #1: Pt will report a pain level of 0 out of 10 with functional use of R hand   LTG #2: Pt will demo improved FOTO score by 20 points.   LTG #3: Pt will return to prior level of function for ADLs and household management.     Short Term Goals (STGs); to be met within 4 weeks (10/7/2022).  STG #1: Pt will report 1-2 out of 10 pain level with functional use of R hand .  STG #2: Pt will report/demo Modified [place] with self care tasks.  STG #3: Pt will report/demo Modified [place] with feeding.  STG #4: Pt will demonstrate independence with issued HEP.   STG #5: Pt will demo improved SF BABB by 20 degrees needed to aid with composite fist .  STG #6: TAKE FOTO     PLAN   Plan of Care Certification: 9/6/2022 to 11/4/2022.     Outpatient Occupational Therapy 2 times weekly for 8 weeks to include the following interventions: Paraffin, Fluidotherapy, Manual therapy/joint mobilizations, Modalities for pain management, US 3 mhz, Therapeutic exercises/activities., Strengthening, Orthotic Fabrication/Fit/Training, Edema Control, Scar Management, and Joint Protection.      Hannah Coon OT      I CERTIFY THE NEED FOR THESE SERVICES FURNISHED UNDER THIS PLAN OF TREATMENT AND WHILE UNDER MY CARE  Physician's comments:      Physician's Signature: ___________________________________________________

## 2022-09-09 ENCOUNTER — CLINICAL SUPPORT (OUTPATIENT)
Dept: REHABILITATION | Facility: HOSPITAL | Age: 82
End: 2022-09-09
Payer: MEDICARE

## 2022-09-09 DIAGNOSIS — R60.0 LOCALIZED EDEMA: ICD-10-CM

## 2022-09-09 DIAGNOSIS — M25.60 DECREASED RANGE OF MOTION: Primary | ICD-10-CM

## 2022-09-09 PROCEDURE — 97110 THERAPEUTIC EXERCISES: CPT

## 2022-09-09 PROCEDURE — 97140 MANUAL THERAPY 1/> REGIONS: CPT

## 2022-09-09 NOTE — PROGRESS NOTES
"  [facility]  Occupational Therapy Treatment Note    Date: 9/9/2022  Name: Sofía Mcwilliams  Clinic Number: [id number]    Therapy Diagnosis: No diagnosis found.  Physician: Ramona Barrett PA-C       Physician Orders: Eval and treat;   Medical Diagnosis: M66.20 (ICD-10-CM) - Tendon rupture, nontraumatic, extensor  Surgical Procedure and Date:   1. Tendon transfer EDC ring finger to EDC small finger.  2. Tendon transfer E IP (index finger) 2 EDQ (small finger)  3. Debridement distal ulna bone spur (cheilectomy)., 8/16/2022 / Date of Injury/Onset: July 2022  Evaluation Date: 9/6/2022  Insurance Authorization Period Expiration: 11/6/2022  Plan of Care Expiration: 8 weeks; 11/4/2022  Progress Note Due: 4 weeks;    Date of Return to MD: TBD  Visit # / Visits authorized: 1 / 12  FOTO: initial eval      Precautions:  Standard and Weightbearing     Time In: *** PM  Time Out: *** PM  Total Appointment Time (timed & untimed codes): *** minutes         SUBJECTIVE     Pt reports: ***  She {Actions; was/was not:18987} compliant with home exercise program given last session.   Response to previous treatment:***  Functional change: ***    Pain: {0-10:09126::"0"}/10  Location: {RIGHT/LEFT/BILATERAL:56486} {LOCATION ON BODY:92612}     OBJECTIVE   Objective Measures updated at progress report unless specified.    Elbow and Wrist ROM. Measured in degrees.    9/8/2022 9/8/2022     Left Right   Elbow Ext/Flex       Supination/Pronation       Wrist Ext/Flex WNL WNL   Wrist RD/UD WNL  WNL       Hand ROM. Measured in degrees.    9/8/2022 9/8/2022     Left Right           Index: MP  WNL  To be measured next visit due to protocol               PIP                      DIP                  BABB               Long:  MP                  PIP                  DIP                  BABB               Ring:   MP                  PIP                  DIP                  BABB               Small:  MP                   PIP         " "          DIP                  BABB               Thumb: MP                    IP           Rad ADD/ABD           Pal ADD/ABD              Opposition           Strength (Dynamometer) and Pinch Strength (Pinch Gauge)  Measured in pounds.    9/8/2022 9/8/2022     Left Right   Rung II   Deferred    Key Pinch       3pt Pinch       2pt Pinch             Limitation/Restriction for FOTO initial eval; hand Survey     Therapist reviewed FOTO scores for Sofía Mcwilliams on 9/6/2022.   FOTO documents entered into [employer] - see Media section.     Limitation Score: needs to be taken %           Treatment     Sofía received the treatments listed below:     Supervised modalities after being cleared for contradictions: {AMB OT SUPERVISED MODES:03064}      Manual therapy techniques: {AMB PT PROGRESS MANUAL THERAPY:65705} were applied to the: *** for *** minutes, including:  ***    Therapeutic exercises to develop ROM for 5 minutes, including:  - hook grasp performed in splint x 10 reps (2 sets)   - active wave to 30 degrees MP flexion, passive extension return to neutral x 10 reps   - bullhorn kicks         Patient Education and Home Exercises      Education provided:   - ***  - Progress towards goals     Written Home Exercises Provided: {Blank single:69728::"yes","Patient instructed to cont prior HEP"}.  Exercises were reviewed and Sofía was able to demonstrate them prior to the end of the session.  Sofía demonstrated {Desc; good/fair/poor:62473} understanding of the HEP provided. See EMR under Patient Instructions for exercises provided during therapy sessions.      ASSESSMENT     Pt would continue to benefit from skilled OT. ***     Sofía {IS / IS NOT:86755} progressing well towards her goals and {update/no update to therapy goals:69813}. Pt prognosis is {REHAB PROGNOSIS OHS:57469}.     Pt will continue to benefit from skilled outpatient occupational therapy to address the deficits listed in the problem list on initial evaluation, " provide pt/family education and to maximize pt's level of independence in the home and community environment.     Pt's spiritual, cultural and educational needs considered and pt agreeable to plan of care and goals.    Anticipated barriers to occupational therapy: ***    Goals:   The following goals were discussed with the patient and patient is in agreement with them as to be addressed in the treatment plan.   Long Term Goals (LTGs); to be met by discharge.  LTG #1: Pt will report a pain level of 0 out of 10 with functional use of R hand          LTG #2: Pt will demo improved FOTO score by 20 points.   LTG #3: Pt will return to prior level of function for ADLs and household management.      Short Term Goals (STGs); to be met within 4 weeks (10/7/2022).  STG #1: Pt will report 1-2 out of 10 pain level with functional use of R hand .  STG #2: Pt will report/demo Modified [place] with self care tasks.  STG #3: Pt will report/demo Modified [place] with feeding.  STG #4: Pt will demonstrate independence with issued HEP.   STG #5: Pt will demo improved SF BABB by 20 degrees needed to aid with composite fist .  STG #6: TAKE FOTO     PLAN     Continue skilled occupational therapy with individualized plan of care focusing on ***    Updates/Grading for next session: ***    NUNU Rodriguez/CL[place]

## 2022-09-09 NOTE — PROGRESS NOTES
[facility]  Occupational Therapy Treatment Note    Date: 9/9/2022  Name: Sofía Mcwilliams  Clinic Number: [id number]    Therapy Diagnosis:   Encounter Diagnoses   Name Primary?    Decreased range of motion Yes    Localized edema      Physician: Ramona Barrett PA-C     Physician Orders: Eval and treat;   Medical Diagnosis: M66.20 (ICD-10-CM) - Tendon rupture, nontraumatic, extensor  Surgical Procedure and Date:   1. Tendon transfer EDC ring finger to EDC small finger.  2. Tendon transfer E IP (index finger) 2 EDQ (small finger)  3. Debridement distal ulna bone spur (cheilectomy)., 8/16/2022 / Date of Injury/Onset: July 2022  Evaluation Date: 9/6/2022  Insurance Authorization Period Expiration: 11/6/2022  Plan of Care Expiration: 8 weeks; 11/4/2022  Progress Note Due: 4 weeks;    Date of Return to MD: TBD  Visit # / Visits authorized: 1 / 12  FOTO: initial eval      Precautions:  Standard and Weightbearing     Time In: 03:00 PM  Time Out: 04: 45 PM  Total Appointment Time (timed & untimed codes): 45 minutes  Total Billable Time: 45 minutes    3 weeks 3 days (9/9/2022)   SUBJECTIVE     Pt reports: no pain  She was compliant with home exercise program given last session.   Response to previous treatment:first tx  Functional change:     Pain: 0/10  Location: right hands      OBJECTIVE   Objective Measures updated at progress report unless specified.    Elbow and Wrist ROM. Measured in degrees.    9/8/2022 9/8/2022     Left Right   Elbow Ext/Flex       Supination/Pronation       Wrist Ext/Flex WNL WNL   Wrist RD/UD WNL  WNL       Hand ROM. Measured in degrees.    9/8/2022 9/8/2022     Left Right           Index: MP  WNL                PIP                      DIP                  BABB               Small:  MP WNL    18               PIP    30               DIP    29              BABB                   Strength (Dynamometer) and Pinch Strength (Pinch Gauge)  Measured in pounds.    9/8/2022  9/8/2022     Left Right   Rung II   Deferred    Key Pinch       3pt Pinch       2pt Pinch             Treatment     Sofía received the treatments listed below:     Supervised modalities after being cleared for contradictions: Hot Pack - 10 minutes     Manual therapy techniques: scar tissue mobilization were applied to the: dorsal wrist  for 10 minutes, including:  - retrograde massage   - scar tissue mobilization     Therapeutic exercises to develop ROM for 25 minutes, including:  - active MP flexion, IP extension + passive extension x 10 reps (3 sets)   - hook grasp x 10 reps (3 sets)   - IF and SF simultaneous PIP ext x 10 reps (2 sets)     *issued edema sleeve size C   Patient Education and Home Exercises      Education provided:   - do not use R hand for functional use, wear splint 24/7   - Progress towards goals     Written Home Exercises Provided: yes.  Exercises were reviewed and Sofía was able to demonstrate them prior to the end of the session.  Sofía demonstrated good  understanding of the HEP provided. See EMR under Patient Instructions for exercises provided during therapy sessions.      ASSESSMENT     Pt would continue to benefit from skilled OT. Good liza with exercises. Slight MP extension lag in SF.      Sofía is progressing well towards her goals and there are no updates to goals at this time. Pt prognosis is Good.     Pt will continue to benefit from skilled outpatient occupational therapy to address the deficits listed in the problem list on initial evaluation, provide pt/family education and to maximize pt's level of independence in the home and community environment.     Pt's spiritual, cultural and educational needs considered and pt agreeable to plan of care and goals.    Anticipated barriers to occupational therapy:     Goals:  Long Term Goals (LTGs); to be met by discharge.  LTG #1: Pt will report a pain level of 0 out of 10 with functional use of R hand          LTG #2: Pt will demo improved  FOTO score by 20 points.   LTG #3: Pt will return to prior level of function for ADLs and household management.      Short Term Goals (STGs); to be met within 4 weeks (10/7/2022).  STG #1: Pt will report 1-2 out of 10 pain level with functional use of R hand .  STG #2: Pt will report/demo Modified [place] with self care tasks.  STG #3: Pt will report/demo Modified [place] with feeding.  STG #4: Pt will demonstrate independence with issued HEP.   STG #5: Pt will demo improved SF BABB by 20 degrees needed to aid with composite fist .  STG #6: TAKE FOTO    PLAN     Continue skilled occupational therapy with individualized plan of care focusing on improving functional independence with use of R hand     Updates/Grading for next session:     Hannah Coon OT

## 2022-09-13 ENCOUNTER — CLINICAL SUPPORT (OUTPATIENT)
Dept: REHABILITATION | Facility: HOSPITAL | Age: 82
End: 2022-09-13
Payer: MEDICARE

## 2022-09-13 DIAGNOSIS — M25.60 DECREASED RANGE OF MOTION: Primary | ICD-10-CM

## 2022-09-13 DIAGNOSIS — R60.0 LOCALIZED EDEMA: ICD-10-CM

## 2022-09-13 PROCEDURE — 97110 THERAPEUTIC EXERCISES: CPT

## 2022-09-13 PROCEDURE — 97140 MANUAL THERAPY 1/> REGIONS: CPT

## 2022-09-15 ENCOUNTER — CLINICAL SUPPORT (OUTPATIENT)
Dept: REHABILITATION | Facility: HOSPITAL | Age: 82
End: 2022-09-15
Payer: MEDICARE

## 2022-09-15 DIAGNOSIS — R60.0 LOCALIZED EDEMA: ICD-10-CM

## 2022-09-15 DIAGNOSIS — M25.60 DECREASED RANGE OF MOTION: Primary | ICD-10-CM

## 2022-09-15 PROCEDURE — 97140 MANUAL THERAPY 1/> REGIONS: CPT

## 2022-09-15 PROCEDURE — 97110 THERAPEUTIC EXERCISES: CPT

## 2022-09-15 NOTE — PROGRESS NOTES
[facility]  Occupational Therapy Treatment Note    Date: 9/15/2022  Name: Sofía Mcwilliams  Clinic Number: [id number]    Therapy Diagnosis:   Encounter Diagnoses   Name Primary?    Decreased range of motion Yes    Localized edema        Physician: Ramona Barrett PA-C     Physician Orders: Eval and treat;   Medical Diagnosis: M66.20 (ICD-10-CM) - Tendon rupture, nontraumatic, extensor  Surgical Procedure and Date:   1. Tendon transfer EDC ring finger to EDC small finger.  2. Tendon transfer E IP (index finger) 2 EDQ (small finger)  3. Debridement distal ulna bone spur (cheilectomy)., 8/16/2022 / Date of Injury/Onset: July 2022  Evaluation Date: 9/6/2022  Insurance Authorization Period Expiration: 11/6/2022  Plan of Care Expiration: 8 weeks; 11/4/2022  Progress Note Due: 4 weeks;    Date of Return to MD: TBD  Visit # / Visits authorized: 4 / 12  FOTO: initial eval      Precautions:  Standard and Weightbearing     Time In:  08:03  AM  Time Out: 08:50AM  Total Appointment Time (timed & untimed codes): 47 minutes  Total Billable Time: 45 minutes    4 weeks 2 days (9/15 /2022)   SUBJECTIVE     Pt reports: no pain  She was compliant with home exercise program given last session.   Response to previous treatment:  Functional change:     Pain: 0/10  Location: right hands      OBJECTIVE   Objective Measures updated at progress report unless specified.    Elbow and Wrist ROM. Measured in degrees.    9/8/2022 9/8/2022     Left Right   Elbow Ext/Flex       Supination/Pronation       Wrist Ext/Flex WNL WNL   Wrist RD/UD WNL  WNL       Hand ROM. Measured in degrees.    9/8/2022 9/8/2022     Left Right           Index: MP  WNL                PIP                      DIP                  BABB               Small:  MP WNL    18               PIP    30               DIP    29              BABB                   Strength (Dynamometer) and Pinch Strength (Pinch Gauge)  Measured in pounds.    9/8/2022  9/8/2022     Left Right   Rung II   Deferred    Key Pinch       3pt Pinch       2pt Pinch             Treatment     Sofía received the treatments listed below:     Supervised modalities after being cleared for contradictions: Hot Pack with paraffin bath - 10 minutes     Manual therapy techniques: scar tissue mobilization were applied to the: dorsal wrist  for 10 minutes, including:  - retrograde massage   - scar tissue mobilization     Therapeutic exercises to develop ROM for 27 minutes, including:  - active MP flexion, IP extension + passive extension x 10 reps (3 sets)   - hook grasp x 10 reps (3 sets)   - IF and SF simultaneous PIP ext x 10 reps (2 sets)   - wrist flex/ext x 10 reps (2 sets) digits extended   - EDC extension (all digits together) x 10 reps (3 sets) throughout session       Patient Education and Home Exercises      Education provided:   - do not use R hand for functional use, wear splint 24/7   - Progress towards goals     Written Home Exercises Provided: yes.  Exercises were reviewed and Sofía was able to demonstrate them prior to the end of the session.  Sofía demonstrated good  understanding of the HEP provided. See EMR under Patient Instructions for exercises provided during therapy sessions.      ASSESSMENT     Pt would continue to benefit from skilled OT. Good liza with exercises. Mod difficulty with EDM- improved extension when using all digits.     Sofía is progressing well towards her goals and there are no updates to goals at this time. Pt prognosis is Good.     Pt will continue to benefit from skilled outpatient occupational therapy to address the deficits listed in the problem list on initial evaluation, provide pt/family education and to maximize pt's level of independence in the home and community environment.     Pt's spiritual, cultural and educational needs considered and pt agreeable to plan of care and goals.    Anticipated barriers to occupational therapy:     Goals:  Long Term  Goals (LTGs); to be met by discharge.  LTG #1: Pt will report a pain level of 0 out of 10 with functional use of R hand          LTG #2: Pt will demo improved FOTO score by 20 points.   LTG #3: Pt will return to prior level of function for ADLs and household management.      Short Term Goals (STGs); to be met within 4 weeks (10/7/2022).  STG #1: Pt will report 1-2 out of 10 pain level with functional use of R hand .  STG #2: Pt will report/demo Modified [place] with self care tasks.  STG #3: Pt will report/demo Modified [place] with feeding.  STG #4: Pt will demonstrate independence with issued HEP.   STG #5: Pt will demo improved SF BABB by 20 degrees needed to aid with composite fist .  STG #6: TAKE FOTO    PLAN     Continue skilled occupational therapy with individualized plan of care focusing on improving functional independence with use of R hand     Updates/Grading for next session:     Hannah Coon, OT

## 2022-09-19 NOTE — PROGRESS NOTES
[facility]  Occupational Therapy Treatment Note    Date: 9/13/2022  Name: Sofía Mcwilliams  Clinic Number: [id number]    Therapy Diagnosis:   Encounter Diagnoses   Name Primary?    Decreased range of motion Yes    Localized edema        Physician: Ramona Barrett PA-C     Physician Orders: Eval and treat;   Medical Diagnosis: M66.20 (ICD-10-CM) - Tendon rupture, nontraumatic, extensor  Surgical Procedure and Date:   1. Tendon transfer EDC ring finger to EDC small finger.  2. Tendon transfer E IP (index finger) 2 EDQ (small finger)  3. Debridement distal ulna bone spur (cheilectomy)., 8/16/2022 / Date of Injury/Onset: July 2022  Evaluation Date: 9/6/2022  Insurance Authorization Period Expiration: 11/6/2022  Plan of Care Expiration: 8 weeks; 11/4/2022  Progress Note Due: 4 weeks;    Date of Return to MD: TBD  Visit # / Visits authorized: 4 / 12  FOTO: initial eval      Precautions:  Standard and Weightbearing     Time In: 09:30 AM  Time Out: 10:20 AM  Total Appointment Time (timed & untimed codes): 50 minutes  Total Billable Time: 45 minutes    3 weeks 3 days (9/9/2022)   SUBJECTIVE     Pt reports: no pain  She was compliant with home exercise program given last session.   Response to previous treatment: increased range of motion   Functional change:     Pain: 0/10  Location: right hands      OBJECTIVE   Objective Measures updated at progress report unless specified.    Elbow and Wrist ROM. Measured in degrees.    9/8/2022 9/8/2022     Left Right   Elbow Ext/Flex       Supination/Pronation       Wrist Ext/Flex WNL WNL   Wrist RD/UD WNL  WNL       Hand ROM. Measured in degrees.    9/8/2022 9/8/2022     Left Right           Index: MP  WNL                PIP                      DIP                  BABB               Small:  MP WNL    18               PIP    30               DIP    29              BABB                   Strength (Dynamometer) and Pinch Strength (Pinch Gauge)  Measured in  pounds.    9/8/2022 9/8/2022     Left Right   Rung II   Deferred    Key Pinch       3pt Pinch       2pt Pinch             Treatment     Sofía received the treatments listed below:     Supervised modalities after being cleared for contradictions: Hot Pack with paraffin bath - 10 minutes       Manual therapy techniques: scar tissue mobilization were applied to the: dorsal wrist  for 10 minutes, including:  - retrograde massage   - scar tissue mobilization       Therapeutic exercises to develop ROM for 25 minutes, including:  - active MP flexion, IP extension + passive extension x 10 reps (3 sets)   - hook grasp x 10 reps (3 sets)   - IF and SF simultaneous PIP ext x 10 reps (2 sets)   - gentle wrist ROM flex/ext/RD/UD with digits EXTENDED x 10 reps each direction   - digit extension (all digits) x 10 reps   - active waves x 10 reps (2 sets)       *issued edema sleeve size C   Patient Education and Home Exercises      Education provided:   - do not use R hand for functional use, wear splint 24/7   - add wrist ROM to HEP   - Progress towards goals     Written Home Exercises Provided: yes.  Exercises were reviewed and Sofía was able to demonstrate them prior to the end of the session.  Sofía demonstrated good  understanding of the HEP provided. See EMR under Patient Instructions for exercises provided during therapy sessions.      ASSESSMENT     Pt would continue to benefit from skilled OT. Good liza with exercises. Mod difficulty with full digit extension in SF - however improved following tactile cue.     Sofía is progressing well towards her goals and there are no updates to goals at this time. Pt prognosis is Good.     Pt will continue to benefit from skilled outpatient occupational therapy to address the deficits listed in the problem list on initial evaluation, provide pt/family education and to maximize pt's level of independence in the home and community environment.     Pt's spiritual, cultural and educational  needs considered and pt agreeable to plan of care and goals.    Anticipated barriers to occupational therapy:     Goals:  Long Term Goals (LTGs); to be met by discharge.  LTG #1: Pt will report a pain level of 0 out of 10 with functional use of R hand          LTG #2: Pt will demo improved FOTO score by 20 points.   LTG #3: Pt will return to prior level of function for ADLs and household management.      Short Term Goals (STGs); to be met within 4 weeks (10/7/2022).  STG #1: Pt will report 1-2 out of 10 pain level with functional use of R hand .  STG #2: Pt will report/demo Modified [place] with self care tasks.  STG #3: Pt will report/demo Modified [place] with feeding.  STG #4: Pt will demonstrate independence with issued HEP.   STG #5: Pt will demo improved SF BABB by 20 degrees needed to aid with composite fist .  STG #6: TAKE FOTO    PLAN     Continue skilled occupational therapy with individualized plan of care focusing on improving functional independence with use of R hand     Updates/Grading for next session:     Hannah Coon, OT

## 2022-09-20 ENCOUNTER — CLINICAL SUPPORT (OUTPATIENT)
Dept: REHABILITATION | Facility: HOSPITAL | Age: 82
End: 2022-09-20
Payer: MEDICARE

## 2022-09-20 DIAGNOSIS — R60.0 LOCALIZED EDEMA: ICD-10-CM

## 2022-09-20 DIAGNOSIS — M25.60 DECREASED RANGE OF MOTION: Primary | ICD-10-CM

## 2022-09-20 PROCEDURE — 97110 THERAPEUTIC EXERCISES: CPT | Mod: CO

## 2022-09-20 NOTE — PROGRESS NOTES
"      [facility]  Occupational Therapy Treatment Note    Date: 9/20/2022  Name: Sofía Mcwilliams  Clinic Number: [id number]    Therapy Diagnosis:   Encounter Diagnoses   Name Primary?    Decreased range of motion Yes    Localized edema          Physician: Ramona Barrett PA-C     Physician Orders: Eval and treat;   Medical Diagnosis: M66.20 (ICD-10-CM) - Tendon rupture, nontraumatic, extensor  Surgical Procedure and Date:   1. Tendon transfer EDC ring finger to EDC small finger.  2. Tendon transfer E IP (index finger) 2 EDQ (small finger)  3. Debridement distal ulna bone spur (cheilectomy)., 8/16/2022 / Date of Injury/Onset: July 2022  Evaluation Date: 9/6/2022  Insurance Authorization Period Expiration: 11/6/2022  Plan of Care Expiration: 8 weeks; 11/4/2022  Progress Note Due: 4 weeks;    Date of Return to MD: TBD  Visit # / Visits authorized: 5 / 12  FOTO: initial eval      Precautions:  Standard and Weightbearing     Time In:  8:53  AM  Time Out: 9:43 AM  Total Appointment Time (timed & untimed codes): 50 minutes  Total Billable Time: 40 minutes    5 weeks 0 days (9/20/2022)   SUBJECTIVE     Pt reports: "It feels fine, I just can't do the things I want to do, like cook and garden"  She was compliant with home exercise program given last session.   Response to previous treatment: good   Functional change: none     Pain: 0/10  Location: right hands      OBJECTIVE   Objective Measures updated at progress report unless specified.    Elbow and Wrist ROM. Measured in degrees.    9/8/2022 9/8/2022     Left Right   Elbow Ext/Flex       Supination/Pronation       Wrist Ext/Flex WNL WNL   Wrist RD/UD WNL  WNL       Hand ROM. Measured in degrees.    9/8/2022 9/8/2022     Left Right           Index: MP  WNL                PIP                      DIP                  BABB               Small:  MP WNL    18               PIP    30               DIP    29              BABB                   Strength " (Dynamometer) and Pinch Strength (Pinch Gauge)  Measured in pounds.    9/8/2022 9/8/2022     Left Right   Rung II   Deferred    Key Pinch       3pt Pinch       2pt Pinch             Treatment     Sofía received the treatments listed below:     Supervised modalities after being cleared for contradictions: Hot Pack - 10 minutes     Manual therapy techniques: scar tissue mobilization were applied to the: dorsal wrist  for 10 minutes, including:  - retrograde massage   - scar tissue mobilization       Therapeutic exercises to develop ROM for 30 minutes, including:   - waves x10  - hook grasp x 10 reps (3 sets)   - IF and SF simultaneous PIP ext x 10 reps (2 sets)   - wrist flex/ext x 10 reps (2 sets)   - short arc comp fist, wrist in slight ext  - tenodesis  - isospheres on table x 2 min   *adjustments made to orthosis for incr comfort and decr irritation on thumb     Patient Education and Home Exercises      Education provided:   - do not use R hand for functional use, wear splint 24/7   - Progress towards goals     Written Home Exercises Provided: yes.  Exercises were reviewed and Sofía was able to demonstrate them prior to the end of the session.  Sofía demonstrated good  understanding of the HEP provided. See EMR under Patient Instructions for exercises provided during therapy sessions.      ASSESSMENT     Pt would continue to benefit from skilled OT. Added active ext exercises and good tolerance with no pain. Continues with slight MP extension lag in SF.      Sofía is progressing well towards her goals and there are no updates to goals at this time. Pt prognosis is Good.     Pt will continue to benefit from skilled outpatient occupational therapy to address the deficits listed in the problem list on initial evaluation, provide pt/family education and to maximize pt's level of independence in the home and community environment.     Pt's spiritual, cultural and educational needs considered and pt agreeable to plan of  care and goals.    Anticipated barriers to occupational therapy: n/a    Goals:  Long Term Goals (LTGs); to be met by discharge.  LTG #1: Pt will report a pain level of 0 out of 10 with functional use of R hand          LTG #2: Pt will demo improved FOTO score by 20 points.   LTG #3: Pt will return to prior level of function for ADLs and household management.      Short Term Goals (STGs); to be met within 4 weeks (10/7/2022).  STG #1: Pt will report 1-2 out of 10 pain level with functional use of R hand .  STG #2: Pt will report/demo Modified [place] with self care tasks.  STG #3: Pt will report/demo Modified [place] with feeding.  STG #4: Pt will demonstrate independence with issued HEP.   STG #5: Pt will demo improved SF BABB by 20 degrees needed to aid with composite fist .  STG #6: TAKE FOTO    PLAN     Continue skilled occupational therapy with individualized plan of care focusing on improving functional independence with use of R hand     Updates/Grading for next session: continue with protocol as tolerated     NUNU Rodriguez/CL[place]

## 2022-09-22 ENCOUNTER — CLINICAL SUPPORT (OUTPATIENT)
Dept: REHABILITATION | Facility: HOSPITAL | Age: 82
End: 2022-09-22
Payer: MEDICARE

## 2022-09-22 DIAGNOSIS — R60.0 LOCALIZED EDEMA: ICD-10-CM

## 2022-09-22 DIAGNOSIS — M25.60 DECREASED RANGE OF MOTION: Primary | ICD-10-CM

## 2022-09-22 PROCEDURE — 97110 THERAPEUTIC EXERCISES: CPT | Mod: CO

## 2022-09-22 PROCEDURE — 97022 WHIRLPOOL THERAPY: CPT | Mod: CO

## 2022-09-22 PROCEDURE — 97140 MANUAL THERAPY 1/> REGIONS: CPT | Mod: CO

## 2022-09-22 NOTE — PROGRESS NOTES
"      [facility]  Occupational Therapy Treatment Note    Date: 9/22/2022  Name: Sofía Mcwilliams  Clinic Number: [id number]    Therapy Diagnosis:   Encounter Diagnoses   Name Primary?    Decreased range of motion Yes    Localized edema            Physician: Ramona Barrett PA-C     Physician Orders: Eval and treat;   Medical Diagnosis: M66.20 (ICD-10-CM) - Tendon rupture, nontraumatic, extensor  Surgical Procedure and Date:   1. Tendon transfer EDC ring finger to EDC small finger.  2. Tendon transfer E IP (index finger) 2 EDQ (small finger)  3. Debridement distal ulna bone spur (cheilectomy)., 8/16/2022 / Date of Injury/Onset: July 2022  Evaluation Date: 9/6/2022  Insurance Authorization Period Expiration: 11/6/2022  Plan of Care Expiration: 8 weeks; 11/4/2022  Progress Note Due: 4 weeks;    Date of Return to MD: TBD  Visit # / Visits authorized: 6 / 12  FOTO: initial eval      Precautions:  Standard and Weightbearing     Time In:  8:26  AM  Time Out: 9:20 AM  Total Appointment Time (timed & untimed codes): 54 minutes  Total Billable Time: 54 minutes    5 weeks 0 days (9/20/2022)   SUBJECTIVE     Pt reports: "The splint is still bothering me."   She was compliant with home exercise program given last session.   Response to previous treatment: good   Functional change: none     Pain: 0/10  Location: right hands      OBJECTIVE   Objective Measures updated at progress report unless specified.    Elbow and Wrist ROM. Measured in degrees.    9/8/2022 9/8/2022     Left Right   Elbow Ext/Flex       Supination/Pronation       Wrist Ext/Flex WNL WNL   Wrist RD/UD WNL  WNL       Hand ROM. Measured in degrees.    9/8/2022 9/8/2022     Left Right           Index: MP  WNL                PIP                      DIP                  BABB               Small:  MP WNL    18               PIP    30               DIP    29              BABB                   Strength (Dynamometer) and Pinch Strength (Pinch " Gauge)  Measured in pounds.    9/8/2022 9/8/2022     Left Right   Rung II   Deferred    Key Pinch       3pt Pinch       2pt Pinch             Treatment     Sofía received the treatments listed below:     Supervised modalities after being cleared for contradictions: Hot Pack - 10 minutes     Manual therapy techniques: scar tissue mobilization were applied to the: dorsal wrist  for 10 minutes, including:  - retrograde massage   - scar tissue mobilization       Therapeutic exercises to develop ROM for 34 minutes, including:   - waves x10  - hook grasp x 10 reps (3 sets)   - IF and SF simultaneous PIP ext x 10 reps (2 sets)   - wrist flex/ext x 10 reps (2 sets)   - short arc comp fist, wrist in slight ext  - tenodesis  - isospheres on table x 2 min   *adjustments made to orthosis for incr comfort and decr irritation on thumb     Patient Education and Home Exercises      Education provided:   - do not use R hand for functional use, wear splint 24/7   - Progress towards goals     Written Home Exercises Provided: yes.  Exercises were reviewed and Sofía was able to demonstrate them prior to the end of the session.  Sofía demonstrated good  understanding of the HEP provided. See EMR under Patient Instructions for exercises provided during therapy sessions.      ASSESSMENT     Pt would continue to benefit from skilled OT. Good tolerance to tx with no pain for exercises.    Sofía is progressing well towards her goals and there are no updates to goals at this time. Pt prognosis is Good.     Pt will continue to benefit from skilled outpatient occupational therapy to address the deficits listed in the problem list on initial evaluation, provide pt/family education and to maximize pt's level of independence in the home and community environment.     Pt's spiritual, cultural and educational needs considered and pt agreeable to plan of care and goals.    Anticipated barriers to occupational therapy: n/a    Goals:  Long Term Goals  (LTGs); to be met by discharge.  LTG #1: Pt will report a pain level of 0 out of 10 with functional use of R hand          LTG #2: Pt will demo improved FOTO score by 20 points.   LTG #3: Pt will return to prior level of function for ADLs and household management.      Short Term Goals (STGs); to be met within 4 weeks (10/7/2022).  STG #1: Pt will report 1-2 out of 10 pain level with functional use of R hand .  STG #2: Pt will report/demo Modified [place] with self care tasks.  STG #3: Pt will report/demo Modified [place] with feeding.  STG #4: Pt will demonstrate independence with issued HEP.   STG #5: Pt will demo improved SF BABB by 20 degrees needed to aid with composite fist .  STG #6: TAKE FOTO    PLAN     Continue skilled occupational therapy with individualized plan of care focusing on improving functional independence with use of R hand     Updates/Grading for next session: continue with protocol as tolerated     NUNU Rodriguez/CLINTON

## 2022-09-27 ENCOUNTER — OFFICE VISIT (OUTPATIENT)
Dept: ORTHOPEDICS | Facility: CLINIC | Age: 82
End: 2022-09-27
Payer: MEDICARE

## 2022-09-27 VITALS — HEIGHT: 64 IN | BODY MASS INDEX: 24.41 KG/M2 | WEIGHT: 143 LBS

## 2022-09-27 DIAGNOSIS — M66.20 TENDON RUPTURE, NONTRAUMATIC, EXTENSOR: Primary | ICD-10-CM

## 2022-09-27 PROCEDURE — 1159F MED LIST DOCD IN RCRD: CPT | Mod: CPTII,S$GLB,, | Performed by: ORTHOPAEDIC SURGERY

## 2022-09-27 PROCEDURE — 99024 PR POST-OP FOLLOW-UP VISIT: ICD-10-PCS | Mod: S$GLB,,, | Performed by: ORTHOPAEDIC SURGERY

## 2022-09-27 PROCEDURE — 99999 PR PBB SHADOW E&M-EST. PATIENT-LVL III: ICD-10-PCS | Mod: PBBFAC,,, | Performed by: ORTHOPAEDIC SURGERY

## 2022-09-27 PROCEDURE — 1159F PR MEDICATION LIST DOCUMENTED IN MEDICAL RECORD: ICD-10-PCS | Mod: CPTII,S$GLB,, | Performed by: ORTHOPAEDIC SURGERY

## 2022-09-27 PROCEDURE — 99999 PR PBB SHADOW E&M-EST. PATIENT-LVL III: CPT | Mod: PBBFAC,,, | Performed by: ORTHOPAEDIC SURGERY

## 2022-09-27 PROCEDURE — 1126F AMNT PAIN NOTED NONE PRSNT: CPT | Mod: CPTII,S$GLB,, | Performed by: ORTHOPAEDIC SURGERY

## 2022-09-27 PROCEDURE — 3288F FALL RISK ASSESSMENT DOCD: CPT | Mod: CPTII,S$GLB,, | Performed by: ORTHOPAEDIC SURGERY

## 2022-09-27 PROCEDURE — 99024 POSTOP FOLLOW-UP VISIT: CPT | Mod: S$GLB,,, | Performed by: ORTHOPAEDIC SURGERY

## 2022-09-27 PROCEDURE — 1101F PR PT FALLS ASSESS DOC 0-1 FALLS W/OUT INJ PAST YR: ICD-10-PCS | Mod: CPTII,S$GLB,, | Performed by: ORTHOPAEDIC SURGERY

## 2022-09-27 PROCEDURE — 3288F PR FALLS RISK ASSESSMENT DOCUMENTED: ICD-10-PCS | Mod: CPTII,S$GLB,, | Performed by: ORTHOPAEDIC SURGERY

## 2022-09-27 PROCEDURE — 1126F PR PAIN SEVERITY QUANTIFIED, NO PAIN PRESENT: ICD-10-PCS | Mod: CPTII,S$GLB,, | Performed by: ORTHOPAEDIC SURGERY

## 2022-09-27 PROCEDURE — 1101F PT FALLS ASSESS-DOCD LE1/YR: CPT | Mod: CPTII,S$GLB,, | Performed by: ORTHOPAEDIC SURGERY

## 2022-09-27 NOTE — PROGRESS NOTES
Subjective:      Patient ID: Sofía Mcwilliams is a 81 y.o. female.  Chief Complaint: Post-op Evaluation of the Right Hand      HPI  Sofía Mcwilliams is a  81 y.o. female presenting today for post op visit.  She is s/p tendon transfer extensor tendon right hand now 6 weeks postop  she is doing well currently in therapy using a finger brace  Noa minimal.     Review of patient's allergies indicates:   Allergen Reactions    Celecoxib      Other reaction(s): Swelling    Sulfa (sulfonamide antibiotics)      Other reaction(s): Hives         Current Outpatient Medications   Medication Sig Dispense Refill    aspirin (ECOTRIN) 81 MG EC tablet Take 1 tablet by mouth.      atorvastatin (LIPITOR) 20 MG tablet TAKE 1 TABLET EVERY DAY 90 tablet 3    CALCIUM CARB/VIT D3/MINERALS (CALCIUM-VITAMIN D ORAL) once daily.       coenzyme Q10 10 mg capsule Take by mouth.      losartan (COZAAR) 25 MG tablet TAKE 1 TABLET EVERY DAY 90 tablet 3    meloxicam (MOBIC) 15 MG tablet TAKE 1 TABLET(15 MG) BY MOUTH EVERY DAY 90 tablet 3    multivitamin (THERAGRAN) per tablet Take 1 tablet by mouth.      rOPINIRole (REQUIP XL) 2 mg 24 hr tablet Take 1 tablet (2 mg total) by mouth every evening. 90 tablet 3    rOPINIRole (REQUIP) 0.5 MG tablet Take 1 tablet as needed up to 3 times a day for breakthrough RLS symptoms. 90 tablet 11    VITAMIN B COMPLEX ORAL Take by mouth once daily.       vitamin D 1000 units Tab Take 185 mg by mouth once daily.      lisinopriL (PRINIVIL,ZESTRIL) 2.5 MG tablet Take 2.5 mg by mouth.       No current facility-administered medications for this visit.       Past Medical History:   Diagnosis Date    Anxiety 5/2/2016    Arthritis     Basal cell carcinoma 2013    left buttock    Cataract     Hypertension     Other and unspecified hyperlipidemia        Past Surgical History:   Procedure Laterality Date    APPENDECTOMY      bunion      right    COLONOSCOPY N/A 9/28/2015    Procedure: COLONOSCOPY;  Surgeon: Joe Amos MD;   "Location: [facility] (4TH FLR);  Service: Endoscopy;  Laterality: N/A;    COLONOSCOPY N/A 3/16/2021    Procedure: COLONOSCOPY;  Surgeon: Brice Zayas MD;  Location: [facility] ENDO (4TH FLR);  Service: Endoscopy;  Laterality: N/A;  covid test 3/13-primary care    hip surgery x 2      HYSTERECTOMY      partial    INJECTION OF JOINT Left 1/9/2019    Procedure: Injection, LEFT HIP INTRAARTICULAR INJECTION;  Surgeon: Omega Estevez MD;  Location: [facility] [place] PAIN MGT;  Service: Pain Management;  Laterality: Left;    INJECTION OF JOINT Left 3/13/2019    Procedure: INJECTION, JOINT LEFT HIP;  Surgeon: Omega Estevez MD;  Location: [facility] [place] PAIN MGT;  Service: Pain Management;  Laterality: Left;  Left Hip Intrarticular Steroid Injection    JOINT REPLACEMENT      SURGICAL REMOVAL OF BONE SPUR  8/16/2022    Procedure: EXCISION, BONE SPUR ULNA;  Surgeon: Brennen Prabhakar Jr., MD;  Location: [facility] OR;  Service: Orthopedics;;    SURGICAL REMOVAL OF DISTAL ULNA Right 8/16/2022    Procedure: EXCISION, ULNA, DISTAL;  Surgeon: Brennen Prabhakar Jr., MD;  Location: [facility] OR;  Service: Orthopedics;  Laterality: Right;    TONSILLECTOMY      TRANSFER OF HAND TENDON Right 8/16/2022    Procedure: TRANSFER, TENDON, HAND;  Surgeon: Brennen Prabhakar Jr., MD;  Location: [facility] OR;  Service: Orthopedics;  Laterality: Right;  transfer extensor tendon small finger    TRANSFORAMINAL EPIDURAL INJECTION OF STEROID Left 8/25/2021    Procedure: Injection,steroid,epidural,transforaminal approach; Levels: L5-S1;  Surgeon: Heather Mederos MD;  Location: [facility] PAIN MGT;  Service: Pain Management;  Laterality: Left;  No pacemaker. Patient is taking ASA.     TRANSFORAMINAL EPIDURAL INJECTION OF STEROID Bilateral 2/23/2022    Procedure: Injection,steroid,epidural,transforaminal bilateral L5;  Surgeon: Heather Mederos MD;  Location: [facility] PAIN MGT;  Service: Pain Management;  Laterality: Bilateral;  ASA   no pacemaker       OBJECTIVE:   PHYSICAL EXAM:  Height: 5' 4" " "(162.6 cm) Weight: 64.9 kg (143 lb)  Vitals:    09/27/22 0927   Weight: 64.9 kg (143 lb)   Height: 5' 4" (1.626 m)   PainSc: 0-No pain     Ortho/SPM Exam  Examination right hand incision is well-healed swelling minimal range of motion fingers improved good extension of all digits   No evidence of infection    RADIOGRAPHS:  None  Comments: I have personally reviewed the imaging and I agree with the above radiologist's report.    ASSESSMENT/PLAN:     IMPRESSION:  Status post tendon transfer extensor tendon right small finger    PLAN:  Continue therapy  wean out of the brace  increase activities as tolerated  avoid heavy lifting       FOLLOW UP:  4-6 weeks    Disclaimer: This note has been generated using voice-recognition software. There may be typographical errors that have been missed during proof-reading.     "

## 2022-09-29 ENCOUNTER — CLINICAL SUPPORT (OUTPATIENT)
Dept: REHABILITATION | Facility: HOSPITAL | Age: 82
End: 2022-09-29
Payer: MEDICARE

## 2022-09-29 DIAGNOSIS — R60.0 LOCALIZED EDEMA: ICD-10-CM

## 2022-09-29 DIAGNOSIS — M25.60 DECREASED RANGE OF MOTION: Primary | ICD-10-CM

## 2022-09-29 PROCEDURE — 97110 THERAPEUTIC EXERCISES: CPT

## 2022-09-29 PROCEDURE — 97140 MANUAL THERAPY 1/> REGIONS: CPT

## 2022-09-29 NOTE — PROGRESS NOTES
"      [facility]  Occupational Therapy Treatment Note    Date: 9/29/2022  Name: Sofía Mcwilliams  Clinic Number: [id number]    Therapy Diagnosis:   Encounter Diagnoses   Name Primary?    Decreased range of motion Yes    Localized edema              Physician: Ramona Barrett PA-C     Physician Orders: Eval and treat;   Medical Diagnosis: M66.20 (ICD-10-CM) - Tendon rupture, nontraumatic, extensor  Surgical Procedure and Date:   1. Tendon transfer EDC ring finger to EDC small finger.  2. Tendon transfer E IP (index finger) 2 EDQ (small finger)  3. Debridement distal ulna bone spur (cheilectomy)., 8/16/2022 / Date of Injury/Onset: July 2022  Evaluation Date: 9/6/2022  Insurance Authorization Period Expiration: 11/6/2022  Plan of Care Expiration: 8 weeks; 11/4/2022  Progress Note Due: 4 weeks;    Date of Return to MD: TBD  Visit # / Visits authorized: 7 / 12  FOTO: initial eval      Precautions:  Standard and Weightbearing     Time In:  8:26  AM  Time Out: 9:20 AM  Total Appointment Time (timed & untimed codes): 54 minutes  Total Billable Time: 54 minutes    6 weeks 0 days (9/29/2022)   SUBJECTIVE     Pt reports: "The splint is still bothering me."   She was compliant with home exercise program given last session.   Response to previous treatment: good   Functional change: none     Pain: 0/10  Location: right hands      OBJECTIVE   Objective Measures updated at progress report unless specified.    Elbow and Wrist ROM. Measured in degrees.    9/8/2022 9/8/2022     Left Right   Elbow Ext/Flex       Supination/Pronation       Wrist Ext/Flex WNL WNL   Wrist RD/UD WNL  WNL       Hand ROM. Measured in degrees.    9/8/2022 9/8/2022     Left Right           Index: MP  WNL                PIP                      DIP                  BABB               Small:  MP WNL    18               PIP    30               DIP    29              ABBB                   Strength (Dynamometer) and Pinch Strength (Pinch " Gauge)  Measured in pounds.    9/8/2022 9/8/2022     Left Right   Rung II   Deferred    Key Pinch       3pt Pinch       2pt Pinch             Treatment     Sofía received the treatments listed below:     Supervised modalities after being cleared for contradictions: Hot Pack with paraffin - 10 minutes     Manual therapy techniques: scar tissue mobilization were applied to the: dorsal wrist  for 10 minutes, including:  - retrograde massage   - scar tissue mobilization       Therapeutic exercises to develop ROM for 34 minutes, including:   - waves x10  - hook grasp x 10 reps (3 sets)   - IF and SF simultaneous PIP ext x 10 reps (2 sets)   - wrist flex/ext x 10 reps (2 sets)   - short arc comp fist, wrist in slight ext  - tenodesis  - isospheres on table x 2 min   *adjustments made to orthosis for incr comfort and decr irritation on thumb     Patient Education and Home Exercises      Education provided:   - do not use R hand for functional use, wear splint 24/7   - Progress towards goals     Written Home Exercises Provided: yes.  Exercises were reviewed and Sofía was able to demonstrate them prior to the end of the session.  Sofía demonstrated good  understanding of the HEP provided. See EMR under Patient Instructions for exercises provided during therapy sessions.      ASSESSMENT     Pt would continue to benefit from skilled OT. Good tolerance to tx with no pain for exercises. Cont to have slight extensor lag in SF MF. Decreased scar adhesion within common extensor tendon.  Sofía is progressing well towards her goals and there are no updates to goals at this time. Pt prognosis is Good.     Pt will continue to benefit from skilled outpatient occupational therapy to address the deficits listed in the problem list on initial evaluation, provide pt/family education and to maximize pt's level of independence in the home and community environment.     Pt's spiritual, cultural and educational needs considered and pt agreeable  to plan of care and goals.    Anticipated barriers to occupational therapy: n/a    Goals:  Long Term Goals (LTGs); to be met by discharge.  LTG #1: Pt will report a pain level of 0 out of 10 with functional use of R hand          LTG #2: Pt will demo improved FOTO score by 20 points.   LTG #3: Pt will return to prior level of function for ADLs and household management.      Short Term Goals (STGs); to be met within 4 weeks (10/7/2022).  STG #1: Pt will report 1-2 out of 10 pain level with functional use of R hand .  STG #2: Pt will report/demo Modified [place] with self care tasks.  STG #3: Pt will report/demo Modified [place] with feeding.  STG #4: Pt will demonstrate independence with issued HEP.   STG #5: Pt will demo improved SF BABB by 20 degrees needed to aid with composite fist .  STG #6: TAKE FOTO    PLAN     Continue skilled occupational therapy with individualized plan of care focusing on improving functional independence with use of R hand     Updates/Grading for next session: continue with protocol as tolerated     Hannah Coon, OT

## 2022-10-04 ENCOUNTER — CLINICAL SUPPORT (OUTPATIENT)
Dept: REHABILITATION | Facility: HOSPITAL | Age: 82
End: 2022-10-04
Payer: MEDICARE

## 2022-10-04 DIAGNOSIS — M25.60 DECREASED RANGE OF MOTION: Primary | ICD-10-CM

## 2022-10-04 DIAGNOSIS — R60.0 LOCALIZED EDEMA: ICD-10-CM

## 2022-10-04 PROCEDURE — 97018 PARAFFIN BATH THERAPY: CPT | Mod: CO

## 2022-10-04 PROCEDURE — 97110 THERAPEUTIC EXERCISES: CPT | Mod: CO

## 2022-10-04 PROCEDURE — 97140 MANUAL THERAPY 1/> REGIONS: CPT | Mod: CO

## 2022-10-04 NOTE — PROGRESS NOTES
"      [facility]  Occupational Therapy Treatment Note    Date: 10/4/2022  Name: Sofía Mcwilliams  Clinic Number: [id number]    Therapy Diagnosis:   Encounter Diagnoses   Name Primary?    Decreased range of motion Yes    Localized edema                Physician: Ramona Barrett PA-C     Physician Orders: Eval and treat;   Medical Diagnosis: M66.20 (ICD-10-CM) - Tendon rupture, nontraumatic, extensor  Surgical Procedure and Date:   1. Tendon transfer EDC ring finger to EDC small finger.  2. Tendon transfer E IP (index finger) 2 EDQ (small finger)  3. Debridement distal ulna bone spur (cheilectomy)., 8/16/2022 / Date of Injury/Onset: July 2022  Evaluation Date: 9/6/2022  Insurance Authorization Period Expiration: 11/6/2022  Plan of Care Expiration: 8 weeks; 11/4/2022  Progress Note Due: 4 weeks;    Date of Return to MD: TBD  Visit # / Visits authorized: 8 / 12  FOTO: initial eval      Precautions:  Standard and Weightbearing     Time In:  7:56  AM  Time Out: 8:56 AM  Total Appointment Time (timed & untimed codes): 60 minutes  Total Billable Time: 60 minutes    6 weeks 0 days (9/29/2022)   SUBJECTIVE     Pt reports: "My hand really hasn't been bothering me much." "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   Response to previous treatment: good   Functional change: more IADLs without splint, cooking     Pain: 0/10  Location: right hands      OBJECTIVE   Objective Measures updated at progress report unless specified.    Elbow and Wrist ROM. Measured in degrees.    9/8/2022 9/8/2022     Left Right   Elbow Ext/Flex       Supination/Pronation       Wrist Ext/Flex WNL WNL   Wrist RD/UD WNL  WNL       Hand ROM. Measured in degrees.    9/8/2022 9/8/2022     Left Right           Index: MP  WNL                PIP                      DIP                  BABB               Small:  MP WNL    18               PIP "    30               DIP    29              BABB                   Strength (Dynamometer) and Pinch Strength (Pinch Gauge)  Measured in pounds.    9/8/2022 9/8/2022     Left Right   Rung II   Deferred    Key Pinch       3pt Pinch       2pt Pinch             Treatment     Sofía received the treatments listed below:     Supervised modalities after being cleared for contradictions: Hot Pack with paraffin - 10 minutes     Manual therapy techniques: scar tissue mobilization were applied to the: dorsal wrist  for 10 minutes, including:  - retrograde massage   - scar tissue mobilization       Therapeutic exercises to develop ROM for 40 minutes, including:   - waves x10  - hook grasp x 10 reps   - IF and SF simultaneous PIP ext x 10 reps (2 sets)   - wrist AROM over wedge with med dowel x 10 reps (2 sets)   - digit ext off table x10  - spreads x10  - tenodesis  - isospheres x 2 min   - wrist maze x2 min  - in hand manip with med poms    - reverse blocks x 10     Patient Education and Home Exercises      Education provided:   - do not use R hand for functional use, wear splint 24/7   - Progress towards goals     Written Home Exercises Provided: yes.  Exercises were reviewed and Sofía was able to demonstrate them prior to the end of the session.  Sofía demonstrated good  understanding of the HEP provided. See EMR under Patient Instructions for exercises provided during therapy sessions.      ASSESSMENT     Pt would continue to benefit from skilled OT. Good tolerance to tx. Upgraded with full ROM and light activities today with no pain. Will continue   Sofía is progressing well towards her goals and there are no updates to goals at this time. Pt prognosis is Good.     Pt will continue to benefit from skilled outpatient occupational therapy to address the deficits listed in the problem list on initial evaluation, provide pt/family education and to maximize pt's level of independence in the home and community environment.      Pt's spiritual, cultural and educational needs considered and pt agreeable to plan of care and goals.    Anticipated barriers to occupational therapy: n/a    Goals:  Long Term Goals (LTGs); to be met by discharge.  LTG #1: Pt will report a pain level of 0 out of 10 with functional use of R hand          LTG #2: Pt will demo improved FOTO score by 20 points.   LTG #3: Pt will return to prior level of function for ADLs and household management.      Short Term Goals (STGs); to be met within 4 weeks (10/7/2022).  STG #1: Pt will report 1-2 out of 10 pain level with functional use of R hand .  STG #2: Pt will report/demo Modified [place] with self care tasks.  STG #3: Pt will report/demo Modified [place] with feeding.  STG #4: Pt will demonstrate independence with issued HEP.   STG #5: Pt will demo improved SF BABB by 20 degrees needed to aid with composite fist .  STG #6: TAKE FOTO    PLAN     Continue skilled occupational therapy with individualized plan of care focusing on improving functional independence with use of R hand     Updates/Grading for next session: continue with protocol as tolerated     NUNU Rodriguez/CLINTON

## 2022-10-05 ENCOUNTER — HOSPITAL ENCOUNTER (OUTPATIENT)
Dept: RADIOLOGY | Facility: HOSPITAL | Age: 82
Discharge: HOME | End: 2022-10-05
Attending: INTERNAL MEDICINE
Payer: MEDICARE

## 2022-10-05 DIAGNOSIS — Z12.31 ENCOUNTER FOR SCREENING MAMMOGRAM FOR MALIGNANT NEOPLASM OF BREAST: ICD-10-CM

## 2022-10-05 DIAGNOSIS — Z12.39 ENCOUNTER FOR SCREENING FOR MALIGNANT NEOPLASM OF BREAST, UNSPECIFIED SCREENING MODALITY: ICD-10-CM

## 2022-10-05 PROCEDURE — 77067 SCR MAMMO BI INCL CAD: CPT | Mod: 26,,, | Performed by: RADIOLOGY

## 2022-10-05 PROCEDURE — 77063 BREAST TOMOSYNTHESIS BI: CPT | Mod: TC

## 2022-10-05 PROCEDURE — 77067 MAMMO DIGITAL SCREENING BILAT WITH TOMO: ICD-10-PCS | Mod: 26,,, | Performed by: RADIOLOGY

## 2022-10-05 PROCEDURE — 77063 BREAST TOMOSYNTHESIS BI: CPT | Mod: 26,,, | Performed by: RADIOLOGY

## 2022-10-05 PROCEDURE — 77063 MAMMO DIGITAL SCREENING BILAT WITH TOMO: ICD-10-PCS | Mod: 26,,, | Performed by: RADIOLOGY

## 2022-10-05 PROCEDURE — 77067 SCR MAMMO BI INCL CAD: CPT | Mod: TC

## 2022-10-06 ENCOUNTER — CLINICAL SUPPORT (OUTPATIENT)
Dept: REHABILITATION | Facility: HOSPITAL | Age: 82
End: 2022-10-06
Payer: MEDICARE

## 2022-10-06 DIAGNOSIS — M25.60 DECREASED RANGE OF MOTION: Primary | ICD-10-CM

## 2022-10-06 DIAGNOSIS — R60.0 LOCALIZED EDEMA: ICD-10-CM

## 2022-10-06 PROCEDURE — 97140 MANUAL THERAPY 1/> REGIONS: CPT

## 2022-10-06 PROCEDURE — 97110 THERAPEUTIC EXERCISES: CPT

## 2022-10-06 NOTE — PROGRESS NOTES
"        [facility]  Occupational Therapy Treatment Note    Date: 10/6/2022  Name: Sofía Mcwilliams  Clinic Number: [id number]    Therapy Diagnosis:   Encounter Diagnoses   Name Primary?    Decreased range of motion Yes    Localized edema        Physician: Ramona Barrett PA-C     Physician Orders: Eval and treat;   Medical Diagnosis: M66.20 (ICD-10-CM) - Tendon rupture, nontraumatic, extensor  Surgical Procedure and Date:   1. Tendon transfer EDC ring finger to EDC small finger.  2. Tendon transfer E IP (index finger) 2 EDQ (small finger)  3. Debridement distal ulna bone spur (cheilectomy)., 8/16/2022 / Date of Injury/Onset: July 2022  Evaluation Date: 9/6/2022  Insurance Authorization Period Expiration: 11/6/2022  Plan of Care Expiration: 8 weeks; 11/4/2022  Progress Note Due: 4 weeks;    Date of Return to MD: TBD  Visit # / Visits authorized: 10 / 12  FOTO: initial eval      Precautions:  Standard and Weightbearing     Time In:    08:00 AM  Time Out:   09:00 AM  Total Appointment Time (timed & untimed codes): 60 minutes  Total Billable Time: 60 minutes    7weeks 3 days (10/6/2022)   SUBJECTIVE     Pt reports:  "I was playing [employer] and I felt a little sharp pain, quick stab right here (along dorsal hand just proximal to MP jt)  She was compliant with home exercise program given last session.   Response to previous treatment: good   Functional change: more IADLs without splint, cooking     Pain: 0/10  Location: right hands      OBJECTIVE   Objective Measures updated at progress report unless specified.    Elbow and Wrist ROM. Measured in degrees.    9/8/2022 9/8/2022 10/6/2022     Left Right Right   Elbow Ext/Flex        Supination/Pronation        Wrist Ext/Flex WNL WNL 59/68   Wrist RD/UD WNL  WNL        Hand ROM. Measured in degrees.    9/8/2022 9/8/2022 10/6/2022     Left Right Right            Index: MP  WNL                 PIP                       DIP                   BABB             "     Small:  MP WNL    18 10/85               PIP    30 94               DIP    29 45              BABB                     Strength (Dynamometer) and Pinch Strength (Pinch Gauge)  Measured in pounds.    9/8/2022 9/8/2022     Left Right   Rung II   Deferred    Key Pinch       3pt Pinch       2pt Pinch             Treatment     Sofía received the treatments listed below:       Supervised modalities after being cleared for contradictions: Hot Pack with paraffin - 10 minutes       Manual therapy techniques: scar tissue mobilization were applied to the: dorsal wrist  for 15 minutes, including:  - retrograde massage   - scar tissue mobilization   *dynamic KT tape applied dorsally over RF and SF     Therapeutic exercises to develop ROM for 35 minutes, including:   - waves x10 (2 sets)   - hook grasp x 10 reps   - IF and SF simultaneous PIP ext x 10 reps (2 sets)   - wrist AROM over wedge digits extended x 10 reps (2 sets)   - digit ext off table x10  - spreads x10  - tenodesis x 10 reps   - isospheres x 2 min   - wrist maze x2 min (NT)  - in hand manip with med poms (NT)   - reverse blocks x 10     *given giovanna strap to offload SF and stabilize EDM from rubbing along SF MP jt   *given foam to put on pen for wider tripod  grasp     Patient Education and Home Exercises      Education provided:   - wear giovanna strap  - do not use R hand for functional use, wear splint 24/7   - Progress towards goals     Written Home Exercises Provided: yes.  Exercises were reviewed and Sofía was able to demonstrate them prior to the end of the session.  Sofía demonstrated good  understanding of the HEP provided. See EMR under Patient Instructions for exercises provided during therapy sessions.      ASSESSMENT     Pt would continue to benefit from skilled OT. Noticeable increased swelling just proximal to MP jt of SF. Possible scar tissue build up. Sofía reports no pain with palpation. Issued giovanna strap for increased support of SF.   Will  continue to progress as liza and per protocol.   Sofía is progressing well towards her goals and there are no updates to goals at this time. Pt prognosis is Good.     Pt will continue to benefit from skilled outpatient occupational therapy to address the deficits listed in the problem list on initial evaluation, provide pt/family education and to maximize pt's level of independence in the home and community environment.     Pt's spiritual, cultural and educational needs considered and pt agreeable to plan of care and goals.    Anticipated barriers to occupational therapy: n/a    Goals:  Long Term Goals (LTGs); to be met by discharge.  LTG #1: Pt will report a pain level of 0 out of 10 with functional use of R hand          LTG #2: Pt will demo improved FOTO score by 20 points.   LTG #3: Pt will return to prior level of function for ADLs and household management.      Short Term Goals (STGs); to be met within 4 weeks (10/7/2022).  STG #1: Pt will report 1-2 out of 10 pain level with functional use of R hand .  STG #2: Pt will report/demo Modified [place] with self care tasks. MET 10/6/22   STG #3: Pt will report/demo Modified [place] with feeding.   STG #4: Pt will demonstrate independence with issued HEP.   STG #5: Pt will demo improved SF BABB by 20 degrees needed to aid with composite fist . MET 10/6/22  STG #6: TAKE FOTO    PLAN     Continue skilled occupational therapy with individualized plan of care focusing on improving functional independence with use of R hand     Updates/Grading for next session: continue with protocol as tolerated     Hannah Coon OT

## 2022-10-11 ENCOUNTER — CLINICAL SUPPORT (OUTPATIENT)
Dept: REHABILITATION | Facility: HOSPITAL | Age: 82
End: 2022-10-11
Payer: MEDICARE

## 2022-10-11 DIAGNOSIS — M25.60 DECREASED RANGE OF MOTION: Primary | ICD-10-CM

## 2022-10-11 DIAGNOSIS — R60.0 LOCALIZED EDEMA: ICD-10-CM

## 2022-10-11 PROCEDURE — 97018 PARAFFIN BATH THERAPY: CPT | Mod: CO,59

## 2022-10-11 PROCEDURE — 97140 MANUAL THERAPY 1/> REGIONS: CPT | Mod: CO

## 2022-10-11 PROCEDURE — 97110 THERAPEUTIC EXERCISES: CPT | Mod: CO

## 2022-10-11 NOTE — PROGRESS NOTES
"        [facility]  Occupational Therapy Treatment Note    Date: 10/11/2022  Name: Sofía Mcwilliams  Clinic Number: [id number]    Therapy Diagnosis:   Encounter Diagnoses   Name Primary?    Decreased range of motion Yes    Localized edema                  Physician: Ramona Barrett PA-C     Physician Orders: Eval and treat;   Medical Diagnosis: M66.20 (ICD-10-CM) - Tendon rupture, nontraumatic, extensor  Surgical Procedure and Date:   1. Tendon transfer EDC ring finger to EDC small finger.  2. Tendon transfer E IP (index finger) 2 EDQ (small finger)  3. Debridement distal ulna bone spur (cheilectomy)., 8/16/2022 / Date of Injury/Onset: July 2022  Evaluation Date: 9/6/2022  Insurance Authorization Period Expiration: 11/6/2022  Plan of Care Expiration: 8 weeks; 11/4/2022  Progress Note Due: 4 weeks;    Date of Return to MD: TBD  Visit # / Visits authorized: 10 / 12  FOTO: initial eval      Precautions:  Standard and Weightbearing     Time In:  8:00  AM  Time Out: 8:53 AM  Total Appointment Time (timed & untimed codes): 53 minutes  Total Billable Time: 53 minutes    8weeks 1 days (10/11/2022)   SUBJECTIVE     Pt reports:  "I've been gardening but only using the L hand, and my  helps me do anything heavy."  She was compliant with home exercise program given last session.   Response to previous treatment: good   Functional change: more IADLs without splint, cooking    Pain: 0/10  Location: right hands      OBJECTIVE   Objective Measures updated at progress report unless specified.    Elbow and Wrist ROM. Measured in degrees.    9/8/2022 9/8/2022 10/6/2022     Left Right Right   Elbow Ext/Flex        Supination/Pronation        Wrist Ext/Flex WNL WNL 59/68   Wrist RD/UD WNL  WNL        Hand ROM. Measured in degrees.    9/8/2022 9/8/2022 10/6/2022     Left Right Right            Index: MP  WNL                 PIP                       DIP                   BABB                 Small:  MP WNL   "  18 10/85               PIP    30 94               DIP    29 45              BABB                     Strength (Dynamometer) and Pinch Strength (Pinch Gauge)  Measured in pounds.    9/8/2022 9/8/2022     Left Right   Rung II   Deferred    Key Pinch       3pt Pinch       2pt Pinch             Treatment     Sofía received the treatments listed below:     Supervised modalities after being cleared for contradictions: Hot Pack with paraffin - 10 minutes     Manual therapy techniques: scar tissue mobilization were applied to the: dorsal wrist  for 10 minutes, including:  - retrograde massage   - scar tissue mobilization       Therapeutic exercises to develop ROM for 35 minutes, including:   - waves x10  - hook grasp x 10 reps   - IF and SF simultaneous PIP ext x 10 reps (2 sets)   - wrist AROM over wedge with med dowel x 10 reps (2 sets)   - digit ext off table x10  - spreads x10  - tenodesis  - isospheres x 2 min   - wrist maze x2 min (NT)  - in hand manip with med poms    - reverse blocks x 10     Patient Education and Home Exercises      Education provided:   - avoiding heavy activity  - Progress towards goals     Written Home Exercises Provided: yes.  Exercises were reviewed and Sofía was able to demonstrate them prior to the end of the session.  Sofía demonstrated good  understanding of the HEP provided. See EMR under Patient Instructions for exercises provided during therapy sessions.      ASSESSMENT     Pt would continue to benefit from skilled OT. Good tolerance to tx. Edema noted at start of session in circular formation localized midline dorsal hand near incision, improved post massage. Also noted hard, small, non painful area below SF MP. Reported no pain with daily activities and very little pain overall. Will continue as tolerated.     Sofía is progressing well towards her goals and there are no updates to goals at this time. Pt prognosis is Good.     Pt will continue to benefit from skilled outpatient  occupational therapy to address the deficits listed in the problem list on initial evaluation, provide pt/family education and to maximize pt's level of independence in the home and community environment.     Pt's spiritual, cultural and educational needs considered and pt agreeable to plan of care and goals.    Anticipated barriers to occupational therapy: n/a    Goals:  Long Term Goals (LTGs); to be met by discharge.  LTG #1: Pt will report a pain level of 0 out of 10 with functional use of R hand          LTG #2: Pt will demo improved FOTO score by 20 points.   LTG #3: Pt will return to prior level of function for ADLs and household management.      Short Term Goals (STGs); to be met within 4 weeks (10/7/2022).  STG #1: Pt will report 1-2 out of 10 pain level with functional use of R hand .  STG #2: Pt will report/demo Modified [place] with self care tasks.  STG #3: Pt will report/demo Modified [place] with feeding.  STG #4: Pt will demonstrate independence with issued HEP.   STG #5: Pt will demo improved SF BABB by 20 degrees needed to aid with composite fist .  STG #6: TAKE FOTO    PLAN     Continue skilled occupational therapy with individualized plan of care focusing on improving functional independence with use of R hand     Updates/Grading for next session: continue with protocol as tolerated     NUNU Rodriguez/CLINTON

## 2022-10-13 ENCOUNTER — CLINICAL SUPPORT (OUTPATIENT)
Dept: REHABILITATION | Facility: HOSPITAL | Age: 82
End: 2022-10-13
Payer: MEDICARE

## 2022-10-13 DIAGNOSIS — M25.60 DECREASED RANGE OF MOTION: Primary | ICD-10-CM

## 2022-10-13 DIAGNOSIS — R60.0 LOCALIZED EDEMA: ICD-10-CM

## 2022-10-13 PROCEDURE — 97110 THERAPEUTIC EXERCISES: CPT | Mod: CO

## 2022-10-13 PROCEDURE — 97140 MANUAL THERAPY 1/> REGIONS: CPT | Mod: CO

## 2022-10-13 PROCEDURE — 97018 PARAFFIN BATH THERAPY: CPT | Mod: CO,59

## 2022-10-13 NOTE — PROGRESS NOTES
"        [facility]  Occupational Therapy Treatment Note    Date: 10/13/2022  Name: Sofía Mcwilliams  Clinic Number: [id number]    Therapy Diagnosis:   Encounter Diagnoses   Name Primary?    Decreased range of motion Yes    Localized edema                    Physician: Ramona Barrett PA-C     Physician Orders: Eval and treat;   Medical Diagnosis: M66.20 (ICD-10-CM) - Tendon rupture, nontraumatic, extensor  Surgical Procedure and Date:   1. Tendon transfer EDC ring finger to EDC small finger.  2. Tendon transfer E IP (index finger) 2 EDQ (small finger)  3. Debridement distal ulna bone spur (cheilectomy)., 8/16/2022 / Date of Injury/Onset: July 2022  Evaluation Date: 9/6/2022  Insurance Authorization Period Expiration: 11/6/2022  Plan of Care Expiration: 8 weeks; 11/4/2022  Progress Note Due: 4 weeks;    Date of Return to MD: 10/25/2022  Visit # / Visits authorized: 11 / 12  FOTO: initial eval      Precautions:  Standard and Weightbearing     Time In:  8  AM  Time Out: 8:53 AM  Total Appointment Time (timed & untimed codes): 53 minutes  Total Billable Time: 53 minutes    8weeks 3 days (10/11/2022)   SUBJECTIVE     Pt reports:  "I think they swelling went down."  She was compliant with home exercise program given last session.   Response to previous treatment: good   Functional change: more IADLs without splint, cooking    Pain: 0/10  Location: right hands      OBJECTIVE   Objective Measures updated at progress report unless specified.    Elbow and Wrist ROM. Measured in degrees.    9/8/2022 9/8/2022 10/6/2022     Left Right Right   Elbow Ext/Flex        Supination/Pronation        Wrist Ext/Flex WNL WNL 59/68   Wrist RD/UD WNL  WNL        Hand ROM. Measured in degrees.    9/8/2022 9/8/2022 10/6/2022     Left Right Right            Index: MP  WNL                 PIP                       DIP                   BABB                 Small:  MP WNL    18 10/85               PIP    30 94               " DIP    29 45              BABB                     Strength (Dynamometer) and Pinch Strength (Pinch Gauge)  Measured in pounds.    9/8/2022 9/8/2022     Left Right   Rung II   Deferred    Key Pinch       3pt Pinch       2pt Pinch             Treatment     Sofía received the treatments listed below:     Supervised modalities after being cleared for contradictions: Hot Pack with paraffin - 10 minutes     Manual therapy techniques: scar tissue mobilization were applied to the: dorsal wrist  for 10 minutes, including:  - retrograde massage   - scar tissue mobilization       Therapeutic exercises to develop ROM for 33 minutes, including:   - waves x10  - hook grasp x 10 reps   - IF and SF simultaneous PIP ext x 10 reps (2 sets)   - wrist AROM over wedge with med dowel x 10 reps (2 sets)   - digit ext off table x10  - spreads x10  - tenodesis  - isospheres x 2 min   - wrist maze x2 min (NT)  - in hand manip with med poms (NT)   - reverse blocks x 10   - SF and IF kicks with large poms (2 sets ea)    Patient Education and Home Exercises      Education provided:   - avoiding heavy activity  - Progress towards goals     Written Home Exercises Provided: yes.  Exercises were reviewed and Sofía was able to demonstrate them prior to the end of the session.  Sofía demonstrated good  understanding of the HEP provided. See EMR under Patient Instructions for exercises provided during therapy sessions.      ASSESSMENT     Pt would continue to benefit from skilled OT. Good tolerance to tx. Improved edema per observation and progression with SF extension. Will continue as tolerated.     Sofía is progressing well towards her goals and there are no updates to goals at this time. Pt prognosis is Good.     Pt will continue to benefit from skilled outpatient occupational therapy to address the deficits listed in the problem list on initial evaluation, provide pt/family education and to maximize pt's level of independence in the home and  community environment.     Pt's spiritual, cultural and educational needs considered and pt agreeable to plan of care and goals.    Anticipated barriers to occupational therapy: n/a    Goals:  Long Term Goals (LTGs); to be met by discharge.  LTG #1: Pt will report a pain level of 0 out of 10 with functional use of R hand          LTG #2: Pt will demo improved FOTO score by 20 points.   LTG #3: Pt will return to prior level of function for ADLs and household management.      Short Term Goals (STGs); to be met within 4 weeks (10/7/2022).  STG #1: Pt will report 1-2 out of 10 pain level with functional use of R hand .  STG #2: Pt will report/demo Modified [place] with self care tasks.  STG #3: Pt will report/demo Modified [place] with feeding.  STG #4: Pt will demonstrate independence with issued HEP.   STG #5: Pt will demo improved SF BABB by 20 degrees needed to aid with composite fist .  STG #6: TAKE FOTO    PLAN     Continue skilled occupational therapy with individualized plan of care focusing on improving functional independence with use of R hand     Updates/Grading for next session: continue with protocol as tolerated    NUNU Rodriguez/CLINTON

## 2022-10-20 ENCOUNTER — CLINICAL SUPPORT (OUTPATIENT)
Dept: REHABILITATION | Facility: HOSPITAL | Age: 82
End: 2022-10-20
Payer: MEDICARE

## 2022-10-20 DIAGNOSIS — R60.0 LOCALIZED EDEMA: ICD-10-CM

## 2022-10-20 DIAGNOSIS — M25.60 DECREASED RANGE OF MOTION: Primary | ICD-10-CM

## 2022-10-20 PROCEDURE — 97110 THERAPEUTIC EXERCISES: CPT

## 2022-10-20 PROCEDURE — 97140 MANUAL THERAPY 1/> REGIONS: CPT

## 2022-10-20 NOTE — PROGRESS NOTES
"        [facility]  Occupational Therapy Treatment Note    Date: 10/20/2022  Name: Sofía Mcwilliams  Clinic Number: [id number]    Therapy Diagnosis:   Encounter Diagnoses   Name Primary?    Decreased range of motion Yes    Localized edema                      Physician: Ramona Barrett PA-C     Physician Orders: Eval and treat;   Medical Diagnosis: M66.20 (ICD-10-CM) - Tendon rupture, nontraumatic, extensor  Surgical Procedure and Date:   1. Tendon transfer EDC ring finger to EDC small finger.  2. Tendon transfer E IP (index finger) 2 EDQ (small finger)  3. Debridement distal ulna bone spur (cheilectomy)., 8/16/2022 / Date of Injury/Onset: July 2022  Evaluation Date: 9/6/2022  Insurance Authorization Period Expiration: 11/6/2022  Plan of Care Expiration: 8 weeks; 11/4/2022  Progress Note Due: 4 weeks;    Date of Return to MD: 10/25/2022  Visit # / Visits authorized: 12 / 12  FOTO: initial eval      Precautions:  Standard and Weightbearing     Time In:  8:05 AM  Time Out: 09:00 AM  Total Appointment Time (timed & untimed codes): 55 minutes  Total Billable Time: 55 minutes    9 weeks 5 days (10/20/2022)   SUBJECTIVE     Pt reports: "Morenita been wearing the giovanna strap and it has helped so much"   She was compliant with home exercise program given last session.   Response to previous treatment: good   Functional change: more IADLs without splint, cooking    Pain: 0/10  Location: right hands      OBJECTIVE   Objective Measures updated at progress report unless specified.    Elbow and Wrist ROM. Measured in degrees.    9/8/2022 9/8/2022 10/6/2022 10/20/2022     Left Right Right Right   Elbow Ext/Flex         Supination/Pronation         Wrist Ext/Flex WNL WNL 59/68 61/70   Wrist RD/UD WNL  WNL         Hand ROM. Measured in degrees.    9/8/2022 9/8/2022 10/6/2022 10/20/2022     Left Right Right Right             Index: MP  WNL                  PIP                        DIP                    BABB      "              Small:  MP WNL    18 10/85 10/90               PIP    30 94 90               DIP    29 45 50              BABB                       Strength (Dynamometer) and Pinch Strength (Pinch Gauge)  Measured in pounds.    9/8/2022 9/8/2022     Left Right   Rung II   Deferred    Key Pinch       3pt Pinch       2pt Pinch             Treatment     Sofía received the treatments listed below:     Supervised modalities after being cleared for contradictions: Hot Pack with paraffin - 10 minutes       Manual therapy techniques: scar tissue mobilization were applied to the: dorsal wrist  for 10 minutes, including:  - retrograde massage   - scar tissue mobilization     Therapeutic exercises to develop ROM for 35 minutes, including:   - updated objective measurements, please see above  - waves x10 (2 sets)  - hook grasp x 10 reps (2 sets)   - IF and SF simultaneous PIP ext x 10 reps (2 sets)   - IF and SF EDC simultaneous extension x 10 reps (2 sets)  - wrist AROM over wedge with med dowel x 10 reps (2 sets)   - digit ext off table x10  - in hand manipulation coins (1 set)  - tenodesis x 10 reps (3 sets)   - isospheres x 2 min    - reverse blocks x 10   - SF and IF kicks with large poms (2 sets ea)    Patient Education and Home Exercises      Education provided:   - avoiding heavy activity  - Progress towards goals     Written Home Exercises Provided: yes.  Exercises were reviewed and Sofía was able to demonstrate them prior to the end of the session.  Sofía demonstrated good  understanding of the HEP provided. See EMR under Patient Instructions for exercises provided during therapy sessions.      ASSESSMENT     Pt would continue to benefit from skilled OT. Good tolerance to tx. Demonstrated SF EDC simultaneous digit extension with IF for first time today!  Increased BABB based on updated measurement. Will continue as tolerated.     Sofía is progressing well towards her goals and there are no updates to goals at this  time. Pt prognosis is Good.     Pt will continue to benefit from skilled outpatient occupational therapy to address the deficits listed in the problem list on initial evaluation, provide pt/family education and to maximize pt's level of independence in the home and community environment.     Pt's spiritual, cultural and educational needs considered and pt agreeable to plan of care and goals.    Anticipated barriers to occupational therapy: n/a    Goals:  Long Term Goals (LTGs); to be met by discharge.  LTG #1: Pt will report a pain level of 0 out of 10 with functional use of R hand          LTG #2: Pt will demo improved FOTO score by 20 points.   LTG #3: Pt will return to prior level of function for ADLs and household management.      Short Term Goals (STGs); to be met within 4 weeks (10/7/2022).  STG #1: Pt will report 1-2 out of 10 pain level with functional use of R hand .  STG #2: Pt will report/demo Modified [place] with self care tasks.  STG #3: Pt will report/demo Modified [place] with feeding.  STG #4: Pt will demonstrate independence with issued HEP.   STG #5: Pt will demo improved SF BABB by 20 degrees needed to aid with composite fist .  STG #6: TAKE FOTO    PLAN     Continue skilled occupational therapy with individualized plan of care focusing on improving functional independence with use of R hand     Updates/Grading for next session: continue with protocol as tolerated    Hannah Coon OT

## 2022-10-25 ENCOUNTER — CLINICAL SUPPORT (OUTPATIENT)
Dept: REHABILITATION | Facility: HOSPITAL | Age: 82
End: 2022-10-25
Payer: MEDICARE

## 2022-10-25 ENCOUNTER — OFFICE VISIT (OUTPATIENT)
Dept: ORTHOPEDICS | Facility: CLINIC | Age: 82
End: 2022-10-25
Payer: MEDICARE

## 2022-10-25 VITALS — BODY MASS INDEX: 24.41 KG/M2 | HEIGHT: 64 IN | WEIGHT: 143 LBS

## 2022-10-25 DIAGNOSIS — M25.60 DECREASED RANGE OF MOTION: Primary | ICD-10-CM

## 2022-10-25 DIAGNOSIS — M66.20 TENDON RUPTURE, NONTRAUMATIC, EXTENSOR: Primary | ICD-10-CM

## 2022-10-25 DIAGNOSIS — R60.0 LOCALIZED EDEMA: ICD-10-CM

## 2022-10-25 PROCEDURE — 99999 PR PBB SHADOW E&M-EST. PATIENT-LVL III: CPT | Mod: PBBFAC,,, | Performed by: ORTHOPAEDIC SURGERY

## 2022-10-25 PROCEDURE — 1159F PR MEDICATION LIST DOCUMENTED IN MEDICAL RECORD: ICD-10-PCS | Mod: CPTII,S$GLB,, | Performed by: ORTHOPAEDIC SURGERY

## 2022-10-25 PROCEDURE — 3288F PR FALLS RISK ASSESSMENT DOCUMENTED: ICD-10-PCS | Mod: CPTII,S$GLB,, | Performed by: ORTHOPAEDIC SURGERY

## 2022-10-25 PROCEDURE — 99024 POSTOP FOLLOW-UP VISIT: CPT | Mod: S$GLB,,, | Performed by: ORTHOPAEDIC SURGERY

## 2022-10-25 PROCEDURE — 1101F PT FALLS ASSESS-DOCD LE1/YR: CPT | Mod: CPTII,S$GLB,, | Performed by: ORTHOPAEDIC SURGERY

## 2022-10-25 PROCEDURE — 97140 MANUAL THERAPY 1/> REGIONS: CPT

## 2022-10-25 PROCEDURE — 3288F FALL RISK ASSESSMENT DOCD: CPT | Mod: CPTII,S$GLB,, | Performed by: ORTHOPAEDIC SURGERY

## 2022-10-25 PROCEDURE — 1126F AMNT PAIN NOTED NONE PRSNT: CPT | Mod: CPTII,S$GLB,, | Performed by: ORTHOPAEDIC SURGERY

## 2022-10-25 PROCEDURE — 99999 PR PBB SHADOW E&M-EST. PATIENT-LVL III: ICD-10-PCS | Mod: PBBFAC,,, | Performed by: ORTHOPAEDIC SURGERY

## 2022-10-25 PROCEDURE — 1101F PR PT FALLS ASSESS DOC 0-1 FALLS W/OUT INJ PAST YR: ICD-10-PCS | Mod: CPTII,S$GLB,, | Performed by: ORTHOPAEDIC SURGERY

## 2022-10-25 PROCEDURE — 97110 THERAPEUTIC EXERCISES: CPT

## 2022-10-25 PROCEDURE — 1159F MED LIST DOCD IN RCRD: CPT | Mod: CPTII,S$GLB,, | Performed by: ORTHOPAEDIC SURGERY

## 2022-10-25 PROCEDURE — 99024 PR POST-OP FOLLOW-UP VISIT: ICD-10-PCS | Mod: S$GLB,,, | Performed by: ORTHOPAEDIC SURGERY

## 2022-10-25 PROCEDURE — 1126F PR PAIN SEVERITY QUANTIFIED, NO PAIN PRESENT: ICD-10-PCS | Mod: CPTII,S$GLB,, | Performed by: ORTHOPAEDIC SURGERY

## 2022-10-25 RX ORDER — DICLOFENAC SODIUM 10 MG/G
2 GEL TOPICAL 3 TIMES DAILY
Qty: 100 G | Refills: 1 | Status: SHIPPED | OUTPATIENT
Start: 2022-10-25 | End: 2023-06-09

## 2022-10-25 NOTE — PROGRESS NOTES
Subjective:      Patient ID: Sofía Mcwilliams is a 82 y.o. female.  Chief Complaint: Post-op Evaluation of the Right Hand      HPI  Sofía Mcwilliams is a  82 y.o. female presenting today for post op visit.  She is s/p extensor tendon transfer to the right ring and small finger she is now about 3 months postop  She is doing well finished up therapy currently doing exercises at home no pain reported.     Review of patient's allergies indicates:   Allergen Reactions    Celecoxib      Other reaction(s): Swelling    Sulfa (sulfonamide antibiotics)      Other reaction(s): Hives         Current Outpatient Medications   Medication Sig Dispense Refill    aspirin (ECOTRIN) 81 MG EC tablet Take 1 tablet by mouth.      atorvastatin (LIPITOR) 20 MG tablet TAKE 1 TABLET EVERY DAY 90 tablet 3    CALCIUM CARB/VIT D3/MINERALS (CALCIUM-VITAMIN D ORAL) once daily.       coenzyme Q10 10 mg capsule Take by mouth.      losartan (COZAAR) 25 MG tablet TAKE 1 TABLET EVERY DAY 90 tablet 3    meloxicam (MOBIC) 15 MG tablet TAKE 1 TABLET(15 MG) BY MOUTH EVERY DAY 90 tablet 3    multivitamin (THERAGRAN) per tablet Take 1 tablet by mouth.      rOPINIRole (REQUIP XL) 2 mg 24 hr tablet Take 1 tablet (2 mg total) by mouth every evening. 90 tablet 3    rOPINIRole (REQUIP) 0.5 MG tablet Take 1 tablet as needed up to 3 times a day for breakthrough RLS symptoms. 90 tablet 11    VITAMIN B COMPLEX ORAL Take by mouth once daily.       vitamin D 1000 units Tab Take 185 mg by mouth once daily.      lisinopriL (PRINIVIL,ZESTRIL) 2.5 MG tablet Take 2.5 mg by mouth.       No current facility-administered medications for this visit.       Past Medical History:   Diagnosis Date    Anxiety 5/2/2016    Arthritis     Basal cell carcinoma 2013    left buttock    Cataract     Hypertension     Other and unspecified hyperlipidemia        Past Surgical History:   Procedure Laterality Date    APPENDECTOMY      bunion      right    COLONOSCOPY N/A 9/28/2015    Procedure:  COLONOSCOPY;  Surgeon: Joe Amos MD;  Location: [facility] ENDO (4TH FLR);  Service: Endoscopy;  Laterality: N/A;    COLONOSCOPY N/A 3/16/2021    Procedure: COLONOSCOPY;  Surgeon: Brice Zayas MD;  Location: [facility] ENDO (4TH FLR);  Service: Endoscopy;  Laterality: N/A;  covid test 3/13-primary care    hip surgery x 2      HYSTERECTOMY      partial    INJECTION OF JOINT Left 1/9/2019    Procedure: Injection, LEFT HIP INTRAARTICULAR INJECTION;  Surgeon: Omega Estevez MD;  Location: [facility] PAIN MGT;  Service: Pain Management;  Laterality: Left;    INJECTION OF JOINT Left 3/13/2019    Procedure: INJECTION, JOINT LEFT HIP;  Surgeon: Omega Estevez MD;  Location: [facility] PAIN MGT;  Service: Pain Management;  Laterality: Left;  Left Hip Intrarticular Steroid Injection    JOINT REPLACEMENT      SURGICAL REMOVAL OF BONE SPUR  8/16/2022    Procedure: EXCISION, BONE SPUR ULNA;  Surgeon: Brennen Prabhakar Jr., MD;  Location: [facility] OR;  Service: Orthopedics;;    SURGICAL REMOVAL OF DISTAL ULNA Right 8/16/2022    Procedure: EXCISION, ULNA, DISTAL;  Surgeon: Brennen Prabhakar Jr., MD;  Location: [facility] OR;  Service: Orthopedics;  Laterality: Right;    TONSILLECTOMY      TRANSFER OF HAND TENDON Right 8/16/2022    Procedure: TRANSFER, TENDON, HAND;  Surgeon: Brennen Prabhakar Jr., MD;  Location: [facility] OR;  Service: Orthopedics;  Laterality: Right;  transfer extensor tendon small finger    TRANSFORAMINAL EPIDURAL INJECTION OF STEROID Left 8/25/2021    Procedure: Injection,steroid,epidural,transforaminal approach; Levels: L5-S1;  Surgeon: Heather Mederos MD;  Location: [facility] PAIN MGT;  Service: Pain Management;  Laterality: Left;  No pacemaker. Patient is taking ASA.     TRANSFORAMINAL EPIDURAL INJECTION OF STEROID Bilateral 2/23/2022    Procedure: Injection,steroid,epidural,transforaminal bilateral L5;  Surgeon: Heather Mederos MD;  Location: [facility] PAIN MGT;  Service: Pain Management;  Laterality: Bilateral;  ASA   no pacemaker  "      OBJECTIVE:   PHYSICAL EXAM:  Height: 5' 4" (162.6 cm) Weight: 64.9 kg (143 lb)  Vitals:    10/25/22 1038   Weight: 64.9 kg (143 lb)   Height: 5' 4" (1.626 m)   PainSc: 0-No pain     Ortho/SPM Exam  Examination right hand wrist there is some mild swelling dorsally over the extensor tendon no evidence of infection range of motion fingers full slight extensor lag to the small finger    RADIOGRAPHS:  None  Comments: I have personally reviewed the imaging and I agree with the above radiologist's report.    ASSESSMENT/PLAN:     IMPRESSION:  Status post extensor tendon transfer right hand    PLAN:  Continue home exercise program for strengthening   Maybe continue the splint for sleeping for another month   Voltaren gel topical    FOLLOW UP:  1-2 months    Disclaimer: This note has been generated using voice-recognition software. There may be typographical errors that have been missed during proof-reading.     "

## 2022-10-25 NOTE — PROGRESS NOTES
"        [facility]  Occupational Therapy Treatment Note    Date: 10/25/2022  Name: Sofía Mcwilliams  Clinic Number: [id number]    Therapy Diagnosis:   No diagnosis found.                    Physician: Ramona Barrett PA-C     Physician Orders: Eval and treat;   Medical Diagnosis: M66.20 (ICD-10-CM) - Tendon rupture, nontraumatic, extensor  Surgical Procedure and Date:   1. Tendon transfer EDC ring finger to EDC small finger.  2. Tendon transfer E IP (index finger) 2 EDQ (small finger)  3. Debridement distal ulna bone spur (cheilectomy)., 8/16/2022 / Date of Injury/Onset: July 2022  Evaluation Date: 9/6/2022  Insurance Authorization Period Expiration: 12/31/2022  Plan of Care Expiration: 8 weeks; 11/4/2022  Progress Note Due: 4 weeks;    Date of Return to MD: 10/25/2022  Visit # / Visits authorized: 12 / 24  FOTO: initial eval      Precautions:  Standard and Weightbearing     Time In:  *** AM  Time Out: *** AM  Total Appointment Time (timed & untimed codes): *** minutes  Total Billable Time: *** minutes    9 weeks 5 days (10/20/2022)   SUBJECTIVE     Pt reports: "Morenita been wearing the giovanna strap and it has helped so much" ***  She was compliant with home exercise program given last session.   Response to previous treatment: good   Functional change: more IADLs without splint, cooking    Pain: 0/10  Location: right hands      OBJECTIVE   Objective Measures updated at progress report unless specified.    Elbow and Wrist ROM. Measured in degrees.    9/8/2022 9/8/2022 10/6/2022 10/20/2022     Left Right Right Right   Elbow Ext/Flex         Supination/Pronation         Wrist Ext/Flex WNL WNL 59/68 61/70   Wrist RD/UD WNL  WNL         Hand ROM. Measured in degrees.    9/8/2022 9/8/2022 10/6/2022 10/20/2022     Left Right Right Right             Index: MP  WNL                  PIP                        DIP                    BABB                   Small:  MP WNL    18 10/85 10/90               PIP    " 30 94 90               DIP    29 45 50              BABB                       Strength (Dynamometer) and Pinch Strength (Pinch Gauge)  Measured in pounds.    9/8/2022 9/8/2022     Left Right   Rung II   Deferred    Key Pinch       3pt Pinch       2pt Pinch             Treatment     Sofía received the treatments listed below:     Supervised modalities after being cleared for contradictions: Hot Pack with paraffin - 10 minutes       Manual therapy techniques: scar tissue mobilization were applied to the: dorsal wrist  for 10 minutes, including:  - retrograde massage   - scar tissue mobilization     Therapeutic exercises to develop ROM for *** minutes, including:   - waves x10 (2 sets)  - hook grasp x 10 reps (2 sets)   - IF and SF simultaneous PIP ext x 10 reps (2 sets)   - IF and SF EDC simultaneous extension x 10 reps (2 sets)  - wrist AROM over wedge with med dowel x 10 reps (2 sets)   - digit ext off table x10  - in hand manipulation coins (1 set)  - tenodesis x 10 reps (3 sets)   - isospheres x 2 min    - reverse blocks x 10   - SF and IF kicks with large poms (2 sets ea)    Patient Education and Home Exercises      Education provided:   - avoiding heavy activity  - Progress towards goals     Written Home Exercises Provided: yes.  Exercises were reviewed and Sofía was able to demonstrate them prior to the end of the session.  Sofía demonstrated good  understanding of the HEP provided. See EMR under Patient Instructions for exercises provided during therapy sessions.      ASSESSMENT     Pt would continue to benefit from skilled OT. Good tolerance to tx. Demonstrated SF EDC simultaneous digit extension with IF for first time today!  Increased BABB based on updated measurement. Will continue as tolerated. ***    Sofía is progressing well towards her goals and there are no updates to goals at this time. Pt prognosis is Good.     Pt will continue to benefit from skilled outpatient occupational therapy to address  the deficits listed in the problem list on initial evaluation, provide pt/family education and to maximize pt's level of independence in the home and community environment.     Pt's spiritual, cultural and educational needs considered and pt agreeable to plan of care and goals.    Anticipated barriers to occupational therapy: n/a    Goals:  Long Term Goals (LTGs); to be met by discharge.  LTG #1: Pt will report a pain level of 0 out of 10 with functional use of R hand          LTG #2: Pt will demo improved FOTO score by 20 points.   LTG #3: Pt will return to prior level of function for ADLs and household management.      Short Term Goals (STGs); to be met within 4 weeks (10/7/2022).  STG #1: Pt will report 1-2 out of 10 pain level with functional use of R hand .  STG #2: Pt will report/demo Modified [place] with self care tasks.  STG #3: Pt will report/demo Modified [place] with feeding.  STG #4: Pt will demonstrate independence with issued HEP.   STG #5: Pt will demo improved SF BABB by 20 degrees needed to aid with composite fist .  STG #6: TAKE FOTO    PLAN     Continue skilled occupational therapy with individualized plan of care focusing on improving functional independence with use of R hand     Updates/Grading for next session: continue with protocol as tolerated    NUNU Rodriguez/CL[place]

## 2022-10-25 NOTE — PROGRESS NOTES
"          [facility]  Occupational Therapy Treatment Note    Date: 10/25/2022  Name: Sofía Mcwilliams  Clinic Number: [id number]    Therapy Diagnosis:   Encounter Diagnoses   Name Primary?    Decreased range of motion Yes    Localized edema        Physician: Ramona Barrett PA-C     Physician Orders: Eval and treat;   Medical Diagnosis: M66.20 (ICD-10-CM) - Tendon rupture, nontraumatic, extensor  Surgical Procedure and Date:   1. Tendon transfer EDC ring finger to EDC small finger.  2. Tendon transfer E IP (index finger) 2 EDQ (small finger)  3. Debridement distal ulna bone spur (cheilectomy)., 8/16/2022 / Date of Injury/Onset: July 2022  Evaluation Date: 9/6/2022  Insurance Authorization Period Expiration: 11/6/2022  Plan of Care Expiration: 8 weeks; 11/4/2022  Progress Note Due: 4 weeks;    Date of Return to MD: 10/25/2022  Visit # / Visits authorized: 12 / 12  FOTO: initial eval      Precautions:  Standard and Weightbearing     Time In:  8:00 AM  Time Out: 08:55AM  Total Appointment Time (timed & untimed codes): 55 minutes  Total Billable Time: 55 minutes    9 weeks 5 days (10/20/2022)   SUBJECTIVE     Pt reports: "feels great"  She was compliant with home exercise program given last session.   Response to previous treatment: good   Functional change: more IADLs without splint, cooking    Pain: 0/10  Location: right hands      OBJECTIVE   Objective Measures updated at progress report unless specified.    Elbow and Wrist ROM. Measured in degrees.    9/8/2022 9/8/2022 10/6/2022 10/20/2022     Left Right Right Right   Elbow Ext/Flex         Supination/Pronation         Wrist Ext/Flex WNL WNL 59/68 61/70   Wrist RD/UD WNL  WNL         Hand ROM. Measured in degrees.    9/8/2022 9/8/2022 10/6/2022 10/20/2022     Left Right Right Right             Index: MP  WNL                  PIP                        DIP                    BABB                   Small:  MP WNL    18 10/85 10/90               " PIP    30 94 90               DIP    29 45 50              BABB                       Strength (Dynamometer) and Pinch Strength (Pinch Gauge)  Measured in pounds.    9/8/2022 9/8/2022     Left Right   Rung II   Deferred    Key Pinch       3pt Pinch       2pt Pinch             Treatment     Sofía received the treatments listed below:     Supervised modalities after being cleared for contradictions: Hot Pack with paraffin - 10 minutes       Manual therapy techniques: scar tissue mobilization were applied to the: dorsal wrist  for 10 minutes, including:  - retrograde massage   - scar tissue mobilization     Therapeutic exercises to develop ROM for 35 minutes, including:   - updated objective measurements, please see above  - waves x10 (2 sets)  - hook grasp x 10 reps (2 sets)   - IF and SF simultaneous PIP ext x 10 reps (2 sets)   - IF and SF EDC simultaneous extension x 10 reps (2 sets)  - wrist AROM over wedge with med dowel x 10 reps (2 sets)   - digit ext off table x10  - in hand manipulation coins (1 set)  - tenodesis x 10 reps (3 sets)   - isospheres x 2 min    - reverse blocks x 10   - SF and IF kicks with large poms (2 sets ea)    Patient Education and Home Exercises      Education provided:   - avoiding heavy activity  - Progress towards goals     Written Home Exercises Provided: yes.  Exercises were reviewed and Sofía was able to demonstrate them prior to the end of the session.  Sofía demonstrated good  understanding of the HEP provided. See EMR under Patient Instructions for exercises provided during therapy sessions.      ASSESSMENT     Pt would continue to benefit from skilled OT. Good tolerance to tx. Good EDC and EDM extension.   Increased BABB based on updated measurement. Will continue as tolerated.     Sofía is progressing well towards her goals and there are no updates to goals at this time. Pt prognosis is Good.     Pt will continue to benefit from skilled outpatient occupational therapy to  address the deficits listed in the problem list on initial evaluation, provide pt/family education and to maximize pt's level of independence in the home and community environment.     Pt's spiritual, cultural and educational needs considered and pt agreeable to plan of care and goals.    Anticipated barriers to occupational therapy: n/a    Goals:  Long Term Goals (LTGs); to be met by discharge.  LTG #1: Pt will report a pain level of 0 out of 10 with functional use of R hand          LTG #2: Pt will demo improved FOTO score by 20 points.   LTG #3: Pt will return to prior level of function for ADLs and household management.      Short Term Goals (STGs); to be met within 4 weeks (10/7/2022).  STG #1: Pt will report 1-2 out of 10 pain level with functional use of R hand .  STG #2: Pt will report/demo Modified [place] with self care tasks.  STG #3: Pt will report/demo Modified [place] with feeding.  STG #4: Pt will demonstrate independence with issued HEP.   STG #5: Pt will demo improved SF BABB by 20 degrees needed to aid with composite fist .  STG #6: TAKE FOTO    PLAN     Continue skilled occupational therapy with individualized plan of care focusing on improving functional independence with use of R hand     Updates/Grading for next session: continue with protocol as tolerated    Hannah Coon OT

## 2022-12-08 ENCOUNTER — OFFICE VISIT (OUTPATIENT)
Dept: ORTHOPEDICS | Facility: CLINIC | Age: 82
End: 2022-12-08
Payer: MEDICARE

## 2022-12-08 VITALS — BODY MASS INDEX: 24.41 KG/M2 | HEIGHT: 64 IN | WEIGHT: 143 LBS

## 2022-12-08 DIAGNOSIS — M66.20 TENDON RUPTURE, NONTRAUMATIC, EXTENSOR: Primary | ICD-10-CM

## 2022-12-08 PROCEDURE — 1125F PR PAIN SEVERITY QUANTIFIED, PAIN PRESENT: ICD-10-PCS | Mod: CPTII,S$GLB,, | Performed by: ORTHOPAEDIC SURGERY

## 2022-12-08 PROCEDURE — 1101F PT FALLS ASSESS-DOCD LE1/YR: CPT | Mod: CPTII,S$GLB,, | Performed by: ORTHOPAEDIC SURGERY

## 2022-12-08 PROCEDURE — 1101F PR PT FALLS ASSESS DOC 0-1 FALLS W/OUT INJ PAST YR: ICD-10-PCS | Mod: CPTII,S$GLB,, | Performed by: ORTHOPAEDIC SURGERY

## 2022-12-08 PROCEDURE — 99999 PR PBB SHADOW E&M-EST. PATIENT-LVL III: ICD-10-PCS | Mod: PBBFAC,,, | Performed by: ORTHOPAEDIC SURGERY

## 2022-12-08 PROCEDURE — 99213 PR OFFICE/OUTPT VISIT, EST, LEVL III, 20-29 MIN: ICD-10-PCS | Mod: S$GLB,,, | Performed by: ORTHOPAEDIC SURGERY

## 2022-12-08 PROCEDURE — 3288F PR FALLS RISK ASSESSMENT DOCUMENTED: ICD-10-PCS | Mod: CPTII,S$GLB,, | Performed by: ORTHOPAEDIC SURGERY

## 2022-12-08 PROCEDURE — 1159F PR MEDICATION LIST DOCUMENTED IN MEDICAL RECORD: ICD-10-PCS | Mod: CPTII,S$GLB,, | Performed by: ORTHOPAEDIC SURGERY

## 2022-12-08 PROCEDURE — 99999 PR PBB SHADOW E&M-EST. PATIENT-LVL III: CPT | Mod: PBBFAC,,, | Performed by: ORTHOPAEDIC SURGERY

## 2022-12-08 PROCEDURE — 1125F AMNT PAIN NOTED PAIN PRSNT: CPT | Mod: CPTII,S$GLB,, | Performed by: ORTHOPAEDIC SURGERY

## 2022-12-08 PROCEDURE — 3288F FALL RISK ASSESSMENT DOCD: CPT | Mod: CPTII,S$GLB,, | Performed by: ORTHOPAEDIC SURGERY

## 2022-12-08 PROCEDURE — 1159F MED LIST DOCD IN RCRD: CPT | Mod: CPTII,S$GLB,, | Performed by: ORTHOPAEDIC SURGERY

## 2022-12-08 PROCEDURE — 99213 OFFICE O/P EST LOW 20 MIN: CPT | Mod: S$GLB,,, | Performed by: ORTHOPAEDIC SURGERY

## 2022-12-08 NOTE — PROGRESS NOTES
Subjective:      Patient ID: Sofía Mcwilliams is a 82 y.o. female.  Chief Complaint: Post-op Evaluation (right hand tendon transfer)      HPI  Sofía Mcwilliams is a  82 y.o. female presenting today for follow up of tendon transfer to the right hand ring and small finger.  She reports that she is now about 5 months postop she is doing well pain minimal she is back doing all of her regular activities she has completed therapy.    Review of patient's allergies indicates:   Allergen Reactions    Celecoxib      Other reaction(s): Swelling    Sulfa (sulfonamide antibiotics)      Other reaction(s): Hives         Current Outpatient Medications   Medication Sig Dispense Refill    aspirin (ECOTRIN) 81 MG EC tablet Take 1 tablet by mouth.      atorvastatin (LIPITOR) 20 MG tablet TAKE 1 TABLET EVERY DAY 90 tablet 3    CALCIUM CARB/VIT D3/MINERALS (CALCIUM-VITAMIN D ORAL) once daily.       coenzyme Q10 10 mg capsule Take by mouth.      diclofenac sodium (VOLTAREN) 1 % Gel Apply 2 g topically 3 (three) times daily. 100 g 1    losartan (COZAAR) 25 MG tablet TAKE 1 TABLET EVERY DAY 90 tablet 3    meloxicam (MOBIC) 15 MG tablet TAKE 1 TABLET(15 MG) BY MOUTH EVERY DAY 90 tablet 3    multivitamin (THERAGRAN) per tablet Take 1 tablet by mouth.      rOPINIRole (REQUIP XL) 2 mg 24 hr tablet Take 1 tablet (2 mg total) by mouth every evening. 90 tablet 3    rOPINIRole (REQUIP) 0.5 MG tablet Take 1 tablet as needed up to 3 times a day for breakthrough RLS symptoms. 90 tablet 11    VITAMIN B COMPLEX ORAL Take by mouth once daily.       vitamin D 1000 units Tab Take 185 mg by mouth once daily.      lisinopriL (PRINIVIL,ZESTRIL) 2.5 MG tablet Take 2.5 mg by mouth.       No current facility-administered medications for this visit.       Past Medical History:   Diagnosis Date    Anxiety 5/2/2016    Arthritis     Basal cell carcinoma 2013    left buttock    Cataract     Hypertension     Other and unspecified hyperlipidemia        Past Surgical History:    Procedure Laterality Date    APPENDECTOMY      bunion      right    COLONOSCOPY N/A 9/28/2015    Procedure: COLONOSCOPY;  Surgeon: Joe Amos MD;  Location: [facility] ENDO (4TH FLR);  Service: Endoscopy;  Laterality: N/A;    COLONOSCOPY N/A 3/16/2021    Procedure: COLONOSCOPY;  Surgeon: Brice Zayas MD;  Location: [facility] ENDO (4TH FLR);  Service: Endoscopy;  Laterality: N/A;  covid test 3/13-primary care    hip surgery x 2      HYSTERECTOMY      partial    INJECTION OF JOINT Left 1/9/2019    Procedure: Injection, LEFT HIP INTRAARTICULAR INJECTION;  Surgeon: Omega Estevez MD;  Location: [facility] [place] PAIN MGT;  Service: Pain Management;  Laterality: Left;    INJECTION OF JOINT Left 3/13/2019    Procedure: INJECTION, JOINT LEFT HIP;  Surgeon: Omega Estevez MD;  Location: [facility] [place] PAIN MGT;  Service: Pain Management;  Laterality: Left;  Left Hip Intrarticular Steroid Injection    JOINT REPLACEMENT      SURGICAL REMOVAL OF BONE SPUR  8/16/2022    Procedure: EXCISION, BONE SPUR ULNA;  Surgeon: Brennen Prabhakar Jr., MD;  Location: [facility] OR;  Service: Orthopedics;;    SURGICAL REMOVAL OF DISTAL ULNA Right 8/16/2022    Procedure: EXCISION, ULNA, DISTAL;  Surgeon: Brennen Prabhakar Jr., MD;  Location: [facility] OR;  Service: Orthopedics;  Laterality: Right;    TONSILLECTOMY      TRANSFER OF HAND TENDON Right 8/16/2022    Procedure: TRANSFER, TENDON, HAND;  Surgeon: Brennen Prabhakar Jr., MD;  Location: [facility] OR;  Service: Orthopedics;  Laterality: Right;  transfer extensor tendon small finger    TRANSFORAMINAL EPIDURAL INJECTION OF STEROID Left 8/25/2021    Procedure: Injection,steroid,epidural,transforaminal approach; Levels: L5-S1;  Surgeon: Heather Mederos MD;  Location: [facility] PAIN MGT;  Service: Pain Management;  Laterality: Left;  No pacemaker. Patient is taking ASA.     TRANSFORAMINAL EPIDURAL INJECTION OF STEROID Bilateral 2/23/2022    Procedure: Injection,steroid,epidural,transforaminal bilateral L5;  Surgeon: Heather MAR  "MD Gatito;  Location: [facility] PAIN MGT;  Service: Pain Management;  Laterality: Bilateral;  ASA   no pacemaker       OBJECTIVE:   PHYSICAL EXAM:  Height: 5' 4" (162.6 cm) Weight: 64.9 kg (143 lb)  Vitals:    12/08/22 0949   Weight: 64.9 kg (143 lb)   Height: 5' 4" (1.626 m)   PainSc:   3   PainLoc: Hand     Ortho/SPM Exam  Examination right hand wrist incision well healed just a mild amount of swelling on the dorsum of the wrist near the extensor retinaculum but it is nontender full range of motion good strength of the extensors to the ring and small finger    RADIOGRAPHS:  None  Comments: I have personally reviewed the imaging and I agree with the above radiologist's report.    ASSESSMENT/PLAN:     IMPRESSION:  Status post tendon transfer right ring and small finger    PLAN:  Continue Voltaren gel topical in the area of swelling which should improve over time otherwise full activities    FOLLOW UP:  As needed    Disclaimer: This note has been generated using voice-recognition software. There may be typographical errors that have been missed during proof-reading.     "

## 2023-01-05 ENCOUNTER — OFFICE VISIT (OUTPATIENT)
Dept: PAIN MEDICINE | Facility: CLINIC | Age: 83
End: 2023-01-05
Payer: MEDICARE

## 2023-01-05 ENCOUNTER — TELEPHONE (OUTPATIENT)
Dept: PAIN MEDICINE | Facility: CLINIC | Age: 83
End: 2023-01-05
Payer: MEDICARE

## 2023-01-05 VITALS
BODY MASS INDEX: 24.56 KG/M2 | WEIGHT: 143.06 LBS | HEART RATE: 87 BPM | SYSTOLIC BLOOD PRESSURE: 124 MMHG | DIASTOLIC BLOOD PRESSURE: 75 MMHG

## 2023-01-05 DIAGNOSIS — M46.1 SACROILIITIS: Primary | ICD-10-CM

## 2023-01-05 DIAGNOSIS — M70.61 GREATER TROCHANTERIC BURSITIS OF RIGHT HIP: ICD-10-CM

## 2023-01-05 DIAGNOSIS — M70.61 GREATER TROCHANTERIC BURSITIS OF RIGHT HIP: Primary | ICD-10-CM

## 2023-01-05 PROCEDURE — 99214 OFFICE O/P EST MOD 30 MIN: CPT | Mod: HCNC,GC,S$GLB, | Performed by: ANESTHESIOLOGY

## 2023-01-05 PROCEDURE — 99499 RISK ADDL DX/OHS AUDIT: ICD-10-PCS | Mod: HCNC,S$GLB,, | Performed by: ANESTHESIOLOGY

## 2023-01-05 PROCEDURE — 99214 PR OFFICE/OUTPT VISIT, EST, LEVL IV, 30-39 MIN: ICD-10-PCS | Mod: HCNC,GC,S$GLB, | Performed by: ANESTHESIOLOGY

## 2023-01-05 PROCEDURE — 1160F RVW MEDS BY RX/DR IN RCRD: CPT | Mod: HCNC,CPTII,S$GLB, | Performed by: ANESTHESIOLOGY

## 2023-01-05 PROCEDURE — 1159F MED LIST DOCD IN RCRD: CPT | Mod: HCNC,CPTII,S$GLB, | Performed by: ANESTHESIOLOGY

## 2023-01-05 PROCEDURE — 99499 UNLISTED E&M SERVICE: CPT | Mod: HCNC,S$GLB,, | Performed by: ANESTHESIOLOGY

## 2023-01-05 PROCEDURE — 99999 PR PBB SHADOW E&M-EST. PATIENT-LVL III: ICD-10-PCS | Mod: PBBFAC,HCNC,, | Performed by: ANESTHESIOLOGY

## 2023-01-05 PROCEDURE — 1125F AMNT PAIN NOTED PAIN PRSNT: CPT | Mod: HCNC,CPTII,S$GLB, | Performed by: ANESTHESIOLOGY

## 2023-01-05 PROCEDURE — 3078F DIAST BP <80 MM HG: CPT | Mod: HCNC,CPTII,S$GLB, | Performed by: ANESTHESIOLOGY

## 2023-01-05 PROCEDURE — 3074F SYST BP LT 130 MM HG: CPT | Mod: HCNC,CPTII,S$GLB, | Performed by: ANESTHESIOLOGY

## 2023-01-05 PROCEDURE — 3074F PR MOST RECENT SYSTOLIC BLOOD PRESSURE < 130 MM HG: ICD-10-PCS | Mod: HCNC,CPTII,S$GLB, | Performed by: ANESTHESIOLOGY

## 2023-01-05 PROCEDURE — 1159F PR MEDICATION LIST DOCUMENTED IN MEDICAL RECORD: ICD-10-PCS | Mod: HCNC,CPTII,S$GLB, | Performed by: ANESTHESIOLOGY

## 2023-01-05 PROCEDURE — 99999 PR PBB SHADOW E&M-EST. PATIENT-LVL III: CPT | Mod: PBBFAC,HCNC,, | Performed by: ANESTHESIOLOGY

## 2023-01-05 PROCEDURE — 1160F PR REVIEW ALL MEDS BY PRESCRIBER/CLIN PHARMACIST DOCUMENTED: ICD-10-PCS | Mod: HCNC,CPTII,S$GLB, | Performed by: ANESTHESIOLOGY

## 2023-01-05 PROCEDURE — 3078F PR MOST RECENT DIASTOLIC BLOOD PRESSURE < 80 MM HG: ICD-10-PCS | Mod: HCNC,CPTII,S$GLB, | Performed by: ANESTHESIOLOGY

## 2023-01-05 PROCEDURE — 1125F PR PAIN SEVERITY QUANTIFIED, PAIN PRESENT: ICD-10-PCS | Mod: HCNC,CPTII,S$GLB, | Performed by: ANESTHESIOLOGY

## 2023-01-05 RX ORDER — PREGABALIN 25 MG/1
25 CAPSULE ORAL 2 TIMES DAILY
Qty: 60 CAPSULE | Refills: 0 | Status: SHIPPED | OUTPATIENT
Start: 2023-01-05 | End: 2023-03-22

## 2023-01-05 NOTE — PROGRESS NOTES
Chronic patient Established Note (Follow up visit)      SUBJECTIVE:    History of Present Illness: Sofía Mcwilliams is a 81 y.o. female referred by Dr. Natalia Stubbs for left groin pain. She has pmh of bilateral hip replacements which resolved a significant portion of her pain. However she reports that in march of this year she started to develop left groin pain. The pain she decribes as burning and sharp and radiated down the medial aspect of the thigh into the calf. Pain made worse with walking and standing. Made better with sitting and rest. She is still able to cycle without any significant pain. She denies any numbness tingling or weakness or b/b dysfunction. She did complete many courses of PT (up to 3 months) and but did not get pain relief. She did follow up with her orthopedic surgeon in  who repeated her x-rays to evaluate for hardware issues.         Onset: 7 months, started after walking more than usual  Location: left low back and left groin mostly  Radiation: left groin and left medial calf/leg  Timing: constant- groin ; intermittent -lbp  Quality: Aching and Throbbing  Exacerbating Factors: lifting, standing for more than 10 minutes and walking for more than 10-20 minutes  Alleviating Factors: massage, physical therapy and sitting  Associated Symptoms: denies night fever/night sweats, urinary incontinence and significant weight loss     Severity: Currently: 2/10   Typical Range: 2-4/10     Exacerbation: 4/10      Interval History    (01/05/2023) pt returns today for right low back pain.  Patient had a bilateral L5/S1 TFESI in August 2022 and she reports very little relief after this procedure.  She has low back pain, mainly right sided when she walks.  Pain improves with rest.  She denies leg symptoms.  She denies weakness.      (02/03/2022) pt returns today for right low back, right hip and right lateral leg pain, additionally she has mild left groin pain.. Pt's pain score is 2/10.  Mrs Mcwilliams reports  today that for 8 weeks she participated in the get fit program through [facility] which really helped her pain.  She states this pain in her right low back right hip and right leg started around [place].  Pain especially got worse after restarting physical therapy 3 weeks ago.  Pain currently is located in the right low back and right lateral anterior and medial leg stopping above the knee.  She experienced excellent relief following a left TF REID in August of 2021. She does still have mild left groin and leg pain but her worst pain is on the right today.  She denies any profound weakness, denies any bowel or bladder dysfunction, denies any saddle anesthesia denies any recent incident or trauma.  She reports that she is an avid exercise person and feels as though she may have pushed it too much which has now created more pain for her on the right.        Current Pain Scales:  Current: 2/10                Typical Range: 0-1/10        Interval updates:  3/2/2022 -Oj returns to clinic s/p Bilateral L5 transforaminal  on 2/23/22 with 60% relief with improved functionality.   She reports a pain intensity 0/10 today with a weekly range of 0-0/10.  Patient states she is doing very well since her injection she is performing ADLs with minimal to no pain.     Previous Interventions:  -02/23/2022  Bilateral L5 transforaminal   -08/25/2021 Left L5 transforaminal epidural steroid injection 90%   - bilateral hip replacements     Previous Therapies:  PT/OT: yes   Relevant Surgery: no   Previous Medications:   - NSAIDS: Meloxicam 15 mg  - Muscle Relaxants:  None  - TCAs: None  - SNRIs: None  - Topicals: None  - Anticonvulsants: None    - Opioids: None     Current Pain Medications:  Meloxicam 15 mg     Blood Thinners: none    Pain Disability Index Review:  Last 3 PDI Scores 1/5/2023 3/2/2022 2/3/2022   Pain Disability Index (PDI) 29 6 27         Imaging:     EXAMINATION:  MRI LUMBAR SPINE WITHOUT CONTRAST     CLINICAL  HISTORY:  Back pain or radiculopathy, > 6 wks;left lumbar radic; Radiculopathy, lumbar region     TECHNIQUE:  Multiplanar, multisequence MR images were acquired from the thoracolumbar junction to the sacrum without the administration of contrast.     COMPARISON:  08/05/2021 radiograph     FINDINGS:  Levo scoliotic curvature.  Vertebral body heights maintained.  Mild grade 1 anterolisthesis of L5 on S1.  Multilevel disc desiccation present with height loss noted at L2-3.  L4-5 Schmorl node endplate changes present.  No concerning marrow signal abnormality.     Conus terminates normally.  Intra-abdominal/pelvic structures demonstrate no concerning abnormality.     L1-L2: No spinal canal stenosis or neural foraminal narrowing.  Facet degenerative findings.     L2-L3: Mild circumferential disc bulging.  Facet arthropathy.  No significant spinal canal stenosis or neural foraminal narrowing.     L3-L4: Mild circumferential disc bulging.  Facet arthropathy.  No significant spinal canal stenosis or neural foraminal narrowing.     L4-L5: Mild circumferential disc bulging.  Facet degenerative hypertrophy findings.  Mild spinal canal stenosis without significant neural foraminal narrowing.     L5-S1: Spondylolisthesis with disc uncovering.  Facet degenerative hypertrophy findings.  Significant spinal canal stenosis.  At least moderate left neural foraminal narrowing present.  No significant right neural foraminal narrowing.     Impression:     Multilevel degenerative findings as above.        Electronically signed by: Fab Greenfield MD  Date:                                            08/14/2021  Time:                                           12:04    Allergies:   Review of patient's allergies indicates:   Allergen Reactions    Celecoxib      Other reaction(s): Swelling    Sulfa (sulfonamide antibiotics)      Other reaction(s): Hives       Current Medications:   Current Outpatient Medications   Medication Sig Dispense  Refill    aspirin (ECOTRIN) 81 MG EC tablet Take 1 tablet by mouth.      atorvastatin (LIPITOR) 20 MG tablet TAKE 1 TABLET EVERY DAY 90 tablet 3    CALCIUM CARB/VIT D3/MINERALS (CALCIUM-VITAMIN D ORAL) once daily.       coenzyme Q10 10 mg capsule Take by mouth.      diclofenac sodium (VOLTAREN) 1 % Gel Apply 2 g topically 3 (three) times daily. 100 g 1    losartan (COZAAR) 25 MG tablet TAKE 1 TABLET EVERY DAY 90 tablet 3    meloxicam (MOBIC) 15 MG tablet TAKE 1 TABLET(15 MG) BY MOUTH EVERY DAY 90 tablet 3    multivitamin (THERAGRAN) per tablet Take 1 tablet by mouth.      rOPINIRole (REQUIP XL) 2 mg 24 hr tablet Take 1 tablet (2 mg total) by mouth every evening. 90 tablet 3    rOPINIRole (REQUIP) 0.5 MG tablet Take 1 tablet as needed up to 3 times a day for breakthrough RLS symptoms. 90 tablet 11    VITAMIN B COMPLEX ORAL Take by mouth once daily.       vitamin D 1000 units Tab Take 185 mg by mouth once daily.      lisinopriL (PRINIVIL,ZESTRIL) 2.5 MG tablet Take 2.5 mg by mouth.      pregabalin (LYRICA) 25 MG capsule Take 1 capsule (25 mg total) by mouth 2 (two) times daily. 60 capsule 0     No current facility-administered medications for this visit.       REVIEW OF SYSTEMS:    GENERAL:  No weight loss, malaise or fevers.  HEENT:  Negative for frequent or significant headaches.  NECK:  Negative for lumps, goiter, pain and significant neck swelling.  RESPIRATORY:  Negative for cough, wheezing or shortness of breath.  CARDIOVASCULAR:  Negative for chest pain, leg swelling or palpitations.  GI:  Negative for abdominal discomfort, blood in stools or black stools or change in bowel habits.  MUSCULOSKELETAL:  See HPI.  SKIN:  Negative for lesions, rash, and itching.  PSYCH:  +ve for sleep disturbance, mood disorder and recent psychosocial stressors.  HEMATOLOGY/LYMPHOLOGY:  Negative for prolonged bleeding, bruising easily or swollen nodes.  NEURO:   No history of headaches, syncope, paralysis, seizures or tremors.  All  other reviewed and negative other than HPI.    Past Medical History:  Past Medical History:   Diagnosis Date    Anxiety 5/2/2016    Arthritis     Basal cell carcinoma 2013    left buttock    Cataract     Hypertension     Other and unspecified hyperlipidemia        Past Surgical History:  Past Surgical History:   Procedure Laterality Date    APPENDECTOMY      bunion      right    COLONOSCOPY N/A 9/28/2015    Procedure: COLONOSCOPY;  Surgeon: Joe Amos MD;  [facility]  Service: Endoscopy;  Laterality: N/A;    COLONOSCOPY N/A 3/16/2021    Procedure: COLONOSCOPY;  Surgeon: Brice Zayas MD;  [facility]  Service: Endoscopy;  Laterality: N/A;  covid test 3/13-primary care    hip surgery x 2      HYSTERECTOMY      partial    INJECTION OF JOINT Left 1/9/2019    Procedure: Injection, LEFT HIP INTRAARTICULAR INJECTION;  Surgeon: Omega Estevez MD;  Location: [facility] PAIN MGT;  Service: Pain Management;  Laterality: Left;    INJECTION OF JOINT Left 3/13/2019    Procedure: INJECTION, JOINT LEFT HIP;  Surgeon: mOega Estevez MD;  Location: [facility] PAIN MGT;  Service: Pain Management;  Laterality: Left;  Left Hip Intrarticular Steroid Injection    JOINT REPLACEMENT      SURGICAL REMOVAL OF BONE SPUR  8/16/2022    Procedure: EXCISION, BONE SPUR ULNA;  Surgeon: Brennen Prabhakar Jr., MD;  Location: [facility] OR;  Service: Orthopedics;;    SURGICAL REMOVAL OF DISTAL ULNA Right 8/16/2022    Procedure: EXCISION, ULNA, DISTAL;  Surgeon: Brennen Prabhakar Jr., MD;  Location: [facility] OR;  Service: Orthopedics;  Laterality: Right;    TONSILLECTOMY      TRANSFER OF HAND TENDON Right 8/16/2022    Procedure: TRANSFER, TENDON, HAND;  Surgeon: Brennen Prabhakar Jr., MD;  Location: [facility] OR;  Service: Orthopedics;  Laterality: Right;  transfer extensor tendon small finger    TRANSFORAMINAL EPIDURAL INJECTION OF STEROID Left 8/25/2021    Procedure: Injection,steroid,epidural,transforaminal approach;  Levels: L5-S1;  Surgeon: Heather Mederos MD;  [place];  Service: Pain Management;  Laterality: Left;  No pacemaker. Patient is taking ASA.     TRANSFORAMINAL EPIDURAL INJECTION OF STEROID Bilateral 2022    Procedure: Injection,steroid,epidural,transforaminal bilateral L5;  Surgeon: Heather Mederos MD;  Location: [facility] [place];  Service: Pain Management;  Laterality: Bilateral;  ASA   no pacemaker       Family History:  Family History   Adopted: Yes       Social History:  Social History     Socioeconomic History    Marital status:    Occupational History    Occupation: RN   Tobacco Use    Smoking status: Former     Packs/day: 0.00     Years: 0.00     Pack years: 0.00     Types: Cigarettes     Quit date: 1965     Years since quittin.1    Smokeless tobacco: Never   Substance and Sexual Activity    Alcohol use: Yes     Alcohol/week: 0.0 standard drinks     Comment: Occ.    Drug use: No    Sexual activity: Yes   Other Topics Concern    Are you pregnant or think you may be? No    Breast-feeding No     Social Determinants of Health     Financial Resource Strain: Low Risk     Difficulty of Paying Living Expenses: Not hard at all   Food Insecurity: No Food Insecurity    Worried About Running Out of Food in the Last Year: Never true    Ran Out of Food in the Last Year: Never true   Transportation Needs: No Transportation Needs    Lack of Transportation (Medical): No    Lack of Transportation (Non-Medical): No   Physical Activity: Insufficiently Active    Days of Exercise per Week: 3 days    Minutes of Exercise per Session: 30 min   Stress: No Stress Concern Present    Feeling of Stress : Not at all   Social Connections: Unknown    Frequency of Communication with Friends and Family: Three times a week    Frequency of Social Gatherings with Friends and Family: More than three times a week    Active Member of Clubs or Organizations: No    Attends Club or Organization Meetings: Never     Marital Status:    Housing Stability: Low Risk     Unable to Pay for Housing in the Last Year: No    Number of Places Lived in the Last Year: 1    Unstable Housing in the Last Year: No       OBJECTIVE:    /75   Pulse 87   Wt 64.9 kg (143 lb 1.3 oz)   BMI 24.56 kg/m²     PHYSICAL EXAMINATION:    General appearance: Well appearing, in no acute distress, alert and oriented x3.  Psych:  Mood and affect appropriate.  Skin: Skin color, texture, turgor normal, no rashes or lesions, in both upper and lower body.  Head/face:  Atraumatic, normocephalic. No palpable lymph nodes  Neck: No pain to palpation over the cervical paraspinous muscles. Spurling Negative. No pain with neck flexion, extension, or lateral flexion. .  Cor: RRR  Pulm: CTA  GI: Abdomen soft and non-tender.  Back: Straight leg raising in the sitting and supine positions is negative to radicular pain. No pain to palpation over the spine or costovertebral angles. Normal range of motion without pain reproduction.  Extremities: Peripheral joint ROM is full and pain free without obvious instability or laxity in all four extremities. No deformities, edema, or skin discoloration. Good capillary refill.  Musculoskeletal:  Positive axial loading test bilateral. Positive tenderness over rt SIJ with positive thigh and sacral thrust test, Positive FABERE,Ganselin and Yeoman's test on the rt side.negative FADIR Tender to palpation right GTB  Negative Ganeslin's. Bilateral upper and lower extremity strength is normal and symmetric.  No atrophy or tone abnormalities are noted.  Neuro: Bilateral upper and lower extremity coordination and muscle stretch reflexes are physiologic and symmetric.  Plantar response are downgoing. No loss of sensation is noted.  Gait: antalgic    ASSESSMENT: 82 y.o. year old female with right sided low back pain, consistent with the following     1. Sacroiliitis  Procedure Order to Pain Management      2. Greater trochanteric  bursitis of right hip  Procedure Order to Pain Management            PLAN:   - I have stressed the importance of physical activity and a home exercise plan to help with pain and improve health.  - Patient can continue with medications for now since they are providing benefits, using them appropriately, and without side effects.  - Will schedule for right SIJ and GTB  - Start Lyrica to 25mg BID  - RTC 4 weeks after procedure  - Counseled patient regarding the importance of activity modification, constant sleeping habits, and physical therapy.    The above plan and management options were discussed at length with patient. Patient is in agreement with the above and verbalized understanding.    Parag Mcdonald   I have personally reviewed the history and exam of this patient and agree with the resident/fellow/NPs note as stated above.    Omega Estevez MD    01/05/2023

## 2023-01-26 ENCOUNTER — TELEPHONE (OUTPATIENT)
Dept: INTERNAL MEDICINE | Facility: CLINIC | Age: 83
End: 2023-01-26
Payer: MEDICARE

## 2023-02-01 ENCOUNTER — PES CALL (OUTPATIENT)
Dept: ADMINISTRATIVE | Facility: CLINIC | Age: 83
End: 2023-02-01
Payer: MEDICARE

## 2023-02-09 DIAGNOSIS — Z00.00 ENCOUNTER FOR MEDICARE ANNUAL WELLNESS EXAM: ICD-10-CM

## 2023-02-22 ENCOUNTER — TELEPHONE (OUTPATIENT)
Dept: PAIN MEDICINE | Facility: HOSPITAL | Age: 83
End: 2023-02-22
Payer: MEDICARE

## 2023-02-22 NOTE — TELEPHONE ENCOUNTER
Patient notified of 7:00 am arrival time and the following:    Please remember:  Check in at the registration desk on the first floor of the hospital. [address] [place] [id number]  Please DO NOT eat or drink 8 hours prior to your arrival time for the procedure, if diabetic 6 hours prior. If you are taking medications for blood pressure, heart medications, thyroid, cholesterol; you can take them with a small sip of water.  Refrain from drinking alcohol within 24 hours prior to your procedure  You will need someone to drive you home after procedure. You cannot take taxi, Uber, or Lyft.  If you start feeling sick (fever, chills, or coughing) or start on any antibiotics please contact us at [phone] to reschedule.

## 2023-02-23 ENCOUNTER — HOSPITAL ENCOUNTER (OUTPATIENT)
Facility: HOSPITAL | Age: 83
Discharge: HOME | End: 2023-02-23
Attending: ANESTHESIOLOGY | Admitting: ANESTHESIOLOGY
Payer: MEDICARE

## 2023-02-23 VITALS
RESPIRATION RATE: 16 BRPM | SYSTOLIC BLOOD PRESSURE: 153 MMHG | DIASTOLIC BLOOD PRESSURE: 83 MMHG | HEIGHT: 64 IN | OXYGEN SATURATION: 95 % | TEMPERATURE: 97 F | HEART RATE: 67 BPM | BODY MASS INDEX: 25.61 KG/M2 | WEIGHT: 150 LBS

## 2023-02-23 DIAGNOSIS — M46.1 SACROILIITIS: Primary | ICD-10-CM

## 2023-02-23 DIAGNOSIS — G89.29 CHRONIC PAIN: ICD-10-CM

## 2023-02-23 DIAGNOSIS — M70.61 GREATER TROCHANTERIC BURSITIS OF RIGHT HIP: ICD-10-CM

## 2023-02-23 PROCEDURE — 25500020 PHARM REV CODE 255: Mod: HCNC | Performed by: ANESTHESIOLOGY

## 2023-02-23 PROCEDURE — 20610 DRAIN/INJ JOINT/BURSA W/O US: CPT | Mod: 59,HCNC,RT | Performed by: ANESTHESIOLOGY

## 2023-02-23 PROCEDURE — 27096 PR INJECTION,SACROILIAC JOINT: ICD-10-PCS | Mod: HCNC,RT,, | Performed by: ANESTHESIOLOGY

## 2023-02-23 PROCEDURE — 20610 PR DRAIN/INJECT LARGE JOINT/BURSA: ICD-10-PCS | Mod: 59,HCNC,RT, | Performed by: ANESTHESIOLOGY

## 2023-02-23 PROCEDURE — 27096 INJECT SACROILIAC JOINT: CPT | Mod: HCNC,RT | Performed by: ANESTHESIOLOGY

## 2023-02-23 PROCEDURE — 63600175 PHARM REV CODE 636 W HCPCS: Mod: HCNC | Performed by: ANESTHESIOLOGY

## 2023-02-23 PROCEDURE — 27096 INJECT SACROILIAC JOINT: CPT | Mod: HCNC,RT,, | Performed by: ANESTHESIOLOGY

## 2023-02-23 PROCEDURE — 25000003 PHARM REV CODE 250: Mod: HCNC | Performed by: ANESTHESIOLOGY

## 2023-02-23 PROCEDURE — 20610 DRAIN/INJ JOINT/BURSA W/O US: CPT | Mod: 59,HCNC,RT, | Performed by: ANESTHESIOLOGY

## 2023-02-23 RX ORDER — TRIAMCINOLONE ACETONIDE 40 MG/ML
INJECTION, SUSPENSION INTRA-ARTICULAR; INTRAMUSCULAR
Status: DISCONTINUED | OUTPATIENT
Start: 2023-02-23 | End: 2023-02-23 | Stop reason: HOSPADM

## 2023-02-23 RX ORDER — BUPIVACAINE HYDROCHLORIDE 2.5 MG/ML
INJECTION, SOLUTION EPIDURAL; INFILTRATION; INTRACAUDAL
Status: DISCONTINUED | OUTPATIENT
Start: 2023-02-23 | End: 2023-02-23 | Stop reason: HOSPADM

## 2023-02-23 RX ORDER — LIDOCAINE HYDROCHLORIDE 20 MG/ML
INJECTION, SOLUTION EPIDURAL; INFILTRATION; INTRACAUDAL; PERINEURAL
Status: DISCONTINUED | OUTPATIENT
Start: 2023-02-23 | End: 2023-02-23 | Stop reason: HOSPADM

## 2023-02-23 RX ORDER — SODIUM CHLORIDE 9 MG/ML
500 INJECTION, SOLUTION INTRAVENOUS CONTINUOUS
Status: DISCONTINUED | OUTPATIENT
Start: 2023-02-23 | End: 2023-02-23 | Stop reason: HOSPADM

## 2023-02-23 NOTE — H&P
HPI  Sofía Mcwilliams presents for a right SI joint and right GTB injection.        Past Medical History:   Diagnosis Date    Anxiety 5/2/2016    Arthritis     Basal cell carcinoma 2013    left buttock    Cataract     Hypertension     Other and unspecified hyperlipidemia      Past Surgical History:   Procedure Laterality Date    APPENDECTOMY      bunion      right    COLONOSCOPY N/A 9/28/2015    Procedure: COLONOSCOPY;  Surgeon: Joe Amos MD;  [facility]: [facility]  Service: Endoscopy;  Laterality: N/A;    COLONOSCOPY N/A 3/16/2021    Procedure: COLONOSCOPY;  Surgeon: Brice Zayas MD;  [facility]: [facility]O ([facility]);  Service: Endoscopy;  Laterality: N/A;  covid test 3/13-primary care    hip surgery x 2      HYSTERECTOMY      partial    INJECTION OF JOINT Left 1/9/2019    Procedure: Injection, LEFT HIP INTRAARTICULAR INJECTION;  Surgeon: Omega Estevez MD;  Location: [facility] PAIN MGT;  Service: Pain Management;  Laterality: Left;    INJECTION OF JOINT Left 3/13/2019    Procedure: INJECTION, JOINT LEFT HIP;  Surgeon: Omega Estevez MD;  Location: [facility] PAIN MGT;  Service: Pain Management;  Laterality: Left;  Left Hip Intrarticular Steroid Injection    JOINT REPLACEMENT      SURGICAL REMOVAL OF BONE SPUR  8/16/2022    Procedure: EXCISION, BONE SPUR ULNA;  Surgeon: Brennen Prabhakar Jr., MD;  Location: [facility] OR;  Service: Orthopedics;;    SURGICAL REMOVAL OF DISTAL ULNA Right 8/16/2022    Procedure: EXCISION, ULNA, DISTAL;  Surgeon: Brennen Prabhakar Jr., MD;  Location: [facility] OR;  Service: Orthopedics;  Laterality: Right;    TONSILLECTOMY      TRANSFER OF HAND TENDON Right 8/16/2022    Procedure: TRANSFER, TENDON, HAND;  Surgeon: Brennen Prabhakar Jr., MD;  Location: [facility] OR;  Service: Orthopedics;  Laterality: Right;  transfer extensor tendon small finger    TRANSFORAMINAL EPIDURAL INJECTION OF STEROID Left 8/25/2021    Procedure: Injection,steroid,epidural,transforaminal approach; Levels: L5-S1;   "Surgeon: Heather Mederos MD;  Location: [facility] PAIN MGT;  Service: Pain Management;  Laterality: Left;  No pacemaker. Patient is taking ASA.     TRANSFORAMINAL EPIDURAL INJECTION OF STEROID Bilateral 2/23/2022    Procedure: Injection,steroid,epidural,transforaminal bilateral L5;  Surgeon: Heather Mederos MD;  Location: [facility] PAIN MGT;  Service: Pain Management;  Laterality: Bilateral;  ASA   no pacemaker     Review of patient's allergies indicates:   Allergen Reactions    Celecoxib      Other reaction(s): Swelling    Sulfa (sulfonamide antibiotics)      Other reaction(s): Hives        PMHx, PSHx, Allergies, Medications reviewed in epic      ROS negative except pain complaints in HPI    OBJECTIVE:    BP (!) 155/73   Pulse 71   Temp 97.6 °F (36.4 °C) (Skin)   Resp 18   Ht 5' 4" (1.626 m)   Wt 68 kg (150 lb)   Breastfeeding No   BMI 25.75 kg/m²     PHYSICAL EXAMINATION:    GENERAL: Well appearing, in no acute distress, alert and oriented x3.  PSYCH:  Mood and affect appropriate.  SKIN: Skin color, texture, turgor normal, no rashes or lesions.  CV: RRR with palpation of the radial artery.  PULM: No evidence of respiratory difficulty, symmetric chest rise. Clear to auscultation.  NEURO: Cranial nerves grossly intact.    Plan:    Proceed with procedure as planned    Taiwo Barros  02/23/2023    "

## 2023-02-23 NOTE — DISCHARGE INSTRUCTIONS
Home Care Instructions Pain Management:    1.  DIET:    You may resume your normal diet today.    2.  BATHING:    You may shower with luke warm water.    3.  DRESSING:    You may remove your bandage today.    4.  ACTIVITY LEVEL:      You may resume your normal activities 24 hours after your procedure.    5.  MEDICATIONS:    You may resume your normal medications today.    6.  SPECIAL INSTRUCTIONS:    No heat to the injection site for 24 hours including bath or shower, heating pad, moist heat or hot tubs.    Use an ice pack to the injection site for any pain or discomfort.  Apply ice packs for 20 minute intervals as needed.    If you have received any sedatives by mouth today, you can not drive for 12 hours.    If you have received sedation through an IV, you can not drive for 24 hours.    PLEASE CALL YOUR DOCTOR FOR THE FOLLOWIN.  Redness or swelling around the injection site.  2.  Fever of 101 degrees.  3.  Drainage (pus) from the injection site.  4.  For any continuous bleeding (some dried blood over the incision is normal.)    FOR EMERGENCIES:    If any unusual problems or difficulties occur during clinic hours, call [phone] or dial [phone].    Follow up with with your physician in 2-3 weeks.

## 2023-02-23 NOTE — OP NOTE
Sacroiliac Joint and Greater Trochanteric Bursa Injection under Fluoroscopic Guidance    The procedure, risks, benefits, and options were discussed with the patient. There are no contraindications to the procedure. The patent expressed understanding and agreed to the procedure. Informed written consent was obtained prior to the start of the procedure and can be found in the patient's chart.    PATIENT NAME: Sofía Mcwilliams   MRN: [id number]     DATE OF PROCEDURE: 02/23/2023    PROCEDURE:   Right Sacroiliac Joint Injection under Fluoroscopic Guidance  Right Greater Trochanteric Bursa Injection under Fluoroscopic Guidance    PRE-OP DIAGNOSIS:   Greater trochanteric bursitis of right hip [M70.61]  Sacroiliitis     POST-OP DIAGNOSIS: Same    PHYSICIAN: Omega Estevez MD    ASSISTANTS: Taiwo Barros MD [facility] Fellow    MEDICATIONS INJECTED: Preservative-free Kenalog 40mg with 7cc of Bupivacine 0.25%     LOCAL ANESTHETIC INJECTED: Xylocaine 2%     SEDATION: None    ESTIMATED BLOOD LOSS: None    COMPLICATIONS: None    TECHNIQUE: Time-out was performed to identify the patient and procedure to be performed. With the patient laying in a prone position, the surgical area was prepped and draped in the usual sterile fashion using ChloraPrep and a fenestrated drape. The sacroiliac joint was determined under fluoroscopy guidance. Skin anesthesia was achieved by injecting Lidocaine 2% over the injection site. The sacroiliac joint was then approached with a 22 gauge,  3.5 inch spinal quinke needle that was introduced under fluoroscopic guidance in the AP and Lateral views. Once the needle tip was in the area of the joint, and there was no blood aspiration, contrast dye contrast dye Omnipaque (240mg/mL) was injected to confirm placement and there was no vascular runoff. Fluoroscopic imaging in the AP and lateral views revealed a clear outline of the joint space. 4 mL of the medication mixture listed above was injected slowly  intraarticular and shanti-articular. Displacement of the radio opaque contrast after injection of the medication confirmed that the medication went into the area of the joint. The needles were removed and bleeding was nil. A sterile dressing was applied. No specimens collected. The patient tolerated the procedure well.     TECHNIQUE: Time-out was performed to identify the patient and procedure to be performed. With the patient laying in a prone position, the surgical area was prepped and draped in the usual sterile fashion using ChloraPrep and a fenestrated drape. The area overlying the greater trochanteric bursa was determined under fluoroscopy guidance. Skin anesthesia was achieved by injecting Lidocaine 2% over the injection site. The greater trochanteric bursa was then approached with a 22 gauge, 5 inch spinal quinke needle that was introduced under fluoroscopic guidance in the AP view. Once the needle tip was in the area of the bursa, and there was no blood aspiration,  Contrast dye  Omnipaque (240mg/mL) was injected to confirm placement and there was no vascular runoff. 4 mL of the medication mixture listed above was injected slowly. The needles were removed and bleeding was nil. A sterile dressing was applied. No specimens collected. The patient tolerated the procedure well.    The patient was monitored after the procedure in the recovery area. They were given post-procedure and discharge instructions to follow at home. The patient was discharged in a stable condition.    Taiwo Barros MD [facility] Pain Fellow    I reviewed and edited the fellow's note. I conducted my own interview and physical examination. I agree with the findings. I was present and supervising all critical portions of the procedure.    Omega Estevez MD

## 2023-02-23 NOTE — PLAN OF CARE
Safety precautions maintained. Bed locked and in lowest position. Instructed pt to call for assistance. Pt verbalizes understanding.       Postop Diagnosis: same

## 2023-02-23 NOTE — DISCHARGE SUMMARY
Discharge Note  Short Stay      SUMMARY     Admit Date: 2/23/2023    Attending Physician: Omega Estevez    Discharge Physician: Taiwo Barros      Discharge Date: 2/23/2023 8:30 AM    Procedure(s) (LRB):  INJECTION,SACROILIAC JOINT Right SI Joint, Right GTB (Right)    Final Diagnosis: Greater trochanteric bursitis of right hip [M70.61]    Disposition: Home or self care    Patient Instructions:   Current Discharge Medication List        CONTINUE these medications which have NOT CHANGED    Details   aspirin (ECOTRIN) 81 MG EC tablet Take 1 tablet by mouth.      losartan (COZAAR) 25 MG tablet TAKE 1 TABLET EVERY DAY  Qty: 90 tablet, Refills: 3    Associated Diagnoses: Essential hypertension      atorvastatin (LIPITOR) 20 MG tablet TAKE 1 TABLET EVERY DAY  Qty: 90 tablet, Refills: 3    Associated Diagnoses: Hyperlipidemia, unspecified hyperlipidemia type      CALCIUM CARB/VIT D3/MINERALS (CALCIUM-VITAMIN D ORAL) once daily.       coenzyme Q10 10 mg capsule Take by mouth.      diclofenac sodium (VOLTAREN) 1 % Gel Apply 2 g topically 3 (three) times daily.  Qty: 100 g, Refills: 1      multivitamin (THERAGRAN) per tablet Take 1 tablet by mouth.      pregabalin (LYRICA) 25 MG capsule Take 1 capsule (25 mg total) by mouth 2 (two) times daily.  Qty: 60 capsule, Refills: 0      rOPINIRole (REQUIP XL) 2 mg 24 hr tablet Take 1 tablet (2 mg total) by mouth every evening.  Qty: 90 tablet, Refills: 3    Associated Diagnoses: RLS (restless legs syndrome)      rOPINIRole (REQUIP) 0.5 MG tablet Take 1 tablet as needed up to 3 times a day for breakthrough RLS symptoms.  Qty: 90 tablet, Refills: 11      VITAMIN B COMPLEX ORAL Take by mouth once daily.       vitamin D 1000 units Tab Take 185 mg by mouth once daily.           STOP taking these medications       lisinopriL (PRINIVIL,ZESTRIL) 2.5 MG tablet Comments:   Reason for Stopping:         meloxicam (MOBIC) 15 MG tablet Comments:   Reason for Stopping:                   Discharge  Diagnosis: Greater trochanteric bursitis of right hip [M70.61]  Condition on Discharge: Stable with no complications to procedure   Diet on Discharge: Same as before.  Activity: as per instruction sheet.  Discharge to: Home with a responsible adult.  Follow up: 2-4 weeks       Please call my office or pager at [phone] if experienced any weakness or loss of sensation, fever > 101.5, pain uncontrolled with oral medications, persistent nausea/vomiting/or diarrhea, redness or drainage from the incisions, or any other worrisome concerns. If physician on call was not reached or could not communicate with our office for any reason please go to the nearest emergency department        Omega Estevez

## 2023-03-20 ENCOUNTER — LAB VISIT (OUTPATIENT)
Dept: LAB | Facility: HOSPITAL | Age: 83
End: 2023-03-20
Attending: INTERNAL MEDICINE
Payer: MEDICARE

## 2023-03-20 DIAGNOSIS — E78.5 HYPERLIPIDEMIA, UNSPECIFIED HYPERLIPIDEMIA TYPE: ICD-10-CM

## 2023-03-20 DIAGNOSIS — I10 PRIMARY HYPERTENSION: ICD-10-CM

## 2023-03-20 LAB
ALBUMIN SERPL BCP-MCNC: 3.4 G/DL (ref 3.5–5.2)
ALP SERPL-CCNC: 94 U/L (ref 55–135)
ALT SERPL W/O P-5'-P-CCNC: 21 U/L (ref 10–44)
ANION GAP SERPL CALC-SCNC: 9 MMOL/L (ref 8–16)
AST SERPL-CCNC: 20 U/L (ref 10–40)
BILIRUB SERPL-MCNC: 0.8 MG/DL (ref 0.1–1)
BUN SERPL-MCNC: 12 MG/DL (ref 8–23)
CALCIUM SERPL-MCNC: 9.6 MG/DL (ref 8.7–10.5)
CHLORIDE SERPL-SCNC: 102 MMOL/L (ref 95–110)
CHOLEST SERPL-MCNC: 156 MG/DL (ref 120–199)
CHOLEST/HDLC SERPL: 2.3 {RATIO} (ref 2–5)
CO2 SERPL-SCNC: 28 MMOL/L (ref 23–29)
CREAT SERPL-MCNC: 0.7 MG/DL (ref 0.5–1.4)
EST. GFR  (NO RACE VARIABLE): >60 ML/MIN/1.73 M^2
GLUCOSE SERPL-MCNC: 103 MG/DL (ref 70–110)
HDLC SERPL-MCNC: 69 MG/DL (ref 40–75)
HDLC SERPL: 44.2 % (ref 20–50)
LDLC SERPL CALC-MCNC: 74.4 MG/DL (ref 63–159)
NONHDLC SERPL-MCNC: 87 MG/DL
POTASSIUM SERPL-SCNC: 4.1 MMOL/L (ref 3.5–5.1)
PROT SERPL-MCNC: 6.5 G/DL (ref 6–8.4)
SODIUM SERPL-SCNC: 139 MMOL/L (ref 136–145)
TRIGL SERPL-MCNC: 63 MG/DL (ref 30–150)

## 2023-03-20 PROCEDURE — 80053 COMPREHEN METABOLIC PANEL: CPT | Mod: HCNC | Performed by: INTERNAL MEDICINE

## 2023-03-20 PROCEDURE — 36415 COLL VENOUS BLD VENIPUNCTURE: CPT | Mod: HCNC,PO | Performed by: INTERNAL MEDICINE

## 2023-03-20 PROCEDURE — 80061 LIPID PANEL: CPT | Mod: HCNC | Performed by: INTERNAL MEDICINE

## 2023-03-22 ENCOUNTER — OFFICE VISIT (OUTPATIENT)
Dept: INTERNAL MEDICINE | Facility: CLINIC | Age: 83
End: 2023-03-22
Payer: MEDICARE

## 2023-03-22 VITALS
BODY MASS INDEX: 25.06 KG/M2 | WEIGHT: 146.81 LBS | OXYGEN SATURATION: 98 % | HEIGHT: 64 IN | HEART RATE: 99 BPM | SYSTOLIC BLOOD PRESSURE: 120 MMHG | DIASTOLIC BLOOD PRESSURE: 50 MMHG

## 2023-03-22 DIAGNOSIS — R26.89 BALANCE DISORDER: Primary | ICD-10-CM

## 2023-03-22 DIAGNOSIS — E78.5 HYPERLIPIDEMIA, UNSPECIFIED HYPERLIPIDEMIA TYPE: ICD-10-CM

## 2023-03-22 DIAGNOSIS — I10 PRIMARY HYPERTENSION: ICD-10-CM

## 2023-03-22 DIAGNOSIS — R09.89 OTHER SPECIFIED SYMPTOMS AND SIGNS INVOLVING THE CIRCULATORY AND RESPIRATORY SYSTEMS: ICD-10-CM

## 2023-03-22 DIAGNOSIS — G25.81 RLS (RESTLESS LEGS SYNDROME): ICD-10-CM

## 2023-03-22 DIAGNOSIS — R51.9 NEW ONSET OF HEADACHES AFTER AGE 50: ICD-10-CM

## 2023-03-22 PROBLEM — R52 PAIN: Status: RESOLVED | Noted: 2021-08-25 | Resolved: 2023-03-22

## 2023-03-22 PROCEDURE — 3078F DIAST BP <80 MM HG: CPT | Mod: HCNC,CPTII,S$GLB, | Performed by: INTERNAL MEDICINE

## 2023-03-22 PROCEDURE — 99214 PR OFFICE/OUTPT VISIT, EST, LEVL IV, 30-39 MIN: ICD-10-PCS | Mod: HCNC,S$GLB,, | Performed by: INTERNAL MEDICINE

## 2023-03-22 PROCEDURE — 3074F SYST BP LT 130 MM HG: CPT | Mod: HCNC,CPTII,S$GLB, | Performed by: INTERNAL MEDICINE

## 2023-03-22 PROCEDURE — 1101F PT FALLS ASSESS-DOCD LE1/YR: CPT | Mod: HCNC,CPTII,S$GLB, | Performed by: INTERNAL MEDICINE

## 2023-03-22 PROCEDURE — 1126F AMNT PAIN NOTED NONE PRSNT: CPT | Mod: HCNC,CPTII,S$GLB, | Performed by: INTERNAL MEDICINE

## 2023-03-22 PROCEDURE — 1160F RVW MEDS BY RX/DR IN RCRD: CPT | Mod: HCNC,CPTII,S$GLB, | Performed by: INTERNAL MEDICINE

## 2023-03-22 PROCEDURE — 3078F PR MOST RECENT DIASTOLIC BLOOD PRESSURE < 80 MM HG: ICD-10-PCS | Mod: HCNC,CPTII,S$GLB, | Performed by: INTERNAL MEDICINE

## 2023-03-22 PROCEDURE — 3288F FALL RISK ASSESSMENT DOCD: CPT | Mod: HCNC,CPTII,S$GLB, | Performed by: INTERNAL MEDICINE

## 2023-03-22 PROCEDURE — 1126F PR PAIN SEVERITY QUANTIFIED, NO PAIN PRESENT: ICD-10-PCS | Mod: HCNC,CPTII,S$GLB, | Performed by: INTERNAL MEDICINE

## 2023-03-22 PROCEDURE — 99999 PR PBB SHADOW E&M-EST. PATIENT-LVL V: ICD-10-PCS | Mod: PBBFAC,HCNC,, | Performed by: INTERNAL MEDICINE

## 2023-03-22 PROCEDURE — 3074F PR MOST RECENT SYSTOLIC BLOOD PRESSURE < 130 MM HG: ICD-10-PCS | Mod: HCNC,CPTII,S$GLB, | Performed by: INTERNAL MEDICINE

## 2023-03-22 PROCEDURE — 3288F PR FALLS RISK ASSESSMENT DOCUMENTED: ICD-10-PCS | Mod: HCNC,CPTII,S$GLB, | Performed by: INTERNAL MEDICINE

## 2023-03-22 PROCEDURE — 99214 OFFICE O/P EST MOD 30 MIN: CPT | Mod: HCNC,S$GLB,, | Performed by: INTERNAL MEDICINE

## 2023-03-22 PROCEDURE — 1159F MED LIST DOCD IN RCRD: CPT | Mod: HCNC,CPTII,S$GLB, | Performed by: INTERNAL MEDICINE

## 2023-03-22 PROCEDURE — 1160F PR REVIEW ALL MEDS BY PRESCRIBER/CLIN PHARMACIST DOCUMENTED: ICD-10-PCS | Mod: HCNC,CPTII,S$GLB, | Performed by: INTERNAL MEDICINE

## 2023-03-22 PROCEDURE — 1101F PR PT FALLS ASSESS DOC 0-1 FALLS W/OUT INJ PAST YR: ICD-10-PCS | Mod: HCNC,CPTII,S$GLB, | Performed by: INTERNAL MEDICINE

## 2023-03-22 PROCEDURE — 99999 PR PBB SHADOW E&M-EST. PATIENT-LVL V: CPT | Mod: PBBFAC,HCNC,, | Performed by: INTERNAL MEDICINE

## 2023-03-22 PROCEDURE — 1159F PR MEDICATION LIST DOCUMENTED IN MEDICAL RECORD: ICD-10-PCS | Mod: HCNC,CPTII,S$GLB, | Performed by: INTERNAL MEDICINE

## 2023-03-22 NOTE — PROGRESS NOTES
Subjective:       Patient ID: Sofía Mcwilliams is a 82 y.o. female.    Chief Complaint: Hypertension and Follow-up    HPI 82-year-old female presents to clinic today for follow-up of hypertension dyslipidemia restless leg syndrome compliant with all medications she feels that she has been experiencing new onset headaches associated with pressure with change of head position she notices when she bends down and gets up she denies any vertigo but she feels like she has been very off balance she is exercising with [employer] and and a dancing class.  She feels very off balance and sort of drunk at times like she is going to fall but she feels like she is very muscularly strong.  Review of Systems    Otherwise negative patient denies any sinus symptoms  Objective:      Physical Exam  General: Well-appearing, well-nourished.  No distress  HEENT: conjunctivae are normal.  Pupils are equal and reative to light.  TM's are clear and intact bilaterally.  Hearing is grossly normal.  Nasopharynx is clear.  Oropharynx is clear.  Neck: Supple.  No thyroid megaly.  No bruits.  Lymph: No cervical or supraclavicular adenopathy.  Heart: Regular rate and rhythm, without murmur, rub or gallop.  Lungs: Clear to auscultation; respiratory effort normal.  Abdomen: Soft, nontender, nondistended.  Normoactive bowel sounds.  No hepatomegaly.  No masses.  Extremities: Good distal pulses.  No edema.  Psych: Oriented to time person place.  Judgment and insight seem unimpaired.  Mood and affect are appropriate.  Neuro good strength 5/5 upper and lower extremities  Assessment:       Problem List Items Addressed This Visit       Hypertension    Hyperlipidemia    RLS (restless legs syndrome)     Other Visit Diagnoses       Balance disorder    -  Primary    Relevant Orders    MRI Brain W WO Contrast    New onset of headaches after age 50        Relevant Orders    US Carotid Bilateral    Other specified symptoms and signs involving the circulatory and respiratory  systems        Relevant Orders    US Carotid Bilateral              Plan:       Sofía was seen today for hypertension and follow-up.    Diagnoses and all orders for this visit:    Balance problem  -     MRI Brain W WO Contrast; Future  -     if imaging unremarkable, consider possible consultation with Neurology  Hyperlipidemia, unspecified hyperlipidemia type  Controlled.  Continue current medical regimen.  Prescription refills addressed.  Followup advised. See after visit summary.  Primary hypertension  Controlled.  Continue current medical regimen.  Prescription refills addressed.  Followup advised. See after visit summary.  RLS (restless legs syndrome)  Controlled.  Continue current medical regimen.  Prescription refills addressed.  Followup advised. See after visit summary.  New onset of headaches after age 50  -     US Carotid Bilateral; Future    Other specified symptoms and signs involving the circulatory and respiratory systems  -     US Carotid Bilateral; Future

## 2023-03-23 ENCOUNTER — OFFICE VISIT (OUTPATIENT)
Dept: ORTHOPEDICS | Facility: CLINIC | Age: 83
End: 2023-03-23
Payer: MEDICARE

## 2023-03-23 VITALS — BODY MASS INDEX: 25.2 KG/M2 | HEIGHT: 64 IN

## 2023-03-23 DIAGNOSIS — M66.20 TENDON RUPTURE, NONTRAUMATIC, EXTENSOR: Primary | ICD-10-CM

## 2023-03-23 PROCEDURE — 1101F PT FALLS ASSESS-DOCD LE1/YR: CPT | Mod: HCNC,CPTII,S$GLB, | Performed by: ORTHOPAEDIC SURGERY

## 2023-03-23 PROCEDURE — 3288F PR FALLS RISK ASSESSMENT DOCUMENTED: ICD-10-PCS | Mod: HCNC,CPTII,S$GLB, | Performed by: ORTHOPAEDIC SURGERY

## 2023-03-23 PROCEDURE — 99999 PR PBB SHADOW E&M-EST. PATIENT-LVL III: ICD-10-PCS | Mod: PBBFAC,HCNC,, | Performed by: ORTHOPAEDIC SURGERY

## 2023-03-23 PROCEDURE — 1126F AMNT PAIN NOTED NONE PRSNT: CPT | Mod: HCNC,CPTII,S$GLB, | Performed by: ORTHOPAEDIC SURGERY

## 2023-03-23 PROCEDURE — 1101F PR PT FALLS ASSESS DOC 0-1 FALLS W/OUT INJ PAST YR: ICD-10-PCS | Mod: HCNC,CPTII,S$GLB, | Performed by: ORTHOPAEDIC SURGERY

## 2023-03-23 PROCEDURE — 1126F PR PAIN SEVERITY QUANTIFIED, NO PAIN PRESENT: ICD-10-PCS | Mod: HCNC,CPTII,S$GLB, | Performed by: ORTHOPAEDIC SURGERY

## 2023-03-23 PROCEDURE — 3288F FALL RISK ASSESSMENT DOCD: CPT | Mod: HCNC,CPTII,S$GLB, | Performed by: ORTHOPAEDIC SURGERY

## 2023-03-23 PROCEDURE — 1159F PR MEDICATION LIST DOCUMENTED IN MEDICAL RECORD: ICD-10-PCS | Mod: HCNC,CPTII,S$GLB, | Performed by: ORTHOPAEDIC SURGERY

## 2023-03-23 PROCEDURE — 1159F MED LIST DOCD IN RCRD: CPT | Mod: HCNC,CPTII,S$GLB, | Performed by: ORTHOPAEDIC SURGERY

## 2023-03-23 PROCEDURE — 99213 OFFICE O/P EST LOW 20 MIN: CPT | Mod: HCNC,S$GLB,, | Performed by: ORTHOPAEDIC SURGERY

## 2023-03-23 PROCEDURE — 99213 PR OFFICE/OUTPT VISIT, EST, LEVL III, 20-29 MIN: ICD-10-PCS | Mod: HCNC,S$GLB,, | Performed by: ORTHOPAEDIC SURGERY

## 2023-03-23 PROCEDURE — 99999 PR PBB SHADOW E&M-EST. PATIENT-LVL III: CPT | Mod: PBBFAC,HCNC,, | Performed by: ORTHOPAEDIC SURGERY

## 2023-03-23 NOTE — PROGRESS NOTES
Subjective:      Patient ID: Sofía Mcwilliams is a 82 y.o. female.  Chief Complaint: Follow-up (Right ring and pinky finger c/o right wrist swelling)      HPI  Sofía Mcwilliams is a  82 y.o. female presenting today for follow up of extensor tendon transfer of the right hand.  She reports that she is having some loss of extension of the right ring finger the small fingers doing well but she seems to have ad a change in function in the right hand   .    Review of patient's allergies indicates:   Allergen Reactions    Celecoxib      Other reaction(s): Swelling    Sulfa (sulfonamide antibiotics)      Other reaction(s): Hives         Current Outpatient Medications   Medication Sig Dispense Refill    aspirin (ECOTRIN) 81 MG EC tablet Take 1 tablet by mouth.      atorvastatin (LIPITOR) 20 MG tablet TAKE 1 TABLET EVERY DAY 90 tablet 3    CALCIUM CARB/VIT D3/MINERALS (CALCIUM-VITAMIN D ORAL) once daily.       coenzyme Q10 10 mg capsule Take by mouth.      diclofenac sodium (VOLTAREN) 1 % Gel Apply 2 g topically 3 (three) times daily. 100 g 1    losartan (COZAAR) 25 MG tablet TAKE 1 TABLET EVERY DAY 90 tablet 3    multivitamin (THERAGRAN) per tablet Take 1 tablet by mouth.      rOPINIRole (REQUIP XL) 2 mg 24 hr tablet Take 1 tablet (2 mg total) by mouth every evening. 90 tablet 3    rOPINIRole (REQUIP) 0.5 MG tablet Take 1 tablet as needed up to 3 times a day for breakthrough RLS symptoms. 90 tablet 11    VITAMIN B COMPLEX ORAL Take by mouth once daily.       vitamin D 1000 units Tab Take 185 mg by mouth once daily.       No current facility-administered medications for this visit.       Past Medical History:   Diagnosis Date    Anxiety 5/2/2016    Arthritis     Basal cell carcinoma 2013    left buttock    Cataract     Hypertension     Other and unspecified hyperlipidemia        Past Surgical History:   Procedure Laterality Date    APPENDECTOMY      bunion      right    COLONOSCOPY N/A 9/28/2015    Procedure: COLONOSCOPY;  Surgeon:  Joe Amos MD;  Location: [facility] ENDO (4TH FLR);  Service: Endoscopy;  Laterality: N/A;    COLONOSCOPY N/A 3/16/2021    Procedure: COLONOSCOPY;  Surgeon: Brice Zayas MD;  Location: [facility] ENDO (4TH FLR);  Service: Endoscopy;  Laterality: N/A;  covid test 3/13-primary care    hip surgery x 2      HYSTERECTOMY      partial    INJECTION OF JOINT Left 1/9/2019    Procedure: Injection, LEFT HIP INTRAARTICULAR INJECTION;  Surgeon: Omega Estevez MD;  Location: [facility] PAIN MGT;  Service: Pain Management;  Laterality: Left;    INJECTION OF JOINT Left 3/13/2019    Procedure: INJECTION, JOINT LEFT HIP;  Surgeon: Omega Estevez MD;  Location: [facility] PAIN MGT;  Service: Pain Management;  Laterality: Left;  Left Hip Intrarticular Steroid Injection    INJECTION, SACROILIAC JOINT Right 2/23/2023    Procedure: INJECTION,SACROILIAC JOINT Right SI Joint, Right GTB;  Surgeon: Omega Estevez MD;  Location: [facility] PAIN MGT;  Service: Pain Management;  Laterality: Right;    JOINT REPLACEMENT      SURGICAL REMOVAL OF BONE SPUR  8/16/2022    Procedure: EXCISION, BONE SPUR ULNA;  Surgeon: Brennen Prabhakar Jr., MD;  Location: [facility] OR;  Service: Orthopedics;;    SURGICAL REMOVAL OF DISTAL ULNA Right 8/16/2022    Procedure: EXCISION, ULNA, DISTAL;  Surgeon: Brennen Prabhakar Jr., MD;  Location: [facility] OR;  Service: Orthopedics;  Laterality: Right;    TONSILLECTOMY      TRANSFER OF HAND TENDON Right 8/16/2022    Procedure: TRANSFER, TENDON, HAND;  Surgeon: Brennen Prabhakar Jr., MD;  Location: [facility] OR;  Service: Orthopedics;  Laterality: Right;  transfer extensor tendon small finger    TRANSFORAMINAL EPIDURAL INJECTION OF STEROID Left 8/25/2021    Procedure: Injection,steroid,epidural,transforaminal approach; Levels: L5-S1;  Surgeon: Heather Mederos MD;  Location: [facility] PAIN MGT;  Service: Pain Management;  Laterality: Left;  No pacemaker. Patient is taking ASA.     TRANSFORAMINAL EPIDURAL INJECTION OF STEROID Bilateral 2/23/2022     "Procedure: Injection,steroid,epidural,transforaminal bilateral L5;  Surgeon: Heather Mederos MD;  Location: [facility];  Service: Pain Management;  Laterality: Bilateral;  ASA   no pacemaker       OBJECTIVE:   PHYSICAL EXAM:  Height: 5' 4" (162.6 cm)    Vitals:    03/23/23 1411   Height: 5' 4" (1.626 m)   PainSc: 0-No pain   PainLoc: Finger     Ortho/SPM Exam  Examination right hand incisions are all well healed no evidence of infection   There is some synovium dorsally with some swelling over the wrist joint and extensor retinaculum  There is a loss of active extension of the ring finger the other digits extend well including the small finger    RADIOGRAPHS:  None  Comments: I have personally reviewed the imaging and I agree with the above radiologist's report.    ASSESSMENT/PLAN:     IMPRESSION:  New rupture extensor tendon to the right ring finger with extensor tendon tenosynovitis    PLAN:  I explained the nature of the problem to the patient I suspect tenosynovitis has caused rupture of the extensor tendon to the ring finger   For this reason I am recommending surgery for tenosynovectomy and tendon transfer to the ring finger   She would like to wait until after her scheduled vacation in the meantime no heavy lifting    FOLLOW UP:  2 months to set up surgery    Disclaimer: This note has been generated using voice-recognition software. There may be typographical errors that have been missed during proof-reading.   "

## 2023-03-24 ENCOUNTER — TELEPHONE (OUTPATIENT)
Dept: FAMILY MEDICINE | Facility: CLINIC | Age: 83
End: 2023-03-24
Payer: MEDICARE

## 2023-03-24 DIAGNOSIS — G25.81 RLS (RESTLESS LEGS SYNDROME): Primary | ICD-10-CM

## 2023-03-24 RX ORDER — DIAZEPAM 5 MG/1
5 TABLET ORAL
Qty: 2 TABLET | Refills: 0 | Status: SHIPPED | OUTPATIENT
Start: 2023-03-24 | End: 2023-05-02

## 2023-03-24 NOTE — TELEPHONE ENCOUNTER
I can give her some valium for the procedure or she could try some immediate release requip.  What would she like?

## 2023-03-24 NOTE — TELEPHONE ENCOUNTER
Pt is scheduled for an mri of brain, pt states she has bad restless legs, she will be unable to keep still while doing the mri, is there  something she can take to help her, please advise

## 2023-03-24 NOTE — TELEPHONE ENCOUNTER
----- Message from Harlanyeyo Mart sent at 3/24/2023  8:23 AM CDT -----  Type:  Needs Medical Advice    Who Called: self  Reason:she has an Mri coming up and because of her restless leg syndrome she doesn't think she will be able to lay still  Would the patient rather a call back or a response via [employer]? call  Best Call Back Number: [phone]  Additional Information: none

## 2023-04-04 ENCOUNTER — HOSPITAL ENCOUNTER (OUTPATIENT)
Dept: RADIOLOGY | Facility: HOSPITAL | Age: 83
Discharge: HOME | End: 2023-04-04
Attending: INTERNAL MEDICINE
Payer: MEDICARE

## 2023-04-04 DIAGNOSIS — R09.89 OTHER SPECIFIED SYMPTOMS AND SIGNS INVOLVING THE CIRCULATORY AND RESPIRATORY SYSTEMS: ICD-10-CM

## 2023-04-04 DIAGNOSIS — R51.9 NEW ONSET OF HEADACHES AFTER AGE 50: ICD-10-CM

## 2023-04-04 DIAGNOSIS — R26.89 BALANCE DISORDER: ICD-10-CM

## 2023-04-04 PROCEDURE — 70553 MRI BRAIN W WO CONTRAST: ICD-10-PCS | Mod: 26,,, | Performed by: RADIOLOGY

## 2023-04-04 PROCEDURE — 93880 US CAROTID BILATERAL: ICD-10-PCS | Mod: 26,,, | Performed by: RADIOLOGY

## 2023-04-04 PROCEDURE — 70553 MRI BRAIN STEM W/O & W/DYE: CPT | Mod: 26,,, | Performed by: RADIOLOGY

## 2023-04-04 PROCEDURE — 25500020 PHARM REV CODE 255: Performed by: INTERNAL MEDICINE

## 2023-04-04 PROCEDURE — A9585 GADOBUTROL INJECTION: HCPCS | Performed by: INTERNAL MEDICINE

## 2023-04-04 PROCEDURE — 93880 EXTRACRANIAL BILAT STUDY: CPT | Mod: TC

## 2023-04-04 PROCEDURE — 70553 MRI BRAIN STEM W/O & W/DYE: CPT | Mod: TC

## 2023-04-04 PROCEDURE — 93880 EXTRACRANIAL BILAT STUDY: CPT | Mod: 26,,, | Performed by: RADIOLOGY

## 2023-04-04 RX ORDER — GADOBUTROL 604.72 MG/ML
7 INJECTION INTRAVENOUS
Status: COMPLETED | OUTPATIENT
Start: 2023-04-04 | End: 2023-04-04

## 2023-04-04 RX ADMIN — GADOBUTROL 7 ML: 604.72 INJECTION INTRAVENOUS at 02:04

## 2023-04-06 ENCOUNTER — PATIENT MESSAGE (OUTPATIENT)
Dept: OPTOMETRY | Facility: CLINIC | Age: 83
End: 2023-04-06
Payer: MEDICARE

## 2023-04-12 ENCOUNTER — TELEPHONE (OUTPATIENT)
Dept: OPTOMETRY | Facility: CLINIC | Age: 83
End: 2023-04-12
Payer: MEDICARE

## 2023-04-12 NOTE — TELEPHONE ENCOUNTER
----- Message from Nanci Parr sent at 4/12/2023  2:22 PM CDT -----  Regarding: appt access  Pt says she is returning Qing's call concerning her appt on 5-19-23 being booked out.  Pls call with a new appt.

## 2023-05-02 ENCOUNTER — OFFICE VISIT (OUTPATIENT)
Dept: OPTOMETRY | Facility: CLINIC | Age: 83
End: 2023-05-02
Payer: COMMERCIAL

## 2023-05-02 DIAGNOSIS — Z13.5 GLAUCOMA SCREENING: ICD-10-CM

## 2023-05-02 DIAGNOSIS — H35.9 RETINA DISORDER: ICD-10-CM

## 2023-05-02 DIAGNOSIS — H52.4 PRESBYOPIA: ICD-10-CM

## 2023-05-02 DIAGNOSIS — H25.13 NUCLEAR SCLEROSIS, BILATERAL: Primary | ICD-10-CM

## 2023-05-02 PROCEDURE — 92014 PR EYE EXAM, EST PATIENT,COMPREHESV: ICD-10-PCS | Mod: S$GLB,,, | Performed by: OPTOMETRIST

## 2023-05-02 PROCEDURE — 99999 PR PBB SHADOW E&M-EST. PATIENT-LVL III: CPT | Mod: PBBFAC,,, | Performed by: OPTOMETRIST

## 2023-05-02 PROCEDURE — 92015 PR REFRACTION: ICD-10-PCS | Mod: S$GLB,,, | Performed by: OPTOMETRIST

## 2023-05-02 PROCEDURE — 92014 COMPRE OPH EXAM EST PT 1/>: CPT | Mod: S$GLB,,, | Performed by: OPTOMETRIST

## 2023-05-02 PROCEDURE — 99999 PR PBB SHADOW E&M-EST. PATIENT-LVL III: ICD-10-PCS | Mod: PBBFAC,,, | Performed by: OPTOMETRIST

## 2023-05-02 PROCEDURE — 92015 DETERMINE REFRACTIVE STATE: CPT | Mod: S$GLB,,, | Performed by: OPTOMETRIST

## 2023-05-02 RX ORDER — IOHEXOL 300 MG/ML
INJECTION, SOLUTION INTRAVENOUS
COMMUNITY
Start: 2023-02-23 | End: 2023-06-09

## 2023-05-02 RX ORDER — TRIAMCINOLONE ACETONIDE 40 MG/ML
INJECTION, SUSPENSION INTRA-ARTICULAR; INTRAMUSCULAR
COMMUNITY
Start: 2023-02-23 | End: 2023-06-09

## 2023-05-02 NOTE — PROGRESS NOTES
HPI    MORELIA 10/20  Patient is here for annual exam. Patient hasn't noticed any   vision changes since the last exam.  Glasses still seem fine. Not using   any drops. No dry eye symptoms.  Last edited by Iris Ramos on 5/2/2023  7:55 AM.            Assessment /Plan     For exam results, see Encounter Report.    Nuclear sclerosis, bilateral    Glaucoma screening    Presbyopia    Retina disorder - Both Eyes      1. Cat OU--pt wishes new Rx  2. Sp focal laser for periph hole OD--stable  3. Vit traction OS--stable.  Again discussed S+S of RD  4. JOLENE--advised SYSTANE or REFRESH ATs prn    PLAN:    rtc 1 yr, or immediately if F/F

## 2023-06-06 ENCOUNTER — TELEPHONE (OUTPATIENT)
Dept: ORTHOPEDICS | Facility: CLINIC | Age: 83
End: 2023-06-06
Payer: MEDICARE

## 2023-06-06 NOTE — TELEPHONE ENCOUNTER
----- Message from Nessa Morrow sent at 6/6/2023  9:02 AM CDT -----  Type:  Patient Returning Call    Who Called:pt  Who Left Message for Patient:office  Does the patient know what this is regarding?:patient requesting a call back for a right hand issue  Would the patient rather a call back or a response via [email or web address]? call  Best Call Back Number:[phone]  Additional Information:

## 2023-06-06 NOTE — TELEPHONE ENCOUNTER
Spoke with patient. Patient called inquiring about scheduling a procedure for a new problem that she has developed on another finger. Informed her that Dr Prabhakar will have to assess her before scheduling any type of procedure since it is a new problem. Patient verbalized understanding.

## 2023-06-06 NOTE — TELEPHONE ENCOUNTER
----- Message from Estefany Sahni sent at 6/6/2023  8:08 AM CDT -----  Regarding: Medical Assistance  Contact: Patient  Patient is requesting a call back in reference to right hand   Patient stated it seems she may be having another issue with another tendon and would like a call back to discuss how to proceed   Please Assist     Patient can be reached at [phone]

## 2023-06-09 ENCOUNTER — OFFICE VISIT (OUTPATIENT)
Dept: PODIATRY | Facility: CLINIC | Age: 83
End: 2023-06-09
Payer: MEDICARE

## 2023-06-09 VITALS
HEIGHT: 64 IN | WEIGHT: 145.38 LBS | SYSTOLIC BLOOD PRESSURE: 133 MMHG | DIASTOLIC BLOOD PRESSURE: 72 MMHG | HEART RATE: 75 BPM | BODY MASS INDEX: 24.82 KG/M2

## 2023-06-09 DIAGNOSIS — L85.1 PLANTAR POROKERATOSIS, ACQUIRED: Primary | ICD-10-CM

## 2023-06-09 DIAGNOSIS — M79.671 RIGHT FOOT PAIN: ICD-10-CM

## 2023-06-09 PROCEDURE — 3078F PR MOST RECENT DIASTOLIC BLOOD PRESSURE < 80 MM HG: ICD-10-PCS | Mod: CPTII,S$GLB,, | Performed by: PODIATRIST

## 2023-06-09 PROCEDURE — 1126F AMNT PAIN NOTED NONE PRSNT: CPT | Mod: CPTII,S$GLB,, | Performed by: PODIATRIST

## 2023-06-09 PROCEDURE — 1101F PR PT FALLS ASSESS DOC 0-1 FALLS W/OUT INJ PAST YR: ICD-10-PCS | Mod: CPTII,S$GLB,, | Performed by: PODIATRIST

## 2023-06-09 PROCEDURE — 1101F PT FALLS ASSESS-DOCD LE1/YR: CPT | Mod: CPTII,S$GLB,, | Performed by: PODIATRIST

## 2023-06-09 PROCEDURE — 3078F DIAST BP <80 MM HG: CPT | Mod: CPTII,S$GLB,, | Performed by: PODIATRIST

## 2023-06-09 PROCEDURE — 1126F PR PAIN SEVERITY QUANTIFIED, NO PAIN PRESENT: ICD-10-PCS | Mod: CPTII,S$GLB,, | Performed by: PODIATRIST

## 2023-06-09 PROCEDURE — 1160F PR REVIEW ALL MEDS BY PRESCRIBER/CLIN PHARMACIST DOCUMENTED: ICD-10-PCS | Mod: CPTII,S$GLB,, | Performed by: PODIATRIST

## 2023-06-09 PROCEDURE — 99999 PR PBB SHADOW E&M-EST. PATIENT-LVL III: ICD-10-PCS | Mod: PBBFAC,,, | Performed by: PODIATRIST

## 2023-06-09 PROCEDURE — 99999 PR PBB SHADOW E&M-EST. PATIENT-LVL III: CPT | Mod: PBBFAC,,, | Performed by: PODIATRIST

## 2023-06-09 PROCEDURE — 1159F PR MEDICATION LIST DOCUMENTED IN MEDICAL RECORD: ICD-10-PCS | Mod: CPTII,S$GLB,, | Performed by: PODIATRIST

## 2023-06-09 PROCEDURE — 99203 OFFICE O/P NEW LOW 30 MIN: CPT | Mod: S$GLB,,, | Performed by: PODIATRIST

## 2023-06-09 PROCEDURE — 3075F PR MOST RECENT SYSTOLIC BLOOD PRESS GE 130-139MM HG: ICD-10-PCS | Mod: CPTII,S$GLB,, | Performed by: PODIATRIST

## 2023-06-09 PROCEDURE — 3288F PR FALLS RISK ASSESSMENT DOCUMENTED: ICD-10-PCS | Mod: CPTII,S$GLB,, | Performed by: PODIATRIST

## 2023-06-09 PROCEDURE — 1160F RVW MEDS BY RX/DR IN RCRD: CPT | Mod: CPTII,S$GLB,, | Performed by: PODIATRIST

## 2023-06-09 PROCEDURE — 3075F SYST BP GE 130 - 139MM HG: CPT | Mod: CPTII,S$GLB,, | Performed by: PODIATRIST

## 2023-06-09 PROCEDURE — 3288F FALL RISK ASSESSMENT DOCD: CPT | Mod: CPTII,S$GLB,, | Performed by: PODIATRIST

## 2023-06-09 PROCEDURE — 1159F MED LIST DOCD IN RCRD: CPT | Mod: CPTII,S$GLB,, | Performed by: PODIATRIST

## 2023-06-09 PROCEDURE — 99203 PR OFFICE/OUTPT VISIT, NEW, LEVL III, 30-44 MIN: ICD-10-PCS | Mod: S$GLB,,, | Performed by: PODIATRIST

## 2023-06-09 RX ORDER — UREA 200 MG/G
1 CREAM TOPICAL DAILY
Qty: 75 G | Refills: 10 | Status: SHIPPED | OUTPATIENT
Start: 2023-06-09 | End: 2023-09-18

## 2023-06-09 NOTE — PROGRESS NOTES
Chief Complaint   Patient presents with    Foot Problem     Possible callous ball of right foot             HPI:   Sofía Mcwilliams is a 82 y.o. female who presents to clinic with concerns of painful right plantar foot, present for the past months.    Pain is worse with direct pressure.    Patient wears [place] shoes today.    Patient recalls no acute trauma to the area.    Treatment tried:  none yet.       PCP:  Natalia Stubbs MD       Patient Active Problem List   Diagnosis    Retina disorder - Both Eyes    Nuclear sclerosis - Both Eyes    Hypertension    Hyperlipidemia    Arthritis    Osteopenia    Anxiety    Mild carotid artery disease    Chronic pain    Osteoarthritis of hip    Primary osteoarthritis of both hands    RLS (restless legs syndrome)    Decreased  strength    Decreased pinch strength    Decreased activities of daily living (ADL)    Reduced sensation    History of colon polyps    Lumbar radiculopathy    Tendon rupture, nontraumatic, extensor    Decreased range of motion    Localized edema             Current Outpatient Medications on File Prior to Visit   Medication Sig Dispense Refill    aspirin (ECOTRIN) 81 MG EC tablet Take 1 tablet by mouth.      atorvastatin (LIPITOR) 20 MG tablet TAKE 1 TABLET EVERY DAY 90 tablet 3    CALCIUM CARB/VIT D3/MINERALS (CALCIUM-VITAMIN D ORAL) once daily.       coenzyme Q10 10 mg capsule Take by mouth.      losartan (COZAAR) 25 MG tablet TAKE 1 TABLET EVERY DAY 90 tablet 3    multivitamin (THERAGRAN) per tablet Take 1 tablet by mouth.      rOPINIRole (REQUIP XL) 2 mg 24 hr tablet TAKE 1 TABLET BY MOUTH EVERY EVENING 90 tablet 3    rOPINIRole (REQUIP) 0.5 MG tablet Take 1 tablet as needed up to 3 times a day for breakthrough RLS symptoms. 90 tablet 11    VITAMIN B COMPLEX ORAL Take by mouth once daily.       vitamin D 1000 units Tab Take 185 mg by mouth once daily.      diclofenac sodium (VOLTAREN) 1 % Gel Apply 2 g topically 3 (three) times daily. 100 g 1     "[DISCONTINUED] KENALOG 40 mg/mL injection       [DISCONTINUED] OMNIPAQUE 300 300 mg iodine/mL injection        No current facility-administered medications on file prior to visit.         Review of patient's allergies indicates:   Allergen Reactions    Celecoxib      Other reaction(s): Swelling    Sulfa (sulfonamide antibiotics)      Other reaction(s): Hives           ROS:  General ROS: negative for - chills, fatigue or fever  Cardiovascular ROS: no chest pain or dyspnea on exertion  Musculoskeletal ROS: negative for - joint pain or joint stiffness   Skin: Negative for rash, negative for nail or hair changes. Positive for  Corn/callus on the foot.         EXAM:    Vitals:    06/09/23 1026   BP: 133/72   BP Location: Right arm   Patient Position: Sitting   BP Method: Medium (Automatic)   Pulse: 75   Weight: 65.9 kg (145 lb 6.3 oz)   Height: 5' 4" (1.626 m)        General: alert and orient and in no apparent distress.        right foot:  Vasc:   Palpable pedal pulses  Feet appropriately warm to touch.   Capillary refill time within normal limits.   Edema: Absent      Neuro:   Epicritic sensation intact.   Tinels sign:  Absent  Mulders click: Absent  SWMF: Absent      MSK:     normal toes without deformities  Muscle strength:  5/5  Joints:  without crepitus  Deformity: none      Derm:      Nucleated, Hyperkeratosis located at the sub 2nd metatarsal head area right foot, round, firm, fixed and tender, does not appear verrucous.   No other open lesions, macerations, or rashes noted.   Skin is otherwise thin and atrophic on the soles.   Erythema:  none at the affected location  Bruising:  absent          MEDICAL DECISION MAKING:         ICD-10-CM ICD-9-CM    1. Plantar porokeratosis, acquired  L85.1 701.1       2. Right foot pain  M79.671 729.5               I counseled the patient on the patient's conditions, their implications and medical management.  Shoe and activity modification as needed for relief.   Hyperkeratosis " trimmed.    Meds as ordered.   Avoid barefoot walking to protect skin on the feet.   Call or return to clinic prn if these symptoms worsen or fail to improve as anticipated. Patient is amenable to plan.

## 2023-06-13 NOTE — PROGRESS NOTES
"    Name: Sofía Mcwilliams  MRN: [id number]   CSN: [id number]      Date: 06/14/2023    Referring physician:  No referring provider defined for this encounter.    Chief Complaint: RLS     Interval History:  - requip xl 2 mg   - requip 0.5 mg IR as needed up to TID for breakthrough symptoms   - may consider addition of gabapentin or lyrica next   - legs are worse than ever"  - sometimes start at noon   - requip IR causes daytime sleepiness   - feels off balance   - no falls, some close calls   - more off balance whenever she closes her eyes    - still able to dance without falling   - she does supplement with B vitamins           From July 2022  - doing better on ER but around 3 pm she starts to feel her legs bother her, has to get up and walk  - feels balance is slightly worse after she takes requip in the evening  - if she turns too quick, feels off balance   - exercises, weight training       From April 2022 Sofía Mcwilliams is a R HANDED 82 y.o. female with a medical issues significant for lumbar radiculopathy, HTN, HLD, osteoarthritis and anxiety who presents for RLS. First noticed symptoms when she was pregnant with her daughter over 40 years ago. She states that she was using hypnosis and noticed that she couldn't still. This improved after pregnancy. About 4 years ago, she had hip replacements, after both of those surgeries, RLS has been constant and annoying. Starts at 2 pm in the afternoon. This was the time she would sit and read. Takes requip about 7 pm, goes to sleep about an hour after -- causes sleepiness. Takes 1mg qhs. She was previously on the xr requip felt that this helped better because it didn't cause sleepiness.  has PD. Feels imbalanced, asks if its due to medications. No falls. Denies feeling lightheaded or dizziness. She previously walked frequently but has decreased due to lumbar radiculopathy. When she wakes up in the morning, still feels off. Does not recall that she felt like this whenever she " was on the ER of [facility].   Riding in the car is difficult, going out to dinner is problematic.       Had gabapentin after hip surgery but did not take it so unclear if it helped. Also tried pramipexole in the past.     Supplements with multi vitamin and B vitamin.     Family History: adopted     Neuroleptic Exposure: none     Nonmotor/Premotor ROS:  Anosmia: normal   Dysarthria/Hypophonia: none   Dysphagia/Sialorrhea: none   Urinary changes: recent isolated UTI   Constipation: some  Falls: none   Micrographia: none   Sleep issues:  -RBD: none       Review of Systems:   Review of Systems   Constitutional:  Negative for chills, fever and malaise/fatigue.   HENT:  Negative for hearing loss.    Eyes:  Negative for blurred vision and double vision.   Respiratory:  Negative for cough, shortness of breath and stridor.    Cardiovascular:  Negative for chest pain and leg swelling.   Gastrointestinal:  Positive for constipation. Negative for diarrhea and nausea.   Genitourinary:  Negative for frequency and urgency.   Musculoskeletal:  Negative for falls.   Skin:  Negative for itching and rash.   Neurological:  Positive for sensory change. Negative for dizziness, tremors, loss of consciousness and weakness.   Psychiatric/Behavioral:  Negative for hallucinations and memory loss.          Past Medical History: The patient  has a past medical history of Anxiety (5/2/2016), Arthritis, Basal cell carcinoma (2013), Cataract, Hypertension, and Other and unspecified hyperlipidemia.    Social History: The patient  reports that she quit smoking about 57 years ago. Her smoking use included cigarettes. She has never used smokeless tobacco. She reports current alcohol use. She reports that she does not use drugs.    Family History: Their family history is not on file. She was adopted.    Allergies: Celecoxib and Sulfa (sulfonamide antibiotics)     Meds:   Current Outpatient Medications on File Prior to Visit   Medication Sig Dispense  "Refill    aspirin (ECOTRIN) 81 MG EC tablet Take 1 tablet by mouth.      atorvastatin (LIPITOR) 20 MG tablet TAKE 1 TABLET EVERY DAY 90 tablet 3    CALCIUM CARB/VIT D3/MINERALS (CALCIUM-VITAMIN D ORAL) once daily.       coenzyme Q10 10 mg capsule Take by mouth.      losartan (COZAAR) 25 MG tablet TAKE 1 TABLET EVERY DAY 90 tablet 3    multivitamin (THERAGRAN) per tablet Take 1 tablet by mouth.      rOPINIRole (REQUIP XL) 2 mg 24 hr tablet TAKE 1 TABLET BY MOUTH EVERY EVENING 90 tablet 3    rOPINIRole (REQUIP) 0.5 MG tablet Take 1 tablet as needed up to 3 times a day for breakthrough RLS symptoms. 90 tablet 11    urea 20 % Crea Apply 1 application topically once daily. To dry skin on the feet. 75 g 10    VITAMIN B COMPLEX ORAL Take by mouth once daily.       vitamin D 1000 units Tab Take 185 mg by mouth once daily.       No current facility-administered medications on file prior to visit.       Exam:  /70   Pulse 81   Ht 5' 4" (1.626 m)   Wt 65.8 kg (145 lb)   BMI 24.89 kg/m²     Constitutional  Well-developed, well-nourished, appears stated age   Ophthalmoscopic  No papilledema with no hemorrhages or exudates bilaterally   Cardiovascular  Radial pulses 2+ and symmetric, no LE edema bilaterally   Neurological    * Mental status  MOCA =      - Orientation  Oriented to person, place, time, and situation     - Memory   Intact recent and remote     - Attention/concentration  Attentive, vigilant during exam     - Language  Naming & repetition intact, +2-step commands     - Fund of knowledge  Aware of current events     - Executive  Well-organized thoughts     - Other     * Cranial nerves       - CN II  PERRL, visual fields full to confrontation     - CN III, IV, VI  Extraocular movements full, normal pursuits and saccades     - CN V  Sensation V1 - V3 intact     - CN VII  Face strong and symmetric bilaterally     - CN VIII  Hearing intact bilaterally     - CN IX, X  Palate raises midline and symmetric     - CN " XI  SCM and trapezius 5/5 bilaterally     - CN XII  Tongue midline   * Motor  Muscle bulk normal, strength 5/5 throughout   * Sensory   Intact to light touch, decreased vibratory sense to bilateral LE to the knees    * Coordination  No dysmetria with finger-to-nose or heel-to-shin   * Gait  See below.   * Deep tendon reflexes  3+ biceps    pectoralis spreads    Left laughlin    Babinski downgoing on the L , possibly up on the R?    * Specialized movement exam  No hypophonic speech.    No facial masking, appropriately animated    No cogwheel rigidity.     No bradykinesia.   No tremor with rest, posture, kinesis, or intention.    No other dystonia, chorea, athetosis, myoclonus, or tics.   No motor impersistence.   diminished arm swing bilaterally    No postural instability.    mild vocal tremor      Some swaying with romberg    Appears almost as if she has a L foot drop or decreased strength in L dorsiflexion however strength is normal on exam today      Laboratory/Radiological:  - Results:  Lab Visit on 03/20/2023   Component Date Value Ref Range Status    Sodium 03/20/2023 139  136 - 145 mmol/L Final    Potassium 03/20/2023 4.1  3.5 - 5.1 mmol/L Final    Chloride 03/20/2023 102  95 - 110 mmol/L Final    CO2 03/20/2023 28  23 - 29 mmol/L Final    Glucose 03/20/2023 103  70 - 110 mg/dL Final    BUN 03/20/2023 12  8 - 23 mg/dL Final    Creatinine 03/20/2023 0.7  0.5 - 1.4 mg/dL Final    Calcium 03/20/2023 9.6  8.7 - 10.5 mg/dL Final    Total Protein 03/20/2023 6.5  6.0 - 8.4 g/dL Final    Albumin 03/20/2023 3.4 (L)  3.5 - 5.2 g/dL Final    Total Bilirubin 03/20/2023 0.8  0.1 - 1.0 mg/dL Final    Alkaline Phosphatase 03/20/2023 94  55 - 135 U/L Final    AST 03/20/2023 20  10 - 40 U/L Final    ALT 03/20/2023 21  10 - 44 U/L Final    Anion Gap 03/20/2023 9  8 - 16 mmol/L Final    eGFR 03/20/2023 >60.0  >60 mL/min/1.73 m^2 Final    Cholesterol 03/20/2023 156  120 - 199 mg/dL Final    Triglycerides 03/20/2023 63  30 - 150  mg/dL Final    HDL 03/20/2023 69  40 - 75 mg/dL Final    LDL Cholesterol 03/20/2023 74.4  63.0 - 159.0 mg/dL Final    HDL/Cholesterol Ratio 03/20/2023 44.2  20.0 - 50.0 % Final    Total Cholesterol/HDL Ratio 03/20/2023 2.3  2.0 - 5.0 Final    Non-HDL Cholesterol 03/20/2023 87  mg/dL Final       - Independent review of images:    Reviewed brain MRI from April 2023   Few punctate foci of T2 FLAIR signal hyperintensity supratentorial white matter while nonspecific in light of history sequela migraine headaches to be included in differential       - Independent review of consultant's notes: [facility]     ASSESSMENT/PLAN:  1. Restless Leg Syndrome  - requip 2 mg XR with good results   - still has some breakthrough symptoms around 3 pm    - requip 0.5 mg IR as needed up to TID for breakthrough symptoms   - lumbar radiculopathy likely worsening symptoms   - adding gabapentin 100 mg TID PRN for restless leg symptoms         2. Imbalance  - sensory ataxia, brisk dtrs, L laughlin   - labs as below   - cervical MRI   - reviewed brain MRI, unremarkable         The patient has a documented history of hypertension, hyperlipidemia and/or hypercholesteremia with long-term complications such as cerebrovascular disease, peripheral vascular disease, and/or aortic atherosclerosis. Collectively these risk factors may contribute to cerebral atherosclerosis, and cerebral hypoperfusion compounded neurocognitive disorder. Rec'd maximizing cerebrovascular-related medical therapy, including but not limited to cholesterol medications and antiplatelet agents. Rec'd controlling vascular risk factors like hypertension, hyperlipidemia, and Diabetes SBP<130, LDL<100, and A1C<7.0. Rec'd lifestyle choices, including a heart-healthy diet (e.g., Mediterranean or DASH), increased cardiovascular exercise (goal 150 minutes of moderate-intensity per week), and staying cognitively and socially active.      Orders Placed This Encounter    MRI Cervical Spine W WO  Cont    Vitamin B12 Deficiency Panel    Vitamin B1    Copper, Serum    Zinc    Creatinine, serum    gabapentin (NEURONTIN) 100 MG capsule           Follow up:  In 3-4 months with [facility]    Collaborating Physician, Dr. Charlton, was available during today's encounter. Any change to plan along with cosign to appear in the EMR.       Total time spent with the patient: 31 minutes.  20 minutes of face-to-face consultation and 15 minutes of chart review and coordination of care, on the day of the visit. This includes face to face time and non-face to face time preparing to see the patient (eg, review of tests), obtaining and/or reviewing separately obtained history, documenting clinical information in the electronic or other health record, independently interpreting resultsand communicating results to the patient/family/caregiver, or care coordination.         Vi Jimenes PA-C   [facility]  Department of Neurology  Movement Disorders

## 2023-06-14 ENCOUNTER — LAB VISIT (OUTPATIENT)
Dept: LAB | Facility: HOSPITAL | Age: 83
End: 2023-06-14
Payer: MEDICARE

## 2023-06-14 ENCOUNTER — OFFICE VISIT (OUTPATIENT)
Dept: NEUROLOGY | Facility: CLINIC | Age: 83
End: 2023-06-14
Payer: MEDICARE

## 2023-06-14 VITALS
SYSTOLIC BLOOD PRESSURE: 119 MMHG | WEIGHT: 145 LBS | BODY MASS INDEX: 24.75 KG/M2 | HEART RATE: 81 BPM | HEIGHT: 64 IN | DIASTOLIC BLOOD PRESSURE: 70 MMHG

## 2023-06-14 DIAGNOSIS — E78.5 HYPERLIPIDEMIA, UNSPECIFIED HYPERLIPIDEMIA TYPE: ICD-10-CM

## 2023-06-14 DIAGNOSIS — R26.89 IMBALANCE: ICD-10-CM

## 2023-06-14 DIAGNOSIS — D50.9 RESTLESS LEG SYNDROME DUE TO IRON DEFICIENCY ANEMIA: Primary | ICD-10-CM

## 2023-06-14 DIAGNOSIS — Z71.89 COUNSELING REGARDING GOALS OF CARE: ICD-10-CM

## 2023-06-14 DIAGNOSIS — I77.9 MILD CAROTID ARTERY DISEASE: ICD-10-CM

## 2023-06-14 DIAGNOSIS — R27.8 SENSORY ATAXIA: ICD-10-CM

## 2023-06-14 DIAGNOSIS — G25.81 RESTLESS LEG SYNDROME DUE TO IRON DEFICIENCY ANEMIA: Primary | ICD-10-CM

## 2023-06-14 DIAGNOSIS — I10 PRIMARY HYPERTENSION: ICD-10-CM

## 2023-06-14 DIAGNOSIS — M54.16 LUMBAR RADICULOPATHY: ICD-10-CM

## 2023-06-14 LAB
CREAT SERPL-MCNC: 0.7 MG/DL (ref 0.5–1.4)
EST. GFR  (NO RACE VARIABLE): >60 ML/MIN/1.73 M^2

## 2023-06-14 PROCEDURE — 84425 ASSAY OF VITAMIN B-1: CPT | Performed by: PHYSICIAN ASSISTANT

## 2023-06-14 PROCEDURE — 1101F PT FALLS ASSESS-DOCD LE1/YR: CPT | Mod: CPTII,S$GLB,, | Performed by: PHYSICIAN ASSISTANT

## 2023-06-14 PROCEDURE — 3074F PR MOST RECENT SYSTOLIC BLOOD PRESSURE < 130 MM HG: ICD-10-PCS | Mod: CPTII,S$GLB,, | Performed by: PHYSICIAN ASSISTANT

## 2023-06-14 PROCEDURE — 1160F PR REVIEW ALL MEDS BY PRESCRIBER/CLIN PHARMACIST DOCUMENTED: ICD-10-PCS | Mod: CPTII,S$GLB,, | Performed by: PHYSICIAN ASSISTANT

## 2023-06-14 PROCEDURE — 99214 PR OFFICE/OUTPT VISIT, EST, LEVL IV, 30-39 MIN: ICD-10-PCS | Mod: S$GLB,,, | Performed by: PHYSICIAN ASSISTANT

## 2023-06-14 PROCEDURE — 3074F SYST BP LT 130 MM HG: CPT | Mod: CPTII,S$GLB,, | Performed by: PHYSICIAN ASSISTANT

## 2023-06-14 PROCEDURE — 3078F PR MOST RECENT DIASTOLIC BLOOD PRESSURE < 80 MM HG: ICD-10-PCS | Mod: CPTII,S$GLB,, | Performed by: PHYSICIAN ASSISTANT

## 2023-06-14 PROCEDURE — 1126F AMNT PAIN NOTED NONE PRSNT: CPT | Mod: CPTII,S$GLB,, | Performed by: PHYSICIAN ASSISTANT

## 2023-06-14 PROCEDURE — 1159F MED LIST DOCD IN RCRD: CPT | Mod: CPTII,S$GLB,, | Performed by: PHYSICIAN ASSISTANT

## 2023-06-14 PROCEDURE — 82565 ASSAY OF CREATININE: CPT | Performed by: PHYSICIAN ASSISTANT

## 2023-06-14 PROCEDURE — 1126F PR PAIN SEVERITY QUANTIFIED, NO PAIN PRESENT: ICD-10-PCS | Mod: CPTII,S$GLB,, | Performed by: PHYSICIAN ASSISTANT

## 2023-06-14 PROCEDURE — 3288F FALL RISK ASSESSMENT DOCD: CPT | Mod: CPTII,S$GLB,, | Performed by: PHYSICIAN ASSISTANT

## 2023-06-14 PROCEDURE — 99214 OFFICE O/P EST MOD 30 MIN: CPT | Mod: S$GLB,,, | Performed by: PHYSICIAN ASSISTANT

## 2023-06-14 PROCEDURE — 3078F DIAST BP <80 MM HG: CPT | Mod: CPTII,S$GLB,, | Performed by: PHYSICIAN ASSISTANT

## 2023-06-14 PROCEDURE — 1159F PR MEDICATION LIST DOCUMENTED IN MEDICAL RECORD: ICD-10-PCS | Mod: CPTII,S$GLB,, | Performed by: PHYSICIAN ASSISTANT

## 2023-06-14 PROCEDURE — 36415 COLL VENOUS BLD VENIPUNCTURE: CPT | Performed by: PHYSICIAN ASSISTANT

## 2023-06-14 PROCEDURE — 84630 ASSAY OF ZINC: CPT | Performed by: PHYSICIAN ASSISTANT

## 2023-06-14 PROCEDURE — 1101F PR PT FALLS ASSESS DOC 0-1 FALLS W/OUT INJ PAST YR: ICD-10-PCS | Mod: CPTII,S$GLB,, | Performed by: PHYSICIAN ASSISTANT

## 2023-06-14 PROCEDURE — 99999 PR PBB SHADOW E&M-EST. PATIENT-LVL IV: CPT | Mod: PBBFAC,,, | Performed by: PHYSICIAN ASSISTANT

## 2023-06-14 PROCEDURE — 82525 ASSAY OF COPPER: CPT | Performed by: PHYSICIAN ASSISTANT

## 2023-06-14 PROCEDURE — 1160F RVW MEDS BY RX/DR IN RCRD: CPT | Mod: CPTII,S$GLB,, | Performed by: PHYSICIAN ASSISTANT

## 2023-06-14 PROCEDURE — 82607 VITAMIN B-12: CPT | Performed by: PHYSICIAN ASSISTANT

## 2023-06-14 PROCEDURE — 99999 PR PBB SHADOW E&M-EST. PATIENT-LVL IV: ICD-10-PCS | Mod: PBBFAC,,, | Performed by: PHYSICIAN ASSISTANT

## 2023-06-14 PROCEDURE — 3288F PR FALLS RISK ASSESSMENT DOCUMENTED: ICD-10-PCS | Mod: CPTII,S$GLB,, | Performed by: PHYSICIAN ASSISTANT

## 2023-06-14 RX ORDER — GABAPENTIN 100 MG/1
100 CAPSULE ORAL 3 TIMES DAILY PRN
Qty: 90 CAPSULE | Refills: 11 | Status: SHIPPED | OUTPATIENT
Start: 2023-06-14 | End: 2023-09-01

## 2023-06-15 LAB — VIT B12 SERPL-MCNC: 427 NG/L (ref 180–914)

## 2023-06-18 LAB
COPPER SERPL-MCNC: 759 UG/L (ref 810–1990)
ZINC SERPL-MCNC: 103 UG/DL (ref 60–130)

## 2023-06-19 ENCOUNTER — PATIENT MESSAGE (OUTPATIENT)
Dept: NEUROLOGY | Facility: CLINIC | Age: 83
End: 2023-06-19
Payer: MEDICARE

## 2023-06-19 LAB — VIT B1 BLD-MCNC: 154 UG/L (ref 38–122)

## 2023-06-26 ENCOUNTER — OFFICE VISIT (OUTPATIENT)
Dept: ORTHOPEDICS | Facility: CLINIC | Age: 83
End: 2023-06-26
Payer: MEDICARE

## 2023-06-26 VITALS — HEIGHT: 64 IN | BODY MASS INDEX: 24.89 KG/M2

## 2023-06-26 DIAGNOSIS — M66.20 TENDON RUPTURE, NONTRAUMATIC, EXTENSOR: Primary | ICD-10-CM

## 2023-06-26 PROCEDURE — 1126F AMNT PAIN NOTED NONE PRSNT: CPT | Mod: CPTII,S$GLB,, | Performed by: ORTHOPAEDIC SURGERY

## 2023-06-26 PROCEDURE — 99999 PR PBB SHADOW E&M-EST. PATIENT-LVL III: CPT | Mod: PBBFAC,,, | Performed by: ORTHOPAEDIC SURGERY

## 2023-06-26 PROCEDURE — 99214 OFFICE O/P EST MOD 30 MIN: CPT | Mod: S$GLB,,, | Performed by: ORTHOPAEDIC SURGERY

## 2023-06-26 PROCEDURE — 1126F PR PAIN SEVERITY QUANTIFIED, NO PAIN PRESENT: ICD-10-PCS | Mod: CPTII,S$GLB,, | Performed by: ORTHOPAEDIC SURGERY

## 2023-06-26 PROCEDURE — 99999 PR PBB SHADOW E&M-EST. PATIENT-LVL III: ICD-10-PCS | Mod: PBBFAC,,, | Performed by: ORTHOPAEDIC SURGERY

## 2023-06-26 PROCEDURE — 1159F MED LIST DOCD IN RCRD: CPT | Mod: CPTII,S$GLB,, | Performed by: ORTHOPAEDIC SURGERY

## 2023-06-26 PROCEDURE — 1159F PR MEDICATION LIST DOCUMENTED IN MEDICAL RECORD: ICD-10-PCS | Mod: CPTII,S$GLB,, | Performed by: ORTHOPAEDIC SURGERY

## 2023-06-26 PROCEDURE — 99214 PR OFFICE/OUTPT VISIT, EST, LEVL IV, 30-39 MIN: ICD-10-PCS | Mod: S$GLB,,, | Performed by: ORTHOPAEDIC SURGERY

## 2023-06-26 RX ORDER — CEFAZOLIN SODIUM 2 G/50ML
2 SOLUTION INTRAVENOUS
Status: CANCELLED | OUTPATIENT
Start: 2023-06-26

## 2023-06-26 NOTE — H&P (VIEW-ONLY)
CC:  Extensor tendon rupture right middle and right ring finger recurrent        HPI:  Sofía Mcwilliams is a very pleasant 82 y.o. female presents with loss of extension of the right middle and right ring fingers   She did have similar problems including the small finger last year treated with tendon transfer symptoms have recurred in the middle and ring finger         PAST MEDICAL HISTORY:   Past Medical History:   Diagnosis Date    Anxiety 5/2/2016    Arthritis     Basal cell carcinoma 2013    left buttock    Cataract     Hypertension     Other and unspecified hyperlipidemia      PAST SURGICAL HISTORY:   Past Surgical History:   Procedure Laterality Date    APPENDECTOMY      bunion      right    COLONOSCOPY N/A 9/28/2015    Procedure: COLONOSCOPY;  Surgeon: Joe Amos MD;  [facility]  Service: Endoscopy;  Laterality: N/A;    COLONOSCOPY N/A 3/16/2021    Procedure: COLONOSCOPY;  Surgeon: Brice Zayas MD;  Location: [facility] ENDO ([address] [place] [address]);  Service: Endoscopy;  Laterality: N/A;  covid test 3/13-primary care    hip surgery x 2      HYSTERECTOMY      partial    INJECTION OF JOINT Left 1/9/2019    Procedure: Injection, LEFT HIP INTRAARTICULAR INJECTION;  Surgeon: Omega Estevez MD;  Location: [facility] PAIN MGT;  Service: Pain Management;  Laterality: Left;    INJECTION OF JOINT Left 3/13/2019    Procedure: INJECTION, JOINT LEFT HIP;  Surgeon: Omega Estevez MD;  Location: [facility] PAIN MGT;  Service: Pain Management;  Laterality: Left;  Left Hip Intrarticular Steroid Injection    INJECTION, SACROILIAC JOINT Right 2/23/2023    Procedure: INJECTION,SACROILIAC JOINT Right SI Joint, Right GTB;  Surgeon: Omega Estevez MD;  Location: [facility] PAIN MGT;  Service: Pain Management;  Laterality: Right;    JOINT REPLACEMENT      SURGICAL REMOVAL OF BONE SPUR  8/16/2022    Procedure: EXCISION, BONE SPUR ULNA;  Surgeon: Brennen Prabhakar Jr., MD;  Location: [facility] OR;  Service: Orthopedics;;    SURGICAL  REMOVAL OF DISTAL ULNA Right 2022    Procedure: EXCISION, ULNA, DISTAL;  Surgeon: Brennen Prabhakar Jr., MD;  Location: [facility] OR;  Service: Orthopedics;  Laterality: Right;    TONSILLECTOMY      TRANSFER OF HAND TENDON Right 2022    Procedure: TRANSFER, TENDON, HAND;  Surgeon: rBennen Prabhakar Jr., MD;  Location: [facility] OR;  Service: Orthopedics;  Laterality: Right;  transfer extensor tendon small finger    TRANSFORAMINAL EPIDURAL INJECTION OF STEROID Left 2021    Procedure: Injection,steroid,epidural,transforaminal approach; Levels: L5-S1;  Surgeon: Heather Mederos MD;  Location: [facility] PAIN MGT;  Service: Pain Management;  Laterality: Left;  No pacemaker. Patient is taking ASA.     TRANSFORAMINAL EPIDURAL INJECTION OF STEROID Bilateral 2022    Procedure: Injection,steroid,epidural,transforaminal bilateral L5;  Surgeon: Heather Mederos MD;  Location: [facility] PAIN MGT;  Service: Pain Management;  Laterality: Bilateral;  ASA   no pacemaker     FAMILY HISTORY:   Family History   Adopted: Yes     SOCIAL HISTORY:   Social History     Socioeconomic History    Marital status:    Occupational History    Occupation: RN   Tobacco Use    Smoking status: Former     Packs/day: 0.00     Years: 0.00     Pack years: 0.00     Types: Cigarettes     Quit date: 1965     Years since quittin.6    Smokeless tobacco: Never   Substance and Sexual Activity    Alcohol use: Yes     Alcohol/week: 0.0 standard drinks     Comment: Occ.    Drug use: No    Sexual activity: Yes   Other Topics Concern    Are you pregnant or think you may be? No    Breast-feeding No     Social Determinants of Health     Financial Resource Strain: Low Risk     Difficulty of Paying Living Expenses: Not hard at all   Food Insecurity: No Food Insecurity    Worried About Running Out of Food in the Last Year: Never true    Ran Out of Food in the Last Year: Never true   Transportation Needs: No Transportation Needs    Lack of Transportation  (Medical): No    Lack of Transportation (Non-Medical): No   Physical Activity: Sufficiently Active    Days of Exercise per Week: 4 days    Minutes of Exercise per Session: 60 min   Stress: No Stress Concern Present    Feeling of Stress : Not at all   Social Connections: Unknown    Frequency of Communication with Friends and Family: More than three times a week    Frequency of Social Gatherings with Friends and Family: Three times a week    Active Member of Clubs or Organizations: Yes    Attends Club or Organization Meetings: More than 4 times per year    Marital Status:    Housing Stability: Low Risk     Unable to Pay for Housing in the Last Year: No    Number of Places Lived in the Last Year: 1    Unstable Housing in the Last Year: No       MEDICATIONS:   Current Outpatient Medications:     aspirin (ECOTRIN) 81 MG EC tablet, Take 1 tablet by mouth., Disp: , Rfl:     atorvastatin (LIPITOR) 20 MG tablet, TAKE 1 TABLET EVERY DAY, Disp: 90 tablet, Rfl: 3    CALCIUM CARB/VIT D3/MINERALS (CALCIUM-VITAMIN D ORAL), once daily. , Disp: , Rfl:     coenzyme Q10 10 mg capsule, Take by mouth., Disp: , Rfl:     gabapentin (NEURONTIN) 100 MG capsule, Take 1 capsule (100 mg total) by mouth 3 (three) times daily as needed (RLS)., Disp: 90 capsule, Rfl: 11    losartan (COZAAR) 25 MG tablet, TAKE 1 TABLET EVERY DAY, Disp: 90 tablet, Rfl: 3    multivitamin (THERAGRAN) per tablet, Take 1 tablet by mouth., Disp: , Rfl:     rOPINIRole (REQUIP XL) 2 mg 24 hr tablet, TAKE 1 TABLET BY MOUTH EVERY EVENING, Disp: 90 tablet, Rfl: 3    rOPINIRole (REQUIP) 0.5 MG tablet, Take 1 tablet as needed up to 3 times a day for breakthrough RLS symptoms., Disp: 90 tablet, Rfl: 11    urea 20 % Crea, Apply 1 application topically once daily. To dry skin on the feet., Disp: 75 g, Rfl: 10    VITAMIN B COMPLEX ORAL, Take by mouth once daily. , Disp: , Rfl:     vitamin D 1000 units Tab, Take 185 mg by mouth once daily., Disp: , Rfl:   ALLERGIES:   Review  "of patient's allergies indicates:   Allergen Reactions    Celecoxib      Other reaction(s): Swelling    Sulfa (sulfonamide antibiotics)      Other reaction(s): Hives       Review of Systems:  Constitutional: no fever or chills  ENT: no nasal congestion or sore throat  Respiratory: no cough or shortness of breath  Cardiovascular: no chest pain or palpitations  Gastrointestinal: no nausea or vomiting, PUD, GERD, NSAID intolerance  Genitourinary: no hematuria or dysuria  Integument/Breast: no rash or pruritis  Hematologic/Lymphatic: no easy bruising or lymphadenopathy  Musculoskeletal: see HPI  Neurological: no seizures or tremors  Behavioral/Psych: no auditory or visual hallucinations      Physical Exam   Vitals:    06/26/23 1308   Height: 5' 4" (1.626 m)   PainSc: 0-No pain   PainLoc: Hand       Constitutional: Oriented to person, place, and time. Appears well-developed and well-nourished.   HENT:   Head: Normocephalic and atraumatic.   Nose: Nose normal.   Eyes: No scleral icterus.   Neck: Normal range of motion.   Cardiovascular: Normal rate and regular rhythm.    Pulses:       Radial pulses are 2+ on the right side, and 2+ on the left side.   Pulmonary/Chest: Effort normal and breath sounds normal.   Abdominal: Soft.   Neurological: Alert and oriented to person, place, and time.   Skin: Skin is warm.   Psychiatric: Normal mood and affect.     MUSCULOSKELETAL UPPER EXTREMITY:  Examination of the right hand there is synovitis present dorsally overlying the extensor tendons tender to touch  There is loss of active extension of the middle and ring finger but intact in the index and small fingers            Diagnostic Studies:  None        Assessment:  Extensor tendon rupture to the right middle and ring fingers    Plan:  I explained the nature of the problem to the patient   I have recommended tenosynovectomy and tendon transfer to the right hand   We may need to use 1 of the flexor tendons possibly the palmaris " "longus for transfer and I explained that to her today      The risks and benefits of surgery were discussed with the patient today and they understand.  The consent was signed in the office for surgery.      Brennen Prabhakar MD (Jay)  [facility]  Orthopedic Upper Extremity Surgery       "

## 2023-06-26 NOTE — PROGRESS NOTES
CC:  Extensor tendon rupture right middle and right ring finger recurrent        HPI:  Sofía Mcwilliams is a very pleasant 82 y.o. female presents with loss of extension of the right middle and right ring fingers   She did have similar problems including the small finger last year treated with tendon transfer symptoms have recurred in the middle and ring finger         PAST MEDICAL HISTORY:   Past Medical History:   Diagnosis Date    Anxiety 5/2/2016    Arthritis     Basal cell carcinoma 2013    left buttock    Cataract     Hypertension     Other and unspecified hyperlipidemia      PAST SURGICAL HISTORY:   Past Surgical History:   Procedure Laterality Date    APPENDECTOMY      bunion      right    COLONOSCOPY N/A 9/28/2015    Procedure: COLONOSCOPY;  Surgeon: Joe Amos MD;  [facility]  Service: Endoscopy;  Laterality: N/A;    COLONOSCOPY N/A 3/16/2021    Procedure: COLONOSCOPY;  Surgeon: Brice Zayas MD;  Location: [facility] ENDO ([address] [place] [address]);  Service: Endoscopy;  Laterality: N/A;  covid test 3/13-primary care    hip surgery x 2      HYSTERECTOMY      partial    INJECTION OF JOINT Left 1/9/2019    Procedure: Injection, LEFT HIP INTRAARTICULAR INJECTION;  Surgeon: Omega Estevez MD;  Location: [facility] PAIN MGT;  Service: Pain Management;  Laterality: Left;    INJECTION OF JOINT Left 3/13/2019    Procedure: INJECTION, JOINT LEFT HIP;  Surgeon: Omega Estevez MD;  Location: [facility] PAIN MGT;  Service: Pain Management;  Laterality: Left;  Left Hip Intrarticular Steroid Injection    INJECTION, SACROILIAC JOINT Right 2/23/2023    Procedure: INJECTION,SACROILIAC JOINT Right SI Joint, Right GTB;  Surgeon: Omega Estevez MD;  Location: [facility] PAIN MGT;  Service: Pain Management;  Laterality: Right;    JOINT REPLACEMENT      SURGICAL REMOVAL OF BONE SPUR  8/16/2022    Procedure: EXCISION, BONE SPUR ULNA;  Surgeon: Brennen Prabhakar Jr., MD;  Location: [facility] OR;  Service: Orthopedics;;    SURGICAL  REMOVAL OF DISTAL ULNA Right 2022    Procedure: EXCISION, ULNA, DISTAL;  Surgeon: Brennen Prabhakar Jr., MD;  Location: [facility] OR;  Service: Orthopedics;  Laterality: Right;    TONSILLECTOMY      TRANSFER OF HAND TENDON Right 2022    Procedure: TRANSFER, TENDON, HAND;  Surgeon: Brennen Prabhakar Jr., MD;  Location: [facility] OR;  Service: Orthopedics;  Laterality: Right;  transfer extensor tendon small finger    TRANSFORAMINAL EPIDURAL INJECTION OF STEROID Left 2021    Procedure: Injection,steroid,epidural,transforaminal approach; Levels: L5-S1;  Surgeon: Heather Mederos MD;  Location: [facility] PAIN MGT;  Service: Pain Management;  Laterality: Left;  No pacemaker. Patient is taking ASA.     TRANSFORAMINAL EPIDURAL INJECTION OF STEROID Bilateral 2022    Procedure: Injection,steroid,epidural,transforaminal bilateral L5;  Surgeon: Heather Mederos MD;  Location: [facility] PAIN MGT;  Service: Pain Management;  Laterality: Bilateral;  ASA   no pacemaker     FAMILY HISTORY:   Family History   Adopted: Yes     SOCIAL HISTORY:   Social History     Socioeconomic History    Marital status:    Occupational History    Occupation: RN   Tobacco Use    Smoking status: Former     Packs/day: 0.00     Years: 0.00     Pack years: 0.00     Types: Cigarettes     Quit date: 1965     Years since quittin.6    Smokeless tobacco: Never   Substance and Sexual Activity    Alcohol use: Yes     Alcohol/week: 0.0 standard drinks     Comment: Occ.    Drug use: No    Sexual activity: Yes   Other Topics Concern    Are you pregnant or think you may be? No    Breast-feeding No     Social Determinants of Health     Financial Resource Strain: Low Risk     Difficulty of Paying Living Expenses: Not hard at all   Food Insecurity: No Food Insecurity    Worried About Running Out of Food in the Last Year: Never true    Ran Out of Food in the Last Year: Never true   Transportation Needs: No Transportation Needs    Lack of Transportation  (Medical): No    Lack of Transportation (Non-Medical): No   Physical Activity: Sufficiently Active    Days of Exercise per Week: 4 days    Minutes of Exercise per Session: 60 min   Stress: No Stress Concern Present    Feeling of Stress : Not at all   Social Connections: Unknown    Frequency of Communication with Friends and Family: More than three times a week    Frequency of Social Gatherings with Friends and Family: Three times a week    Active Member of Clubs or Organizations: Yes    Attends Club or Organization Meetings: More than 4 times per year    Marital Status:    Housing Stability: Low Risk     Unable to Pay for Housing in the Last Year: No    Number of Places Lived in the Last Year: 1    Unstable Housing in the Last Year: No       MEDICATIONS:   Current Outpatient Medications:     aspirin (ECOTRIN) 81 MG EC tablet, Take 1 tablet by mouth., Disp: , Rfl:     atorvastatin (LIPITOR) 20 MG tablet, TAKE 1 TABLET EVERY DAY, Disp: 90 tablet, Rfl: 3    CALCIUM CARB/VIT D3/MINERALS (CALCIUM-VITAMIN D ORAL), once daily. , Disp: , Rfl:     coenzyme Q10 10 mg capsule, Take by mouth., Disp: , Rfl:     gabapentin (NEURONTIN) 100 MG capsule, Take 1 capsule (100 mg total) by mouth 3 (three) times daily as needed (RLS)., Disp: 90 capsule, Rfl: 11    losartan (COZAAR) 25 MG tablet, TAKE 1 TABLET EVERY DAY, Disp: 90 tablet, Rfl: 3    multivitamin (THERAGRAN) per tablet, Take 1 tablet by mouth., Disp: , Rfl:     rOPINIRole (REQUIP XL) 2 mg 24 hr tablet, TAKE 1 TABLET BY MOUTH EVERY EVENING, Disp: 90 tablet, Rfl: 3    rOPINIRole (REQUIP) 0.5 MG tablet, Take 1 tablet as needed up to 3 times a day for breakthrough RLS symptoms., Disp: 90 tablet, Rfl: 11    urea 20 % Crea, Apply 1 application topically once daily. To dry skin on the feet., Disp: 75 g, Rfl: 10    VITAMIN B COMPLEX ORAL, Take by mouth once daily. , Disp: , Rfl:     vitamin D 1000 units Tab, Take 185 mg by mouth once daily., Disp: , Rfl:   ALLERGIES:   Review  "of patient's allergies indicates:   Allergen Reactions    Celecoxib      Other reaction(s): Swelling    Sulfa (sulfonamide antibiotics)      Other reaction(s): Hives       Review of Systems:  Constitutional: no fever or chills  ENT: no nasal congestion or sore throat  Respiratory: no cough or shortness of breath  Cardiovascular: no chest pain or palpitations  Gastrointestinal: no nausea or vomiting, PUD, GERD, NSAID intolerance  Genitourinary: no hematuria or dysuria  Integument/Breast: no rash or pruritis  Hematologic/Lymphatic: no easy bruising or lymphadenopathy  Musculoskeletal: see HPI  Neurological: no seizures or tremors  Behavioral/Psych: no auditory or visual hallucinations      Physical Exam   Vitals:    06/26/23 1308   Height: 5' 4" (1.626 m)   PainSc: 0-No pain   PainLoc: Hand       Constitutional: Oriented to person, place, and time. Appears well-developed and well-nourished.   HENT:   Head: Normocephalic and atraumatic.   Nose: Nose normal.   Eyes: No scleral icterus.   Neck: Normal range of motion.   Cardiovascular: Normal rate and regular rhythm.    Pulses:       Radial pulses are 2+ on the right side, and 2+ on the left side.   Pulmonary/Chest: Effort normal and breath sounds normal.   Abdominal: Soft.   Neurological: Alert and oriented to person, place, and time.   Skin: Skin is warm.   Psychiatric: Normal mood and affect.     MUSCULOSKELETAL UPPER EXTREMITY:  Examination of the right hand there is synovitis present dorsally overlying the extensor tendons tender to touch  There is loss of active extension of the middle and ring finger but intact in the index and small fingers            Diagnostic Studies:  None        Assessment:  Extensor tendon rupture to the right middle and ring fingers    Plan:  I explained the nature of the problem to the patient   I have recommended tenosynovectomy and tendon transfer to the right hand   We may need to use 1 of the flexor tendons possibly the palmaris " "longus for transfer and I explained that to her today      The risks and benefits of surgery were discussed with the patient today and they understand.  The consent was signed in the office for surgery.      Brennen Prabhakar MD (Jay)  [facility]  Orthopedic Upper Extremity Surgery       "

## 2023-06-28 ENCOUNTER — HOSPITAL ENCOUNTER (OUTPATIENT)
Dept: RADIOLOGY | Facility: HOSPITAL | Age: 83
Discharge: HOME | End: 2023-06-28
Attending: PHYSICIAN ASSISTANT
Payer: MEDICARE

## 2023-06-28 DIAGNOSIS — R26.89 IMBALANCE: ICD-10-CM

## 2023-06-28 DIAGNOSIS — R27.8 SENSORY ATAXIA: ICD-10-CM

## 2023-06-28 PROCEDURE — 72156 MRI NECK SPINE W/O & W/DYE: CPT | Mod: 26,,, | Performed by: RADIOLOGY

## 2023-06-28 PROCEDURE — 72156 MRI CERVICAL SPINE W WO CONTRAST: ICD-10-PCS | Mod: 26,,, | Performed by: RADIOLOGY

## 2023-06-28 PROCEDURE — A9585 GADOBUTROL INJECTION: HCPCS | Performed by: PHYSICIAN ASSISTANT

## 2023-06-28 PROCEDURE — 25500020 PHARM REV CODE 255: Performed by: PHYSICIAN ASSISTANT

## 2023-06-28 PROCEDURE — 72156 MRI NECK SPINE W/O & W/DYE: CPT | Mod: TC

## 2023-06-28 RX ORDER — GADOBUTROL 604.72 MG/ML
7 INJECTION INTRAVENOUS
Status: COMPLETED | OUTPATIENT
Start: 2023-06-28 | End: 2023-06-28

## 2023-06-28 RX ADMIN — GADOBUTROL 7 ML: 604.72 INJECTION INTRAVENOUS at 07:06

## 2023-06-30 ENCOUNTER — HOSPITAL ENCOUNTER (OUTPATIENT)
Facility: HOSPITAL | Age: 83
Discharge: HOME | End: 2023-07-02
Attending: ORTHOPAEDIC SURGERY | Admitting: ORTHOPAEDIC SURGERY
Payer: MEDICARE

## 2023-06-30 ENCOUNTER — ANESTHESIA (OUTPATIENT)
Dept: SURGERY | Facility: HOSPITAL | Age: 83
End: 2023-06-30
Payer: MEDICARE

## 2023-06-30 ENCOUNTER — ANESTHESIA EVENT (OUTPATIENT)
Dept: SURGERY | Facility: HOSPITAL | Age: 83
End: 2023-06-30
Payer: MEDICARE

## 2023-06-30 DIAGNOSIS — G54.1 IDIOPATHIC LUMBOSACRAL PLEXUS NEUROPATHY: ICD-10-CM

## 2023-06-30 DIAGNOSIS — M21.372 FOOT DROP, LEFT FOOT: ICD-10-CM

## 2023-06-30 DIAGNOSIS — I10 HYPERTENSION: ICD-10-CM

## 2023-06-30 DIAGNOSIS — G54.1 LUMBOSACRAL PLEXUS DISORDERS: ICD-10-CM

## 2023-06-30 DIAGNOSIS — M66.20 TENDON RUPTURE, NONTRAUMATIC, EXTENSOR: ICD-10-CM

## 2023-06-30 DIAGNOSIS — M48.07 SPINAL STENOSIS, LUMBOSACRAL REGION: Primary | ICD-10-CM

## 2023-06-30 DIAGNOSIS — M25.272 FLAIL JOINT, LEFT ANKLE AND FOOT: ICD-10-CM

## 2023-06-30 DIAGNOSIS — R53.1 PROGRESSIVE FOCAL MOTOR WEAKNESS: ICD-10-CM

## 2023-06-30 DIAGNOSIS — R20.0 NUMBNESS OF LEFT FOOT: ICD-10-CM

## 2023-06-30 PROCEDURE — 88304 TISSUE EXAM BY PATHOLOGIST: CPT | Mod: 26,,, | Performed by: STUDENT IN AN ORGANIZED HEALTH CARE EDUCATION/TRAINING PROGRAM

## 2023-06-30 PROCEDURE — 36000706: Performed by: ORTHOPAEDIC SURGERY

## 2023-06-30 PROCEDURE — 87070 CULTURE OTHR SPECIMN AEROBIC: CPT | Performed by: ORTHOPAEDIC SURGERY

## 2023-06-30 PROCEDURE — 25000003 PHARM REV CODE 250: Performed by: STUDENT IN AN ORGANIZED HEALTH CARE EDUCATION/TRAINING PROGRAM

## 2023-06-30 PROCEDURE — 87206 SMEAR FLUORESCENT/ACID STAI: CPT | Performed by: ORTHOPAEDIC SURGERY

## 2023-06-30 PROCEDURE — 36000707: Performed by: ORTHOPAEDIC SURGERY

## 2023-06-30 PROCEDURE — 99900035 HC TECH TIME PER 15 MIN (STAT)

## 2023-06-30 PROCEDURE — 93010 EKG 12-LEAD: ICD-10-PCS | Mod: ,,, | Performed by: INTERNAL MEDICINE

## 2023-06-30 PROCEDURE — D9220A PRA ANESTHESIA: Mod: ANES,,, | Performed by: STUDENT IN AN ORGANIZED HEALTH CARE EDUCATION/TRAINING PROGRAM

## 2023-06-30 PROCEDURE — 88304 TISSUE EXAM BY PATHOLOGIST: CPT | Performed by: STUDENT IN AN ORGANIZED HEALTH CARE EDUCATION/TRAINING PROGRAM

## 2023-06-30 PROCEDURE — 93010 ELECTROCARDIOGRAM REPORT: CPT | Mod: ,,, | Performed by: INTERNAL MEDICINE

## 2023-06-30 PROCEDURE — 88304 PR  SURG PATH,LEVEL III: ICD-10-PCS | Mod: 26,,, | Performed by: STUDENT IN AN ORGANIZED HEALTH CARE EDUCATION/TRAINING PROGRAM

## 2023-06-30 PROCEDURE — 26418 REPAIR FINGER TENDON: CPT | Mod: 51,F8,, | Performed by: ORTHOPAEDIC SURGERY

## 2023-06-30 PROCEDURE — 87075 CULTR BACTERIA EXCEPT BLOOD: CPT | Performed by: ORTHOPAEDIC SURGERY

## 2023-06-30 PROCEDURE — 37000009 HC ANESTHESIA EA ADD 15 MINS: Performed by: ORTHOPAEDIC SURGERY

## 2023-06-30 PROCEDURE — 93005 ELECTROCARDIOGRAM TRACING: CPT

## 2023-06-30 PROCEDURE — D9220A PRA ANESTHESIA: ICD-10-PCS | Mod: CRNA,,, | Performed by: NURSE ANESTHETIST, CERTIFIED REGISTERED

## 2023-06-30 PROCEDURE — C1889 IMPLANT/INSERT DEVICE, NOC: HCPCS | Performed by: ORTHOPAEDIC SURGERY

## 2023-06-30 PROCEDURE — 87205 SMEAR GRAM STAIN: CPT | Performed by: ORTHOPAEDIC SURGERY

## 2023-06-30 PROCEDURE — 37000008 HC ANESTHESIA 1ST 15 MINUTES: Performed by: ORTHOPAEDIC SURGERY

## 2023-06-30 PROCEDURE — 71000015 HC POSTOP RECOV 1ST HR: Performed by: ORTHOPAEDIC SURGERY

## 2023-06-30 PROCEDURE — 87102 FUNGUS ISOLATION CULTURE: CPT | Performed by: ORTHOPAEDIC SURGERY

## 2023-06-30 PROCEDURE — D9220A PRA ANESTHESIA: ICD-10-PCS | Mod: ANES,,, | Performed by: STUDENT IN AN ORGANIZED HEALTH CARE EDUCATION/TRAINING PROGRAM

## 2023-06-30 PROCEDURE — 25000003 PHARM REV CODE 250: Performed by: NURSE ANESTHETIST, CERTIFIED REGISTERED

## 2023-06-30 PROCEDURE — D9220A PRA ANESTHESIA: Mod: CRNA,,, | Performed by: NURSE ANESTHETIST, CERTIFIED REGISTERED

## 2023-06-30 PROCEDURE — 64415 NJX AA&/STRD BRCH PLXS IMG: CPT | Mod: 59 | Performed by: STUDENT IN AN ORGANIZED HEALTH CARE EDUCATION/TRAINING PROGRAM

## 2023-06-30 PROCEDURE — 63600175 PHARM REV CODE 636 W HCPCS: Performed by: ORTHOPAEDIC SURGERY

## 2023-06-30 PROCEDURE — 87116 MYCOBACTERIA CULTURE: CPT | Performed by: ORTHOPAEDIC SURGERY

## 2023-06-30 PROCEDURE — 63600175 PHARM REV CODE 636 W HCPCS: Performed by: NURSE ANESTHETIST, CERTIFIED REGISTERED

## 2023-06-30 PROCEDURE — 71000016 HC POSTOP RECOV ADDL HR: Performed by: ORTHOPAEDIC SURGERY

## 2023-06-30 PROCEDURE — 25000003 PHARM REV CODE 250: Performed by: ORTHOPAEDIC SURGERY

## 2023-06-30 PROCEDURE — 94761 N-INVAS EAR/PLS OXIMETRY MLT: CPT

## 2023-06-30 PROCEDURE — 26418 PR REPAIR EXTEN TENDON,DORSUM FINGR,EA: ICD-10-PCS | Mod: 51,F8,, | Performed by: ORTHOPAEDIC SURGERY

## 2023-06-30 RX ORDER — ACETAMINOPHEN 10 MG/ML
INJECTION, SOLUTION INTRAVENOUS
Status: DISCONTINUED | OUTPATIENT
Start: 2023-06-30 | End: 2023-06-30

## 2023-06-30 RX ORDER — FENTANYL CITRATE 50 UG/ML
25 INJECTION, SOLUTION INTRAMUSCULAR; INTRAVENOUS EVERY 5 MIN PRN
Status: DISCONTINUED | OUTPATIENT
Start: 2023-06-30 | End: 2023-06-30 | Stop reason: HOSPADM

## 2023-06-30 RX ORDER — CEFAZOLIN SODIUM 2 G/50ML
2 SOLUTION INTRAVENOUS
Status: DISCONTINUED | OUTPATIENT
Start: 2023-06-30 | End: 2023-06-30 | Stop reason: HOSPADM

## 2023-06-30 RX ORDER — OXYCODONE HYDROCHLORIDE 5 MG/1
10 TABLET ORAL EVERY 4 HOURS PRN
Status: DISCONTINUED | OUTPATIENT
Start: 2023-06-30 | End: 2023-07-02 | Stop reason: HOSPADM

## 2023-06-30 RX ORDER — LOSARTAN POTASSIUM 25 MG/1
25 TABLET ORAL DAILY
Status: DISCONTINUED | OUTPATIENT
Start: 2023-07-01 | End: 2023-07-02 | Stop reason: HOSPADM

## 2023-06-30 RX ORDER — ONDANSETRON 2 MG/ML
INJECTION INTRAMUSCULAR; INTRAVENOUS
Status: DISCONTINUED | OUTPATIENT
Start: 2023-06-30 | End: 2023-06-30

## 2023-06-30 RX ORDER — PROPOFOL 10 MG/ML
VIAL (ML) INTRAVENOUS
Status: DISCONTINUED | OUTPATIENT
Start: 2023-06-30 | End: 2023-06-30

## 2023-06-30 RX ORDER — KETOROLAC TROMETHAMINE 30 MG/ML
INJECTION, SOLUTION INTRAMUSCULAR; INTRAVENOUS
Status: DISCONTINUED | OUTPATIENT
Start: 2023-06-30 | End: 2023-06-30

## 2023-06-30 RX ORDER — PROPOFOL 10 MG/ML
VIAL (ML) INTRAVENOUS CONTINUOUS PRN
Status: DISCONTINUED | OUTPATIENT
Start: 2023-06-30 | End: 2023-06-30

## 2023-06-30 RX ORDER — HYDROCODONE BITARTRATE AND ACETAMINOPHEN 5; 325 MG/1; MG/1
1 TABLET ORAL EVERY 4 HOURS PRN
Qty: 30 TABLET | Refills: 0 | Status: SHIPPED | OUTPATIENT
Start: 2023-06-30 | End: 2023-09-01

## 2023-06-30 RX ORDER — GABAPENTIN 100 MG/1
100 CAPSULE ORAL 3 TIMES DAILY
Status: DISCONTINUED | OUTPATIENT
Start: 2023-07-01 | End: 2023-07-02 | Stop reason: HOSPADM

## 2023-06-30 RX ORDER — ROPINIROLE 0.25 MG/1
0.5 TABLET, FILM COATED ORAL 3 TIMES DAILY
Status: COMPLETED | OUTPATIENT
Start: 2023-06-30 | End: 2023-06-30

## 2023-06-30 RX ORDER — ACETAMINOPHEN 325 MG/1
650 TABLET ORAL EVERY 4 HOURS PRN
Status: DISCONTINUED | OUTPATIENT
Start: 2023-06-30 | End: 2023-07-02 | Stop reason: HOSPADM

## 2023-06-30 RX ORDER — BUPIVACAINE HYDROCHLORIDE 5 MG/ML
INJECTION, SOLUTION EPIDURAL; INTRACAUDAL
Status: COMPLETED | OUTPATIENT
Start: 2023-06-30 | End: 2023-06-30

## 2023-06-30 RX ORDER — HYDROMORPHONE HYDROCHLORIDE 2 MG/ML
0.2 INJECTION, SOLUTION INTRAMUSCULAR; INTRAVENOUS; SUBCUTANEOUS EVERY 5 MIN PRN
Status: DISCONTINUED | OUTPATIENT
Start: 2023-06-30 | End: 2023-06-30 | Stop reason: HOSPADM

## 2023-06-30 RX ORDER — LIDOCAINE HYDROCHLORIDE 20 MG/ML
INJECTION INTRAVENOUS
Status: DISCONTINUED | OUTPATIENT
Start: 2023-06-30 | End: 2023-06-30

## 2023-06-30 RX ORDER — HYDROCODONE BITARTRATE AND ACETAMINOPHEN 5; 325 MG/1; MG/1
1 TABLET ORAL EVERY 4 HOURS PRN
Status: DISCONTINUED | OUTPATIENT
Start: 2023-06-30 | End: 2023-07-02 | Stop reason: HOSPADM

## 2023-06-30 RX ORDER — ONDANSETRON 2 MG/ML
4 INJECTION INTRAMUSCULAR; INTRAVENOUS DAILY PRN
Status: DISCONTINUED | OUTPATIENT
Start: 2023-06-30 | End: 2023-06-30 | Stop reason: HOSPADM

## 2023-06-30 RX ORDER — ONDANSETRON 4 MG/1
8 TABLET, ORALLY DISINTEGRATING ORAL EVERY 8 HOURS PRN
Status: DISCONTINUED | OUTPATIENT
Start: 2023-06-30 | End: 2023-07-02 | Stop reason: HOSPADM

## 2023-06-30 RX ORDER — GABAPENTIN 300 MG/1
300 CAPSULE ORAL 3 TIMES DAILY
Status: DISCONTINUED | OUTPATIENT
Start: 2023-06-30 | End: 2023-06-30

## 2023-06-30 RX ORDER — ROPINIROLE 1 MG/1
2 TABLET, FILM COATED ORAL NIGHTLY
Status: DISCONTINUED | OUTPATIENT
Start: 2023-06-30 | End: 2023-07-02 | Stop reason: HOSPADM

## 2023-06-30 RX ADMIN — KETOROLAC TROMETHAMINE 30 MG: 30 INJECTION, SOLUTION INTRAMUSCULAR; INTRAVENOUS at 06:06

## 2023-06-30 RX ADMIN — ROPINIROLE HYDROCHLORIDE 0.5 MG: 0.25 TABLET, FILM COATED ORAL at 04:06

## 2023-06-30 RX ADMIN — PROPOFOL 100 MCG/KG/MIN: 10 INJECTION, EMULSION INTRAVENOUS at 05:06

## 2023-06-30 RX ADMIN — PROPOFOL 30 MG: 10 INJECTION, EMULSION INTRAVENOUS at 05:06

## 2023-06-30 RX ADMIN — ACETAMINOPHEN 1000 MG: 10 INJECTION, SOLUTION INTRAVENOUS at 05:06

## 2023-06-30 RX ADMIN — CEFAZOLIN SODIUM 2 G: 2 SOLUTION INTRAVENOUS at 05:06

## 2023-06-30 RX ADMIN — LIDOCAINE HYDROCHLORIDE 50 MG: 20 INJECTION, SOLUTION INTRAVENOUS at 05:06

## 2023-06-30 RX ADMIN — SODIUM CHLORIDE, SODIUM LACTATE, POTASSIUM CHLORIDE, AND CALCIUM CHLORIDE: .6; .31; .03; .02 INJECTION, SOLUTION INTRAVENOUS at 05:06

## 2023-06-30 RX ADMIN — ROPINIROLE HYDROCHLORIDE 2 MG: 1 TABLET, FILM COATED ORAL at 10:06

## 2023-06-30 RX ADMIN — ONDANSETRON 4 MG: 2 INJECTION, SOLUTION INTRAMUSCULAR; INTRAVENOUS at 06:06

## 2023-06-30 RX ADMIN — BUPIVACAINE HYDROCHLORIDE 25 ML: 5 INJECTION, SOLUTION EPIDURAL; INTRACAUDAL; PERINEURAL at 03:06

## 2023-06-30 NOTE — ANESTHESIA PREPROCEDURE EVALUATION
[facility]  Anesthesia Pre-Operative Evaluation         Patient Name: Sofía Mcwilliams  YOB: 1940  MRN: [id number]    SUBJECTIVE:     06/30/2023    Procedure(s) (LRB):  TRANSFER, TENDON, HAND (Right)    Sofía Mcwilliams is a 82 y.o. female here for Procedure(s) (LRB):  TRANSFER, TENDON, HAND (Right)    Drips:     Patient Active Problem List   Diagnosis    Retina disorder - Both Eyes    Nuclear sclerosis - Both Eyes    Hypertension    Hyperlipidemia    Arthritis    Osteopenia    Anxiety    Mild carotid artery disease    Chronic pain    Osteoarthritis of hip    Primary osteoarthritis of both hands    Restless leg syndrome due to iron deficiency anemia    Decreased  strength    Decreased pinch strength    Decreased activities of daily living (ADL)    Reduced sensation    History of colon polyps    Lumbar radiculopathy    Tendon rupture, nontraumatic, extensor    Decreased range of motion    Localized edema    Sensory ataxia       Review of patient's allergies indicates:   Allergen Reactions    Celecoxib      Other reaction(s): Swelling    Sulfa (sulfonamide antibiotics)      Other reaction(s): Hives       No current facility-administered medications on file prior to encounter.     Current Outpatient Medications on File Prior to Encounter   Medication Sig Dispense Refill    aspirin (ECOTRIN) 81 MG EC tablet Take 1 tablet by mouth.      atorvastatin (LIPITOR) 20 MG tablet TAKE 1 TABLET EVERY DAY 90 tablet 3    CALCIUM CARB/VIT D3/MINERALS (CALCIUM-VITAMIN D ORAL) once daily.       coenzyme Q10 10 mg capsule Take by mouth.      gabapentin (NEURONTIN) 100 MG capsule Take 1 capsule (100 mg total) by mouth 3 (three) times daily as needed (RLS). 90 capsule 11    losartan (COZAAR) 25 MG tablet TAKE 1 TABLET EVERY DAY 90 tablet 3    multivitamin (THERAGRAN) per tablet Take 1 tablet by  mouth.      rOPINIRole (REQUIP XL) 2 mg 24 hr tablet TAKE 1 TABLET BY MOUTH EVERY EVENING 90 tablet 3    urea 20 % Crea Apply 1 application topically once daily. To dry skin on the feet. 75 g 10    VITAMIN B COMPLEX ORAL Take by mouth once daily.       vitamin D 1000 units Tab Take 185 mg by mouth once daily.      rOPINIRole (REQUIP) 0.5 MG tablet Take 1 tablet as needed up to 3 times a day for breakthrough RLS symptoms. 90 tablet 11       Past Surgical History:   Procedure Laterality Date    APPENDECTOMY      bunion      right    COLONOSCOPY N/A 9/28/2015    Procedure: COLONOSCOPY;  Surgeon: Joe Amos MD;  Location: [facility] ENDO (4TH FLR);  Service: Endoscopy;  Laterality: N/A;    COLONOSCOPY N/A 3/16/2021    Procedure: COLONOSCOPY;  Surgeon: Brice Zayas MD;  [facility]  Service: Endoscopy;  Laterality: N/A;  covid test 3/13-primary care    hip surgery x 2      HYSTERECTOMY      partial    INJECTION OF JOINT Left 1/9/2019    Procedure: Injection, LEFT HIP INTRAARTICULAR INJECTION;  Surgeon: Omega Estevez MD;  Location: [facility] PAIN MGT;  Service: Pain Management;  Laterality: Left;    INJECTION OF JOINT Left 3/13/2019    Procedure: INJECTION, JOINT LEFT HIP;  Surgeon: Omega Estevez MD;  Location: [facility] [place] PAIN MGT;  Service: Pain Management;  Laterality: Left;  Left Hip Intrarticular Steroid Injection    INJECTION, SACROILIAC JOINT Right 2/23/2023    Procedure: INJECTION,SACROILIAC JOINT Right SI Joint, Right GTB;  Surgeon: Omega Estevez MD;  Location: [facility] PAIN MGT;  Service: Pain Management;  Laterality: Right;    JOINT REPLACEMENT      SURGICAL REMOVAL OF BONE SPUR  8/16/2022    Procedure: EXCISION, BONE SPUR ULNA;  Surgeon: Brennen Prabhakar Jr., MD;  Location: [facility] OR;  Service: Orthopedics;;    SURGICAL REMOVAL OF DISTAL ULNA Right 8/16/2022    Procedure: EXCISION, ULNA, DISTAL;  Surgeon: Brennen Prabhakar Jr., MD;  Location: [facility] OR;  Service:  Orthopedics;  Laterality: Right;    TONSILLECTOMY      TRANSFER OF HAND TENDON Right 2022    Procedure: TRANSFER, TENDON, HAND;  Surgeon: Brennen Prabhakar Jr., MD;  Location: [facility] OR;  Service: Orthopedics;  Laterality: Right;  transfer extensor tendon small finger    TRANSFORAMINAL EPIDURAL INJECTION OF STEROID Left 2021    Procedure: Injection,steroid,epidural,transforaminal approach; Levels: L5-S1;  Surgeon: Heather Mederos MD;  Location: [facility] PAIN MGT;  Service: Pain Management;  Laterality: Left;  No pacemaker. Patient is taking ASA.     TRANSFORAMINAL EPIDURAL INJECTION OF STEROID Bilateral 2022    Procedure: Injection,steroid,epidural,transforaminal bilateral L5;  Surgeon: Heather Medreos MD;  Location: [facility] PAIN MGT;  Service: Pain Management;  Laterality: Bilateral;  ASA   no pacemaker       Social History     Socioeconomic History    Marital status:    Occupational History    Occupation: RN   Tobacco Use    Smoking status: Former     Packs/day: 0.00     Years: 0.00     Pack years: 0.00     Types: Cigarettes     Quit date: 1965     Years since quittin.6    Smokeless tobacco: Never   Substance and Sexual Activity    Alcohol use: Yes     Alcohol/week: 0.0 standard drinks     Comment: Occ.    Drug use: No    Sexual activity: Yes   Other Topics Concern    Are you pregnant or think you may be? No    Breast-feeding No     Social Determinants of Health     Financial Resource Strain: Low Risk     Difficulty of Paying Living Expenses: Not hard at all   Food Insecurity: No Food Insecurity    Worried About Running Out of Food in the Last Year: Never true    Ran Out of Food in the Last Year: Never true   Transportation Needs: No Transportation Needs    Lack of Transportation (Medical): No    Lack of Transportation (Non-Medical): No   Physical Activity: Sufficiently Active    Days of Exercise per Week: 4 days    Minutes of Exercise per Session: 60 min   Stress: No  Stress Concern Present    Feeling of Stress : Not at all   Social Connections: Unknown    Frequency of Communication with Friends and Family: More than three times a week    Frequency of Social Gatherings with Friends and Family: Three times a week    Active Member of Clubs or Organizations: Yes    Attends Club or Organization Meetings: More than 4 times per year    Marital Status:    Housing Stability: Low Risk     Unable to Pay for Housing in the Last Year: No    Number of Places Lived in the Last Year: 1    Unstable Housing in the Last Year: No         OBJECTIVE:     Vital Signs Range (Last 24H):  Temp:  [36.8 °C (98.2 °F)] 36.8 °C (98.2 °F)  Pulse:  [78] 78  Resp:  [20] 20  SpO2:  [98 %] 98 %  BP: (158)/(72) 158/72    Significant Labs:  Lab Results   Component Value Date    WBC 7.03 08/05/2022    HGB 13.2 08/05/2022    HCT 40.9 08/05/2022     08/05/2022    CHOL 156 03/20/2023    TRIG 63 03/20/2023    HDL 69 03/20/2023    ALT 21 03/20/2023    AST 20 03/20/2023     03/20/2023    K 4.1 03/20/2023     03/20/2023    CREATININE 0.7 06/14/2023    BUN 12 03/20/2023    CO2 28 03/20/2023    TSH 1.857 08/05/2022    INR 1.0 07/29/2020    HGBA1C 5.9 07/29/2020         Pre-op Assessment    I have reviewed the Patient Summary Reports.     I have reviewed the Nursing Notes. I have reviewed the NPO Status.   I have reviewed the Medications.     Review of Systems  Anesthesia Hx:  No problems with previous Anesthesia  History of prior surgery of interest to airway management or planning:  Denies Personal Hx of Anesthesia complications.   Social:  Former Smoker    Hematology/Oncology:  Hematology Normal   Oncology Normal     EENT/Dental:EENT/Dental Normal   Cardiovascular:   Exercise tolerance: good Hypertension Denies Valvular problems/Murmurs.  Denies MI.   hyperlipidemia    Pulmonary:  Pulmonary Normal  Denies COPD.  Denies Asthma.  Denies Sleep Apnea.    Renal/:  Renal/ Normal  Denies  Chronic Renal Disease.     Hepatic/GI:  Hepatic/GI Normal  Denies GERD. Denies Liver Disease.    Musculoskeletal:   Arthritis     Neurological:   Denies TIA. Denies CVA. Neuromuscular Disease,  Denies Seizures.    Endocrine:  Endocrine Normal Denies Diabetes. Denies Hypothyroidism.  Denies Hyperthyroidism.        Physical Exam  General: Well nourished, Cooperative, Oriented and Alert    Airway:  Mallampati: II           Anesthesia Plan  Type of Anesthesia, risks & benefits discussed:    Anesthesia Type: Gen Natural Airway, Regional  Intra-op Monitoring Plan: Standard ASA Monitors  Post Op Pain Control Plan: multimodal analgesia and peripheral nerve block  Induction:  IV  Informed Consent: Informed consent signed with the Patient and all parties understand the risks and agree with anesthesia plan.  All questions answered.   ASA Score: 2  Day of Surgery Review of History & Physical: H&P Update referred to the surgeon/provider.    Ready For Surgery From Anesthesia Perspective.     .

## 2023-06-30 NOTE — TRANSFER OF CARE
"Anesthesia Transfer of Care Note    Patient: Sofía Mcwilliams    Procedure(s) Performed: Procedure(s) (LRB):  TRANSFER, TENDON, HAND (Right)    Patient location: OPS    Anesthesia Type: general    Transport from OR: Transported from OR on room air with adequate spontaneous ventilation    Post pain: adequate analgesia    Post assessment: no apparent anesthetic complications and tolerated procedure well    Post vital signs: stable    Level of consciousness: awake and oriented    Nausea/Vomiting: no nausea/vomiting    Complications: none    Transfer of care protocol was followed      Last vitals:   Visit Vitals  BP (!) 158/72   Pulse 78   Temp 36.8 °C (98.2 °F) (Skin)   Resp 20   Ht 5' 4" (1.626 m)   SpO2 98%   Breastfeeding No   BMI 24.89 kg/m²     "

## 2023-06-30 NOTE — ANESTHESIA PROCEDURE NOTES
Right Supraclavicular Peripheral Block    Patient location during procedure: pre-op   Block not for primary anesthetic.  Reason for block: at surgeon's request and post-op pain management   Post-op Pain Location: Right hand   Start time: 6/30/2023 2:57 PM  Timeout: 6/30/2023 2:56 PM   End time: 6/30/2023 3:05 PM    Staffing  Authorizing Provider: Joe Villanueva MD  Performing Provider: Joe Villanueva MD    Preanesthetic Checklist  Completed: patient identified, IV checked, site marked, risks and benefits discussed, surgical consent, monitors and equipment checked, pre-op evaluation and timeout performed  Peripheral Block  Patient position: supine  Prep: ChloraPrep  Patient monitoring: heart rate, cardiac monitor, continuous pulse ox, continuous capnometry and frequent blood pressure checks  Block type: supraclavicular  Laterality: right  Injection technique: single shot  Needle  Needle type: Stimuplex   Needle gauge: 20 G  Needle length: 4 in  Needle localization: anatomical landmarks and ultrasound guidance   -ultrasound image captured on disc.  Assessment  Injection assessment: negative aspiration, local visualized surrounding nerve and positive parasthesia  Paresthesia pain: immediately resolved  Heart rate change: no  Slow fractionated injection: yes  Pain Tolerance: comfortable throughout block  Medications:    Medications: bupivacaine (pf) (MARCAINE) injection 0.5% - Perineural   25 mL - 6/30/2023 3:04:00 PM    Additional Notes  VSS.  [facility] RN monitoring vitals throughout procedure.  Patient tolerated procedure well.     With lidocaine 1% 2 mL for skin infiltration.

## 2023-06-30 NOTE — OP NOTE
[facility] - [facility] (Hospital)  Operative Note      Date of Procedure: 6/30/2023     Procedure: Procedure(s) (LRB):  TRANSFER, TENDON, HAND (Right)     Surgeon(s) and Role:     * Brennen Prabhakar Jr., MD - Primary    Assisting Surgeon: None    Pre-Operative Diagnosis: Tendon rupture, nontraumatic, extensor [M66.20]    Post-Operative Diagnosis: Post-Op Diagnosis Codes:     * Tendon rupture, nontraumatic, extensor [M66.20]    Anesthesia: Regional    Operative Findings (including complications, if any):  Extensor tendon rupture x2 right hand    Description of Technical Procedures:     Preop diagnosis:  1. Extensor tendon rupture right middle finger.      2. Extensor tendon rupture right ring finger.      Postop diagnosis: Same.      Operative procedure:  1. Extensor tendon repair right middle finger side to side with index finger tendon.      2. Extensor tendon repair right ring finger side-to-side repair with small finger tendon.      Surgeon: Aki.      Anesthesia:  Regional block.      EBL:  Minimal.      Specimen:  Cultures and soft tissue.      Complications:  None.      Operative procedure in detail as follows:    After operative consent was obtained the patient brought the operating room placed supine operating table.  Anesthesia by regional block performed by the anesthesia staff.  After the right arm was anesthetized a tourniquet applied in the right arm prepped and draped out in the normal sterile fashion.  The Esmarch used to exsanguinated the limb and the tourniquet inflated 225 mmHg.      Following this a curved dorsal incision made with a 15 blade centered over the metacarpals of the right hand.  Full-thickness skin flaps were raised hemostasis achieved with the Bovie.  Large amount of fluid was encountered and sent for culture there was no evidence of pus or infection.  There appeared to be extensor tendon ruptures to the middle and ring finger.  The index and small finger tendons were intact.  There did  not appear to be any evidence of infection the tissue was sent for evaluation and culture.      There was no evidence of bony impingement or bone spurs.  A small amount of tenosynovium was present and tenosynovectomy performed.  The extensor retinaculum was released slightly to allow for repair.  The tendons were then cleaned up the distal stumps were trimmed and then repaired side to side with multiple sutures of 6-8 figure-of-eight sutures using 3-0 FiberWire for both tendons.  After this was done with the correct tension to allow full normal cascade and tenodesis affect with flexion and extension.  The wound irrigated the edges trimmed up hemostasis achieved with Bovie.  The skin closed with a running 5 O nylon horizontal mattress suture.  Sterile dressing applied followed by a volar splint holding the fingers in extension.  Tourniquet deflated patient brought to the recovery room in stable condition all sponge needle counts reported as correct no complications    Significant Surgical Tasks Conducted by the Assistant(s), if Applicable: na    Estimated Blood Loss (EBL): * No values recorded between 6/30/2023  5:35 PM and 6/30/2023  6:30 PM *           Implants: * No implants in log *    Specimens:   Specimen (24h ago, onward)       Start     Ordered    06/30/23 1749  Specimen to Pathology, Surgery Orthopedics  Once        Comments: Pre-op Diagnosis: Tendon rupture, nontraumatic, extensor [M66.20]Procedure(s):TRANSFER, TENDON, HAND Number of specimens: 1Name of specimens: 1. Degenerative Tendon Right Hand- perm     References:    Click here for ordering Quick Tip   Question Answer Comment   Procedure Type: Orthopedics    Which provider would you like to cc? YULI BRYAN JR    Release to patient Immediate        06/30/23 3590                            Condition: Good    Disposition: PACU - hemodynamically stable.    Attestation: I was present and scrubbed for the entire procedure.    Discharge Note    OUTCOME:  Patient tolerated treatment/procedure well without complication and is now ready for discharge.    DISPOSITION: Home or Self Care    FINAL DIAGNOSIS:  Tendon rupture, nontraumatic, extensor    FOLLOWUP: In clinic    DISCHARGE INSTRUCTIONS:    Discharge Procedure Orders   Diet general     Leave dressing on - Keep it clean, dry, and intact until clinic visit     Call MD for:  temperature >100.4     Call MD for:  persistent nausea and vomiting     Call MD for:  severe uncontrolled pain     Keep surgical extremity elevated

## 2023-06-30 NOTE — DISCHARGE INSTRUCTIONS
ANESTHESIA  -For the first 24 hours after surgery:  Do not drive, use heavy equipment, make important decisions, or drink alcohol  -It is normal to feel sleepy for several hours.  Rest until you are more awake.  -Have someone stay with you, if needed.  They can watch for problems and help keep you safe.  -Some possible post anesthesia side effects include: nausea and vomiting, sore throat and hoarseness, sleepiness, and dizziness.    PAIN  -If you have pain after surgery, pain medicine will help you feel better.  Take it as directed, before pain becomes severe.  Most pain relievers taken by mouth need at least 20-30 minutes to start working.  -Do not drive or drink alcohol while taking pain medicine.  -Pain medication can upset your stomach.  Taking them with a little food may help.  -Other ways to help control pain: elevation, ice, and relaxation  -Call your surgeon if still having unmanageable pain an hour after taking pain medicine.  -Pain medicine can cause constipation.  Taking an over-the counter stool softener while on prescription pain medicine and drinking plenty of fluids can prevent this side effect.  -Call your surgeon if you have severe side effects like: breathing problems, trouble waking up, dizziness, confusion, or severe constipation.    NAUSEA  -Some people have nausea after surgery.  This is often because of anesthesia, pain, pain medicine, or the stress of surgery.  -Do not push yourself to eat.  Start off with clear liquids and soup.  Slowly move to solid foods.  Don't eat fatty, rich, spicy foods at first.  Eat smaller amounts.  -If you develop persistent nausea and vomiting please notify your surgeon immediately.    BLEEDING  -Different types of surgery require different types of care and dressing changes.  It is important to follow all instructions and advice from your surgeon.  Change dressing as directed.  Call your surgeon for any concerns regarding postop bleeding.    SIGNS OF  INFECTION  -Signs of infection include: fever, swelling, drainage, and redness  -Notify your surgeon if you have a fever of 100.4 F (38.0 C) or higher.  -Notify your surgeon if you notice redness, swelling, increased pain, pus, or a foul smell at the incision site.    After Hand Surgery  After surgery, the better you take care of yourself--especially your hand--the sooner it will heal. Follow your surgeons instructions. Try not to bump your hand, and dont move or lift anything while youre still wearing bandages, a splint, or a cast.  Care for your hand    Keep your hand elevated above heart level as much as possible for the first several days after surgery. This helps reduce swelling and pain.  To help prevent infection and speed healing, take care not to get your cast or bandages wet.  Relieve pain as directed  Your surgeon may prescribe pain medicine or suggest you take an anti-inflammatory medicine. You might also be instructed to apply ice (or another cold source) to your hand. If you use ice cubes, put them in a plastic bag and rest it on top of your bandages. Leave the cold source on your hand for as long as its comfortable. Do this several times a day for the first few days after surgery. It may take several minutes before you can feel the cold through the cast or bandages.  Follow up with your surgeon  During a follow-up visit after surgery, your surgeon will check your progress. The stitches, bandages, splint, or cast may be removed. A new cast or splint may be placed. If your hand has healed enough, your surgeon may prescribe exercises.  Do prescribed hand exercises  Your surgeon may recommend that you do exercises. These may be done under the guidance of a physical or occupational therapist. The exercises strengthen your hand, help you regain flexibility, and restore proper function. Do the exercises as advised.  Call your surgeon if you have...  A fever higher than 100.4°F (38.0°C) taken by  mouth  Side effects from your medicine, such as prolonged nausea  A wet or loose dressing, or a dressing that is too tight  Excessive bleeding  Increased, ongoing pain or numbness  Signs of infection (such as drainage, warmth, or redness) at the incision site

## 2023-07-01 PROBLEM — M21.372 FOOT DROP, LEFT FOOT: Status: ACTIVE | Noted: 2023-07-01

## 2023-07-01 LAB
ANION GAP SERPL CALC-SCNC: 11 MMOL/L (ref 8–16)
BASOPHILS # BLD AUTO: 0.01 K/UL (ref 0–0.2)
BASOPHILS NFR BLD: 0.1 % (ref 0–1.9)
BUN SERPL-MCNC: 13 MG/DL (ref 8–23)
CALCIUM SERPL-MCNC: 9.2 MG/DL (ref 8.7–10.5)
CHLORIDE SERPL-SCNC: 109 MMOL/L (ref 95–110)
CO2 SERPL-SCNC: 20 MMOL/L (ref 23–29)
CREAT SERPL-MCNC: 0.9 MG/DL (ref 0.5–1.4)
CRP SERPL-MCNC: 3 MG/L (ref 0–8.2)
DIFFERENTIAL METHOD: ABNORMAL
EOSINOPHIL # BLD AUTO: 0 K/UL (ref 0–0.5)
EOSINOPHIL NFR BLD: 0.2 % (ref 0–8)
ERYTHROCYTE [DISTWIDTH] IN BLOOD BY AUTOMATED COUNT: 14.8 % (ref 11.5–14.5)
ERYTHROCYTE [SEDIMENTATION RATE] IN BLOOD BY PHOTOMETRIC METHOD: 14 MM/HR (ref 0–36)
EST. GFR  (NO RACE VARIABLE): >60 ML/MIN/1.73 M^2
GLUCOSE SERPL-MCNC: 202 MG/DL (ref 70–110)
GRAM STN SPEC: NORMAL
GRAM STN SPEC: NORMAL
HCT VFR BLD AUTO: 39.9 % (ref 37–48.5)
HGB BLD-MCNC: 13 G/DL (ref 12–16)
IMM GRANULOCYTES # BLD AUTO: 0.07 K/UL (ref 0–0.04)
IMM GRANULOCYTES NFR BLD AUTO: 0.8 % (ref 0–0.5)
LYMPHOCYTES # BLD AUTO: 1.2 K/UL (ref 1–4.8)
LYMPHOCYTES NFR BLD: 14.2 % (ref 18–48)
MCH RBC QN AUTO: 28.3 PG (ref 27–31)
MCHC RBC AUTO-ENTMCNC: 32.6 G/DL (ref 32–36)
MCV RBC AUTO: 87 FL (ref 82–98)
MONOCYTES # BLD AUTO: 0.1 K/UL (ref 0.3–1)
MONOCYTES NFR BLD: 0.8 % (ref 4–15)
NEUTROPHILS # BLD AUTO: 7 K/UL (ref 1.8–7.7)
NEUTROPHILS NFR BLD: 83.9 % (ref 38–73)
NRBC BLD-RTO: 0 /100 WBC
PLATELET # BLD AUTO: 188 K/UL (ref 150–450)
PMV BLD AUTO: 10.3 FL (ref 9.2–12.9)
POTASSIUM SERPL-SCNC: 3.9 MMOL/L (ref 3.5–5.1)
RBC # BLD AUTO: 4.59 M/UL (ref 4–5.4)
SODIUM SERPL-SCNC: 140 MMOL/L (ref 136–145)
WBC # BLD AUTO: 8.32 K/UL (ref 3.9–12.7)

## 2023-07-01 PROCEDURE — 99214 PR OFFICE/OUTPT VISIT, EST, LEVL IV, 30-39 MIN: ICD-10-PCS | Mod: ,,, | Performed by: NEUROLOGICAL SURGERY

## 2023-07-01 PROCEDURE — 86140 C-REACTIVE PROTEIN: CPT | Performed by: NEUROLOGICAL SURGERY

## 2023-07-01 PROCEDURE — 36415 COLL VENOUS BLD VENIPUNCTURE: CPT | Performed by: NEUROLOGICAL SURGERY

## 2023-07-01 PROCEDURE — 85652 RBC SED RATE AUTOMATED: CPT | Performed by: NEUROLOGICAL SURGERY

## 2023-07-01 PROCEDURE — 97530 THERAPEUTIC ACTIVITIES: CPT

## 2023-07-01 PROCEDURE — 99214 OFFICE O/P EST MOD 30 MIN: CPT | Mod: ,,, | Performed by: NEUROLOGICAL SURGERY

## 2023-07-01 PROCEDURE — 97161 PT EVAL LOW COMPLEX 20 MIN: CPT

## 2023-07-01 PROCEDURE — 25000003 PHARM REV CODE 250: Performed by: ORTHOPAEDIC SURGERY

## 2023-07-01 PROCEDURE — 80048 BASIC METABOLIC PNL TOTAL CA: CPT | Performed by: NEUROLOGICAL SURGERY

## 2023-07-01 PROCEDURE — 85025 COMPLETE CBC W/AUTO DIFF WBC: CPT | Performed by: NEUROLOGICAL SURGERY

## 2023-07-01 PROCEDURE — 63600175 PHARM REV CODE 636 W HCPCS: Performed by: ORTHOPAEDIC SURGERY

## 2023-07-01 RX ADMIN — METHYLPREDNISOLONE SODIUM SUCCINATE 80 MG: 40 INJECTION, POWDER, FOR SOLUTION INTRAMUSCULAR; INTRAVENOUS at 03:07

## 2023-07-01 RX ADMIN — GABAPENTIN 100 MG: 100 CAPSULE ORAL at 09:07

## 2023-07-01 RX ADMIN — ROPINIROLE HYDROCHLORIDE 2 MG: 1 TABLET, FILM COATED ORAL at 09:07

## 2023-07-01 RX ADMIN — GABAPENTIN 100 MG: 100 CAPSULE ORAL at 03:07

## 2023-07-01 RX ADMIN — GABAPENTIN 100 MG: 100 CAPSULE ORAL at 08:07

## 2023-07-01 RX ADMIN — LOSARTAN POTASSIUM 25 MG: 25 TABLET, FILM COATED ORAL at 08:07

## 2023-07-01 NOTE — PLAN OF CARE
The sw spoke to the pt via phone and she states her spouse Dhaval Mcwilliams [phone] will transport her home at d/c. The pt's independent with her ADL's and doesn't use dme. The pt has a great support system and has no further Case Management needs. The sw stressed the importance of going to her [facility] F/U's and taking her medications. The pt acknowledged understanding and states she will comply. The pt's clear to d/c home once she sees Dr. Prabhakar.     [facility] Surg  Discharge Final Note    Primary Care Provider: Natalia Stubbs MD    Expected Discharge Date: 6/30/2023    Final Discharge Note (most recent)       Final Note - 07/01/23 0824          Final Note    Assessment Type Discharge Planning Assessment (P)                      Important Message from Medicare             Contact Info       Ramona Barrett PA-C   Specialty: Orthopedic Surgery    [address] [place] [id number]   Phone: [phone]       Next Steps: Follow up on 7/12/2023    Instructions: 10:45am

## 2023-07-01 NOTE — PLAN OF CARE
Called to pt room. Pt c/o numbness and inablity to move left foot, toes. Able to lift leg and bend at knee.   Touch, cold sensation present. Anesthesia notified.  Here to see.

## 2023-07-01 NOTE — PLAN OF CARE
Problem: Adult Inpatient Plan of Care  Goal: Plan of Care Review  Outcome: Ongoing, Progressing  Flowsheets (Taken 7/1/2023 0613)  Plan of Care Reviewed With: patient     Problem: Fall Injury Risk  Goal: Absence of Fall and Fall-Related Injury  Outcome: Ongoing, Progressing  Intervention: Identify and Manage Contributors  Flowsheets (Taken 7/1/2023 0613)  Medication Review/Management: medications reviewed     Vitals:    07/01/23 0442   BP: 125/64   Pulse: 70   Resp: 18   Temp: 97.2 °F (36.2 °C)   ;s

## 2023-07-01 NOTE — PROGRESS NOTES
Lumbar spine MRI from today reviewed showing left L5-S1 moderate to severe foraminal stenosis compressing the left L5 nerve root. Left L5-S1 small posterolateral disc herniation in contact with the left S1 nerve root. She does however have significant atrophy of the left iliopsoas muscle which was not present in 2021.    No emergent surgical intervention indicated for today. Unclear if surgical intervention would improve motor strength outcome.     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     Consulting neurology     CBC, BMP, sed rate, CRP    Ok to start solumedrol IV    Will order AFO brace.      Consider outpatient EMG

## 2023-07-01 NOTE — CONSULTS
Neurosurgery consulted for left leg numbness. Patient is being discharged. Will schedule outpatient follow-up this week with neurosurgery.     Glynn Farfan MD

## 2023-07-01 NOTE — ANESTHESIA POSTPROCEDURE EVALUATION
Anesthesia Post Evaluation    Patient: Sofía Mcwilliams    Procedure(s) Performed: Procedure(s) (LRB):  TRANSFER, TENDON, HAND (Right)    Final Anesthesia Type: regional      Patient location during evaluation: PACU  Patient participation: Yes- Able to Participate  Level of consciousness: awake and alert  Post-procedure vital signs: reviewed and stable  Pain management: adequate  Airway patency: patent    PONV status at discharge: No PONV  Anesthetic complications: yes  Perioperative Events: peripheral neurologic deficit        Cardiovascular status: stable  Respiratory status: spontaneous ventilation  Hydration status: euvolemic  Follow-up not needed.          Vitals Value Taken Time   /56 06/30/23 1900   Temp 36.4 °C (97.5 °F) 06/30/23 1830   Pulse 66 06/30/23 1900   Resp 18 06/30/23 1900   SpO2 98 % 06/30/23 1900         No case tracking events are documented in the log.      Pain/Choco Score: Choco Score: 10 (6/30/2023  7:00 PM)

## 2023-07-01 NOTE — PT/OT/SLP EVAL
Physical Therapy Evaluation    Patient Name:  Sofía Mcwilliams   MRN:  [id number]    Recommendations:     Discharge Recommendations: home health PT, home health OT   Discharge Equipment Recommendations: shower chair, wheelchair, bedside commode (RW with R forearm support)   Barriers to discharge: Inaccessible home    Assessment:     Sofía Mcwilliams is a 82 y.o. female admitted with a medical diagnosis of Tendon rupture, nontraumatic, extensor.  She presents with the following impairments/functional limitations: weakness, impaired sensation, impaired self care skills, impaired functional mobility, gait instability, impaired balance, decreased coordination, decreased upper extremity function, decreased lower extremity function, decreased ROM, impaired coordination, impaired fine motor, impaired skin, orthopedic precautions.  Pt was able to transfer supine to sit with Mod independence. She required Min assist to transfer sit<>stand and Min assist to transfer bed to chair.    Rehab Prognosis: Good; patient would benefit from acute skilled PT services to address these deficits and reach maximum level of function.    Recent Surgery: Procedure(s) (LRB):  TRANSFER, TENDON, HAND (Right) 1 Day Post-Op    Plan:     During this hospitalization, patient to be seen 5 x/week to address the identified rehab impairments via gait training, therapeutic activities, therapeutic exercises, neuromuscular re-education and progress toward the following goals:    Plan of Care Expires:  08/01/23    Subjective     Chief Complaint: weakness and numbness in L LE  Patient/Family Comments/goals: Pt agreeable to Tx  Pain/Comfort:  Pain Rating 1: 0/10    Patients cultural, spiritual, Congregational conflicts given the current situation: no    Living Environment:  Pt lives in a single story home with 4 steps to enter and 2 steps to the den.  Pt lives with her   Prior to admission, patients level of function was independent with all ADL's.  Equipment used at  home: grab bar (RW with R forearm support).  DME owned (not currently used): rolling walker.  Upon discharge, patient will have assistance from her .    Objective:     Communicated with nurse prior to session.  Patient found up in chair with PureWick, peripheral IV, SCD, Other (comments) (R hand splint and sling)  upon PT entry to room.    General Precautions: Standard, fall  Orthopedic Precautions:RUE non weight bearing   Braces: UE Sling, UE brace  Respiratory Status: Room air    Exams:  Cognitive Exam:  Patient is oriented to Person, Place, Time, and Situation  Sensation:    -       Impaired  light/touch L lower leg  RLE ROM: WFL  RLE Strength: WFL  LLE ROM: WFL except dorsiflexion -10  LLE Strength: WFL except 0/5 L dorsiflexion, plantarflexion, inversion and eversion as well as toe flexion     Functional Mobility:  Bed Mobility:     Rolling Left:  modified independence  Supine to Sit: modified independence  Transfers:     Sit to Stand:  minimum assistance with rolling walker  Bed to Chair: minimum assistance with  no AD  using  Stand Pivot      AM-PAC 6 CLICK MOBILITY  Total Score:15       Treatment & Education:  Pt received transfer training sit to stand and bed to chair.    Patient left up in chair with all lines intact, call button in reach, and nurse notified.    GOALS:   Multidisciplinary Problems       Physical Therapy Goals          Problem: Physical Therapy    Goal Priority Disciplines Outcome Goal Variances Interventions   Physical Therapy Goal     PT, PT/OT Ongoing, Not Progressing     Description: Goals to be met by: 2023    Patient will increase functional independence with mobility by performin. Sit<>stand with CGA with RW.  2. Gait x 50 feet with RW with Min assist.  3. Ascend/descend 4 step(s) with least restrictive assistive device and Mod assist.                           History:     Past Medical History:   Diagnosis Date    Anxiety 2016    Arthritis     Basal cell  carcinoma 2013    left buttock    Cataract     Hypertension     Other and unspecified hyperlipidemia        Past Surgical History:   Procedure Laterality Date    APPENDECTOMY      bunion      right    COLONOSCOPY N/A 9/28/2015    Procedure: COLONOSCOPY;  Surgeon: Joe Amos MD;  Location: [facility] ENDO (4TH FLR);  Service: Endoscopy;  Laterality: N/A;    COLONOSCOPY N/A 3/16/2021    Procedure: COLONOSCOPY;  Surgeon: Brice Zayas MD;  Location: [facility] ENDO (4TH FLR);  Service: Endoscopy;  Laterality: N/A;  covid test 3/13-primary care    hip surgery x 2      HYSTERECTOMY      partial    INJECTION OF JOINT Left 1/9/2019    Procedure: Injection, LEFT HIP INTRAARTICULAR INJECTION;  Surgeon: Omega Estevez MD;  Location: [facility] PAIN MGT;  Service: Pain Management;  Laterality: Left;    INJECTION OF JOINT Left 3/13/2019    Procedure: INJECTION, JOINT LEFT HIP;  Surgeon: Omega Estevez MD;  Location: [facility] PAIN MGT;  Service: Pain Management;  Laterality: Left;  Left Hip Intrarticular Steroid Injection    INJECTION, SACROILIAC JOINT Right 2/23/2023    Procedure: INJECTION,SACROILIAC JOINT Right SI Joint, Right GTB;  Surgeon: Omega Estevez MD;  Location: [facility] PAIN MGT;  Service: Pain Management;  Laterality: Right;    JOINT REPLACEMENT      SURGICAL REMOVAL OF BONE SPUR  8/16/2022    Procedure: EXCISION, BONE SPUR ULNA;  Surgeon: Brennen Prabhakar Jr., MD;  Location: [facility] OR;  Service: Orthopedics;;    SURGICAL REMOVAL OF DISTAL ULNA Right 8/16/2022    Procedure: EXCISION, ULNA, DISTAL;  Surgeon: Brennen Prabhakar Jr., MD;  Location: [facility] OR;  Service: Orthopedics;  Laterality: Right;    TONSILLECTOMY      TRANSFER OF HAND TENDON Right 8/16/2022    Procedure: TRANSFER, TENDON, HAND;  Surgeon: Brennen Prabhakar Jr., MD;  Location: [facility] OR;  Service: Orthopedics;  Laterality: Right;  transfer extensor tendon small finger    TRANSFORAMINAL EPIDURAL INJECTION OF STEROID Left 8/25/2021    Procedure:  Injection,steroid,epidural,transforaminal approach; Levels: L5-S1;  Surgeon: Heather Mederos MD;  Location: [facility] PAIN MGT;  Service: Pain Management;  Laterality: Left;  No pacemaker. Patient is taking ASA.     TRANSFORAMINAL EPIDURAL INJECTION OF STEROID Bilateral 2/23/2022    Procedure: Injection,steroid,epidural,transforaminal bilateral L5;  Surgeon: Heather Mederos MD;  Location: [facility] PAIN MGT;  Service: Pain Management;  Laterality: Bilateral;  ASA   no pacemaker       Time Tracking:     PT Received On: 07/01/23  PT Start Time: 1126     PT Stop Time: 1152  PT Total Time (min): 26 min     Billable Minutes: Evaluation 10 and Therapeutic Activity 16      07/01/2023

## 2023-07-01 NOTE — PLAN OF CARE
Problem: Physical Therapy  Goal: Physical Therapy Goal  Description: Goals to be met by: 2023    Patient will increase functional independence with mobility by performin. Sit<>stand with CGA with RW.  2. Gait x 50 feet with RW with Min assist.  3. Ascend/descend 4 step(s) with least restrictive assistive device and Mod assist.      Outcome: Ongoing, Not Progressing   Pt was able to transfer supine to sit with Mod independence. She required Min assist to transfer sit<>stand and Min assist to transfer bed to chair.

## 2023-07-01 NOTE — PROGRESS NOTES
06/30/23 2246   Admission   Initial VN Admission Questions Complete   Shift   Pain Management Interventions pain management plan reviewed with patient/caregiver   Virtual Nurse - Patient Verbalized Approval Of Camera Use;VN Rounding   Safety/Activity   Patient Rounds bed in low position;call light in patient/parent reach;clutter free environment maintained;visualized patient;placement of personal items at bedside   Safety Promotion/Fall Prevention assistive device/personal item within reach;bed alarm set;Fall Risk reviewed with patient/family;side rails raised x 2   Positioning   Body Position supine   Head of Bed (HOB) Positioning HOB at 30-45 degrees   VN cued in to pt's room with permission. Admission questions completed. Plan of care reviewed with pt. Pt denies any needs at this time. Call bell w/in reach. Instructed to call for needs/assist.

## 2023-07-01 NOTE — CONSULTS
NEUROSURGICAL INPATIENT CONSULTATION NOTE    DATE OF SERVICE:  07/01/2023    ATTENDING PHYSICIAN:  Glynn Farfan MD    CONSULT REQUESTED BY:  Orthopedic surgery    REASON FOR CONSULT:  Left foot weakness and numbness    SUBJECTIVE:    HISTORY OF PRESENT ILLNESS:  This is a very pleasant 82 y.o. female, history of chronic low back pain, admitted for a right ring and middle finger extensor tendon repair under regional block.  Patient noticed in recovery that she could not feel her left foot.  Patient reports that since yesterday she is unable to perform any plantar flexion or dorsiflexion of the left foot.  She denies having significant back pain, leg pain.  She is able to void her bladder without difficulty.  No other motor weakness.  Prior to surgery she did not have any left leg pain.  She has been treated in February for right buttock pain with a right SI joint and hip injection she reports that usually she has some low back pain when she stands and walk but the more she walks the back pain improves.  Recently she had a brain MRI and a cervical spine MRI due to gait imbalance issues.              PAST MEDICAL HISTORY:  Active Ambulatory Problems     Diagnosis Date Noted    Retina disorder - Both Eyes 07/16/2012    Nuclear sclerosis - Both Eyes 09/20/2013    Hypertension 09/29/2014    Hyperlipidemia 09/29/2014    Arthritis 11/13/2015    Osteopenia 11/13/2015    Anxiety 05/02/2016    Mild carotid artery disease 11/03/2016    Chronic pain 01/09/2019    Osteoarthritis of hip 04/11/2019    Primary osteoarthritis of both hands 01/17/2020    Restless leg syndrome due to iron deficiency anemia 01/17/2020    Decreased  strength 02/11/2021    Decreased pinch strength 02/11/2021    Decreased activities of daily living (ADL) 02/11/2021    Reduced sensation 02/11/2021    History of colon polyps 03/16/2021    Lumbar radiculopathy 08/25/2021    Tendon rupture, nontraumatic, extensor 07/25/2022    Decreased range of motion  09/08/2022    Localized edema 09/08/2022    Sensory ataxia 06/14/2023     Resolved Ambulatory Problems     Diagnosis Date Noted    Pain in limb 05/13/2015    Colon cancer screening 09/28/2015    Pain 08/25/2021     Past Medical History:   Diagnosis Date    Basal cell carcinoma 2013    Cataract     Other and unspecified hyperlipidemia        PAST SURGICAL HISTORY:  Past Surgical History:   Procedure Laterality Date    APPENDECTOMY      bunion      right    COLONOSCOPY N/A 9/28/2015    Procedure: COLONOSCOPY;  Surgeon: Joe Amos MD;  Location: [facility] ENDO ([facility] FLR);  Service: Endoscopy;  Laterality: N/A;    COLONOSCOPY N/A 3/16/2021    Procedure: COLONOSCOPY;  Surgeon: Brice Zayas MD;  Location: [facility] ENDO ([facility] FLR);  Service: Endoscopy;  Laterality: N/A;  covid test 3/13-primary care    hip surgery x 2      HYSTERECTOMY      partial    INJECTION OF JOINT Left 1/9/2019    Procedure: Injection, LEFT HIP INTRAARTICULAR INJECTION;  Surgeon: Omega Estevez MD;  Location: [facility] [place] PAIN MGT;  Service: Pain Management;  Laterality: Left;    INJECTION OF JOINT Left 3/13/2019    Procedure: INJECTION, JOINT LEFT HIP;  Surgeon: Omega Estevez MD;  Location: [facility] [place] PAIN MGT;  Service: Pain Management;  Laterality: Left;  Left Hip Intrarticular Steroid Injection    INJECTION, SACROILIAC JOINT Right 2/23/2023    Procedure: INJECTION,SACROILIAC JOINT Right SI Joint, Right GTB;  Surgeon: Omega Estevez MD;  Location: [facility] PAIN MGT;  Service: Pain Management;  Laterality: Right;    JOINT REPLACEMENT      SURGICAL REMOVAL OF BONE SPUR  8/16/2022    Procedure: EXCISION, BONE SPUR ULNA;  Surgeon: Brennen Prabhakar Jr., MD;  Location: [facility] OR;  Service: Orthopedics;;    SURGICAL REMOVAL OF DISTAL ULNA Right 8/16/2022    Procedure: EXCISION, ULNA, DISTAL;  Surgeon: Brennen Prabhakar Jr., MD;  Location: [facility] OR;  Service: Orthopedics;  Laterality: Right;    TONSILLECTOMY      TRANSFER OF HAND TENDON Right 8/16/2022     Procedure: TRANSFER, TENDON, HAND;  Surgeon: Brennen Prabhakar Jr., MD;  Location: [facility] OR;  Service: Orthopedics;  Laterality: Right;  transfer extensor tendon small finger    TRANSFORAMINAL EPIDURAL INJECTION OF STEROID Left 2021    Procedure: Injection,steroid,epidural,transforaminal approach; Levels: L5-S1;  Surgeon: Heather Mederos MD;  Location: [facility] PAIN MGT;  Service: Pain Management;  Laterality: Left;  No pacemaker. Patient is taking ASA.     TRANSFORAMINAL EPIDURAL INJECTION OF STEROID Bilateral 2022    Procedure: Injection,steroid,epidural,transforaminal bilateral L5;  Surgeon: Heather Mederos MD;  Location: [facility] PAIN MGT;  Service: Pain Management;  Laterality: Bilateral;  ASA   no pacemaker       SOCIAL HISTORY:   Social History     Socioeconomic History    Marital status:    Occupational History    Occupation: RN   Tobacco Use    Smoking status: Former     Packs/day: 0.00     Years: 0.00     Pack years: 0.00     Types: Cigarettes     Quit date: 1965     Years since quittin.6    Smokeless tobacco: Never   Substance and Sexual Activity    Alcohol use: Yes     Alcohol/week: 0.0 standard drinks     Comment: Occ.    Drug use: No    Sexual activity: Yes   Other Topics Concern    Are you pregnant or think you may be? No    Breast-feeding No     Social Determinants of Health     Financial Resource Strain: Low Risk     Difficulty of Paying Living Expenses: Not hard at all   Food Insecurity: No Food Insecurity    Worried About Running Out of Food in the Last Year: Never true    Ran Out of Food in the Last Year: Never true   Transportation Needs: No Transportation Needs    Lack of Transportation (Medical): No    Lack of Transportation (Non-Medical): No   Physical Activity: Sufficiently Active    Days of Exercise per Week: 4 days    Minutes of Exercise per Session: 60 min   Stress: No Stress Concern Present    Feeling of Stress : Not at all   Social Connections: Unknown    Frequency of  Communication with Friends and Family: More than three times a week    Frequency of Social Gatherings with Friends and Family: Three times a week    Active Member of Clubs or Organizations: Yes    Attends Club or Organization Meetings: More than 4 times per year    Marital Status:    Housing Stability: Low Risk     Unable to Pay for Housing in the Last Year: No    Number of Places Lived in the Last Year: 1    Unstable Housing in the Last Year: No       FAMILY HISTORY:  Family History   Adopted: Yes       CURRENTS MEDICATIONS:    No current facility-administered medications on file prior to encounter.     Current Outpatient Medications on File Prior to Encounter   Medication Sig Dispense Refill    aspirin (ECOTRIN) 81 MG EC tablet Take 1 tablet by mouth.      atorvastatin (LIPITOR) 20 MG tablet TAKE 1 TABLET EVERY DAY 90 tablet 3    CALCIUM CARB/VIT D3/MINERALS (CALCIUM-VITAMIN D ORAL) once daily.       coenzyme Q10 10 mg capsule Take by mouth.      gabapentin (NEURONTIN) 100 MG capsule Take 1 capsule (100 mg total) by mouth 3 (three) times daily as needed (RLS). 90 capsule 11    losartan (COZAAR) 25 MG tablet TAKE 1 TABLET EVERY DAY 90 tablet 3    multivitamin (THERAGRAN) per tablet Take 1 tablet by mouth.      rOPINIRole (REQUIP XL) 2 mg 24 hr tablet TAKE 1 TABLET BY MOUTH EVERY EVENING 90 tablet 3    urea 20 % Crea Apply 1 application topically once daily. To dry skin on the feet. 75 g 10    VITAMIN B COMPLEX ORAL Take by mouth once daily.       vitamin D 1000 units Tab Take 185 mg by mouth once daily.      rOPINIRole (REQUIP) 0.5 MG tablet Take 1 tablet as needed up to 3 times a day for breakthrough RLS symptoms. 90 tablet 11        gabapentin  100 mg Oral TID    losartan  25 mg Oral Daily    methylPREDNISolone sodium succinate injection  80 mg Intravenous Q12H    rOPINIRole  2 mg Oral QHS       ALLERGIES:  Review of patient's allergies indicates:   Allergen Reactions    Celecoxib      Other reaction(s):  Swelling    Sulfa (sulfonamide antibiotics)      Other reaction(s): Hives       REVIEW OF SYSTEMS:  Review of Systems   Constitutional:  Negative for diaphoresis, fever and weight loss.   Respiratory:  Negative for shortness of breath.    Cardiovascular:  Negative for chest pain.   Gastrointestinal:  Negative for blood in stool.   Genitourinary:  Negative for hematuria.   Endo/Heme/Allergies:  Does not bruise/bleed easily.   All other systems reviewed and are negative.    OBJECTIVE:    PHYSICAL EXAMINATION:   Vitals:    07/01/23 0716   BP: (!) 140/67   Pulse: 80   Resp: 20   Temp: 96.6 °F (35.9 °C)       Physical Exam:  Vitals reviewed.    Constitutional: She appears well-developed and well-nourished.     Eyes: Pupils are equal, round, and reactive to light. Conjunctivae and EOM are normal.     Cardiovascular: Normal distal pulses and no edema.     Abdominal: Soft.     Skin: Skin displays no rash on trunk and no rash on extremities. Skin displays no lesions on trunk and no lesions on extremities.     Psych/Behavior: She is alert. She is oriented to person, place, and time. She has a normal mood and affect.     Musculoskeletal:        Neck: Range of motion is full.     Neurological:        Sensory:   Common peroneal nerve Tinel's sign negative on the left side       DTRs: Tricep reflexes are 2+ on the right side and 2+ on the left side. Bicep reflexes are 2+ on the right side and 2+ on the left side. Brachioradialis reflexes are 2+ on the right side and 2+ on the left side. Patellar reflexes are 2+ on the right side and 2+ on the left side. Achilles reflexes are 0 on the right side and 0 on the left side.     Back Exam     Tenderness   The patient is experiencing no tenderness.     Muscle Strength   Right Quadriceps:  5/5   Left Quadriceps:  5/5   Right Hamstrings:  5/5   Left Hamstrings:  5/5           SI joint:   Palpation at the right and left SI joints not painful  MARTIN test is negative bilaterally  Ninfa  test is negative bilaterally  Thigh thrust test is negative bilaterally    Neurologic Exam     Mental Status   Oriented to person, place, and time.   Speech: speech is normal   Level of consciousness: alert    Cranial Nerves   Cranial nerves II through XII intact.     CN III, IV, VI   Pupils are equal, round, and reactive to light.  Extraocular motions are normal.     Motor Exam   Muscle bulk: normal  Overall muscle tone: normal    Strength   Right deltoid: 5/5  Left deltoid: 5/5  Right biceps: 5/5  Left biceps: 5/5  Right triceps: 5/5  Left triceps: 5/5  Right wrist flexion: 5/5  Left wrist flexion: 5/5  Right wrist extension: 5/5  Left wrist extension: 5/5  Right interossei: 5/5  Left interossei: 5/5  Right iliopsoas: 5/5  Left iliopsoas: 5/5  Right quadriceps: 5/5  Left quadriceps: 5/5  Right hamstrin/5  Left hamstrin/5  Right anterior tibial: 5/5  Left anterior tibial: 0/5  Right posterior tibial: 5/5  Left posterior tibial: 0/5  Right peroneal: 5/5  Left peroneal: 0/5  Right gastroc: 5/5  Left gastroc: 0/5    Sensory Exam   Right arm light touch: normal  Left arm light touch: normal  Right leg light touch: normal  Left leg light touch: decreased from knee  Common peroneal nerve Tinel's sign negative on the left side     Gait, Coordination, and Reflexes     Reflexes   Right brachioradialis: 2+  Left brachioradialis: 2+  Right biceps: 2+  Left biceps: 2+  Right triceps: 2+  Left triceps: 2+  Right patellar: 2+  Left patellar: 2+  Right achilles: 0  Left achilles: 0  Right plantar: normal  Left plantar: normal  Right Berkowitz: absent  Left Berkowitz: absent  Right ankle clonus: absent  Left ankle clonus: absent    DIAGNOSTIC DATA:    No results for input(s): HGB, HCT, MCV, WBC, PLT, NA, K, CL, GLU, BUN, CREATININE, CALCIUM, MG, ALT, AST, ALBUMIN, BILITOT, INR in the last 72 hours.    Microbiology Results (last 7 days)       Procedure Component Value Units Date/Time    Gram stain [[id number]] Collected:  06/30/23 1809    Order Status: Completed Specimen: Incision site from Hand, Right Updated: 07/01/23 0331     Gram Stain Result No WBC's      No organisms seen    Narrative:      Right Hand Cultures    Aerobic culture [[id number]] Collected: 06/30/23 1809    Order Status: Sent Specimen: Incision site from Hand, Right Updated: 07/01/23 0153    AFB Culture & Smear [[id number]] Collected: 06/30/23 1809    Order Status: Sent Specimen: Incision site from Hand, Right Updated: 07/01/23 0153    Culture, Anaerobe [[id number]] Collected: 06/30/23 1809    Order Status: Sent Specimen: Incision site from Hand, Right Updated: 07/01/23 0153    Fungus culture [[id number]] Collected: 06/30/23 1809    Order Status: Sent Specimen: Incision site from Hand, Right Updated: 07/01/23 0153            I personally interpreted the following imaging:  I reviewed the patient's lumbar spine from 2021 showing some L4-5 and L5-S1 degeneration without significant stenosis.    06/28/2023 MRI of the cervical spine shows spondylosis without significant stenosis or intramedullary signal change    Brain MRI from April 4, 2023 shows nonspecific T2 FLAIR signal involving the supratentorial white matter, no mass, enhancing lesion or stroke    X-ray lumbar spine shows levoscoliosis centered at L2-3, significant degenerative disc disease involving the L2-3 disc space    ASSESMENT:  This is a 82 y.o. female with acute left flail foot since yesterday following right hand surgery for tendons rupture.  Presentation is atypical because the patient has both dorsiflexion and plantar flexion weakness.  Appears at both the left L5 and S1 nerve roots are affected significantly.  The patient may have a new disc herniation explaining no symptoms.  No cauda equina syndrome    PLAN:  Okay to complete workup with lumbar spine MRI while in the hospital  Call Neurosurgery when lumbar spine MRI is completed.  All questions answered      Glynn Farfan MD  Cell: [phone]

## 2023-07-01 NOTE — H&P
Subjective:      Patient ID: Sofía Mcwilliams is a 82 y.o. female.    Chief Complaint: No chief complaint on file.      HPI  Sofía Mcwilliams is a  82 y.o. female who underwent outpatient surgery on her right hand yesterday under local/regional anesthesia   She tolerated the procedure well but prior to her discharge she noted new onset weakness and numbness in the left foot   She was unable to ambulate and therefore she was admitted to the hospital for workup of her left leg   She does have a history of chronic neck and back problems including lumbar disc disease   There was no recent trauma reported   No bowel or bladder problems reported         Review of patient's allergies indicates:   Allergen Reactions    Celecoxib      Other reaction(s): Swelling    Sulfa (sulfonamide antibiotics)      Other reaction(s): Hives         Current Facility-Administered Medications   Medication Dose Route Frequency Provider Last Rate Last Admin    acetaminophen tablet 650 mg  650 mg Oral Q4H PRN Brennen Prabhakar Jr., MD        gabapentin capsule 100 mg  100 mg Oral TID Brennen Prabhakar Jr., MD   100 mg at 07/01/23 1508    HYDROcodone-acetaminophen 5-325 mg per tablet 1 tablet  1 tablet Oral Q4H PRN Brennen Prabhakar Jr., MD        losartan tablet 25 mg  25 mg Oral Daily Brennen Prabhakar Jr., MD   25 mg at 07/01/23 0831    methylPREDNISolone sodium succinate injection 80 mg  80 mg Intravenous Q12H Brennen Prabhakar Jr., MD   80 mg at 07/01/23 1508    ondansetron disintegrating tablet 8 mg  8 mg Oral Q8H PRN Brennen Prabhakar Jr., MD        oxyCODONE immediate release tablet 10 mg  10 mg Oral Q4H PRN Brennen Prabhakar Jr., MD        rOPINIRole tablet 2 mg  2 mg Oral QHS Brennen Prabhakar Jr., MD   2 mg at 06/30/23 1303       Past Medical History:   Diagnosis Date    Anxiety 5/2/2016    Arthritis     Basal cell carcinoma 2013    left buttock    Cataract     Hypertension     Other and unspecified hyperlipidemia        Past Surgical History:    Procedure Laterality Date    APPENDECTOMY      bunion      right    COLONOSCOPY N/A 9/28/2015    Procedure: COLONOSCOPY;  Surgeon: Joe Amos MD;  Location: [facility] ENDO (4TH FLR);  Service: Endoscopy;  Laterality: N/A;    COLONOSCOPY N/A 3/16/2021    Procedure: COLONOSCOPY;  Surgeon: Brice Zayas MD;  Location: [facility] ENDO (4TH FLR);  Service: Endoscopy;  Laterality: N/A;  covid test 3/13-primary care    hip surgery x 2      HYSTERECTOMY      partial    INJECTION OF JOINT Left 1/9/2019    Procedure: Injection, LEFT HIP INTRAARTICULAR INJECTION;  Surgeon: Omega Estevez MD;  Location: [facility] PAIN MGT;  Service: Pain Management;  Laterality: Left;    INJECTION OF JOINT Left 3/13/2019    Procedure: INJECTION, JOINT LEFT HIP;  Surgeon: Omega Estevez MD;  Location: [facility] PAIN MGT;  Service: Pain Management;  Laterality: Left;  Left Hip Intrarticular Steroid Injection    INJECTION, SACROILIAC JOINT Right 2/23/2023    Procedure: INJECTION,SACROILIAC JOINT Right SI Joint, Right GTB;  Surgeon: Omega Estevez MD;  Location: [facility] PAIN MGT;  Service: Pain Management;  Laterality: Right;    JOINT REPLACEMENT      SURGICAL REMOVAL OF BONE SPUR  8/16/2022    Procedure: EXCISION, BONE SPUR ULNA;  Surgeon: Brennen Prabhakar Jr., MD;  Location: [facility] OR;  Service: Orthopedics;;    SURGICAL REMOVAL OF DISTAL ULNA Right 8/16/2022    Procedure: EXCISION, ULNA, DISTAL;  Surgeon: Brennen Prabhakar Jr., MD;  Location: [facility] OR;  Service: Orthopedics;  Laterality: Right;    TONSILLECTOMY      TRANSFER OF HAND TENDON Right 8/16/2022    Procedure: TRANSFER, TENDON, HAND;  Surgeon: Brennen Prabhakar Jr., MD;  Location: [facility] OR;  Service: Orthopedics;  Laterality: Right;  transfer extensor tendon small finger    TRANSFORAMINAL EPIDURAL INJECTION OF STEROID Left 8/25/2021    Procedure: Injection,steroid,epidural,transforaminal approach; Levels: L5-S1;  Surgeon: Heather Mederos MD;  Location: [facility] PAIN MGT;  Service: Pain  "Management;  Laterality: Left;  No pacemaker. Patient is taking ASA.     TRANSFORAMINAL EPIDURAL INJECTION OF STEROID Bilateral 2/23/2022    Procedure: Injection,steroid,epidural,transforaminal bilateral L5;  Surgeon: Heather Mederos MD;  [facility];  Service: Pain Management;  Laterality: Bilateral;  ASA   no pacemaker       Review of Systems:  ROS    OBJECTIVE:     PHYSICAL EXAM:  Height: 5' 4" (162.6 cm) Weight: 65.9 kg (145 lb 4.5 oz)  Vitals:    07/01/23 0442 07/01/23 0716 07/01/23 0730 07/01/23 1523   BP: 125/64 (!) 140/67  (!) 142/65   Pulse: 70 80  70   Resp: 18 20  20   Temp: 97.2 °F (36.2 °C) 96.6 °F (35.9 °C)  96.8 °F (36 °C)   TempSrc: Oral      SpO2: 97% 98% 96% 98%   Weight:       Height:         Well developed, well nourished female in no acute distress  Alert and oriented x 3  HEENT- Normal exam  Lungs- Clear to auscultation  Heart- Regular rate and rhythm  Abdomen- Soft nontender  Extremity exam- examination of the left leg shows weakness of the left foot with plantar and dorsiflexion   Decreased sensation in the foot   Strength intact in the left quadriceps and hip   Neurologic exam intact in the right leg       RADIOGRAPHS:  Lumbosacral spine x-rays show degenerative changes at multiple levels  MRI scan pending  Comments: I have personally reviewed the imaging and I agree with the above radiologist's report.    ASSESSMENT/PLAN:     IMPRESSION:  Lumbar radiculopathy left leg affecting the foot acute onset after recent hand surgery    PLAN:  The patient will be admitted for workup and neurosurgical evaluation   MRI scan of the lumbar spine is pending   She was started on methylprednisolone IV to treat apparent acute nerve compression affecting the left leg       - We talked at length about the anatomy and pathophysiology of @DX@        Disclaimer: This note has been generated using voice-recognition software. There may be typographical errors that have been missed during proof-reading. "

## 2023-07-02 VITALS
SYSTOLIC BLOOD PRESSURE: 115 MMHG | DIASTOLIC BLOOD PRESSURE: 54 MMHG | BODY MASS INDEX: 26.72 KG/M2 | HEIGHT: 64 IN | TEMPERATURE: 98 F | RESPIRATION RATE: 17 BRPM | OXYGEN SATURATION: 99 % | WEIGHT: 156.5 LBS | HEART RATE: 76 BPM

## 2023-07-02 DIAGNOSIS — M21.372 FOOT DROP, LEFT FOOT: Primary | ICD-10-CM

## 2023-07-02 PROCEDURE — 97530 THERAPEUTIC ACTIVITIES: CPT

## 2023-07-02 PROCEDURE — 99214 PR OFFICE/OUTPT VISIT, EST, LEVL IV, 30-39 MIN: ICD-10-PCS | Mod: ,,, | Performed by: PSYCHIATRY & NEUROLOGY

## 2023-07-02 PROCEDURE — 94761 N-INVAS EAR/PLS OXIMETRY MLT: CPT

## 2023-07-02 PROCEDURE — 99213 OFFICE O/P EST LOW 20 MIN: CPT | Mod: ,,, | Performed by: NEUROLOGICAL SURGERY

## 2023-07-02 PROCEDURE — 99213 PR OFFICE/OUTPT VISIT, EST, LEVL III, 20-29 MIN: ICD-10-PCS | Mod: ,,, | Performed by: NEUROLOGICAL SURGERY

## 2023-07-02 PROCEDURE — 99214 OFFICE O/P EST MOD 30 MIN: CPT | Mod: ,,, | Performed by: PSYCHIATRY & NEUROLOGY

## 2023-07-02 PROCEDURE — 97535 SELF CARE MNGMENT TRAINING: CPT

## 2023-07-02 PROCEDURE — 25000003 PHARM REV CODE 250: Performed by: ORTHOPAEDIC SURGERY

## 2023-07-02 PROCEDURE — 97116 GAIT TRAINING THERAPY: CPT

## 2023-07-02 PROCEDURE — 97165 OT EVAL LOW COMPLEX 30 MIN: CPT

## 2023-07-02 PROCEDURE — 63600175 PHARM REV CODE 636 W HCPCS: Performed by: ORTHOPAEDIC SURGERY

## 2023-07-02 RX ORDER — METHYLPREDNISOLONE 4 MG/1
TABLET ORAL
Qty: 21 EACH | Refills: 0 | Status: SHIPPED | OUTPATIENT
Start: 2023-07-02 | End: 2023-07-23

## 2023-07-02 RX ADMIN — GABAPENTIN 100 MG: 100 CAPSULE ORAL at 08:07

## 2023-07-02 RX ADMIN — METHYLPREDNISOLONE SODIUM SUCCINATE 80 MG: 40 INJECTION, POWDER, FOR SOLUTION INTRAMUSCULAR; INTRAVENOUS at 02:07

## 2023-07-02 RX ADMIN — LOSARTAN POTASSIUM 25 MG: 25 TABLET, FILM COATED ORAL at 08:07

## 2023-07-02 NOTE — PT/OT/SLP EVAL
Occupational Therapy   Evaluation, tx and Discharge Summary    Name: Sofía Mcwilliams  MRN: [id number]  Admitting Diagnosis: Foot drop, left foot  Recent Surgery: Procedure(s) (LRB):  TRANSFER, TENDON, HAND (Right) 2 Days Post-Op  The primary encounter diagnosis was Spinal stenosis, lumbosacral region. Diagnoses of Tendon rupture, nontraumatic, extensor, Hypertension, Foot drop, left foot, Progressive focal motor weakness, Idiopathic lumbosacral plexus neuropathy, and Lumbosacral plexus disorders were also pertinent to this visit.    Recommendations:     Discharge Recommendations:  (low Intensity therapy) and family assist  Discharge Equipment Recommendations:  grab bar, bedside commode  Barriers to discharge:  None    Assessment:     Sofía Mcwilliams is a 82 y.o. female with a medical diagnosis of Foot drop, left foot.  She presents with Performance deficits affecting function: weakness, impaired endurance, impaired self care skills, impaired functional mobility, gait instability, impaired balance, decreased upper extremity function, decreased lower extremity function, decreased coordination, decreased safety awareness, pain, decreased ROM, orthopedic precautions.      Pt found sitting UIC, daughter who will be her caregiver was present. Pt and daughter instructed in one hand tech for pt to perform ADLS 2/2 RUE in sling and splint post hand surgery. Pt has NWB  and no lifting restrictions to R hand.  She performed fx transfers with  Min A for BSC t/f with HHA, pt will be getting SPC delivered to room today. Toileting Min A for clothes management. LB dressing Min A socks due to limited raech, used one hand tech.  Pt instructed in donning and doffing RUE sling with SBA.  Daughter issued gait belt for home use and safety with assisting pt with mobility and standing ADLS. Pt to discharge home today. Dc acute OT.     Rehab Prognosis: Good; patient would benefit from acute skilled OT services to address these deficits and reach  maximum level of function.       Plan:     Patient to be seen 3 x/week to address the above listed problems via self-care/home management, therapeutic activities  Plan of Care Expires:    Plan of Care Reviewed with: patient, daughter, caregiver    Subjective     Chief Complaint: none  Patient/Family Comments/goals: home today I hope.    Occupational Profile:  Living Environment: pt lives with spouse, SSH with 3 steps and BHR; WIS with BIS.  Previous level of function: Indep-mod I ADLS, ambulation without a device; drives, Cooks, has   Roles and Routines: active homemaker  Equipment Used at Home:  (walk n shower with BIS,)  Assistance upon Discharge: daughter, spouse    Pain/Comfort:  Pain Rating 1: 0/10  Pain Rating Post-Intervention 1: 0/10    Patients cultural, spiritual, Judaism conflicts given the current situation: no    Objective:     Communicated with: nurse prior to session.  Patient found up in chair with   upon OT entry to room.    General Precautions: Standard, fall  Orthopedic Precautions: RUE non weight bearing (no lifting, minimal WB per pt)  Braces: UE Sling (R hand splint and ace wrap)  Respiratory Status: Room air    Occupational Performance:    Functional Mobility/Transfers:  Patient completed Sit <> Stand Transfer with minimum assistance  with  hand-held assist   Patient completed Toilet Transfer Step Transfer technique with minimum assistance with  hand-held assist, bedside commode, and L foot drop but clearing floor, slow moving for safety  Functional Mobility: ambulated ~8 feet with Min A with HHA on L and unsteady, L foot drop clearing floor but off balance, numbness L foot to distal lateral knee reported    Activities of Daily Living:  Feeding:  setup    Grooming: setup seated  Upper Body Dressing: stand by assistance donned sling  Lower Body Dressing: minimum assistance L sock, crossed legs, 3 finger tech instructed  Toileting: minimum assistance clothes  management    Cognitive/Visual Perceptual:  Cognitive/Psychosocial Skills:     -       Oriented to: Person, Place, Time, and Situation   -       Follows Commands/attention:Follows two-step commands  -       Communication: clear/fluent  -       Memory: No Deficits noted  -       Safety awareness/insight to disability: impaired   -       Mood/Affect/Coping skills/emotional control: Appropriate to situation  Visual/Perceptual:      -Intact .    Physical Exam:  Balance:    -       sitting: good  dynamic: good-   standing: poor plus   dynamic; poor  Postural examination/scapula alignment:    -       Rounded shoulders  Sensation:    -       Impaired  numbness L lateral just distal to knee down to foot  Dominant hand:    -       right  Upper Extremity Range of Motion:     -       Right Upper Extremity: WFL except L wrist and fingers immobilized in splint and ace wrap s/p hand tendon surgery  -       Left Upper Extremity: WFL  Upper Extremity Strength:    -       Right Upper Extremity: WFL except wrist and hand NT 2/2 above  -       Left Upper Extremity: WFL   Strength:    -       Right Upper Extremity:  R hand NT in splint post op hand sx  -       Left Upper Extremity: WFL  Fine Motor Coordination:    -       Impaired  Right hand, manipulation of objects post op sx, immobilized    AMPAC 6 Click ADL:  AMPAC Total Score: 18    Treatment & Education:  Purpose of OT and POC  Pt instructed in one handed tech with LUE, R hand immobilized and in sling post op hand tendon sx  Pt educated in post op precautions following hand sx  Family training completed with daughter: gait belt use and issue, BSC stand step t/f, one handed tech for ADLS, donning RUE sling with use of thumb loop  All questions/concerns addressed within scope.     Patient left up in chair with all lines intact, call button in reach, and sdaughter present    GOALS:   Multidisciplinary Problems       Occupational Therapy Goals          Problem: Occupational  Therapy    Goal Priority Disciplines Outcome Interventions   Occupational Therapy Goal     OT, PT/OT Adequate for Care Transition                        History:     Past Medical History:   Diagnosis Date    Anxiety 5/2/2016    Arthritis     Basal cell carcinoma 2013    left buttock    Cataract     Hypertension     Other and unspecified hyperlipidemia          Past Surgical History:   Procedure Laterality Date    APPENDECTOMY      bunion      right    COLONOSCOPY N/A 9/28/2015    Procedure: COLONOSCOPY;  Surgeon: Joe Amos MD;  [facility]  Service: Endoscopy;  Laterality: N/A;    COLONOSCOPY N/A 3/16/2021    Procedure: COLONOSCOPY;  Surgeon: Brice Zayas MD;  [facility]  Service: Endoscopy;  Laterality: N/A;  covid test 3/13-primary care    hip surgery x 2      HYSTERECTOMY      partial    INJECTION OF JOINT Left 1/9/2019    Procedure: Injection, LEFT HIP INTRAARTICULAR INJECTION;  Surgeon: Omega Estevez MD;  Location: [facility] PAIN MGT;  Service: Pain Management;  Laterality: Left;    INJECTION OF JOINT Left 3/13/2019    Procedure: INJECTION, JOINT LEFT HIP;  Surgeon: Omega Estevez MD;  Location: [facility] PAIN MGT;  Service: Pain Management;  Laterality: Left;  Left Hip Intrarticular Steroid Injection    INJECTION, SACROILIAC JOINT Right 2/23/2023    Procedure: INJECTION,SACROILIAC JOINT Right SI Joint, Right GTB;  Surgeon: Omega Estevez MD;  Location: [facility] PAIN MGT;  Service: Pain Management;  Laterality: Right;    JOINT REPLACEMENT      SURGICAL REMOVAL OF BONE SPUR  8/16/2022    Procedure: EXCISION, BONE SPUR ULNA;  Surgeon: Brennen Prabhakar Jr., MD;  Location: [facility] OR;  Service: Orthopedics;;    SURGICAL REMOVAL OF DISTAL ULNA Right 8/16/2022    Procedure: EXCISION, ULNA, DISTAL;  Surgeon: Brennen Prabhakar Jr., MD;  Location: [facility] OR;  Service: Orthopedics;  Laterality: Right;    TONSILLECTOMY      TRANSFER OF HAND TENDON Right 8/16/2022    Procedure:  TRANSFER, TENDON, HAND;  Surgeon: Brennen Prabhakar Jr., MD;  Location: [facility] OR;  Service: Orthopedics;  Laterality: Right;  transfer extensor tendon small finger    TRANSFORAMINAL EPIDURAL INJECTION OF STEROID Left 8/25/2021    Procedure: Injection,steroid,epidural,transforaminal approach; Levels: L5-S1;  Surgeon: Heather Mederos MD;  Location: [facility] PAIN MGT;  Service: Pain Management;  Laterality: Left;  No pacemaker. Patient is taking ASA.     TRANSFORAMINAL EPIDURAL INJECTION OF STEROID Bilateral 2/23/2022    Procedure: Injection,steroid,epidural,transforaminal bilateral L5;  Surgeon: Heather Mederos MD;  Location: [facility] PAIN T;  Service: Pain Management;  Laterality: Bilateral;  ASA   no pacemaker       Time Tracking:     OT Date of Treatment: 07/02/23  OT Start Time: 1315  OT Stop Time: 1341  OT Total Time (min): 26 min    Billable Minutes:Evaluation 10  Self Care/Home Management 16  Total Time 26    7/2/2023

## 2023-07-02 NOTE — PLAN OF CARE
Problem: Occupational Therapy  Goal: Occupational Therapy Goal  Outcome: Adequate for Care Transition   OT eval, tx and Dc. Pt discharged home after OT. HHOT and family assist recommended. Dc acute OT

## 2023-07-02 NOTE — PROGRESS NOTES
BRIEF NOTE    Pt seen as follow-up for left foot numbness and inability to move same foot after right hand surgery on 6/30.  No acute findings in PACU and pt admitted for workup.      Reports same inability to move foot, but was able to ambulate with PT assistance.  Talked about getting brace for foot for home.  Pt also had MRI of lumbar spine and planning to have MRI of pelvis for concern of lumbosacral plexus compression.      With respect to her fingers, pt reports that movement and sensation have have returned and that she is without pain.      - Remainder of care per primary team.   - Please call with questions.     Joe Villanueva MD  7/2/2023  9:53 AM

## 2023-07-02 NOTE — PROGRESS NOTES
[place] [facility]  Discharge Final Note    Primary Care Provider: Natalia Stubbs MD    Expected Discharge Date: 7/2/2023    Final Discharge Note (most recent)       Final Note - 07/02/23 1225          Post-Acute Status    Post-Acute Authorization HME     HME Status Referrals Sent   referral sent to [facility] for a straigtht cane                    Important Message from Medicare             Contact Info       Ramona Barrett PA-C   Specialty: Orthopedic Surgery    [address] [place] [id number]   Phone: [phone]       Next Steps: Follow up on 7/12/2023    Instructions: 10:45am          Patient has discharge transportation home

## 2023-07-02 NOTE — CONSULTS
"NEUROLOGY FLOOR CONSULT    Reason for consult:  lumbosacral radiculoplexus neruopathy    CC:  "Numbness and weakness in left lower extremity"    HPI:   Sofía Mcwilliams is a 82 y.o. w/ Hx of HTN, HLD, Basal Cell Carcinoma, Anxiety, and Arthritis who underwent outpatient surgery on her right hand on 6/30/2023 under local/regional anesthesia. Per hand surgery note, she tolerated the procedure well, but prior to her discharge she noted new onset weakness and numbness in the left foot. She was unable to ambulate and therefore she was admitted to the hospital for workup of her left leg. She notably has a history of chronic neck and back problems including lumbar disc disease. There was no recent trauma reported. No bowel or bladder problems reported. Neurosurgery was consulted and recommended imaging with MRI L-Spine, MRI w/wo Pelvis, and a course of Solumedrol for suspected acute nerve compression injury. MRI L-spine revealed L2/3 disc disease with mild central canal and bilateral neural foraminal stenosis as well as Grade 1 spondylolisthesis at L5/S1 with moderate narrowing of the left neural foramina.    On interview:  Patient reports similar history as above. States she continues to have numbness and weakness in the LLE over the lateral aspect of the leg below the knee. She reports she was able to ambulate with the assistance of PT, but was unable to move her foot during this time.     ROS: As per HPI    Histories:     Allergies:  Celecoxib and Sulfa (sulfonamide antibiotics)    Current Medications:    Current Facility-Administered Medications   Medication Dose Route Frequency Provider Last Rate Last Admin    acetaminophen tablet 650 mg  650 mg Oral Q4H PRN Brennen Prabhakar Jr., MD        gabapentin capsule 100 mg  100 mg Oral TID Brennen Prabhakar Jr., MD   100 mg at 07/02/23 0808    HYDROcodone-acetaminophen 5-325 mg per tablet 1 tablet  1 tablet Oral Q4H PRN Brennen Prabhakar Jr., MD        losartan tablet 25 mg  25 mg " Oral Daily Brennen Prabhakar Jr., MD   25 mg at 07/02/23 0808    methylPREDNISolone sodium succinate injection 80 mg  80 mg Intravenous Q12H Brennen Prabhakar Jr., MD   80 mg at 07/02/23 0245    ondansetron disintegrating tablet 8 mg  8 mg Oral Q8H PRN Brennen Prabhakar Jr., MD        oxyCODONE immediate release tablet 10 mg  10 mg Oral Q4H PRN Brennen Prabhakar Jr., MD        rOPINIRole tablet 2 mg  2 mg Oral QHS Brennen Prabhakar Jr., MD   2 mg at 07/01/23 2129       Past Medical/Surgical/Family History:  Medical:   Past Medical History:   Diagnosis Date    Anxiety 5/2/2016    Arthritis     Basal cell carcinoma 2013    left buttock    Cataract     Hypertension     Other and unspecified hyperlipidemia       Surgeries:   Past Surgical History:   Procedure Laterality Date    APPENDECTOMY      bunion      right    COLONOSCOPY N/A 9/28/2015    Procedure: COLONOSCOPY;  Surgeon: Joe Amos MD;  Location: [address]);  Service: Endoscopy;  Laterality: N/A;    COLONOSCOPY N/A 3/16/2021    Procedure: COLONOSCOPY;  Surgeon: Brice Zayas MD;  Location: [facility] ([address] [place] [address]);  Service: Endoscopy;  Laterality: N/A;  covid test 3/13-primary care    hip surgery x 2      HYSTERECTOMY      partial    INJECTION OF JOINT Left 1/9/2019    Procedure: Injection, LEFT HIP INTRAARTICULAR INJECTION;  Surgeon: Omega Estevez MD;  Location: [facility] PAIN MGT;  Service: Pain Management;  Laterality: Left;    INJECTION OF JOINT Left 3/13/2019    Procedure: INJECTION, JOINT LEFT HIP;  Surgeon: Omega Estevez MD;  Location: [facility] PAIN MGT;  Service: Pain Management;  Laterality: Left;  Left Hip Intrarticular Steroid Injection    INJECTION, SACROILIAC JOINT Right 2/23/2023    Procedure: INJECTION,SACROILIAC JOINT Right SI Joint, Right GTB;  Surgeon: Omega Estevez MD;  Location: [facility] PAIN MGT;  Service: Pain Management;  Laterality: Right;    JOINT REPLACEMENT      SURGICAL REMOVAL OF BONE SPUR  8/16/2022    Procedure:  EXCISION, BONE SPUR ULNA;  Surgeon: Brennen Prabhakar Jr., MD;  Location: [facility] OR;  Service: Orthopedics;;    SURGICAL REMOVAL OF DISTAL ULNA Right 2022    Procedure: EXCISION, ULNA, DISTAL;  Surgeon: Brennen Prabhakar Jr., MD;  Location: [facility] OR;  Service: Orthopedics;  Laterality: Right;    TONSILLECTOMY      TRANSFER OF HAND TENDON Right 2022    Procedure: TRANSFER, TENDON, HAND;  Surgeon: Brennen Prabhakar Jr., MD;  Location: [facility] OR;  Service: Orthopedics;  Laterality: Right;  transfer extensor tendon small finger    TRANSFORAMINAL EPIDURAL INJECTION OF STEROID Left 2021    Procedure: Injection,steroid,epidural,transforaminal approach; Levels: L5-S1;  Surgeon: Heather Mederos MD;  Location: [facility] PAIN T;  Service: Pain Management;  Laterality: Left;  No pacemaker. Patient is taking ASA.     TRANSFORAMINAL EPIDURAL INJECTION OF STEROID Bilateral 2022    Procedure: Injection,steroid,epidural,transforaminal bilateral L5;  Surgeon: Heather Mederos MD;  Location: [facility] PAIN T;  Service: Pain Management;  Laterality: Bilateral;  ASA   no pacemaker      Family:   Family History   Adopted: Yes     Social History:  Social History     Tobacco Use    Smoking status: Former     Packs/day: 0.00     Years: 0.00     Pack years: 0.00     Types: Cigarettes     Quit date: 1965     Years since quittin.6    Smokeless tobacco: Never   Substance Use Topics    Alcohol use: Yes     Alcohol/week: 0.0 standard drinks     Comment: Occ.    Drug use: No       Current Evaluation:     Vital Signs:   Vitals:    23 0700   BP: 130/61   Pulse: 80   Resp: 17   Temp: 97.6 °F (36.4 °C)        Neurological Examination  Orientation  Alert, awake, oriented to self, place, time, and situation.  Memory  Recent and remote memory intact.  Language  No dysarthria, No aphasia.   Cranial Nerves  PERRL, VF intact, EOMI, V1-V3 intact, symmetric facial expression, hearing grossly intact, SCM & TPZ 5/5, tongue midline,  symmetric palate elevation.  Motor  Normal Bulk with exception of left hip flexors which are mildly atrophic  Normal Tone  Normal strength in BUE and RLE  Normal strength in proximal LLE  0/5 strength in LLE dorsiflex and plantarflex  Sensory  Decreased sensation to light touch over the lateral aspect of the distal LLE below the knee to the toes, temperature sensation intact  Normal sensation elsewhere  DTR  +2 symmetric in patellars  +2 in right achilles  +0 in left achilles  Cerebellar/Gait  Normal coordination  Gait deferred    LABORATORY STUDIES:  Vit B12: 427  Thiamine: 154  ESR:  14  CRP:  3.0    RADIOLOGY STUDIES:  I have personally reviewed the images performed.     MRI L-spine w/o Contrast:  FINDINGS:  Alignment: Grade 1 spondylolisthesis of L5 on S1. Possible transitional S1 segment.  Vertebrae: No acute fractures.  No significant marrow edema or replacement.  Discs: Moderate disc space narrowing and desiccation at L2-3 with mild degenerative disc changes elsewhere.  Endplate degenerative changes are noted also, most prominent at L2-3.  Cord: Normal.  Conus terminates at approximately L2.  Degenerative findings:  T12-L1: No significant abnormality.  L1-L2: No significant abnormality.  L2-L3: Mild diffuse posterior disc osteophyte complex.  Mild central canal narrowing.  Mild bilateral foraminal narrowing.  The degenerative disc and endplate changes at L2-3 have increased from the prior study.  L3-L4: Mild diffuse posterior disc osteophyte complex.  Mild central canal narrowing.  Neural foramen are adequately maintained.  L4-L5: Mild diffuse disc bulge.  Mild facet arthropathy and ligamentum flavum hypertrophy.  Mild central canal narrowing.  Neural foramen are adequately maintained.  L5-S1: Grade 1 spondylolisthesis with mild uncovering of the disc with minimal protrusion.  Moderate left foraminal narrowing.  Moderate central canal narrowing.  Moderate bilateral facet arthropathy.  Paraspinal muscles &  soft tissues: Unremarkable.  IMPRESSION:  1. No acute abnormality  2. Multilevel chronic degenerative changes.  The degenerative disc and endplate changes at L2-3 have increased from the prior study.    XR L-Spine:  FINDINGS:  Vertebral bodies are normal in height without evidence of fracture.  Mild levoscoliosis.  Normal sagittal alignment is preserved. No significant spondylolisthesis.  Degenerative disc disease with loss of disc height, endplate sclerosis and vacuum disc phenomenon at L2-3. Modest degenerative disc disease elsewhere. Hypertrophic facet arthropathy in the mid to lower lumbar spine.  Postsurgical change both hips.  IMPRESSION:  Levoscoliosis and degenerative change as above.    Assessment:  Plan:     Sofía Mcwilliams is a 82 y.o. w/ Hx of HTN, HLD, Basal Cell Carcinoma, Anxiety, and Arthritis who underwent outpatient surgery on her right hand on 6/30/2023 under local/regional anesthesia. She tolerated the procedure well, but prior to her discharge she noted new onset weakness and numbness in the left foot. She was unable to ambulate and therefore she was admitted to the hospital for workup of her left leg. No bowel or bladder problems reported. MRI L-spine revealed L2/3 disc disease with mild central canal and bilateral neural foraminal stenosis as well as Grade 1 spondylolisthesis at L5/S1 with moderate narrowing of the left neural foramina. Patient started on a course of Solumedrol for acute nerve compression. Suspect etiology is nerve compression (possibly sciatic).    - Solumedrol course per NSGY recommendations  - There is much debate regarding steroid use in nerve compression cases, recommend short course with rapid taper  - Continue Gabapentin 100mg TID for neuropathic pain - may increase to 300mg TID as needed  - MRI Pelvis pending for neoplastic rule-out -- now scheduled for outpatient  - EMG/NCS upon discharge  - Agree with AFO and cane  - PT/OT  - Outpatient neurology and neurosurgery  follow-up    Pending discharge today.    Case discussed with Dr. Da Silva.    Will sign off. Please do not hesitate to contact us for further assistance in the care of this patient. We appreciate the consult.       Fab Damon MD  [employer] [facility] PGY-IV  [employer] [facility] Consult Service

## 2023-07-02 NOTE — PLAN OF CARE
Problem: Physical Therapy  Goal: Physical Therapy Goal  Description: Goals to be met by: 2023    Patient will increase functional independence with mobility by performin. Sit<>stand with CGA -met   2. Gait x 50 feet with AD with Min assist.-met   3. Ascend/descend 4 step(s) with least restrictive assistive device and Mod assist.      Outcome: Ongoing, Progressing   Goals remain appropriate. 2023

## 2023-07-02 NOTE — PT/OT/SLP PROGRESS
Physical Therapy Treatment    Patient Name:  Sofía Mcwilliams   MRN:  [id number]    Recommendations:     Discharge Recommendations: other (see comments)  Discharge Equipment Recommendations: cane, straight LLE AFO  Barriers to discharge: None    Assessment:     Sofía Mcwilliams is a 82 y.o. female admitted with a medical diagnosis of Foot drop, left foot.  She presents with the following impairments/functional limitations: weakness, impaired balance, decreased safety awareness, impaired endurance, gait instability, impaired functional mobility, decreased lower extremity function pt tolerated treatment better being able to gait train farther. Pt will benefit from cont therapy when discharged to increased strength LLE  and safe functional mobility. Pt will be able to discharge home with SC and L AFO when medically stable.     Rehab Prognosis: Good; patient would benefit from acute skilled PT services to address these deficits and reach maximum level of function.    Recent Surgery: Procedure(s) (LRB):  TRANSFER, TENDON, HAND (Right) 2 Days Post-Op    Plan:     During this hospitalization, patient to be seen 5 x/week to address the identified rehab impairments via therapeutic activities, gait training, therapeutic exercises and progress toward the following goals:    Plan of Care Expires:  08/01/23    Subjective     Chief Complaint: pt stated that her LLE was weak.   Patient/Family Comments/goals:  for her leg to get stronger and to go home.   Pain/Comfort:  Pain Rating 1: 0/10  Pain Rating Post-Intervention 1: 0/10      Objective:     Communicated with nurse prior to session.  Patient found supine with  (hep lock IV) upon PT entry to room.     General Precautions: Standard, fall  Orthopedic Precautions: RUE non weight bearing  Braces:  (RUE sling)  Respiratory Status: Room air     Functional Mobility:  Bed Mobility:     Rolling Left:  modified independence  Supine to Sit: modified independence    Transfers:     Sit to Stand:   contact guard assistance with hand-held assist    Gait: pt received gait training ~ 34 ft with min assist.  Pt received gait training ~ 112 ft with NBQC and CGA. Pt needed verbal instruction in sequencing QC usage and safety. Pt received gait training ~ 34 ft with SC and CGA. Pt needed verbal instructions for sequencing for SC usage. Pt had LLE wrapped in ace wrap to facilitate DF with gait. Pt had decreased LLE advancement, decreased L heel strike, foot flat and toe off.      AM-PAC 6 CLICK MOBILITY  Turning over in bed (including adjusting bedclothes, sheets and blankets)?: 3  Sitting down on and standing up from a chair with arms (e.g., wheelchair, bedside commode, etc.): 3  Moving from lying on back to sitting on the side of the bed?: 3  Moving to and from a bed to a chair (including a wheelchair)?: 3  Need to walk in hospital room?: 3  Climbing 3-5 steps with a railing?: 3  Basic Mobility Total Score: 18       Treatment & Education:  Pt received verbal instructions in PT POC and discharge instructions for AFO wearing and skin inspection for pressure points. Pt verbally expressed understanding of such.     Patient left up in chair with all lines intact, call button in reach, and RN  notified.. RN approved pt up in chair without chair alarm.     GOALS:   Multidisciplinary Problems       Physical Therapy Goals          Problem: Physical Therapy    Goal Priority Disciplines Outcome Goal Variances Interventions   Physical Therapy Goal     PT, PT/OT Ongoing, Progressing     Description: Goals to be met by: 2023    Patient will increase functional independence with mobility by performin. Sit<>stand with CGA -met 7/2  2. Gait x 50 feet with AD with Min assist.-met 7/2  3. Ascend/descend 4 step(s) with least restrictive assistive device and Mod assist.                           Time Tracking:     PT Received On: 23  PT Start Time: 735     PT Stop Time: 819  PT Total Time (min): 44 min     Billable  Minutes: Gait Training 30 min  and Therapeutic Activity 14 min     Treatment Type: Treatment  PT/PTA: PT     Number of PTA visits since last PT visit: 0     07/02/2023

## 2023-07-02 NOTE — PLAN OF CARE
07/02/23 1439   AVS Confirmation   Discharge instructions and AVS given to and reviewed with patient and/or significant other. Yes     AVS printed and handed to patient by bedside nurse. VN reviewed discharge instructions with patient and family using teachback method.  Allowed time for questions, all questions answered.  Patient verbalized complete understanding of discharge instructions. Methylprednisolone paper prescription was not included with discharge packet. Secure chat request sent to bedside nurse Shayan and charge nurse Caral to check patient's folder for prescription. Patient also requesting BSC chair as suggested by therapy department. Secure chat sent to  Leslie for BSC follow up and possible discharge to patient's home. Discharge instructions complete. Bedside delivery of Hydrocodone completed on Friday.

## 2023-07-02 NOTE — PLAN OF CARE
Problem: Fall Injury Risk  Goal: Absence of Fall and Fall-Related Injury  Outcome: Ongoing, Progressing     Problem: Bowel Motility Impaired (Surgery Nonspecified)  Goal: Effective Bowel Elimination  Outcome: Ongoing, Progressing     Problem: Infection (Surgery Nonspecified)  Goal: Absence of Infection Signs and Symptoms  Outcome: Ongoing, Progressing     Problem: Pain (Surgery Nonspecified)  Goal: Acceptable Pain Control  Outcome: Ongoing, Progressing

## 2023-07-02 NOTE — PLAN OF CARE
07/02/23 1225   Post-Acute Status   Post-Acute Authorization HME   HME Status Referrals Sent  (referral sent to [facility] for a straigtht cane)

## 2023-07-02 NOTE — NURSING
Medications given per MAR. Pure wick in use overnight. Patient c/o numbness to the outer mid foot that goes up to the upper calf. Dressing to the right arm intact no drainage noted. Safety maintained, call light in reach, bed alarm in use.

## 2023-07-02 NOTE — PLAN OF CARE
07/02/23 1357   Post-Acute Status   Post-Acute Authorization HME   HME Status Set-up Complete/Auth obtained  (Straight can delivered bedside. Delivery ticket signed by Pam Garcia.)

## 2023-07-03 LAB — BACTERIA SPEC AEROBE CULT: NORMAL

## 2023-07-03 NOTE — DISCHARGE SUMMARY
[facility]  Orthopedics  Discharge Summary      Patient Name: Sofía Mcwilliams  MRN: [id number]  Admission Date: 6/30/2023  Hospital Length of Stay: 0 days  Discharge Date and Time: 7/2/2023  3:21 PM  Attending Physician: No att. providers found   Discharging Provider: Brennen Prabhakar Jr, MD  Primary Care Provider: Natalia Stubbs MD    HPI:  82-year-old female who was admitted for outpatient surgery on her right hand due to extensor tendon rupture       Procedure(s) (LRB):  TRANSFER, TENDON, HAND (Right)      Hospital Course:  The patient was admitted for outpatient surgery on her right hand.  She underwent successful surgery and was scheduled for discharge home the same day   However following surgery she was unable to ambulate due to weakness in her left foot   There was no surgery regarding left foot and no nerve block so it was unclear what had caused this acute change in her left ankle and foot   She was admitted for workup of weakness left foot   There was no evidence of CVA   Neuro surgery was consulted and they ordered an MRI scan of the lumbar spine   This showed some degenerative changes but could not explain the findings   They felt that it was possible that she had a sciatic nerve injury affecting left foot   Physical therapy was ordered and the patient was given an AFO foot brace to allow ambulation  The right hand did well during this hospitalization she was able to move the fingers and the splint was left intact   Pain was mild to moderate   She was felt ready for discharge home on 7 2 at which time her condition was noted to be stable on a regular diet but still had no improvement with the left foot   Follow-up in neuro surgery and MRI scheduled of the pelvic area to rule out a lesion of the sciatic nerve   Patient does have a neurologist and she will follow up with her neurologist as well    Consults (From admission, onward)          Status Ordering Provider     Inpatient consult to [facility]   "Once        Provider:  (Not yet assigned)    Completed DOROTHY DONAHUE     Inpatient consult to Neurosurgery  Once        Provider:  (Not yet assigned)    Completed YULI BRYAN JR            Significant Diagnostic Studies: Radiology: MRI: as above  No pertinent studies.    Pending Diagnostic Studies:       Procedure Component Value Units Date/Time    Specimen to Pathology, Surgery Orthopedics [[id number]] Collected: 06/30/23 1809    Order Status: Sent Lab Status: In process Updated: 07/03/23 0914    Specimen: Tissue           Final Active Diagnoses:    Diagnosis Date Noted POA    PRINCIPAL PROBLEM:  Foot drop, left foot [M21.372] 07/01/2023 Yes    Tendon rupture, nontraumatic, extensor [M66.20] 07/25/2022 Yes      Problems Resolved During this Admission:      Discharged Condition: good    Disposition: Home or Self Care    Follow Up:   Follow-up Information       Ramona Barrett PA-C Follow up on 7/12/2023.    Specialty: Orthopedic Surgery  Why: 10:45am  Contact information:  [facility]  [address] [place] [id number]  [phone]                           Patient Instructions:      ANKLE FOOT ORTHOSIS FOR HOME USE     Order Specific Question Answer Comments   Type of AFO to be filled? Solid ankle AFO    Height: 5' 4" (1.626 m)    Weight: 71 kg (156 lb 8.4 oz)    Does patient have medical equipment at home? grab bar RW with R forearm support   DME Agency:  [facility]     CANE FOR HOME USE     Order Specific Question Answer Comments   Type of Cane: Straight    Height: 5' 4" (1.626 m)    Weight: 71 kg (156 lb 8.4 oz)    Does patient have medical equipment at home? grab bar RW with R forearm support   Length of need (1-99 months): 3    Please check all that apply: Patient's condition impairs ambulation.      MRI Pelvis W WO Contrast   Standing Status: Future Standing Exp. Date: 07/02/24     Order Specific Question Answer Comments   Does the patient have a pacemaker or a defibrillator (Note: Some " facilities may not be able to schedule an MRI for patients with pacemakers and defibrillators. You should contact your local radiology department to determine if this is the case.)? No    Does the patient have an aneurysm or surgical clip, pump, nerve/brain stimulator, middle/inner ear prosthesis, or other metal implant or foreign object (bullet, shrapnel)? If they have a card related to their implant, ask them to bring it. Issues related to the implant may cause the MRI to be delayed. No    Is the patient claustrophobic? No    Will the patient require sedation? No    Does the patient have any of the following conditions? Diabetes, History of Renal Disease or Hypertension requiring medical therapy? Yes    May the Radiologist modify the order per protocol to meet the clinical needs of the patient? Yes    Is this part of a Research Study? No    Does the patient have on a skin patch for medication with aluminized backing? No    Is this for rectal cancer or prostate cancer? No      Diet general     Leave dressing on - Keep it clean, dry, and intact until clinic visit     Call MD for:  temperature >100.4     Call MD for:  persistent nausea and vomiting     Call MD for:  severe uncontrolled pain     Keep surgical extremity elevated     EMG W/ ULTRASOUND AND NERVE CONDUCTION TEST 2 Extremities   Standing Status: Future Standing Exp. Date: 07/02/24     Order Specific Question Answer Comments   Number of Extremities 2 Extremities    Reason for Procedure: Lumbosacral Radiculopathy      Medications:  Reconciled Home Medications:      Medication List        START taking these medications      HYDROcodone-acetaminophen 5-325 mg per tablet  Commonly known as: NORCO  Take 1 tablet by mouth every 4 (four) hours as needed for Pain.     methylPREDNISolone 4 mg tablet  Commonly known as: MEDROL DOSEPACK  use as directed            CONTINUE taking these medications      aspirin 81 MG EC tablet  Commonly known as: ECOTRIN  Take 1 tablet  by mouth.     atorvastatin 20 MG tablet  Commonly known as: LIPITOR  TAKE 1 TABLET EVERY DAY     CALCIUM-VITAMIN D ORAL  once daily.     coenzyme Q10 10 mg capsule  Take by mouth.     gabapentin 100 MG capsule  Commonly known as: NEURONTIN  Take 1 capsule (100 mg total) by mouth 3 (three) times daily as needed (RLS).     losartan 25 MG tablet  Commonly known as: COZAAR  TAKE 1 TABLET EVERY DAY     multivitamin per tablet  Commonly known as: THERAGRAN  Take 1 tablet by mouth.     * rOPINIRole 0.5 MG tablet  Commonly known as: REQUIP  Take 1 tablet as needed up to 3 times a day for breakthrough RLS symptoms.     * rOPINIRole 2 mg 24 hr tablet  Commonly known as: REQUIP XL  TAKE 1 TABLET BY MOUTH EVERY EVENING     urea 20 % Crea  Apply 1 application topically once daily. To dry skin on the feet.     VITAMIN B COMPLEX ORAL  Take by mouth once daily.     vitamin D 1000 units Tab  Commonly known as: VITAMIN D3  Take 185 mg by mouth once daily.           * This list has 2 medication(s) that are the same as other medications prescribed for you. Read the directions carefully, and ask your doctor or other care provider to review them with you.                  Brennen Prabhakar Jr, MD  Orthopedics  [facility]

## 2023-07-03 NOTE — PLAN OF CARE
Pt got late order for BSC yesterday after DC. Msg sent to [facility] to review and try to deliver to pt's home.

## 2023-07-04 ENCOUNTER — PATIENT MESSAGE (OUTPATIENT)
Dept: ORTHOPEDICS | Facility: CLINIC | Age: 83
End: 2023-07-04
Payer: MEDICARE

## 2023-07-04 PROBLEM — R20.0 NUMBNESS OF LEFT FOOT: Status: ACTIVE | Noted: 2023-07-04

## 2023-07-04 PROBLEM — M25.272: Status: ACTIVE | Noted: 2023-07-04

## 2023-07-05 ENCOUNTER — TELEPHONE (OUTPATIENT)
Dept: NEUROSURGERY | Facility: CLINIC | Age: 83
End: 2023-07-05
Payer: MEDICARE

## 2023-07-05 LAB — BACTERIA SPEC ANAEROBE CULT: NORMAL

## 2023-07-05 NOTE — TELEPHONE ENCOUNTER
Returned pt's call, pt stated that she received a message through her portal stating that she has to pay $150.00 for her MRI of the pelvis and she would like to get that scheduled. I scheduled pt to complete MRI at [facility] on July 13 at 2 pm and for her to see Martha on Friday July 7 at 1:00 pm. I also stated to pt that  put in a order for her to complete an EMG. I stated that I will send a message to the EMG department to get her scheduled. Pt voiced understanding

## 2023-07-07 ENCOUNTER — TELEPHONE (OUTPATIENT)
Dept: NEUROSURGERY | Facility: CLINIC | Age: 83
End: 2023-07-07

## 2023-07-07 ENCOUNTER — OFFICE VISIT (OUTPATIENT)
Dept: NEUROSURGERY | Facility: CLINIC | Age: 83
End: 2023-07-07
Payer: MEDICARE

## 2023-07-07 VITALS
SYSTOLIC BLOOD PRESSURE: 153 MMHG | HEIGHT: 64 IN | HEART RATE: 92 BPM | BODY MASS INDEX: 26.72 KG/M2 | DIASTOLIC BLOOD PRESSURE: 79 MMHG | WEIGHT: 156.5 LBS

## 2023-07-07 DIAGNOSIS — M21.372 FOOT DROP, LEFT FOOT: Primary | ICD-10-CM

## 2023-07-07 PROCEDURE — 99214 PR OFFICE/OUTPT VISIT, EST, LEVL IV, 30-39 MIN: ICD-10-PCS | Mod: S$GLB,,, | Performed by: PHYSICIAN ASSISTANT

## 2023-07-07 PROCEDURE — 3077F PR MOST RECENT SYSTOLIC BLOOD PRESSURE >= 140 MM HG: ICD-10-PCS | Mod: CPTII,S$GLB,, | Performed by: PHYSICIAN ASSISTANT

## 2023-07-07 PROCEDURE — 3288F PR FALLS RISK ASSESSMENT DOCUMENTED: ICD-10-PCS | Mod: CPTII,S$GLB,, | Performed by: PHYSICIAN ASSISTANT

## 2023-07-07 PROCEDURE — 1126F AMNT PAIN NOTED NONE PRSNT: CPT | Mod: CPTII,S$GLB,, | Performed by: PHYSICIAN ASSISTANT

## 2023-07-07 PROCEDURE — 1126F PR PAIN SEVERITY QUANTIFIED, NO PAIN PRESENT: ICD-10-PCS | Mod: CPTII,S$GLB,, | Performed by: PHYSICIAN ASSISTANT

## 2023-07-07 PROCEDURE — 1160F RVW MEDS BY RX/DR IN RCRD: CPT | Mod: CPTII,S$GLB,, | Performed by: PHYSICIAN ASSISTANT

## 2023-07-07 PROCEDURE — 1159F MED LIST DOCD IN RCRD: CPT | Mod: CPTII,S$GLB,, | Performed by: PHYSICIAN ASSISTANT

## 2023-07-07 PROCEDURE — 1160F PR REVIEW ALL MEDS BY PRESCRIBER/CLIN PHARMACIST DOCUMENTED: ICD-10-PCS | Mod: CPTII,S$GLB,, | Performed by: PHYSICIAN ASSISTANT

## 2023-07-07 PROCEDURE — 3078F DIAST BP <80 MM HG: CPT | Mod: CPTII,S$GLB,, | Performed by: PHYSICIAN ASSISTANT

## 2023-07-07 PROCEDURE — 99999 PR PBB SHADOW E&M-EST. PATIENT-LVL IV: CPT | Mod: PBBFAC,,, | Performed by: PHYSICIAN ASSISTANT

## 2023-07-07 PROCEDURE — 3077F SYST BP >= 140 MM HG: CPT | Mod: CPTII,S$GLB,, | Performed by: PHYSICIAN ASSISTANT

## 2023-07-07 PROCEDURE — 99214 OFFICE O/P EST MOD 30 MIN: CPT | Mod: S$GLB,,, | Performed by: PHYSICIAN ASSISTANT

## 2023-07-07 PROCEDURE — 1159F PR MEDICATION LIST DOCUMENTED IN MEDICAL RECORD: ICD-10-PCS | Mod: CPTII,S$GLB,, | Performed by: PHYSICIAN ASSISTANT

## 2023-07-07 PROCEDURE — 1101F PT FALLS ASSESS-DOCD LE1/YR: CPT | Mod: CPTII,S$GLB,, | Performed by: PHYSICIAN ASSISTANT

## 2023-07-07 PROCEDURE — 99999 PR PBB SHADOW E&M-EST. PATIENT-LVL IV: ICD-10-PCS | Mod: PBBFAC,,, | Performed by: PHYSICIAN ASSISTANT

## 2023-07-07 PROCEDURE — 3288F FALL RISK ASSESSMENT DOCD: CPT | Mod: CPTII,S$GLB,, | Performed by: PHYSICIAN ASSISTANT

## 2023-07-07 PROCEDURE — 3078F PR MOST RECENT DIASTOLIC BLOOD PRESSURE < 80 MM HG: ICD-10-PCS | Mod: CPTII,S$GLB,, | Performed by: PHYSICIAN ASSISTANT

## 2023-07-07 PROCEDURE — 1101F PR PT FALLS ASSESS DOC 0-1 FALLS W/OUT INJ PAST YR: ICD-10-PCS | Mod: CPTII,S$GLB,, | Performed by: PHYSICIAN ASSISTANT

## 2023-07-07 NOTE — TELEPHONE ENCOUNTER
Dr. Farfan,    You recently saw her in the hospital and she continues to have left foot drop and is in an AFO brace.  She has no movement of her left foot.  She has numbness from the left lateral leg to the whole foot and some feeling on the inner arch of the medial portion of the left foot.  No right leg symptoms.  As of about a week ago she stopped having back pain.  No leg pain.    She is scheduled for MRI pelvis soon and EMG not until the middle of August.    Do you want the EMG moved up?    Bandar please schedule a fu with Dr. Farfan after the MRI and the EMG is done.    Thanks,  Martha Weiner, [facility], PA-C  Neurosurgery  [facility]  07/07/2023

## 2023-07-07 NOTE — PROGRESS NOTES
"  Subjective:     Patient ID:  Sofía Mcwilliams is a 82 y.o. female.    Adolph    Chief Complaint: Left foot weakness    HPI    Sofía Mcwilliams is a 82 y.o. female who presents for follow up.  She recently saw Dr. Farfan in the hospital  she continues to have left foot drop and is in an AFO brace.  She has no movement of her left foot.  She has numbness from the left lateral leg to the whole foot and some feeling on the inner arch of the medial portion of the left foot.  No right leg symptoms.  As of about a week ago she stopped having back pain.  No leg pain.    Patient denies any recent accidents or trauma, no saddle anesthesias, and no bowel or bladder incontinence.      Review of Systems:  Constitution: Negative for chills, fever, night sweats and weight loss.   Musculoskeletal: Negative for falls.   Gastrointestinal: Negative for bowel incontinence, nausea and vomiting.   Genitourinary: Negative for bladder incontinence.   Neurological: Negative for disturbances in coordination and loss of balance.      Objective:      Vitals:    07/07/23 1304   BP: (!) 153/79   Pulse: 92   Weight: 71 kg (156 lb 8.4 oz)   Height: 5' 4" (1.626 m)   PainSc: 0-No pain         Physical Exam: Exam done in the chair      General:  Sofía Mcwilliams is well-developed, well-nourished, appears stated age, in no acute distress, alert and oriented to person, place, and time.    Pulmonary/Chest:  Respiratory effort normal  Abdominal: Exhibits no distension  Psychiatric:  Normal mood and affect.  Behavior is normal.  Judgement and thought content normal      Neurological:  Alert and oriented to person, place, and time    Muscle strength against resistance:     Right Left   Hip flexion  5 / 5 5 / 5   Hip extension 5 / 5 5 / 5   Hip abduction 5 / 5 5 / 5   Hip adduction  5 / 5 5 / 5   Knee extension  5 / 5 5 / 5   Knee flexion 5 / 5 5 / 5   Dorsiflexion  5 / 5 0 / 5   EHL  5 / 5 0 / 5   Plantar flexion  5 / 5 0 / 5   Inversion of the feet 5 / 5 0 / 5 "   Eversion of the feet  5 / 5 0 / 5         On gross examination of the bilateral upper extremities, patient has full painfree ROM with no signs of clubbing, cyanosis, edema, or weakness.       Assessment:          1. Foot drop, left foot            Plan:               Left foot drop    Patient is already scheduled to get MRI pelvis with and without contrast and EMG nerve conduction study for further evaluation.  Patient will follow-up with Dr. Farfan after these are done.    We will see about moving the EMG/NCS up to an earlier time.    Follow-Up:  Follow up if symptoms worsen or fail to improve. If there are any questions prior to this, the patient was instructed to contact the office.       Martha Weiner, [facility], PA-C  Neurosurgery  [facility]  07/07/2023

## 2023-07-11 DIAGNOSIS — I10 ESSENTIAL HYPERTENSION: ICD-10-CM

## 2023-07-11 DIAGNOSIS — E78.5 HYPERLIPIDEMIA, UNSPECIFIED HYPERLIPIDEMIA TYPE: ICD-10-CM

## 2023-07-11 RX ORDER — ATORVASTATIN CALCIUM 20 MG/1
TABLET, FILM COATED ORAL
Qty: 90 TABLET | Refills: 2 | Status: SHIPPED | OUTPATIENT
Start: 2023-07-11

## 2023-07-11 NOTE — TELEPHONE ENCOUNTER
Bandar,    Were you able to find out about a sooner EMG or any other availability?    Thanks,  Martha Weiner, [facility], PA-C  Neurosurgery  [facility]  07/11/2023

## 2023-07-12 ENCOUNTER — OFFICE VISIT (OUTPATIENT)
Dept: ORTHOPEDICS | Facility: CLINIC | Age: 83
End: 2023-07-12
Payer: MEDICARE

## 2023-07-12 VITALS — WEIGHT: 156 LBS | BODY MASS INDEX: 26.63 KG/M2 | HEIGHT: 64 IN

## 2023-07-12 DIAGNOSIS — M66.20 TENDON RUPTURE, NONTRAUMATIC, EXTENSOR: Primary | ICD-10-CM

## 2023-07-12 LAB
FINAL PATHOLOGIC DIAGNOSIS: NORMAL
GROSS: NORMAL
Lab: NORMAL
MICROSCOPIC EXAM: NORMAL

## 2023-07-12 PROCEDURE — 3288F FALL RISK ASSESSMENT DOCD: CPT | Mod: CPTII,S$GLB,, | Performed by: PHYSICIAN ASSISTANT

## 2023-07-12 PROCEDURE — 1101F PR PT FALLS ASSESS DOC 0-1 FALLS W/OUT INJ PAST YR: ICD-10-PCS | Mod: CPTII,S$GLB,, | Performed by: PHYSICIAN ASSISTANT

## 2023-07-12 PROCEDURE — 1160F RVW MEDS BY RX/DR IN RCRD: CPT | Mod: CPTII,S$GLB,, | Performed by: PHYSICIAN ASSISTANT

## 2023-07-12 PROCEDURE — 3288F PR FALLS RISK ASSESSMENT DOCUMENTED: ICD-10-PCS | Mod: CPTII,S$GLB,, | Performed by: PHYSICIAN ASSISTANT

## 2023-07-12 PROCEDURE — 1126F AMNT PAIN NOTED NONE PRSNT: CPT | Mod: CPTII,S$GLB,, | Performed by: PHYSICIAN ASSISTANT

## 2023-07-12 PROCEDURE — 99999 PR PBB SHADOW E&M-EST. PATIENT-LVL V: ICD-10-PCS | Mod: PBBFAC,,, | Performed by: PHYSICIAN ASSISTANT

## 2023-07-12 PROCEDURE — 1160F PR REVIEW ALL MEDS BY PRESCRIBER/CLIN PHARMACIST DOCUMENTED: ICD-10-PCS | Mod: CPTII,S$GLB,, | Performed by: PHYSICIAN ASSISTANT

## 2023-07-12 PROCEDURE — 99024 POSTOP FOLLOW-UP VISIT: CPT | Mod: S$GLB,,, | Performed by: PHYSICIAN ASSISTANT

## 2023-07-12 PROCEDURE — 99999 PR PBB SHADOW E&M-EST. PATIENT-LVL V: CPT | Mod: PBBFAC,,, | Performed by: PHYSICIAN ASSISTANT

## 2023-07-12 PROCEDURE — 1101F PT FALLS ASSESS-DOCD LE1/YR: CPT | Mod: CPTII,S$GLB,, | Performed by: PHYSICIAN ASSISTANT

## 2023-07-12 PROCEDURE — 1159F MED LIST DOCD IN RCRD: CPT | Mod: CPTII,S$GLB,, | Performed by: PHYSICIAN ASSISTANT

## 2023-07-12 PROCEDURE — 99024 PR POST-OP FOLLOW-UP VISIT: ICD-10-PCS | Mod: S$GLB,,, | Performed by: PHYSICIAN ASSISTANT

## 2023-07-12 PROCEDURE — 1159F PR MEDICATION LIST DOCUMENTED IN MEDICAL RECORD: ICD-10-PCS | Mod: CPTII,S$GLB,, | Performed by: PHYSICIAN ASSISTANT

## 2023-07-12 PROCEDURE — 1126F PR PAIN SEVERITY QUANTIFIED, NO PAIN PRESENT: ICD-10-PCS | Mod: CPTII,S$GLB,, | Performed by: PHYSICIAN ASSISTANT

## 2023-07-12 RX ORDER — LOSARTAN POTASSIUM 25 MG/1
TABLET ORAL
Qty: 90 TABLET | Refills: 0 | Status: SHIPPED | OUTPATIENT
Start: 2023-07-12 | End: 2024-01-10

## 2023-07-12 NOTE — TELEPHONE ENCOUNTER
Refill Routing Note   Medication(s) are not appropriate for processing by [facility] for the following reason(s):      Required vitals abnormal    [facility] action(s):  Defer  Approve None identified            Appointments  past 12m or future 3m with PCP    Date Provider   Last Visit   3/22/2023 Natalia Stubbs MD   Next Visit   9/20/2023 Natalia Stubbs MD   ED visits in past 90 days: 0        Note composed:11:47 PM 07/11/2023

## 2023-07-12 NOTE — PROGRESS NOTES
Subjective:      Patient ID: Sofía Mcwilliams is a 82 y.o. female.    Chief Complaint: Post-op Evaluation of the Right Hand      HPI: Sofía Mcwilliams is here for a postop visit. She is 2wks s/p     1. Extensor tendon repair right middle finger side to side with index finger tendon.     2. Extensor tendon repair right ring finger side-to-side repair with small finger tendon   DOS: 6-     She is doing well. Post surgical complaints include: mild dorsal hand swelling.    Of note, the patient continues to have postoperative left lower extremity weakness and footdrop of unknown etiology.  She has recently been seen by Neurosurgery and has an upcoming MRI of her pelvis to evaluate for possible tumor compression.  Additionally, she is scheduled for a nerve conduction study of her lower extremity.      Past Medical History:   Diagnosis Date    Anxiety 5/2/2016    Arthritis     Basal cell carcinoma 2013    left buttock    Cataract     Hypertension     Other and unspecified hyperlipidemia        Current Outpatient Medications:     aspirin (ECOTRIN) 81 MG EC tablet, Take 1 tablet by mouth., Disp: , Rfl:     atorvastatin (LIPITOR) 20 MG tablet, TAKE 1 TABLET EVERY DAY, Disp: 90 tablet, Rfl: 2    CALCIUM CARB/VIT D3/MINERALS (CALCIUM-VITAMIN D ORAL), once daily. , Disp: , Rfl:     coenzyme Q10 10 mg capsule, Take by mouth., Disp: , Rfl:     gabapentin (NEURONTIN) 100 MG capsule, Take 1 capsule (100 mg total) by mouth 3 (three) times daily as needed (RLS)., Disp: 90 capsule, Rfl: 11    HYDROcodone-acetaminophen (NORCO) 5-325 mg per tablet, Take 1 tablet by mouth every 4 (four) hours as needed for Pain., Disp: 30 tablet, Rfl: 0    methylPREDNISolone (MEDROL DOSEPACK) 4 mg tablet, use as directed, Disp: 21 each, Rfl: 0    multivitamin (THERAGRAN) per tablet, Take 1 tablet by mouth., Disp: , Rfl:     rOPINIRole (REQUIP XL) 2 mg 24 hr tablet, TAKE 1 TABLET BY MOUTH EVERY EVENING, Disp: 90 tablet, Rfl: 3    rOPINIRole (REQUIP) 0.5  "MG tablet, Take 1 tablet as needed up to 3 times a day for breakthrough RLS symptoms., Disp: 90 tablet, Rfl: 11    VITAMIN B COMPLEX ORAL, Take by mouth once daily. , Disp: , Rfl:     vitamin D 1000 units Tab, Take 185 mg by mouth once daily., Disp: , Rfl:     losartan (COZAAR) 25 MG tablet, TAKE 1 TABLET EVERY DAY, Disp: 90 tablet, Rfl: 0    urea 20 % Crea, Apply 1 application topically once daily. To dry skin on the feet., Disp: 75 g, Rfl: 10  Review of patient's allergies indicates:   Allergen Reactions    Celecoxib      Other reaction(s): Swelling    Sulfa (sulfonamide antibiotics)      Other reaction(s): Hives       Ht 5' 4" (1.626 m)   Wt 70.8 kg (156 lb)   BMI 26.78 kg/m²     Review of Systems   Constitutional:  Negative for chills, fever and weight loss.   Respiratory:  Negative for shortness of breath.    Cardiovascular:  Negative for chest pain and leg swelling.   Gastrointestinal:  Negative for nausea and vomiting.   Genitourinary:  Negative for dysuria.   Musculoskeletal:  Positive for myalgias. Negative for falls and joint pain.   Skin:  Negative for rash.   Neurological:  Negative for dizziness, tingling, sensory change and focal weakness.        Objective:    Ortho Exam   RIGHT hand:  Surgical wound margins are well approximated and healing nicely. No redness, warmth, drainage, or other signs of infection. Minimal swelling. ROM wrist and fingers not tested today.   strength not tested today.  Sensation intact. Pulses present.    GEN: Well developed, well nourished female. AAOX3. No acute distress.   Breathing unlabored.  Mood and affect appropriate.       Imaging:  No new imaging obtained today  Assessment:         1. Tendon rupture, nontraumatic, extensor          Plan:     2 weeks status post RIGHT middle + ring digit extensor tendon repair    Sutures removed at the bedside  Regular wound care explained with soap and water.  Pain control: Rest, ice, over-the-counter anti-inflammatories and " upper extremity elevation  DME: extension splint provided  Activity restrictions:  No heavy gripping, lifting, pushing or pulling x 10-12 weeks  OT:  Formal referral provided for custom Orthoplast splint and extensor tendon repair protocol    Follow Up: 4wks    Continue f/u with NSG and Neurology for new onset foot drop and LLE weakness.

## 2023-07-12 NOTE — TELEPHONE ENCOUNTER
No care due was identified.  [facility] Embedded Care Due Messages. Reference number: [id number].   7/11/2023 10:07:23 PM CDT

## 2023-07-13 ENCOUNTER — HOSPITAL ENCOUNTER (OUTPATIENT)
Dept: RADIOLOGY | Facility: HOSPITAL | Age: 83
Discharge: HOME | End: 2023-07-13
Attending: NEUROLOGICAL SURGERY
Payer: MEDICARE

## 2023-07-13 DIAGNOSIS — G54.1 IDIOPATHIC LUMBOSACRAL PLEXUS NEUROPATHY: ICD-10-CM

## 2023-07-13 DIAGNOSIS — G54.1 LUMBOSACRAL PLEXUS DISORDERS: ICD-10-CM

## 2023-07-13 DIAGNOSIS — M21.372 FOOT DROP, LEFT FOOT: ICD-10-CM

## 2023-07-13 DIAGNOSIS — R53.1 PROGRESSIVE FOCAL MOTOR WEAKNESS: ICD-10-CM

## 2023-07-13 PROCEDURE — 72197 MRI SACRAL PLEXUS W W/O CONTRAST: ICD-10-PCS | Mod: 26,,, | Performed by: INTERNAL MEDICINE

## 2023-07-13 PROCEDURE — 72197 MRI PELVIS W/O & W/DYE: CPT | Mod: TC

## 2023-07-13 PROCEDURE — 25500020 PHARM REV CODE 255: Performed by: NEUROLOGICAL SURGERY

## 2023-07-13 PROCEDURE — A9585 GADOBUTROL INJECTION: HCPCS | Performed by: NEUROLOGICAL SURGERY

## 2023-07-13 PROCEDURE — 72197 MRI PELVIS W/O & W/DYE: CPT | Mod: 26,,, | Performed by: INTERNAL MEDICINE

## 2023-07-13 RX ORDER — GADOBUTROL 604.72 MG/ML
7 INJECTION INTRAVENOUS
Status: COMPLETED | OUTPATIENT
Start: 2023-07-13 | End: 2023-07-13

## 2023-07-13 RX ADMIN — GADOBUTROL 7 ML: 604.72 INJECTION INTRAVENOUS at 03:07

## 2023-07-14 ENCOUNTER — PATIENT MESSAGE (OUTPATIENT)
Dept: NEUROLOGY | Facility: CLINIC | Age: 83
End: 2023-07-14
Payer: MEDICARE

## 2023-07-14 ENCOUNTER — PATIENT MESSAGE (OUTPATIENT)
Dept: NEUROSURGERY | Facility: CLINIC | Age: 83
End: 2023-07-14
Payer: MEDICARE

## 2023-07-17 DIAGNOSIS — M79.2 NEURITIS: Primary | ICD-10-CM

## 2023-07-18 ENCOUNTER — PROCEDURE VISIT (OUTPATIENT)
Dept: NEUROLOGY | Facility: CLINIC | Age: 83
End: 2023-07-18
Payer: MEDICARE

## 2023-07-18 DIAGNOSIS — R53.1 PROGRESSIVE FOCAL MOTOR WEAKNESS: ICD-10-CM

## 2023-07-18 DIAGNOSIS — M21.372 FOOT DROP, LEFT FOOT: ICD-10-CM

## 2023-07-18 DIAGNOSIS — G54.1 LUMBOSACRAL PLEXUS DISORDERS: ICD-10-CM

## 2023-07-18 DIAGNOSIS — G54.1 IDIOPATHIC LUMBOSACRAL PLEXUS NEUROPATHY: ICD-10-CM

## 2023-07-18 PROCEDURE — 95886 PR EMG COMPLETE, W/ NERVE CONDUCTION STUDIES, 5+ MUSCLES: ICD-10-PCS | Mod: HCNC,S$GLB,, | Performed by: PSYCHIATRY & NEUROLOGY

## 2023-07-18 PROCEDURE — 95886 MUSC TEST DONE W/N TEST COMP: CPT | Mod: HCNC,S$GLB,, | Performed by: PSYCHIATRY & NEUROLOGY

## 2023-07-18 PROCEDURE — 95910 NRV CNDJ TEST 7-8 STUDIES: CPT | Mod: HCNC,S$GLB,, | Performed by: PSYCHIATRY & NEUROLOGY

## 2023-07-18 PROCEDURE — 95910 PR NERVE CONDUCTION STUDY; 7-8 STUDIES: ICD-10-PCS | Mod: HCNC,S$GLB,, | Performed by: PSYCHIATRY & NEUROLOGY

## 2023-07-19 ENCOUNTER — PATIENT MESSAGE (OUTPATIENT)
Dept: ORTHOPEDICS | Facility: CLINIC | Age: 83
End: 2023-07-19
Payer: MEDICARE

## 2023-07-20 ENCOUNTER — CLINICAL SUPPORT (OUTPATIENT)
Dept: REHABILITATION | Facility: HOSPITAL | Age: 83
End: 2023-07-20
Payer: MEDICARE

## 2023-07-20 DIAGNOSIS — M66.20 TENDON RUPTURE, NONTRAUMATIC, EXTENSOR: ICD-10-CM

## 2023-07-20 PROCEDURE — L3808 WHFO, RIGID W/O JOINTS: HCPCS

## 2023-07-20 NOTE — PROGRESS NOTES
OCCUPATIONAL THERAPY --- ORTHOSIS  EVALUATION - FITTING - TRAINING     Patient Name: Sofía Mcwilliams  MRN: [id number]    Date: 7/20/2023  Physician: Dr. Brennen Prabhakar  Primary Diagnosis: M66.20 (ICD-10-CM) - Tendon rupture, nontraumatic, extensor  Treatment Diagnosis: No diagnosis found.  (and resulting condition that requires a custom orthosis)     Start time: 10:00 A  End Time: 11:15A  Total Time: 75    SUBJECTIVE:   HISTORY/OCCUPATIONAL PROFILE:   Patient is a 82 year old, Right hand dominant who presents this day for orthotic fabrication/fitting/training.      Patient's chief complaint is discomfort with prefabricated resting hand splint and reports difficulty with completing her ADLs    Occupation: Retired      Pain: 0/10    Date of Onset/Injury/Surgery: 6/30/2023      OBJECTIVE:     Observations:  minimal tenderness and edema throughout R hand     Sensation:  Intact in all autonomous zones     ASSESSMENT:   Type of custom fabricated Orthosis:   Static  WHFO     L-code:    - given edema sleeve to address swelling     Purpose of orthosis:   To protect healing tendon repairs after surgery (EDC)   To decrease deformity/joint contracture  For support of fracture/ligament/soft tissue injury during healing phase.     Initial OT Orthosis evaluation completed.  Fabricated custom resting hand orthotic  per MD orders for the purpose listed above.  Patient/caregiver were provided written instructions on orthosis purpose, wear schedule, care and precautions to monitor for increased pain/edema, pressure points, skin breakdown or redness/skin irritation.  Patient was IND in donning/doffing of orthosis while in clinic.  Patient/caregiver to contact clinic for adjustments as needed.  Patient may require skilled OT services for orthotic adjustments/modifications as needed.     Quality of Fit of orthotic fabricated:    Good Fit achieved  May require adjustments as needed to accommodate for reduction in edema, wound healing,  fracture healing    Rehab Potential: good    Short Term Goals (in 4 wks)  1) Patient to be IND with orthotic use,donning/doffing,  wear and care precautions and modalities for pain/edema management.     PLAN:    Recommend continued  Occupational therapy for 20 visits during the  Certification period of 7/20/2023  to 9/8/2023 in pursuit of OT goals and for orthosis modification/adjustments as needed.      Treatment to include orthotic fabrication/fitting/training, orthotic modification/adjustment as needed, HEP, instruction in modalities for pain/edema management,  and any other treatment deemed necessary.     I CERTIFY THE NEED FOR THESE SERVICES FURNISHED UNDER THIS PLAN OF TREATMENT AND WHILE UNDER MY CARE    Physician's comments: _____________________________________________________________________      Physician's Name: ___________________  Date ______________________________

## 2023-07-21 ENCOUNTER — PATIENT MESSAGE (OUTPATIENT)
Dept: NEUROSURGERY | Facility: CLINIC | Age: 83
End: 2023-07-21
Payer: MEDICARE

## 2023-07-21 DIAGNOSIS — R60.0 LEG EDEMA, LEFT: Primary | ICD-10-CM

## 2023-07-21 DIAGNOSIS — G57.02 NEUROPATHY OF LEFT SCIATIC NERVE: ICD-10-CM

## 2023-07-26 ENCOUNTER — CLINICAL SUPPORT (OUTPATIENT)
Dept: REHABILITATION | Facility: HOSPITAL | Age: 83
End: 2023-07-26
Payer: MEDICARE

## 2023-07-26 DIAGNOSIS — R29.898 DECREASED FINGER STRENGTH: ICD-10-CM

## 2023-07-26 DIAGNOSIS — M25.641 DECREASED RANGE OF MOTION OF FINGER OF RIGHT HAND: ICD-10-CM

## 2023-07-26 PROCEDURE — 97166 OT EVAL MOD COMPLEX 45 MIN: CPT

## 2023-07-26 PROCEDURE — 97140 MANUAL THERAPY 1/> REGIONS: CPT

## 2023-07-31 ENCOUNTER — CLINICAL SUPPORT (OUTPATIENT)
Dept: REHABILITATION | Facility: HOSPITAL | Age: 83
End: 2023-07-31
Payer: MEDICARE

## 2023-07-31 DIAGNOSIS — R29.898 DECREASED FINGER STRENGTH: ICD-10-CM

## 2023-07-31 DIAGNOSIS — M25.641 DECREASED RANGE OF MOTION OF FINGER OF RIGHT HAND: Primary | ICD-10-CM

## 2023-07-31 PROCEDURE — 97140 MANUAL THERAPY 1/> REGIONS: CPT

## 2023-07-31 PROCEDURE — 97110 THERAPEUTIC EXERCISES: CPT

## 2023-07-31 NOTE — PROGRESS NOTES
[facility]  Occupational Therapy Treatment Note     Date: 7/31/2023  Name: Sofía Mcwilliams  Clinic Number: [id number]    Therapy Diagnosis:   Encounter Diagnoses   Name Primary?    Decreased range of motion of finger of right hand Yes    Decreased finger strength      Physician: Ramona Barrett PA-C      Physician Orders: Eval and Treat  Medical Diagnosis: M66.20 (ICD-10-CM) - Tendon rupture, nontraumatic, extensor  Surgical Procedure and Date: 6/30/2023,   1. Extensor tendon repair right middle finger side to side with index finger tendon.    2. Extensor tendon repair right ring finger side-to-side repair with small finger tendon.    Evaluation Date: 7/26/2023  Insurance Authorization Period Expiration: 12/31/2023  Plan of Care Certification Period: 8 weeks; 9/22/2023  Date of Return to MD: 8/10/2023  Visit # / Visits authorized: 1 / 1  FOTO: 1/ 3        Precautions:  Standard and Fall     Time In: 03:00 PM   Time Out: 04:00 PM   Total Billable Time: 60 minutes    Subjective     Pt reports: good  She was compliant with home exercise program given last session.   Response to previous treatment:decreased wrist edema  Functional change: none noted to this date     Pain: 0/10  Location: right wrists      Objective     Objective Measures updated at progress report unless specified.    Treatment     Sofía received the treatments listed below:      therapeutic exercises to develop ROM for 25 minutes including:  - hook grasp x 10 reps (3 sets)   - short arc x 10 reps  - syngeristic wrist x 10 reps (2 sets)     manual therapy techniques: Soft tissue Mobilization were applied to the: R hand for 10 minutes, including:  Retrograde   Scar tissue  v      Adjusted brace to MP at 0 degrees PIP DIP free   hot pack for R hand minutes to 10 min.        Patient Education and Home Exercises     Education provided:   -   - Progress towards goals     Written Home Exercises Provided: yes.  Exercises were  reviewed and Sofía was able to demonstrate them prior to the end of the session.  Sofía demonstrated good  understanding of the HEP provided. See EMR under Patient Instructions for exercises provided during therapy sessions.       Assessment     Cont lag in MP LF     Sofía is progressing well towards her goals and there are no updates to goals at this time. Pt prognosis is Fair.     Pt will continue to benefit from skilled outpatient occupational therapy to address the deficits listed in the problem list on initial evaluation provide pt/family education and to maximize pt's level of independence in the home and community environment.     Pt's spiritual, cultural and educational needs considered and pt agreeable to plan of care and goals.    Anticipated barriers to occupational therapy:     Goals:  Long term goals: (by d/c)  1)  Patient to be IND with HEP and modalities for pain management  2)  Increase ROM 5-10 degrees to increase functional hand use for ADLs/leisure activities  3)   Increase  strength 5-8 lbs. to carry laundry, carry groceries, sweep, and increase functional independence of right hand for ADLs/iADLs  4)   Increase pinch 3-4  psis for  Increase in functional independence in ADLs/iADLs such as manipulating fasteners and opening containers  5) Patient to score at 63%  or less on FOTO to demonstrate improved perception of functional rightUE Use.                 Short term Goals: ( 4 weeks)   1)  Patient to be IND with HEP and modalities for pain management  2)  Increase wrist ROM 10 degrees to increase functional hand use for ADLs/leisure activities  3)   Pt will demo ext lag less than 5 degrees for lF MP jt.   4)   Pt will be able to make a full composite fist   5)   Patient to score at 55%  or less on FOTO to demonstrate improved perception of functional right UE Use.    Plan     Updates/Grading for next session:     Hannah Coon, MELY   7/31/2023

## 2023-08-01 ENCOUNTER — TELEPHONE (OUTPATIENT)
Dept: NEUROSURGERY | Facility: CLINIC | Age: 83
End: 2023-08-01
Payer: MEDICARE

## 2023-08-01 LAB — FUNGUS SPEC CULT: NORMAL

## 2023-08-01 NOTE — PLAN OF CARE
[facility]  Occupational Therapy Initial Evaluation     Name: Sofía Mcwilliams  Clinic Number: [id number]      Therapy Diagnosis:   Encounter Diagnoses   Name Primary?    Decreased range of motion of finger of right hand     Decreased finger strength      Physician: Ramona Barrett PA-C      Physician Orders: Eval and Treat  Medical Diagnosis: M66.20 (ICD-10-CM) - Tendon rupture, nontraumatic, extensor  Surgical Procedure and Date: 6/30/2023,   1. Extensor tendon repair right middle finger side to side with index finger tendon.    2. Extensor tendon repair right ring finger side-to-side repair with small finger tendon.    Evaluation Date: 7/26/2023  Insurance Authorization Period Expiration: 12/31/2023  Plan of Care Certification Period: 8 weeks; 9/22/2023  Date of Return to MD: 8/10/2023  Visit # / Visits authorized: 1 / 1  FOTO: 1/ 3      Precautions:  Standard and Fall    Time In: 03:00 PM   Time Out: 04:00 PM   Total Billable Time: 60 minutes      Subjective     Date of Onset: May 2023     History of Current Condition/Mechanism of Injury: Sofía reports: sudden rupture of  Extensor tendon rupture right middle finger and Extensor tendon rupture right ring finger.    Pt also verbalized she lost her balance at home and fell onto her ottoman and hit her R hand (wearing brace) - possible bend in splint       Falls: 2-3    Involved Side: Right  Dominant Side: Right    Prior Therapy: no    Pain:  Functional Pain Scale Rating 0-10:   0/10 on average  0/10 at best  0/10 at worst      Occupation:  Retired   Working presently: retired   Duties:      Functional Limitations/Social History:    Previous functional status includes: Independent with all ADLs.     Current Functional Status   Home/Living environment: lives with their family          Limitation of Functional Status as follows:   ADLs/IADLs:     - Feeding: I    - Bathing: mod I    - Dressing/Grooming: mod I    - Home Management: mod I     -  Driving: using L      Leisure: Gardening, Crafts, and making jewelry     Patient's Goals for Therapy:     Past Medical History/Physical Systems Review:   Sofía Mcwilliams  has a past medical history of Anxiety, Arthritis, Basal cell carcinoma, Cataract, Hypertension, and Other and unspecified hyperlipidemia.    Sofía Mcwilliams  has a past surgical history that includes Hysterectomy; Appendectomy; Tonsillectomy; bunion; Colonoscopy (N/A, 9/28/2015); Injection of joint (Left, 1/9/2019); Injection of joint (Left, 3/13/2019); Joint replacement; hip surgery x 2; Colonoscopy (N/A, 3/16/2021); Transforaminal epidural injection of steroid (Left, 8/25/2021); Transforaminal epidural injection of steroid (Bilateral, 2/23/2022); Transfer of hand tendon (Right, 8/16/2022); Surgical removal of distal ulna (Right, 8/16/2022); Surgical removal of bone spur (8/16/2022); injection, sacroiliac joint (Right, 2/23/2023); and Transfer of hand tendon (Right, 6/30/2023).    Sofía has a current medication list which includes the following prescription(s): aspirin, atorvastatin, calcium carb/vit d3/minerals, coenzyme q10, gabapentin, hydrocodone-acetaminophen, losartan, multivitamin, ropinirole, ropinirole, urea, vitamin b complex, and vitamin d.    Review of patient's allergies indicates:   Allergen Reactions    Celecoxib      Other reaction(s): Swelling    Sulfa (sulfonamide antibiotics)      Other reaction(s): Hives        Objective     Observation/Appearance:  Skin intact, Skin dry, Hypertrophic scarring, and Edema present     Edema. Measured in centimeters.   7/26/2023 7/26/2023    Right  Left    Proximal Wrist Crease 15.5 15.0 cm   Figure of 8     MCPs 19.0  18.5          Hand ROM. Measured in degrees.   7/26/2023 7/26/2023    Right  Left         Index: MP  0/35               PIP     0/45               DIP 0/31               BABB 111         Long:  MP 10/45               PIP /45               DIP 0/25               BABB 105         Ring:   MP  5/15               PIP 0/50               DIP 0/25               BABB 85         Small:  MP 0/10                PIP 0/30                DIP 0/35               BABB 75        Elbow and Wrist ROM. Measured in degrees.   7/31/2023 7/31/2023    Left Right        Wrist Ext/Flex 65/70 26/35              Sensation: denies numbness and tingling   NT Distribution 7/26/2023 7/26/2023    Right  Left    [place] Shaye     Normal 1.65-2.83     Diminished Light Touch 3.22-3.61     Diminished Protective 3.84-4.31     Loss of Protective 4.56-6.65     Untestable >6.65     2 Point Discrimination     Static     Dynamic        Strength (Dyanmometer) and Pinch Strength (Pinch Gauge)  Measured in pounds and psi. Average of three trials.   7/26/2023 7/26/2023    Right  Left    Rung II deferred    Key Pinch     3pt Pinch     2pt Pinch         Intake Outcome Measure for FOTO initial eval; hand Survey    Therapist reviewed FOTO scores for Sofía CARRILLO Oj on 7/26/2023.   FOTO documents entered into [employer] - see Media section.    Intake Score: 52.5%         Treatment     Total Treatment time separate from Evaluation time:23 min     Sofía received the treatments listed below:          therapeutic exercises to develop ROM for 5 minutes including:  - hook grasp to extension x 10 reps (2 sets)    manual therapy techniques: Manual Lymphatic Drainage and Soft tissue Mobilization were applied to the: R dorsal hand for 10 minutes, including:  - retrograde massage   - scar tissue mobilization       hot pack for 8 minutes to R.      Patient Education and Home Exercises      Education provided:   -role of OT, goals for OT, scheduling/cancellations, insurance limitations with patient.  -Additional Education provided: only remove splint for exercise     Written Home Exercises Provided: Patient instructed to cont prior HEP.  Exercises were reviewed and Sofía was able to demonstrate them prior to the end of the session.    Sofía demonstrated good   understanding of the education provided.     Pt was advised to perform these exercises free of pain, and to stop performing them if pain occurs.    See EMR under Patient Instructions for exercises provided prior visit.    Assessment     Sofía Mcwilliams is a 82 y.o. female referred to outpatient occupational therapy and presents with a medical diagnosis of S/p Extensor tendon repair x 2 .    Following medical record review it is determined that pt will benefit from occupational therapy services in order to maximize pain free and/or functional use of right hand/wrist. The following goals were discussed with the patient and patient is in agreement with them as to be addressed in the treatment plan. The patient's rehab potential is Good.     Anticipated barriers to occupational therapy:     Plan of care discussed with patient: Yes  Patient's spiritual, cultural and educational needs considered and patient is agreeable to the plan of care and goals as stated below:     Medical Necessity is demonstrated by the following  Occupational Profile/History  Co-morbidities and personal factors that may impact the plan of care [] LOW: Brief chart review  [x] MODERATE: Expanded chart review   [] HIGH: Extensive chart review    Moderate / High Support Documentation:      Examination  Performance deficits relating to physical, cognitive or psychosocial skills that result in activity limitations and/or participation restrictions  [] LOW: addressing 1-3 Performance deficits  [x] MODERATE: 3-5 Performance deficits  [] HIGH: 5+ Performance deficits (please support below)    Moderate / High Support Documentation:    Physical:  Joint Mobility  Skin Integrity/Scar Formation  Edema   Strength  Fine Motor Coordination    Cognitive:  No Deficits    Psychosocial:    Habits  Routines  Rituals     Treatment Options [] LOW: Limited options  [x] MODERATE: Several options  [] HIGH: Multiple options      Decision Making/ Complexity Score: moderate        The following goals were discussed with the patient and patient is in agreement with them as to be addressed in the treatment plan.     Goals:   Long term goals: (by d/c)  1)  Patient to be IND with HEP and modalities for pain management  2)  Increase ROM 5-10 degrees to increase functional hand use for ADLs/leisure activities  3)   Increase  strength 5-8 lbs. to carry laundry, carry groceries, sweep, and increase functional independence of right hand for ADLs/iADLs  4)   Increase pinch 3-4  psis for  Increase in functional independence in ADLs/iADLs such as manipulating fasteners and opening containers  5) Patient to score at 63%  or less on FOTO to demonstrate improved perception of functional rightUE Use.            Short term Goals: ( 4 weeks)   1)  Patient to be IND with HEP and modalities for pain management  2)  Increase wrist ROM 10 degrees to increase functional hand use for ADLs/leisure activities  3)   Pt will demo ext lag less than 5 degrees for lF MP jt.   4)   Pt will be able to make a full composite fist   5)   Patient to score at 55%  or less on FOTO to demonstrate improved perception of functional right UE Use.        Plan   Certification Period/Plan of care expiration: 7/26/2023 to 9/22/2023.    Outpatient Occupational Therapy 2 times weekly for 2 weeks to include the following interventions: Paraffin, Fluidotherapy, Manual therapy/joint mobilizations, Modalities for pain management, US 3 mhz, Therapeutic exercises/activities., Strengthening, Orthotic Fabrication/Fit/Training, Edema Control, Scar Management, Electrical Modalities, Joint Protection, and Energy Conservation.    Hannah Coon OT        Physician's Signature: _________________________________________ Date: ________________

## 2023-08-02 ENCOUNTER — CLINICAL SUPPORT (OUTPATIENT)
Dept: REHABILITATION | Facility: HOSPITAL | Age: 83
End: 2023-08-02
Payer: MEDICARE

## 2023-08-02 DIAGNOSIS — R29.898 DECREASED FINGER STRENGTH: ICD-10-CM

## 2023-08-02 DIAGNOSIS — M25.641 DECREASED RANGE OF MOTION OF FINGER OF RIGHT HAND: Primary | ICD-10-CM

## 2023-08-02 PROCEDURE — 97140 MANUAL THERAPY 1/> REGIONS: CPT

## 2023-08-02 PROCEDURE — L3906 WHO W/O JOINTS CF: HCPCS

## 2023-08-02 PROCEDURE — 97110 THERAPEUTIC EXERCISES: CPT

## 2023-08-03 ENCOUNTER — CLINICAL SUPPORT (OUTPATIENT)
Dept: REHABILITATION | Facility: HOSPITAL | Age: 83
End: 2023-08-03
Payer: MEDICARE

## 2023-08-03 DIAGNOSIS — M25.641 DECREASED RANGE OF MOTION OF FINGER OF RIGHT HAND: Primary | ICD-10-CM

## 2023-08-03 DIAGNOSIS — R29.898 DECREASED FINGER STRENGTH: ICD-10-CM

## 2023-08-03 PROCEDURE — 97110 THERAPEUTIC EXERCISES: CPT

## 2023-08-03 PROCEDURE — L3906 WHO W/O JOINTS CF: HCPCS

## 2023-08-04 ENCOUNTER — HOSPITAL ENCOUNTER (OUTPATIENT)
Dept: RADIOLOGY | Facility: HOSPITAL | Age: 83
Discharge: HOME | End: 2023-08-04
Attending: NEUROLOGICAL SURGERY
Payer: MEDICARE

## 2023-08-04 DIAGNOSIS — R60.0 LEG EDEMA, LEFT: ICD-10-CM

## 2023-08-04 DIAGNOSIS — G57.02 NEUROPATHY OF LEFT SCIATIC NERVE: ICD-10-CM

## 2023-08-04 PROCEDURE — 93971 EXTREMITY STUDY: CPT | Mod: TC,HCNC,LT

## 2023-08-04 PROCEDURE — 93971 US LOWER EXTREMITY VEINS LEFT: ICD-10-PCS | Mod: 26,HCNC,LT, | Performed by: RADIOLOGY

## 2023-08-04 PROCEDURE — 93971 EXTREMITY STUDY: CPT | Mod: 26,HCNC,LT, | Performed by: RADIOLOGY

## 2023-08-04 NOTE — PROGRESS NOTES
[facility]  Occupational Therapy Treatment Note     Date: 8/2/2023  Name: Sofía Mcwilliams  Clinic Number: [id number]    Therapy Diagnosis:   Encounter Diagnoses   Name Primary?    Decreased range of motion of finger of right hand Yes    Decreased finger strength        Physician: Ramona Barrett PA-C      Physician Orders: Eval and Treat  Medical Diagnosis: M66.20 (ICD-10-CM) - Tendon rupture, nontraumatic, extensor  Surgical Procedure and Date: 6/30/2023,   1. Extensor tendon repair right middle finger side to side with index finger tendon.    2. Extensor tendon repair right ring finger side-to-side repair with small finger tendon.    Evaluation Date: 7/26/2023  Insurance Authorization Period Expiration: 12/31/2023  Plan of Care Certification Period: 8 weeks; 9/22/2023  Date of Return to MD: 8/10/2023  Visit # / Visits authorized: 1 / 1  FOTO: 1/ 3        Precautions:  Standard and Fall       Time In: 03:00 PM   Time Out: 04:00 PM   Total Billable Time: 60 minutes    Subjective     Pt reports: good  She was compliant with home exercise program given last session.   Response to previous treatment:decreased wrist edema  Functional change: none noted to this date     Pain: 0/10  Location: right wrists      Objective     Observation/Appearance:  Skin intact, Skin dry, Hypertrophic scarring, and Edema present      Edema. Measured in centimeters.    7/26/2023 7/26/2023     Right  Left    Proximal Wrist Crease 15.5 15.0 cm   Figure of 8       MCPs 19.0  18.5             Hand ROM. Measured in degrees.    7/26/2023 7/26/2023     Right  Left            Index: MP  0/35                PIP     0/45                DIP 0/31                BABB 111             Long:  MP 10/45                PIP /45                DIP 0/25                BABB 105             Ring:   MP 5/15                PIP 0/50                DIP 0/25                BABB 85             Small:  MP 0/10                 PIP 0/30                  DIP 0/35                BABB 75           Elbow and Wrist ROM. Measured in degrees.    7/31/2023 7/31/2023     Left Right           Wrist Ext/Flex 65/70 26/35                    Sensation: denies numbness and tingling   NT Distribution 7/26/2023 7/26/2023     Right  Left    [place] Shaye       Normal 1.65-2.83       Diminished Light Touch 3.22-3.61       Diminished Protective 3.84-4.31       Loss of Protective 4.56-6.65       Untestable >6.65       2 Point Discrimination       Static       Dynamic           Strength (Dyanmometer) and Pinch Strength (Pinch Gauge)  Measured in pounds and psi. Average of three trials.    7/26/2023 7/26/2023     Right  Left    Rung II deferred     Key Pinch       3pt Pinch       2pt Pinch             Intake Outcome Measure for FOTO initial eval; hand Survey     Therapist reviewed FOTO scores for Sofía Mcwilliams on 7/26/2023.   FOTO documents entered into "[employer] - see Media section.     Intake Score: 52.5%           Treatment     Sofía received the treatments listed below:      Hot pack for 10 minutes     therapeutic exercises to develop ROM for 10 minutes including:  - hook grasp x 10 reps (3 sets)   - short arc x 10 reps  - syngeristic wrist x 10 reps (2 sets)         manual therapy techniques: Soft tissue Mobilization were applied to the: R hand for 10 minutes, including:  Retrograde   Scar tissue          Fabricated MP blocking wrist, hand orthoses with PIP and DIP free. L code:   - placed LF MP into slight hyperextension compared to adjacent fingers   - per protocol patient needs initial resting hand at night with PIP and DIP free during the day       Patient Education and Home Exercises     Education provided:   - day splint vs night splint   - Progress towards goals     Written Home Exercises Provided: yes.  Exercises were reviewed and Sofía was able to demonstrate them prior to the end of the session.  Sofía demonstrated good  understanding of the HEP provided. See EMR  under Patient Instructions for exercises provided during therapy sessions.       Assessment     Cont lag in MP LF 25 degrees.     Sofía is progressing well towards her goals and there are no updates to goals at this time. Pt prognosis is Fair.     Pt will continue to benefit from skilled outpatient occupational therapy to address the deficits listed in the problem list on initial evaluation provide pt/family education and to maximize pt's level of independence in the home and community environment.     Pt's spiritual, cultural and educational needs considered and pt agreeable to plan of care and goals.    Anticipated barriers to occupational therapy:     Goals:  Long term goals: (by d/c)  1)  Patient to be IND with HEP and modalities for pain management  2)  Increase ROM 5-10 degrees to increase functional hand use for ADLs/leisure activities  3)   Increase  strength 5-8 lbs. to carry laundry, carry groceries, sweep, and increase functional independence of right hand for ADLs/iADLs  4)   Increase pinch 3-4  psis for  Increase in functional independence in ADLs/iADLs such as manipulating fasteners and opening containers  5) Patient to score at 63%  or less on FOTO to demonstrate improved perception of functional rightUE Use.       Short term Goals: ( 4 weeks)   1)  Patient to be IND with HEP and modalities for pain management  2)  Increase wrist ROM 10 degrees to increase functional hand use for ADLs/leisure activities  3)   Pt will demo ext lag less than 5 degrees for lF MP jt.   4)   Pt will be able to make a full composite fist   5)   Patient to score at 55%  or less on FOTO to demonstrate improved perception of functional right UE Use.    Plan     Updates/Grading for next session:     Hannah Coon OT   8/2/2023

## 2023-08-08 ENCOUNTER — CLINICAL SUPPORT (OUTPATIENT)
Dept: REHABILITATION | Facility: HOSPITAL | Age: 83
End: 2023-08-08
Payer: MEDICARE

## 2023-08-08 DIAGNOSIS — M25.641 DECREASED RANGE OF MOTION OF FINGER OF RIGHT HAND: Primary | ICD-10-CM

## 2023-08-08 PROCEDURE — 97140 MANUAL THERAPY 1/> REGIONS: CPT

## 2023-08-08 PROCEDURE — 97022 WHIRLPOOL THERAPY: CPT

## 2023-08-08 PROCEDURE — 97110 THERAPEUTIC EXERCISES: CPT

## 2023-08-08 NOTE — PROGRESS NOTES
[facility]  Occupational Therapy Treatment Note     Date: 8/8/2023  Name: Sofía Mcwilliams  Clinic Number: [id number]    Therapy Diagnosis:   No diagnosis found.      Physician: Ramona Barrett PA-C      Physician Orders: Eval and Treat  Medical Diagnosis: M66.20 (ICD-10-CM) - Tendon rupture, nontraumatic, extensor  Surgical Procedure and Date: 6/30/2023,   1. Extensor tendon repair right middle finger side to side with index finger tendon.    2. Extensor tendon repair right ring finger side-to-side repair with small finger tendon.    Evaluation Date: 7/26/2023  Insurance Authorization Period Expiration: 12/31/2023  Plan of Care Certification Period: 8 weeks; 9/22/2023  Date of Return to MD: 8/10/2023  Visit # / Visits authorized: 1 / 1  FOTO: 1/ 3        Precautions:  Standard and Fall       Time In: 03:00 PM   Time Out: 04:00 PM   Total Billable Time: 60 minutes    Subjective     Pt reports: good  She was compliant with home exercise program given last session.   Response to previous treatment:decreased wrist edema  Functional change: none noted to this date     Pain: 0/10  Location: right wrists      Objective     Observation/Appearance:  Skin intact, Skin dry, Hypertrophic scarring, and Edema present      Edema. Measured in centimeters.    7/26/2023 7/26/2023     Right  Left    Proximal Wrist Crease 15.5 15.0 cm   Figure of 8       MCPs 19.0  18.5             Hand ROM. Measured in degrees.    7/26/2023 7/26/2023     Right  Left            Index: MP  0/35                PIP     0/45                DIP 0/31                BABB 111             Long:  MP 10/45                PIP /45                DIP 0/25                BABB 105             Ring:   MP 5/15                PIP 0/50                DIP 0/25                BABB 85             Small:  MP 0/10                 PIP 0/30                 DIP 0/35                BABB 75           Elbow and Wrist ROM. Measured in degrees.    7/31/2023  7/31/2023     Left Right           Wrist Ext/Flex 65/70 26/35                    Sensation: denies numbness and tingling   NT Distribution 7/26/2023 7/26/2023     Right  Left    [place]       Normal 1.65-2.83       Diminished Light Touch 3.22-3.61       Diminished Protective 3.84-4.31       Loss of Protective 4.56-6.65       Untestable >6.65       2 Point Discrimination       Static       Dynamic           Strength (Dyanmometer) and Pinch Strength (Pinch Gauge)  Measured in pounds and psi. Average of three trials.    7/26/2023 7/26/2023     Right  Left    Rung II deferred     Key Pinch       3pt Pinch       2pt Pinch             Intake Outcome Measure for FOTO initial eval; hand Survey     Therapist reviewed FOTO scores for Sofía Mcwilliams on 7/26/2023.   FOTO documents entered into [employer] - see Media section.     Intake Score: 52.5%           Treatment     Sofía received the treatments listed below:      Hot pack for 10 minutes     therapeutic exercises to develop ROM for 10 minutes including:  - hook grasp x 10 reps (3 sets)   - short arc x 10 reps  - syngeristic wrist x 10 reps (2 sets)    EDC with marker 20 reps    Table top finger extension 20 reps ( pt able to achieve slightly higher ex with moving index and long finger together in extension )    K taped right long finger in extension  for wear at home    Encouraged pt to bring in night time wear splint so we can modifiy next session with increased  wrist extension       manual therapy techniques: Soft tissue Mobilization were applied to the: R hand for 10 minutes, including:  Retrograde   Scar tissue          Fabricated MP blocking wrist, hand orthoses with PIP and DIP free. L code:   - placed LF MP into slight hyperextension compared to adjacent fingers   - per protocol patient needs initial resting hand at night with PIP and DIP free during the day       Patient Education and Home Exercises     Education provided:   - day splint vs night splint    - Progress towards goals     Written Home Exercises Provided: yes.  Exercises were reviewed and Sofía was able to demonstrate them prior to the end of the session.  Sofía demonstrated good  understanding of the HEP provided. See EMR under Patient Instructions for exercises provided during therapy sessions.       Assessment     Cont lag in MP LF 25 degrees.     Sofía is progressing well towards her goals and there are no updates to goals at this time. Pt prognosis is Fair.     Pt will continue to benefit from skilled outpatient occupational therapy to address the deficits listed in the problem list on initial evaluation provide pt/family education and to maximize pt's level of independence in the home and community environment.     Pt's spiritual, cultural and educational needs considered and pt agreeable to plan of care and goals.    Anticipated barriers to occupational therapy:     Goals:  Long term goals: (by d/c)  1)  Patient to be IND with HEP and modalities for pain management  2)  Increase ROM 5-10 degrees to increase functional hand use for ADLs/leisure activities  3)   Increase  strength 5-8 lbs. to carry laundry, carry groceries, sweep, and increase functional independence of right hand for ADLs/iADLs  4)   Increase pinch 3-4  psis for  Increase in functional independence in ADLs/iADLs such as manipulating fasteners and opening containers  5) Patient to score at 63%  or less on FOTO to demonstrate improved perception of functional rightUE Use.       Short term Goals: ( 4 weeks)   1)  Patient to be IND with HEP and modalities for pain management  2)  Increase wrist ROM 10 degrees to increase functional hand use for ADLs/leisure activities  3)   Pt will demo ext lag less than 5 degrees for lF MP jt.   4)   Pt will be able to make a full composite fist   5)   Patient to score at 55%  or less on FOTO to demonstrate improved perception of functional right UE Use.    Plan     Updates/Grading for next  session:     Kiera Ramsey, OT   8/8/2023

## 2023-08-08 NOTE — PROGRESS NOTES
[facility]  Occupational Therapy Treatment Note     Date: 8/3/2023  Name: Sofía Mcwilliasm  Clinic Number: [id number]    Therapy Diagnosis:   Encounter Diagnoses   Name Primary?    Decreased range of motion of finger of right hand Yes    Decreased finger strength          Physician: Ramona Barrett PA-C      Physician Orders: Eval and Treat  Medical Diagnosis: M66.20 (ICD-10-CM) - Tendon rupture, nontraumatic, extensor  Surgical Procedure and Date: 6/30/2023,   1. Extensor tendon repair right middle finger side to side with index finger tendon.    2. Extensor tendon repair right ring finger side-to-side repair with small finger tendon.    Evaluation Date: 7/26/2023  Insurance Authorization Period Expiration: 12/31/2023  Plan of Care Certification Period: 8 weeks; 9/22/2023  Date of Return to MD: 8/10/2023  Visit # / Visits authorized: 1 / 1  FOTO: 1/ 3        Precautions:  Standard and Fall       Time In: 03:00 PM   Time Out: 04:00 PM   Total Billable Time: 60 minutes    Subjective     Pt reports: good  She was compliant with home exercise program given last session.   Response to previous treatment:decreased wrist edema  Functional change: none noted to this date     Pain: 0/10  Location: right wrists      Objective     Observation/Appearance:  Skin intact, Skin dry, Hypertrophic scarring, and Edema present      Edema. Measured in centimeters.    7/26/2023 7/26/2023     Right  Left    Proximal Wrist Crease 15.5 15.0 cm   Figure of 8       MCPs 19.0  18.5             Hand ROM. Measured in degrees.    7/26/2023 7/26/2023     Right  Left            Index: MP  0/35                PIP     0/45                DIP 0/31                BABB 111             Long:  MP 10/45                PIP /45                DIP 0/25                BABB 105             Ring:   MP 5/15                PIP 0/50                DIP 0/25                BABB 85             Small:  MP 0/10                 PIP 0/30                  DIP 0/35                BABB 75           Elbow and Wrist ROM. Measured in degrees.    7/31/2023 7/31/2023     Left Right           Wrist Ext/Flex 65/70 26/35                    Sensation: denies numbness and tingling   NT Distribution 7/26/2023 7/26/2023     Right  Left    [facility] [place]       Normal 1.65-2.83       Diminished Light Touch 3.22-3.61       Diminished Protective 3.84-4.31       Loss of Protective 4.56-6.65       Untestable >6.65       2 Point Discrimination       Static       Dynamic           Strength (Dyanmometer) and Pinch Strength (Pinch Gauge)  Measured in pounds and psi. Average of three trials.    7/26/2023 7/26/2023     Right  Left    Rung II deferred     Key Pinch       3pt Pinch       2pt Pinch             Intake Outcome Measure for FOTO initial eval; hand Survey     Therapist reviewed FOTO scores for Sofía Mcwilliams on 7/26/2023.   FOTO documents entered into [email or web address] - see Media section.     Intake Score: 52.5%           Treatment     Sofía received the treatments listed below:      Hot pack for 10 minutes       therapeutic exercises to develop ROM for 10 minutes including:  - hook grasp x 10 reps (3 sets)   - short arc x 10 reps  - syngeristic wrist x 10 reps (2 sets)         manual therapy techniques: Soft tissue Mobilization were applied to the: R hand for 10 minutes, including:  Retrograde   Scar tissue      Fabricated MP blocking wrist, hand orthoses with PIP and DIP free. L code:   - placed LF MP into slight hyperextension compared to adjacent fingers   - per protocol patient needs initial resting hand at night with PIP and DIP free during the day         Patient Education and Home Exercises     Education provided:   - day splint vs night splint   - Progress towards goals     Written Home Exercises Provided: yes.  Exercises were reviewed and Sofía was able to demonstrate them prior to the end of the session.  Sofía demonstrated good  understanding of the HEP provided. See EMR  under Patient Instructions for exercises provided during therapy sessions.       Assessment     Cont lag in MP LF 25 degrees.     Sofía is progressing well towards her goals and there are no updates to goals at this time. Pt prognosis is Fair.     Pt will continue to benefit from skilled outpatient occupational therapy to address the deficits listed in the problem list on initial evaluation provide pt/family education and to maximize pt's level of independence in the home and community environment.     Pt's spiritual, cultural and educational needs considered and pt agreeable to plan of care and goals.    Anticipated barriers to occupational therapy:     Goals:  Long term goals: (by d/c)  1)  Patient to be IND with HEP and modalities for pain management  2)  Increase ROM 5-10 degrees to increase functional hand use for ADLs/leisure activities  3)   Increase  strength 5-8 lbs. to carry laundry, carry groceries, sweep, and increase functional independence of right hand for ADLs/iADLs  4)   Increase pinch 3-4  psis for  Increase in functional independence in ADLs/iADLs such as manipulating fasteners and opening containers  5) Patient to score at 63%  or less on FOTO to demonstrate improved perception of functional rightUE Use.       Short term Goals: ( 4 weeks)   1)  Patient to be IND with HEP and modalities for pain management  2)  Increase wrist ROM 10 degrees to increase functional hand use for ADLs/leisure activities  3)   Pt will demo ext lag less than 5 degrees for lF MP jt.   4)   Pt will be able to make a full composite fist   5)   Patient to score at 55%  or less on FOTO to demonstrate improved perception of functional right UE Use.    Plan     Updates/Grading for next session:     Hannah Coon OT   8/3/2023

## 2023-08-09 ENCOUNTER — HOSPITAL ENCOUNTER (OUTPATIENT)
Dept: RADIOLOGY | Facility: OTHER | Age: 83
Discharge: HOME | End: 2023-08-09
Attending: NEUROLOGICAL SURGERY
Payer: MEDICARE

## 2023-08-09 DIAGNOSIS — M79.2 NEURITIS: ICD-10-CM

## 2023-08-10 ENCOUNTER — TELEPHONE (OUTPATIENT)
Dept: ORTHOPEDICS | Facility: CLINIC | Age: 83
End: 2023-08-10

## 2023-08-10 ENCOUNTER — CLINICAL SUPPORT (OUTPATIENT)
Dept: REHABILITATION | Facility: HOSPITAL | Age: 83
End: 2023-08-10
Payer: MEDICARE

## 2023-08-10 ENCOUNTER — OFFICE VISIT (OUTPATIENT)
Dept: ORTHOPEDICS | Facility: CLINIC | Age: 83
End: 2023-08-10
Payer: MEDICARE

## 2023-08-10 VITALS — HEIGHT: 64 IN | BODY MASS INDEX: 26.78 KG/M2

## 2023-08-10 DIAGNOSIS — R20.0 NUMBNESS OF LEFT FOOT: Primary | ICD-10-CM

## 2023-08-10 DIAGNOSIS — R29.898 DECREASED FINGER STRENGTH: ICD-10-CM

## 2023-08-10 DIAGNOSIS — M25.641 DECREASED RANGE OF MOTION OF FINGER OF RIGHT HAND: Primary | ICD-10-CM

## 2023-08-10 PROCEDURE — 1159F MED LIST DOCD IN RCRD: CPT | Mod: HCNC,CPTII,S$GLB, | Performed by: ORTHOPAEDIC SURGERY

## 2023-08-10 PROCEDURE — 1126F PR PAIN SEVERITY QUANTIFIED, NO PAIN PRESENT: ICD-10-PCS | Mod: HCNC,CPTII,S$GLB, | Performed by: ORTHOPAEDIC SURGERY

## 2023-08-10 PROCEDURE — 99999 PR PBB SHADOW E&M-EST. PATIENT-LVL III: ICD-10-PCS | Mod: PBBFAC,HCNC,, | Performed by: ORTHOPAEDIC SURGERY

## 2023-08-10 PROCEDURE — 99024 PR POST-OP FOLLOW-UP VISIT: ICD-10-PCS | Mod: HCNC,S$GLB,, | Performed by: ORTHOPAEDIC SURGERY

## 2023-08-10 PROCEDURE — 97760 ORTHOTIC MGMT&TRAING 1ST ENC: CPT | Mod: CO

## 2023-08-10 PROCEDURE — 97022 WHIRLPOOL THERAPY: CPT | Mod: CO

## 2023-08-10 PROCEDURE — 99999 PR PBB SHADOW E&M-EST. PATIENT-LVL III: CPT | Mod: PBBFAC,HCNC,, | Performed by: ORTHOPAEDIC SURGERY

## 2023-08-10 PROCEDURE — 1126F AMNT PAIN NOTED NONE PRSNT: CPT | Mod: HCNC,CPTII,S$GLB, | Performed by: ORTHOPAEDIC SURGERY

## 2023-08-10 PROCEDURE — 1159F PR MEDICATION LIST DOCUMENTED IN MEDICAL RECORD: ICD-10-PCS | Mod: HCNC,CPTII,S$GLB, | Performed by: ORTHOPAEDIC SURGERY

## 2023-08-10 PROCEDURE — 99024 POSTOP FOLLOW-UP VISIT: CPT | Mod: HCNC,S$GLB,, | Performed by: ORTHOPAEDIC SURGERY

## 2023-08-10 RX ORDER — TRAMADOL HYDROCHLORIDE 50 MG/1
50 TABLET ORAL EVERY 12 HOURS PRN
Qty: 30 TABLET | Refills: 0 | Status: SHIPPED | OUTPATIENT
Start: 2023-08-10 | End: 2023-08-25

## 2023-08-10 RX ORDER — METHYLPREDNISOLONE 4 MG/1
TABLET ORAL
Qty: 1 EACH | Refills: 1 | Status: SHIPPED | OUTPATIENT
Start: 2023-08-10 | End: 2023-09-01

## 2023-08-10 RX ORDER — TIZANIDINE 4 MG/1
4 TABLET ORAL NIGHTLY
Qty: 30 TABLET | Refills: 1 | Status: SHIPPED | OUTPATIENT
Start: 2023-08-10 | End: 2023-09-01

## 2023-08-10 NOTE — TELEPHONE ENCOUNTER
----- Message from Sushila Dalal sent at 8/10/2023 10:16 AM CDT -----  Type:  Pharmacy Calling to Clarify an RX      Pharmacy Name:[facility]  Prescription Name:traMADoL (ULTRAM) 50 mg tablet  What do they need to clarify?:needs verification   Best Call Back Number:[phone] fax# [phone]  Additional Information:

## 2023-08-10 NOTE — PROGRESS NOTES
[facility]  Occupational Therapy Treatment Note     Date: 8/10/2023  Name: Sofía Mcwilliams  Clinic Number: [id number]    Therapy Diagnosis:   Encounter Diagnoses   Name Primary?    Decreased range of motion of finger of right hand Yes    Decreased finger strength          Physician: Ramona Barrett PA-C      Physician Orders: Eval and Treat  Medical Diagnosis: M66.20 (ICD-10-CM) - Tendon rupture, nontraumatic, extensor  Surgical Procedure and Date: 6/30/2023,   1. Extensor tendon repair right middle finger side to side with index finger tendon.    2. Extensor tendon repair right ring finger side-to-side repair with small finger tendon.    Evaluation Date: 7/26/2023  Insurance Authorization Period Expiration: 12/31/2023  Plan of Care Certification Period: 8 weeks; 9/22/2023  Date of Return to MD: 8/10/2023  Visit # / Visits authorized: 5 / 20  FOTO: 1/ 3        Precautions:  Standard and Fall       Time In: 10:57 AM   Time Out: 11:57 AM   Total Billable Time: 60 minutes    Subjective     Pt reports: doctor told her prior to session she may wean splint   She was compliant with home exercise program given last session.   Response to previous treatment:decreased wrist edema  Functional change: none noted to this date     Pain: 0/10  Location: right wrists      Objective     Observation/Appearance:  Skin intact, Skin dry, Hypertrophic scarring, and Edema present      Edema. Measured in centimeters.    7/26/2023 7/26/2023     Right  Left    Proximal Wrist Crease 15.5 15.0 cm   Figure of 8       MCPs 19.0  18.5             Hand ROM. Measured in degrees.    7/26/2023 7/26/2023     Right  Left            Index: MP  0/35                PIP     0/45                DIP 0/31                BABB 111             Long:  MP 10/45                PIP /45                DIP 0/25                BABB 105             Ring:   MP 5/15                PIP 0/50                DIP 0/25                BABB 85              Small:  MP 0/10                 PIP 0/30                 DIP 0/35                BABB 75           Elbow and Wrist ROM. Measured in degrees.    7/31/2023 7/31/2023     Left Right           Wrist Ext/Flex 65/70 26/35                    Sensation: denies numbness and tingling   NT Distribution 7/26/2023 7/26/2023     Right  Left    [place]       Normal 1.65-2.83       Diminished Light Touch 3.22-3.61       Diminished Protective 3.84-4.31       Loss of Protective 4.56-6.65       Untestable >6.65       2 Point Discrimination       Static       Dynamic           Strength (Dyanmometer) and Pinch Strength (Pinch Gauge)  Measured in pounds and psi. Average of three trials.    7/26/2023 7/26/2023     Right  Left    Rung II deferred     Key Pinch       3pt Pinch       2pt Pinch             Intake Outcome Measure for FOTO initial eval; hand Survey     Therapist reviewed FOTO scores for Sofía Mcwilliams on 7/26/2023.   FOTO documents entered into [employer] - see Media section.     Intake Score: 52.5%           Treatment     Sofía received the treatments listed below:      Fluidotherapy: To R hand for 10 min, continuous air, 110 deg, air speed 100 to decrease pain, edema & scar tissue and increased tissue extensibility.      therapeutic exercises to develop ROM for 0 minutes including:  - hook grasp x 10 reps (3 sets)   - short arc x 10 reps  - syngeristic wrist x 10 reps (2 sets)    EDC with marker 20 reps    Table top finger extension 20 reps ( pt able to achieve slightly higher ex with moving index and long finger together in extension )    K taped right long finger in extension  for wear at home    Encouraged pt to bring in night time wear splint so we can modifiy next session with increased  wrist extension       manual therapy techniques: Soft tissue Mobilization were applied to the: R hand for 0 minutes, including:  Retrograde   Scar tissue        Ortho fit and train   - adjusted resting hand splint to increase wrist  and digit extension to promote extensor shortening       Patient Education and Home Exercises     Education provided:   - day splint vs night splint   - Progress towards goals     Written Home Exercises Provided: yes.  Exercises were reviewed and Sofía was able to demonstrate them prior to the end of the session.  Sofía demonstrated good  understanding of the HEP provided. See EMR under Patient Instructions for exercises provided during therapy sessions.       Assessment     Cont lag in MP LF. Adjusted splint for nighttime wear to promote extension.    Sofía is progressing well towards her goals and there are no updates to goals at this time. Pt prognosis is Fair.     Pt will continue to benefit from skilled outpatient occupational therapy to address the deficits listed in the problem list on initial evaluation provide pt/family education and to maximize pt's level of independence in the home and community environment.     Pt's spiritual, cultural and educational needs considered and pt agreeable to plan of care and goals.    Anticipated barriers to occupational therapy:     Goals:  Long term goals: (by d/c)  1)  Patient to be IND with HEP and modalities for pain management  2)  Increase ROM 5-10 degrees to increase functional hand use for ADLs/leisure activities  3)   Increase  strength 5-8 lbs. to carry laundry, carry groceries, sweep, and increase functional independence of right hand for ADLs/iADLs  4)   Increase pinch 3-4  psis for  Increase in functional independence in ADLs/iADLs such as manipulating fasteners and opening containers  5) Patient to score at 63%  or less on FOTO to demonstrate improved perception of functional rightUE Use.       Short term Goals: ( 4 weeks)   1)  Patient to be IND with HEP and modalities for pain management  2)  Increase wrist ROM 10 degrees to increase functional hand use for ADLs/leisure activities  3)   Pt will demo ext lag less than 5 degrees for lF MP jt.   4)   Pt  will be able to make a full composite fist   5)   Patient to score at 55%  or less on FOTO to demonstrate improved perception of functional right UE Use.    Plan     Updates/Grading for next session:     TOM Rodriguez   8/10/2023    Client Care conference completed with evaluating therapist in regards to this patients POC as evidenced by co signature of supervising therapist.

## 2023-08-10 NOTE — PROGRESS NOTES
Subjective:      Patient ID: Sofía Mcwilliams is a 82 y.o. female.  Chief Complaint: Post-op Evaluation (6 wk p/o- rt tendon transfer )      HPI  Sofía Mcwilliams is a  82 y.o. female presenting today for post op visit.  She is s/p tendon transfer to the right middle and ring fingers now about 8 weeks postop she is doing well with the right hand currently in therapy and does have a splint for daytime and nighttime usage  Unfortunately she does continue to have problems with her left foot related to paralysis of the left foot which did occur following her recent hand surgery   Neuro surgery and neurology are both involved but there has been some missed steps along the way especially with scheduling of the MRI scans which have been canceled several times and caused inconvenience for her and her    Recent MRI did show evidence of sciatic neuritis and recent nerve conduction study did confirm 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     Review of patient's allergies indicates:   Allergen Reactions    Celecoxib      Other reaction(s): Swelling    Sulfa (sulfonamide antibiotics)      Other reaction(s): Hives         Current Outpatient Medications   Medication Sig Dispense Refill    aspirin (ECOTRIN) 81 MG EC tablet Take 1 tablet by mouth.      atorvastatin (LIPITOR) 20 MG tablet TAKE 1 TABLET EVERY DAY 90 tablet 2    CALCIUM CARB/VIT D3/MINERALS (CALCIUM-VITAMIN D ORAL) once daily.       coenzyme Q10 10 mg capsule Take by mouth.      gabapentin (NEURONTIN) 100 MG capsule Take 1 capsule (100 mg total) by mouth 3 (three) times daily as needed (RLS). 90 capsule 11    losartan (COZAAR) 25 MG tablet TAKE 1 TABLET EVERY DAY 90 tablet 0    multivitamin (THERAGRAN) per tablet Take 1 tablet by  mouth.      rOPINIRole (REQUIP XL) 2 mg 24 hr tablet TAKE 1 TABLET BY MOUTH EVERY EVENING 90 tablet 3    rOPINIRole (REQUIP) 0.5 MG tablet Take 1 tablet as needed up to 3 times a day for breakthrough RLS symptoms. 90 tablet 11    VITAMIN B COMPLEX ORAL Take by mouth once daily.       vitamin D 1000 units Tab Take 185 mg by mouth once daily.      diazePAM (VALIUM) 5 MG tablet Take 1 tablet 30 minutes prior to procedure and may repeat if necessary (Patient not taking: Reported on 8/10/2023) 2 tablet 0    HYDROcodone-acetaminophen (NORCO) 5-325 mg per tablet Take 1 tablet by mouth every 4 (four) hours as needed for Pain. (Patient not taking: Reported on 8/10/2023) 30 tablet 0    methylPREDNISolone (MEDROL DOSEPACK) 4 mg tablet use as directed 1 each 1    tiZANidine (ZANAFLEX) 4 MG tablet Take 1 tablet (4 mg total) by mouth every evening. 30 tablet 1    traMADoL (ULTRAM) 50 mg tablet Take 1 tablet (50 mg total) by mouth every 12 (twelve) hours as needed for Pain. 30 tablet 0    urea 20 % Crea Apply 1 application topically once daily. To dry skin on the feet. 75 g 10     No current facility-administered medications for this visit.       Past Medical History:   Diagnosis Date    Anxiety 5/2/2016    Arthritis     Basal cell carcinoma 2013    left buttock    Cataract     Hypertension     Other and unspecified hyperlipidemia        Past Surgical History:   Procedure Laterality Date    APPENDECTOMY      bunion      right    COLONOSCOPY N/A 9/28/2015    Procedure: COLONOSCOPY;  Surgeon: Joe Amos MD;  Location: [address]);  Service: Endoscopy;  Laterality: N/A;    COLONOSCOPY N/A 3/16/2021    Procedure: COLONOSCOPY;  Surgeon: Brice Zayas MD;  Location: [facility] ([address] [place] [address]);  Service: Endoscopy;  Laterality: N/A;  covid test 3/13-primary care    hip surgery x 2      HYSTERECTOMY      partial    INJECTION OF JOINT Left 1/9/2019    Procedure: Injection, LEFT HIP INTRAARTICULAR INJECTION;  Surgeon: Omega JARQUIN  "MD Herb;  Location: [facility] PAIN MGT;  Service: Pain Management;  Laterality: Left;    INJECTION OF JOINT Left 3/13/2019    Procedure: INJECTION, JOINT LEFT HIP;  Surgeon: Omega Estevez MD;  Location: [facility] PAIN [facility]GT;  Service: Pain Management;  Laterality: Left;  Left Hip Intrarticular Steroid Injection    INJECTION, SACROILIAC JOINT Right 2/23/2023    Procedure: INJECTION,SACROILIAC JOINT Right SI Joint, Right GTB;  Surgeon: Omega Estevez MD;  Location: [facility] PAIN T;  Service: Pain Management;  Laterality: Right;    JOINT REPLACEMENT      SURGICAL REMOVAL OF BONE SPUR  8/16/2022    Procedure: EXCISION, BONE SPUR ULNA;  Surgeon: Brennen Prabhakar Jr., MD;  Location: [facility] OR;  Service: Orthopedics;;    SURGICAL REMOVAL OF DISTAL ULNA Right 8/16/2022    Procedure: EXCISION, ULNA, DISTAL;  Surgeon: Brennen Prabhakar Jr., MD;  Location: [facility] OR;  Service: Orthopedics;  Laterality: Right;    TONSILLECTOMY      TRANSFER OF HAND TENDON Right 8/16/2022    Procedure: TRANSFER, TENDON, HAND;  Surgeon: Brennen Prabhakar Jr., MD;  Location: [facility] OR;  Service: Orthopedics;  Laterality: Right;  transfer extensor tendon small finger    TRANSFER OF HAND TENDON Right 6/30/2023    Procedure: TRANSFER, TENDON, HAND;  Surgeon: Brennen Prabhakar Jr., MD;  Location: [facility] OR;  Service: Orthopedics;  Laterality: Right;    TRANSFORAMINAL EPIDURAL INJECTION OF STEROID Left 8/25/2021    Procedure: Injection,steroid,epidural,transforaminal approach; Levels: L5-S1;  Surgeon: Heather Mederos MD;  Location: [facility] PAIN T;  Service: Pain Management;  Laterality: Left;  No pacemaker. Patient is taking ASA.     TRANSFORAMINAL EPIDURAL INJECTION OF STEROID Bilateral 2/23/2022    Procedure: Injection,steroid,epidural,transforaminal bilateral L5;  Surgeon: Heather Mederos MD;  Location: [facility] PAIN T;  Service: Pain Management;  Laterality: Bilateral;  ASA   no pacemaker       OBJECTIVE:   PHYSICAL EXAM:  Height: 5' 4" (162.6 cm)    Vitals:    " "08/10/23 0816   Height: 5' 4" (1.626 m)   PainSc: 0-No pain     Ortho/SPM Exam  Examination right hand show the incision well healed minimal swelling she has good extension of the digits a little bit of extensor lag of the middle finger flexion is limited sensation intact       RADIOGRAPHS:  None  Comments: I have personally reviewed the imaging and I agree with the above radiologist's report.    ASSESSMENT/PLAN:     IMPRESSION:  1. Status post extensor tendon transfer right hand doing well.      2. Sciatic neuropathy affecting left foot which occurred after recent hand surgery    PLAN:  I understand the patient's frustration especially with scheduling the MRI scans   I think we are on track now however   As far as her medication I think we need to give her a little bit more Medrol Dosepak so I refilled that today I have also placed her on Zanaflex at night for muscle spasms and would like her to continue with Neurontin which she is currently taking   Ultram for occasional use as well   I would like her to continue therapy and she can wean out of the daytime splint a little bit for the right hand   No heavy lifting       FOLLOW UP:  3-4 weeks    Disclaimer: This note has been generated using voice-recognition software. There may be typographical errors that have been missed during proof-reading.     "

## 2023-08-10 NOTE — TELEPHONE ENCOUNTER
Spoke with pharmacy. They stated that patient is currently on Gabapentin and Valium and if it is ok to still dispense Tramadol. Spoke with physician. Per Dr Prabhakar,  it is ok to dispense Tramadol

## 2023-08-15 ENCOUNTER — CLINICAL SUPPORT (OUTPATIENT)
Dept: REHABILITATION | Facility: HOSPITAL | Age: 83
End: 2023-08-15
Payer: MEDICARE

## 2023-08-15 DIAGNOSIS — R29.898 DECREASED FINGER STRENGTH: ICD-10-CM

## 2023-08-15 DIAGNOSIS — M25.641 DECREASED RANGE OF MOTION OF FINGER OF RIGHT HAND: Primary | ICD-10-CM

## 2023-08-15 PROCEDURE — 97140 MANUAL THERAPY 1/> REGIONS: CPT | Mod: 59

## 2023-08-15 PROCEDURE — 97110 THERAPEUTIC EXERCISES: CPT

## 2023-08-15 PROCEDURE — 97760 ORTHOTIC MGMT&TRAING 1ST ENC: CPT

## 2023-08-15 NOTE — PROGRESS NOTES
"  [facility]  Occupational Therapy Treatment Note     Date: 8/15/2023  Name: Sofía Mcwilliams  Clinic Number: [id number]    Therapy Diagnosis:   Encounter Diagnoses   Name Primary?    Decreased range of motion of finger of right hand Yes    Decreased finger strength            Physician: Ramona Barrett PA-C      Physician Orders: Eval and Treat  Medical Diagnosis: M66.20 (ICD-10-CM) - Tendon rupture, nontraumatic, extensor  Surgical Procedure and Date: 6/30/2023,   1. Extensor tendon repair right middle finger side to side with index finger tendon.    2. Extensor tendon repair right ring finger side-to-side repair with small finger tendon.    Evaluation Date: 7/26/2023  Insurance Authorization Period Expiration: 12/31/2023  Plan of Care Certification Period: 8 weeks; 9/22/2023  Date of Return to MD: 8/10/2023  Visit # / Visits authorized: 5 / 20  FOTO: 1/ 3        Precautions:  Standard and Fall       Time In: 10:57 AM   Time Out: 11:57 AM   Total Billable Time: 60 minutes    Subjective     Pt reports:  "it's straight when I wake up but it goes down after I take it off in the morning"   She was compliant with home exercise program given last session.   Response to previous treatment:decreased wrist edema  Functional change: none noted to this date     Pain: 0/10  Location: right wrists      Objective     Observation/Appearance:  Skin intact, Skin dry, Hypertrophic scarring, and Edema present      Edema. Measured in centimeters.    7/26/2023 7/26/2023     Right  Left    Proximal Wrist Crease 15.5 15.0 cm   Figure of 8       MCPs 19.0  18.5             Hand ROM. Measured in degrees.    7/26/2023 7/26/2023     Right  Left            Index: MP  0/35                PIP     0/45                DIP 0/31                BABB 111             Long:  MP 10/45                PIP /45                DIP 0/25                BABB 105             Ring:   MP 5/15                PIP 0/50                DIP 0/25   "              BABB 85             Small:  MP 0/10                 PIP 0/30                 DIP 0/35                BABB 75           Elbow and Wrist ROM. Measured in degrees.    7/31/2023 7/31/2023     Left Right           Wrist Ext/Flex 65/70 26/35                    Sensation: denies numbness and tingling   NT Distribution 7/26/2023 7/26/2023     Right  Left    [place] Shaye       Normal 1.65-2.83       Diminished Light Touch 3.22-3.61       Diminished Protective 3.84-4.31       Loss of Protective 4.56-6.65       Untestable >6.65       2 Point Discrimination       Static       Dynamic           Strength (Dyanmometer) and Pinch Strength (Pinch Gauge)  Measured in pounds and psi. Average of three trials.    7/26/2023 7/26/2023     Right  Left    Rung II deferred     Key Pinch       3pt Pinch       2pt Pinch             Intake Outcome Measure for FOTO initial eval; hand Survey     Therapist reviewed FOTO scores for Sofía Mcwilliams on 7/26/2023.   FOTO documents entered into [employer] - see Media section.     Intake Score: 52.5%           Treatment     Sofía received the treatments listed below:        Fluidotherapy: To R hand for 10 min, continuous air, 110 deg, air speed 100 to decrease pain, edema & scar tissue and increased tissue extensibility. NT   Hot pack 10 min         therapeutic exercises to develop ROM for   25 minutes including:  - hook grasp x 10 reps (3 sets)   - short arc x 10 reps  - syngeristic wrist x 10 reps (2 sets)    EDC with marker 20 reps    Everette strap to assist in extension    K taped right long finger in extension  for wear at home         manual therapy techniques: Soft tissue Mobilization were applied to the: R hand for 15 minutes, including:  Retrograde   Scar tissue  Lymphatouch 175 mmHg negative pressure, pulsation (twist/grab), and continuous vibration 20 Hz over dorsal scar         Ortho fit and train 30 min   - updated MP blocking PIP and DIP free, demonstrated  increased MP ext        Patient Education and Home Exercises     Education provided:   - day splint vs night splint   - Progress towards goals     Written Home Exercises Provided: yes.  Exercises were reviewed and Sofía was able to demonstrate them prior to the end of the session.  Sofía demonstrated good  understanding of the HEP provided. See EMR under Patient Instructions for exercises provided during therapy sessions.       Assessment     Cont lag in MP LF.  Demonstrated improved MP ext in daytime splint.     Sofía is progressing well towards her goals and there are no updates to goals at this time. Pt prognosis is Fair.     Pt will continue to benefit from skilled outpatient occupational therapy to address the deficits listed in the problem list on initial evaluation provide pt/family education and to maximize pt's level of independence in the home and community environment.     Pt's spiritual, cultural and educational needs considered and pt agreeable to plan of care and goals.    Anticipated barriers to occupational therapy:     Goals:  Long term goals: (by d/c)  1)  Patient to be IND with HEP and modalities for pain management  2)  Increase ROM 5-10 degrees to increase functional hand use for ADLs/leisure activities  3)   Increase  strength 5-8 lbs. to carry laundry, carry groceries, sweep, and increase functional independence of right hand for ADLs/iADLs  4)   Increase pinch 3-4  psis for  Increase in functional independence in ADLs/iADLs such as manipulating fasteners and opening containers  5) Patient to score at 63%  or less on FOTO to demonstrate improved perception of functional rightUE Use.       Short term Goals: ( 4 weeks)   1)  Patient to be IND with HEP and modalities for pain management  2)  Increase wrist ROM 10 degrees to increase functional hand use for ADLs/leisure activities  3)   Pt will demo ext lag less than 5 degrees for lF MP jt.   4)   Pt will be able to make a full composite fist   5)   Patient to  score at 55%  or less on FOTO to demonstrate improved perception of functional right UE Use.    Plan     Updates/Grading for next session:     Hannah Coon OT   8/15/2023

## 2023-08-17 ENCOUNTER — CLINICAL SUPPORT (OUTPATIENT)
Dept: REHABILITATION | Facility: HOSPITAL | Age: 83
End: 2023-08-17
Payer: MEDICARE

## 2023-08-17 DIAGNOSIS — M25.641 DECREASED RANGE OF MOTION OF FINGER OF RIGHT HAND: Primary | ICD-10-CM

## 2023-08-17 DIAGNOSIS — R29.898 DECREASED FINGER STRENGTH: ICD-10-CM

## 2023-08-17 PROCEDURE — 97140 MANUAL THERAPY 1/> REGIONS: CPT

## 2023-08-17 PROCEDURE — 97022 WHIRLPOOL THERAPY: CPT

## 2023-08-17 PROCEDURE — 97110 THERAPEUTIC EXERCISES: CPT

## 2023-08-17 PROCEDURE — 97035 APP MDLTY 1+ULTRASOUND EA 15: CPT

## 2023-08-17 NOTE — PROGRESS NOTES
[facility]  Occupational Therapy Treatment Note     Date: 8/17/2023  Name: Sofía Mcwilliams  Clinic Number: [id number]    Therapy Diagnosis:   Encounter Diagnoses   Name Primary?    Decreased range of motion of finger of right hand Yes    Decreased finger strength            Physician: Ramona Barrett PA-C      Physician Orders: Eval and Treat  Medical Diagnosis: M66.20 (ICD-10-CM) - Tendon rupture, nontraumatic, extensor  Surgical Procedure and Date: 6/30/2023,   1. Extensor tendon repair right middle finger side to side with index finger tendon.    2. Extensor tendon repair right ring finger side-to-side repair with small finger tendon.    Evaluation Date: 7/26/2023  Insurance Authorization Period Expiration: 12/31/2023  Plan of Care Certification Period: 8 weeks; 9/22/2023  Date of Return to MD: 8/10/2023  Visit # / Visits authorized: 5 / 20  FOTO: 1/ 3        Precautions:  Standard and Fall       Time In: 10:57 AM   Time Out: 11:57 AM   Total Billable Time: 60 minutes    Subjective     Pt reports: doctor told her prior to session she may wean splint   She was compliant with home exercise program given last session.   Response to previous treatment:decreased wrist edema  Functional change: none noted to this date     Pain: 0/10  Location: right wrists      Objective     Observation/Appearance:  Skin intact, Skin dry, Hypertrophic scarring, and Edema present      Edema. Measured in centimeters.    7/26/2023 7/26/2023     Right  Left    Proximal Wrist Crease 15.5 15.0 cm   Figure of 8       MCPs 19.0  18.5             Hand ROM. Measured in degrees.    7/26/2023 7/26/2023     Right  Left            Index: MP  0/35                PIP     0/45                DIP 0/31                BABB 111             Long:  MP 10/45                PIP /45                DIP 0/25                BABB 105             Ring:   MP 5/15                PIP 0/50                DIP 0/25                BABB 85              Small:  MP 0/10                 PIP 0/30                 DIP 0/35                BABB 75           Elbow and Wrist ROM. Measured in degrees.    7/31/2023 7/31/2023     Left Right           Wrist Ext/Flex 65/70 26/35                    Sensation: denies numbness and tingling   NT Distribution 7/26/2023 7/26/2023     Right  Left    [place] Shaye       Normal 1.65-2.83       Diminished Light Touch 3.22-3.61       Diminished Protective 3.84-4.31       Loss of Protective 4.56-6.65       Untestable >6.65       2 Point Discrimination       Static       Dynamic           Strength (Dyanmometer) and Pinch Strength (Pinch Gauge)  Measured in pounds and psi. Average of three trials.    7/26/2023 7/26/2023     Right  Left    Rung II deferred     Key Pinch       3pt Pinch       2pt Pinch             Intake Outcome Measure for FOTO initial eval; hand Survey     Therapist reviewed FOTO scores for Sofía Mcwilliams on 7/26/2023.   FOTO documents entered into [employer] - see Media section.     Intake Score: 52.5%           Treatment     Sofía received the treatments listed below:     Pt is 7 weeks post op   Discussed that she might be able to wean from day time splint       Fluidotherapy: To R hand for 10 min, continuous air, 110 deg, air speed 100 to decrease pain, edema & scar tissue and increased tissue extensibility.     Manual massage X 8 mintues , with dycem to decrease wrist adhesions      Ultra sound over scar X 8 minutes 1.0 w/cm2       therapeutic exercises to develop ROM for 38 minutes including:     Manual assistance over EDC tendons to assist with finger lifts from table top  20 reps   - hook grasp x 10 reps (3 sets)   - short arc x 10 reps  - syngeristic wrist x 10 reps (2 sets)    EDC with marker 20 reps    Ispspheres    Table top finger extension 20 reps ( pt able to achieve slightly higher ex with moving index and long finger together in extension )    K taped right long finger in extension  for wear at home     Encouraged pt to bring in night time wear splint so we can modifiy next session with increased  wrist extension           Patient Education and Home Exercises     Education provided:   - day splint vs night splint   - Progress towards goals     Written Home Exercises Provided: yes.  Exercises were reviewed and Sofía was able to demonstrate them prior to the end of the session.  Sofía demonstrated good  understanding of the HEP provided. See EMR under Patient Instructions for exercises provided during therapy sessions.       Assessment     Cont lag in MP LF. Adjusted splint for nighttime wear to promote extension.    She exhibits adhesions over EDC scar , encouraged to promote scar massages     Sofía is progressing well towards her goals and there are no updates to goals at this time. Pt prognosis is Fair.     Pt will continue to benefit from skilled outpatient occupational therapy to address the deficits listed in the problem list on initial evaluation provide pt/family education and to maximize pt's level of independence in the home and community environment.     Pt's spiritual, cultural and educational needs considered and pt agreeable to plan of care and goals.    Anticipated barriers to occupational therapy:     Goals:  Long term goals: (by d/c)  1)  Patient to be IND with HEP and modalities for pain management  2)  Increase ROM 5-10 degrees to increase functional hand use for ADLs/leisure activities  3)   Increase  strength 5-8 lbs. to carry laundry, carry groceries, sweep, and increase functional independence of right hand for ADLs/iADLs  4)   Increase pinch 3-4  psis for  Increase in functional independence in ADLs/iADLs such as manipulating fasteners and opening containers  5) Patient to score at 63%  or less on FOTO to demonstrate improved perception of functional rightUE Use.       Short term Goals: ( 4 weeks)   1)  Patient to be IND with HEP and modalities for pain management  2)  Increase wrist ROM  10 degrees to increase functional hand use for ADLs/leisure activities  3)   Pt will demo ext lag less than 5 degrees for lF MP jt.   4)   Pt will be able to make a full composite fist   5)   Patient to score at 55%  or less on FOTO to demonstrate improved perception of functional right UE Use.    Plan     Updates/Grading for next session:     Kiera Ramsey, OT   8/17/2023

## 2023-08-19 LAB
ACID FAST MOD KINY STN SPEC: NORMAL
MYCOBACTERIUM SPEC QL CULT: NORMAL

## 2023-08-21 ENCOUNTER — OFFICE VISIT (OUTPATIENT)
Dept: DERMATOLOGY | Facility: CLINIC | Age: 83
End: 2023-08-21
Payer: MEDICARE

## 2023-08-21 DIAGNOSIS — Z85.828 HISTORY OF NONMELANOMA SKIN CANCER: ICD-10-CM

## 2023-08-21 DIAGNOSIS — L82.1 SK (SEBORRHEIC KERATOSIS): Primary | ICD-10-CM

## 2023-08-21 DIAGNOSIS — D23.9 DERMATOFIBROMA: ICD-10-CM

## 2023-08-21 PROCEDURE — 1101F PT FALLS ASSESS-DOCD LE1/YR: CPT | Mod: HCNC,CPTII,S$GLB, | Performed by: DERMATOLOGY

## 2023-08-21 PROCEDURE — 99213 PR OFFICE/OUTPT VISIT, EST, LEVL III, 20-29 MIN: ICD-10-PCS | Mod: HCNC,S$GLB,, | Performed by: DERMATOLOGY

## 2023-08-21 PROCEDURE — 3288F FALL RISK ASSESSMENT DOCD: CPT | Mod: HCNC,CPTII,S$GLB, | Performed by: DERMATOLOGY

## 2023-08-21 PROCEDURE — 99999 PR PBB SHADOW E&M-EST. PATIENT-LVL III: CPT | Mod: PBBFAC,HCNC,, | Performed by: DERMATOLOGY

## 2023-08-21 PROCEDURE — 1159F PR MEDICATION LIST DOCUMENTED IN MEDICAL RECORD: ICD-10-PCS | Mod: HCNC,CPTII,S$GLB, | Performed by: DERMATOLOGY

## 2023-08-21 PROCEDURE — 1159F MED LIST DOCD IN RCRD: CPT | Mod: HCNC,CPTII,S$GLB, | Performed by: DERMATOLOGY

## 2023-08-21 PROCEDURE — 99999 PR PBB SHADOW E&M-EST. PATIENT-LVL III: ICD-10-PCS | Mod: PBBFAC,HCNC,, | Performed by: DERMATOLOGY

## 2023-08-21 PROCEDURE — 3288F PR FALLS RISK ASSESSMENT DOCUMENTED: ICD-10-PCS | Mod: HCNC,CPTII,S$GLB, | Performed by: DERMATOLOGY

## 2023-08-21 PROCEDURE — 1125F AMNT PAIN NOTED PAIN PRSNT: CPT | Mod: HCNC,CPTII,S$GLB, | Performed by: DERMATOLOGY

## 2023-08-21 PROCEDURE — 1160F RVW MEDS BY RX/DR IN RCRD: CPT | Mod: HCNC,CPTII,S$GLB, | Performed by: DERMATOLOGY

## 2023-08-21 PROCEDURE — 1160F PR REVIEW ALL MEDS BY PRESCRIBER/CLIN PHARMACIST DOCUMENTED: ICD-10-PCS | Mod: HCNC,CPTII,S$GLB, | Performed by: DERMATOLOGY

## 2023-08-21 PROCEDURE — 99213 OFFICE O/P EST LOW 20 MIN: CPT | Mod: HCNC,S$GLB,, | Performed by: DERMATOLOGY

## 2023-08-21 PROCEDURE — 1125F PR PAIN SEVERITY QUANTIFIED, PAIN PRESENT: ICD-10-PCS | Mod: HCNC,CPTII,S$GLB, | Performed by: DERMATOLOGY

## 2023-08-21 PROCEDURE — 1101F PR PT FALLS ASSESS DOC 0-1 FALLS W/OUT INJ PAST YR: ICD-10-PCS | Mod: HCNC,CPTII,S$GLB, | Performed by: DERMATOLOGY

## 2023-08-21 NOTE — PROGRESS NOTES
Subjective:      Patient ID:  Sofía Mcwilliams is a 82 y.o. female who presents for   Chief Complaint   Patient presents with    Skin Check     UBSE      History of Present Illness: The patient presents for follow up of skin check.    The patient was last seen on: 02/04/2022 for skin check with no significant findings.     This is a high risk patient here to check for the development of new lesions.      Patient with new complaint of lesion(s)  Location: Left hand   Duration: 1 1/2 years  Symptoms: red and itchy   Relieving factors/Previous treatments: none      Review of Systems   Skin:  Positive for daily sunscreen use, activity-related sunscreen use and wears hat (activities). Negative for tendency to form keloidal scars and recent sunburn.   Hematologic/Lymphatic: Does not bruise/bleed easily (on asa).       Objective:   Physical Exam   Constitutional: She appears well-developed and well-nourished. No distress.   Neurological: She is alert and oriented to person, place, and time. She is not disoriented.   Psychiatric: She has a normal mood and affect.   Skin:   Areas Examined (abnormalities noted in diagram):   Head / Face Inspection Performed  Neck Inspection Performed  Chest / Axilla Inspection Performed  Back Inspection Performed  RUE Inspected  LUE Inspection Performed                 Diagram Legend     Erythematous scaling macule/papule c/w actinic keratosis       Vascular papule c/w angioma      Pigmented verrucoid papule/plaque c/w seborrheic keratosis      Yellow umbilicated papule c/w sebaceous hyperplasia      Irregularly shaped tan macule c/w lentigo     1-2 mm smooth white papules consistent with Milia      Movable subcutaneous cyst with punctum c/w epidermal inclusion cyst      Subcutaneous movable cyst c/w pilar cyst      Firm pink to brown papule c/w dermatofibroma      Pedunculated fleshy papule(s) c/w skin tag(s)      Evenly pigmented macule c/w junctional nevus     Mildly variegated pigmented,  slightly irregular-bordered macule c/w mildly atypical nevus      Flesh colored to evenly pigmented papule c/w intradermal nevus       Pink pearly papule/plaque c/w basal cell carcinoma      Erythematous hyperkeratotic cursted plaque c/w SCC      Surgical scar with no sign of skin cancer recurrence      Open and closed comedones      Inflammatory papules and pustules      Verrucoid papule consistent consistent with wart     Erythematous eczematous patches and plaques     Dystrophic onycholytic nail with subungual debris c/w onychomycosis     Umbilicated papule    Erythematous-base heme-crusted tan verrucoid plaque consistent with inflamed seborrheic keratosis     Erythematous Silvery Scaling Plaque c/w Psoriasis     See annotation      Assessment / Plan:        SK (seborrheic keratosis)  These are benign inherited growths without a malignant potential. Reassurance given to patient. No treatment is necessary.       Dermatofibroma   - minor problem and chronic.   Reassurance given to patient. No treatment necessary.       History of nonmelanoma skin cancer  Area(s) of previous NMSC evaluated with no signs of recurrence.    Upper body skin examination performed today including at least 6 points as noted in physical examination. No lesions suspicious for malignancy noted.    Recommend daily sun protection/avoidance and use of at least SPF 30, broad spectrum sunscreen (OTC drug).                No follow-ups on file.

## 2023-08-22 ENCOUNTER — CLINICAL SUPPORT (OUTPATIENT)
Dept: REHABILITATION | Facility: HOSPITAL | Age: 83
End: 2023-08-22
Payer: MEDICARE

## 2023-08-22 DIAGNOSIS — R29.898 DECREASED FINGER STRENGTH: ICD-10-CM

## 2023-08-22 DIAGNOSIS — M25.641 DECREASED RANGE OF MOTION OF FINGER OF RIGHT HAND: Primary | ICD-10-CM

## 2023-08-22 PROCEDURE — 97140 MANUAL THERAPY 1/> REGIONS: CPT

## 2023-08-22 PROCEDURE — 97110 THERAPEUTIC EXERCISES: CPT

## 2023-08-22 PROCEDURE — 97035 APP MDLTY 1+ULTRASOUND EA 15: CPT

## 2023-08-22 NOTE — PROGRESS NOTES
"    [facility]  Occupational Therapy Treatment Note     Date: 8/22/2023  Name: Sofía Mcwilliams  Clinic Number: [id number]    Therapy Diagnosis:   Encounter Diagnoses   Name Primary?    Decreased range of motion of finger of right hand Yes    Decreased finger strength          Physician: Ramona Barrett PA-C      Physician Orders: Eval and Treat  Medical Diagnosis: M66.20 (ICD-10-CM) - Tendon rupture, nontraumatic, extensor  Surgical Procedure and Date: 6/30/2023,   1. Extensor tendon repair right middle finger side to side with index finger tendon.    2. Extensor tendon repair right ring finger side-to-side repair with small finger tendon.    Evaluation Date: 7/26/2023  Insurance Authorization Period Expiration: 12/31/2023  Plan of Care Certification Period: 8 weeks; 9/22/2023  Date of Return to MD: 8/10/2023  Visit # / Visits authorized: 9 / 20  FOTO: 1/ 3        Precautions:  Standard and Fall       Time In: 08:00 AM   Time Out: 09:00AM   Total Billable Time: 60 minutes    Subjective     Pt reports: "I'm getting really tired of everything. My foot has been killing me. I took the medicine Dr. Prabhakar prescribed me and I slept the entire night"   She was compliant with home exercise program given last session.   Response to previous treatment:decreased wrist edema  Functional change: none noted to this date     Pain: 0/10  Location: right wrists      Objective     Observation/Appearance:  Skin intact, Skin dry, Hypertrophic scarring, and Edema present      Edema. Measured in centimeters.    7/26/2023 7/26/2023     Right  Left    Proximal Wrist Crease 15.5 15.0 cm   Figure of 8       MCPs 19.0  18.5             Hand ROM. Measured in degrees.    7/26/2023 7/26/2023     Right  Left            Index: MP  0/35                PIP     0/45                DIP 0/31                BABB 111             Long:  MP 10/45                PIP /45                DIP 0/25                BABB 105             Ring: "   MP 5/15                PIP 0/50                DIP 0/25                BABB 85             Small:  MP 0/10                 PIP 0/30                 DIP 0/35                BABB 75           Elbow and Wrist ROM. Measured in degrees.    7/31/2023 7/31/2023     Left Right           Wrist Ext/Flex 65/70 26/35                    Sensation: denies numbness and tingling   NT Distribution 7/26/2023 7/26/2023     Right  Left    [place] Shaye       Normal 1.65-2.83       Diminished Light Touch 3.22-3.61       Diminished Protective 3.84-4.31       Loss of Protective 4.56-6.65       Untestable >6.65       2 Point Discrimination       Static       Dynamic           Strength (Dyanmometer) and Pinch Strength (Pinch Gauge)  Measured in pounds and psi. Average of three trials.    7/26/2023 7/26/2023     Right  Left    Rung II deferred     Key Pinch       3pt Pinch       2pt Pinch             Intake Outcome Measure for FOTO initial eval; hand Survey     Therapist reviewed FOTO scores for Sofía Mcwilliams on 7/26/2023.   FOTO documents entered into [employer] - see Media section.     Intake Score: 52.5%           Treatment     Sofía received the treatments listed below:       Pt is 7 weeks post op   Discussed that she might be able to wean from day time splint         Fluidotherapy: To R hand for 10 min, continuous air, 110 deg, air speed 100 to decrease pain, edema & scar tissue and increased tissue extensibility.     Manual massage X 15 mintues with dycem to decrease wrist adhesions   - dycem opp pulling based on direction of skin movement to encourage increased excursion on tendon while patient is performing PIP extension with all digits        Patient received ultrasound to R dorsal hand area to increase blood flow, circulation, tissue elasticity, and for pain management for 10 minutes @ 1 Mhz, Intensity 1.0 w/cm2 at 100 % duty cycle.        Manual assistance over EDC tendons to assist with finger lifts from table top  20 reps    -  hook grasp x 10 reps (3 sets)   - short arc x 10 reps  - syngeristic wrist x 10 reps (2 sets)   - EDC with marker 20 reps   -  Isospheres   - Table top finger extension 20 reps ( pt able to achieve slightly higher ex with moving index and long finger together in extension )    Applied K taped right long finger in extension  for wear at home    Encouraged pt to bring in night time wear splint so we can modifiy next session with increased  wrist extension       Patient Education and Home Exercises     Education provided:   - day splint vs night splint   - Progress towards goals     Written Home Exercises Provided: yes.  Exercises were reviewed and Sofía was able to demonstrate them prior to the end of the session.  Sofía demonstrated good  understanding of the HEP provided. See EMR under Patient Instructions for exercises provided during therapy sessions.       Assessment     Cont lag in MP LF.  Improved tensile excursion with dycem opp direction.  Decreased lag following KT tape application.       Sofía is progressing well towards her goals and there are no updates to goals at this time. Pt prognosis is Fair.     Pt will continue to benefit from skilled outpatient occupational therapy to address the deficits listed in the problem list on initial evaluation provide pt/family education and to maximize pt's level of independence in the home and community environment.     Pt's spiritual, cultural and educational needs considered and pt agreeable to plan of care and goals.    Anticipated barriers to occupational therapy:     Goals:  Long term goals: (by d/c)  1)  Patient to be IND with HEP and modalities for pain management  2)  Increase ROM 5-10 degrees to increase functional hand use for ADLs/leisure activities  3)   Increase  strength 5-8 lbs. to carry laundry, carry groceries, sweep, and increase functional independence of right hand for ADLs/iADLs  4)   Increase pinch 3-4  psis for  Increase in functional  independence in ADLs/iADLs such as manipulating fasteners and opening containers  5) Patient to score at 63%  or less on FOTO to demonstrate improved perception of functional rightUE Use.       Short term Goals: ( 4 weeks)   1)  Patient to be IND with HEP and modalities for pain management  2)  Increase wrist ROM 10 degrees to increase functional hand use for ADLs/leisure activities  3)   Pt will demo ext lag less than 5 degrees for lF MP jt.   4)   Pt will be able to make a full composite fist   5)   Patient to score at 55%  or less on FOTO to demonstrate improved perception of functional right UE Use.    Plan     Updates/Grading for next session:     Hannah Coon, OT   8/22/2023

## 2023-08-24 ENCOUNTER — CLINICAL SUPPORT (OUTPATIENT)
Dept: REHABILITATION | Facility: HOSPITAL | Age: 83
End: 2023-08-24
Payer: MEDICARE

## 2023-08-24 DIAGNOSIS — R29.898 DECREASED FINGER STRENGTH: ICD-10-CM

## 2023-08-24 DIAGNOSIS — M25.641 DECREASED RANGE OF MOTION OF FINGER OF RIGHT HAND: Primary | ICD-10-CM

## 2023-08-24 PROCEDURE — 97110 THERAPEUTIC EXERCISES: CPT | Mod: CO

## 2023-08-24 PROCEDURE — 97018 PARAFFIN BATH THERAPY: CPT | Mod: 59,CO

## 2023-08-24 PROCEDURE — 97140 MANUAL THERAPY 1/> REGIONS: CPT | Mod: CO

## 2023-08-24 NOTE — PROGRESS NOTES
"    [facility]  Occupational Therapy Treatment Note     Date: 8/24/2023  Name: Sofía Mcwilliams  Clinic Number: [id number]    Therapy Diagnosis:   No diagnosis found.        Physician: Ramona Barrett PA-C      Physician Orders: Eval and Treat  Medical Diagnosis: M66.20 (ICD-10-CM) - Tendon rupture, nontraumatic, extensor  Surgical Procedure and Date: 6/30/2023,   1. Extensor tendon repair right middle finger side to side with index finger tendon.    2. Extensor tendon repair right ring finger side-to-side repair with small finger tendon.    Evaluation Date: 7/26/2023  Insurance Authorization Period Expiration: 12/31/2023  Plan of Care Certification Period: 8 weeks; 9/22/2023  Date of Return to MD: 8/10/2023  Visit # / Visits authorized: 10 / 20  FOTO: 1/ 3        Precautions:  Standard and Fall       Time In: 8:02AM   Time Out: 8:59 AM   Total Billable Time: 57 minutes    Subjective     Pt reports: "I can do functional things with my hand."  She was compliant with home exercise program given last session.   Response to previous treatment:decreased wrist edema  Functional change: none noted to this date     Pain: 0/10  Location: right wrists      Objective     Observation/Appearance:  Skin intact, Skin dry, Hypertrophic scarring, and Edema present      Edema. Measured in centimeters.    7/26/2023 7/26/2023     Right  Left    Proximal Wrist Crease 15.5 15.0 cm   Figure of 8       MCPs 19.0  18.5             Hand ROM. Measured in degrees.    7/26/2023 7/26/2023     Right  Left            Index: MP  0/35                PIP     0/45                DIP 0/31                BABB 111             Long:  MP 10/45                PIP /45                DIP 0/25                BABB 105             Ring:   MP 5/15                PIP 0/50                DIP 0/25                BABB 85             Small:  MP 0/10                 PIP 0/30                 DIP 0/35                BABB 75           Elbow and Wrist " ROM. Measured in degrees.    7/31/2023 7/31/2023     Left Right           Wrist Ext/Flex 65/70 26/35                    Sensation: denies numbness and tingling   NT Distribution 7/26/2023 7/26/2023     Right  Left    [place] Shaye       Normal 1.65-2.83       Diminished Light Touch 3.22-3.61       Diminished Protective 3.84-4.31       Loss of Protective 4.56-6.65       Untestable >6.65       2 Point Discrimination       Static       Dynamic           Strength (Dyanmometer) and Pinch Strength (Pinch Gauge)  Measured in pounds and psi. Average of three trials.    7/26/2023 7/26/2023     Right  Left    Rung II deferred     Key Pinch       3pt Pinch       2pt Pinch             Intake Outcome Measure for FOTO initial eval; hand Survey     Therapist reviewed FOTO scores for Sofía Mcwilliams on 7/26/2023.   FOTO documents entered into [employer] - see Media section.     Intake Score: 52.5%           Treatment     Sofía received the treatments listed below:       Pt is 8 weeks post op           Fluidotherapy: To R hand for 10 min, continuous air, 110 deg, air speed 100 to decrease pain, edema & scar tissue and increased tissue extensibility.     Manual massage X 10 mintues with dycem to decrease wrist adhesions   - dycem opp pulling based on direction of skin movement to encourage increased excursion on tendon while patient is performing PIP extension and wrist with all digits   - scar massage with albin tool        Patient received ultrasound to R dorsal hand area to increase blood flow, circulation, tissue elasticity, and for pain management for 10 minutes @ 3 Mhz, Intensity 1.0 w/cm2 at 100 % duty cycle.        Manual assistance over EDC tendons to assist with finger lifts from table top  29 reps    - hook grasp x 10 reps (3 sets)   - short arc x 10 reps  - syngeristic wrist x 10 reps (2 sets)   - EDC with marker 20 reps   -  Isospheres   - Table top finger extension 20 reps ( pt able to achieve slightly higher ex with  moving index and long finger together in extension )    Applied K taped right long finger in extension  for wear at home         Patient Education and Home Exercises     Education provided:   - day splint vs night splint   - Progress towards goals     Written Home Exercises Provided: yes.  Exercises were reviewed and Sofía was able to demonstrate them prior to the end of the session.  Sofía demonstrated good  understanding of the HEP provided. See EMR under Patient Instructions for exercises provided during therapy sessions.       Assessment     Pt tolerated session well. Cont lag in MP LF.  Improved tensile excursion with dycem opp direction.  Decreased lag following KT tape application.       Sofía is progressing well towards her goals and there are no updates to goals at this time. Pt prognosis is Fair.     Pt will continue to benefit from skilled outpatient occupational therapy to address the deficits listed in the problem list on initial evaluation provide pt/family education and to maximize pt's level of independence in the home and community environment.     Pt's spiritual, cultural and educational needs considered and pt agreeable to plan of care and goals.    Anticipated barriers to occupational therapy:     Goals:  Long term goals: (by d/c)  1)  Patient to be IND with HEP and modalities for pain management  2)  Increase ROM 5-10 degrees to increase functional hand use for ADLs/leisure activities  3)   Increase  strength 5-8 lbs. to carry laundry, carry groceries, sweep, and increase functional independence of right hand for ADLs/iADLs  4)   Increase pinch 3-4  psis for  Increase in functional independence in ADLs/iADLs such as manipulating fasteners and opening containers  5) Patient to score at 63%  or less on FOTO to demonstrate improved perception of functional rightUE Use.       Short term Goals: ( 4 weeks)   1)  Patient to be IND with HEP and modalities for pain management  2)  Increase wrist ROM  10 degrees to increase functional hand use for ADLs/leisure activities  3)   Pt will demo ext lag less than 5 degrees for lF MP jt.   4)   Pt will be able to make a full composite fist   5)   Patient to score at 55%  or less on FOTO to demonstrate improved perception of functional right UE Use.    Plan     Updates/Grading for next session:     TOM Rodriguez   8/24/2023

## 2023-08-28 ENCOUNTER — HOSPITAL ENCOUNTER (OUTPATIENT)
Dept: RADIOLOGY | Facility: HOSPITAL | Age: 83
Discharge: HOME | End: 2023-08-28
Attending: NEUROLOGICAL SURGERY
Payer: MEDICARE

## 2023-08-28 PROCEDURE — A9585 GADOBUTROL INJECTION: HCPCS | Mod: HCNC | Performed by: NEUROLOGICAL SURGERY

## 2023-08-28 PROCEDURE — 73723 MRI JOINT LWR EXTR W/O&W/DYE: CPT | Mod: 26,HCNC,LT, | Performed by: RADIOLOGY

## 2023-08-28 PROCEDURE — 73723 MRI HIP W WO CONTRAST LEFT: ICD-10-PCS | Mod: 26,HCNC,LT, | Performed by: RADIOLOGY

## 2023-08-28 PROCEDURE — 73720 MRI LWR EXTREMITY W/O&W/DYE: CPT | Mod: 26,HCNC,LT, | Performed by: RADIOLOGY

## 2023-08-28 PROCEDURE — 73720 MRI TIBIA FIBULA W WO CONTRAST LEFT: ICD-10-PCS | Mod: 26,HCNC,LT, | Performed by: RADIOLOGY

## 2023-08-28 PROCEDURE — 73723 MRI JOINT LWR EXTR W/O&W/DYE: CPT | Mod: TC,HCNC,LT

## 2023-08-28 PROCEDURE — 73720 MRI LWR EXTREMITY W/O&W/DYE: CPT | Mod: TC,HCNC,LT

## 2023-08-28 PROCEDURE — 25500020 PHARM REV CODE 255: Mod: HCNC | Performed by: NEUROLOGICAL SURGERY

## 2023-08-28 RX ORDER — GADOBUTROL 604.72 MG/ML
7 INJECTION INTRAVENOUS
Status: COMPLETED | OUTPATIENT
Start: 2023-08-28 | End: 2023-08-28

## 2023-08-28 RX ADMIN — GADOBUTROL 7 ML: 604.72 INJECTION INTRAVENOUS at 08:08

## 2023-08-29 ENCOUNTER — CLINICAL SUPPORT (OUTPATIENT)
Dept: REHABILITATION | Facility: HOSPITAL | Age: 83
End: 2023-08-29
Payer: MEDICARE

## 2023-08-29 ENCOUNTER — PATIENT MESSAGE (OUTPATIENT)
Dept: NEUROSURGERY | Facility: CLINIC | Age: 83
End: 2023-08-29
Payer: MEDICARE

## 2023-08-29 DIAGNOSIS — R29.898 DECREASED FINGER STRENGTH: ICD-10-CM

## 2023-08-29 DIAGNOSIS — M25.641 DECREASED RANGE OF MOTION OF FINGER OF RIGHT HAND: Primary | ICD-10-CM

## 2023-08-29 PROCEDURE — 97035 APP MDLTY 1+ULTRASOUND EA 15: CPT

## 2023-08-29 PROCEDURE — 97110 THERAPEUTIC EXERCISES: CPT

## 2023-08-29 PROCEDURE — 97140 MANUAL THERAPY 1/> REGIONS: CPT

## 2023-08-29 NOTE — TELEPHONE ENCOUNTER
Dr. Farfan ordered MRI Femur the same time he ordered the other MRIs so I am not sure why that wasn't scheduled also?    Thanks,  Martha Weiner, [facility], PA-C  Neurosurgery  [facility]  08/29/2023

## 2023-08-29 NOTE — PROGRESS NOTES
"    [facility]  Occupational Therapy Treatment Note     Date: 8/29/2023  Name: Sofía Mcwilliams  Clinic Number: [id number]    Therapy Diagnosis:   Encounter Diagnoses   Name Primary?    Decreased range of motion of finger of right hand Yes    Decreased finger strength            Physician: Ramona Barrett PA-C      Physician Orders: Eval and Treat  Medical Diagnosis: M66.20 (ICD-10-CM) - Tendon rupture, nontraumatic, extensor  Surgical Procedure and Date: 6/30/2023,   1. Extensor tendon repair right middle finger side to side with index finger tendon.    2. Extensor tendon repair right ring finger side-to-side repair with small finger tendon.    Evaluation Date: 7/26/2023  Insurance Authorization Period Expiration: 12/31/2023  Plan of Care Certification Period: 8 weeks; 9/22/2023  Date of Return to MD: 8/10/2023  Visit # / Visits authorized: 10 / 20  FOTO: 1/ 3        Precautions:  Standard and Fall       Time In: 8:00 AM   Time Out: 09:00  AM   Total Billable Time: 60  minutes    Subjective     Pt reports: "yesterday I was doing a puzzle and I had a pain in my hand on the bump  She was compliant with home exercise program given last session.   Response to previous treatment:decreased wrist edema  Functional change: none noted to this date     Pain: 0/10  Location: right wrists      Objective     Observation/Appearance:  Skin intact, Skin dry, Hypertrophic scarring, and Edema present      Edema. Measured in centimeters.    7/26/2023 7/26/2023     Right  Left    Proximal Wrist Crease 15.5 15.0 cm   Figure of 8       MCPs 19.0  18.5             Hand ROM. Measured in degrees.    7/26/2023 7/26/2023     Right  Left            Index: MP  0/35                PIP     0/45                DIP 0/31                BABB 111             Long:  MP 10/45                PIP /45                DIP 0/25                BABB 105             Ring:   MP 5/15                PIP 0/50                DIP 0/25            "     BABB 85             Small:  MP 0/10                 PIP 0/30                 DIP 0/35                BABB 75           Elbow and Wrist ROM. Measured in degrees.    7/31/2023 7/31/2023     Left Right           Wrist Ext/Flex 65/70 26/35                    Sensation: denies numbness and tingling   NT Distribution 7/26/2023 7/26/2023     Right  Left    [place] Shaye       Normal 1.65-2.83       Diminished Light Touch 3.22-3.61       Diminished Protective 3.84-4.31       Loss of Protective 4.56-6.65       Untestable >6.65       2 Point Discrimination       Static       Dynamic           Strength (Dyanmometer) and Pinch Strength (Pinch Gauge)  Measured in pounds and psi. Average of three trials.    7/26/2023 7/26/2023     Right  Left    Rung II deferred     Key Pinch       3pt Pinch       2pt Pinch             Intake Outcome Measure for FOTO initial eval; hand Survey     Therapist reviewed FOTO scores for Sofía Mcwilliams on 7/26/2023.   FOTO documents entered into [employer] - see Media section.     Intake Score: 52.5%           Treatment     Sofía received the treatments listed below:             Fluidotherapy: To R hand for 10 min, continuous air, 110 deg, air speed 100 to decrease pain, edema & scar tissue and increased tissue extensibility. NT        Manual massage X 30 mintues with dycem to decrease wrist adhesions   - dycem opp pulling based on direction of skin movement to encourage increased excursion on tendon while patient is performing PIP extension and wrist with all digits   Lymphatouch; 175mmHg  with 60hz vibration          Patient received ultrasound to R dorsal hand area to increase blood flow, circulation, tissue elasticity, and for pain management for 10 minutes @ 3 Mhz, Intensity 1.0 w/cm2 at 100 % duty cycle.          Manual assistance over EDC tendons to assist with finger lifts from table top  29 reps    - hook grasp x 10 reps (3 sets)   - syngeristic wrist x 10 reps (2 sets) with dycem   -  Isospheres (2 min)   - Table top finger extension 20 reps ( pt able to achieve slightly higher ex with moving index and long finger together in extension )           Patient Education and Home Exercises     Education provided:   Compression sleeve   - day splint vs night splint   - Progress towards goals     Written Home Exercises Provided: yes.  Exercises were reviewed and Sofía was able to demonstrate them prior to the end of the session.  Sofía demonstrated good  understanding of the HEP provided. See EMR under Patient Instructions for exercises provided during therapy sessions.       Assessment     Pt tolerated session well. Cont lag in MP LF.  Improved tensile excursion with dycem opp direction.  Good response with lymphatouch - notable decreased scar adhesion.     Sofía is progressing well towards her goals and there are no updates to goals at this time. Pt prognosis is Fair.     Pt will continue to benefit from skilled outpatient occupational therapy to address the deficits listed in the problem list on initial evaluation provide pt/family education and to maximize pt's level of independence in the home and community environment.     Pt's spiritual, cultural and educational needs considered and pt agreeable to plan of care and goals.    Anticipated barriers to occupational therapy:     Goals:  Long term goals: (by d/c)  1)  Patient to be IND with HEP and modalities for pain management  2)  Increase ROM 5-10 degrees to increase functional hand use for ADLs/leisure activities  3)   Increase  strength 5-8 lbs. to carry laundry, carry groceries, sweep, and increase functional independence of right hand for ADLs/iADLs  4)   Increase pinch 3-4  psis for  Increase in functional independence in ADLs/iADLs such as manipulating fasteners and opening containers  5) Patient to score at 63%  or less on FOTO to demonstrate improved perception of functional rightUE Use.       Short term Goals: ( 4 weeks)   1)  Patient  to be IND with HEP and modalities for pain management  2)  Increase wrist ROM 10 degrees to increase functional hand use for ADLs/leisure activities  3)   Pt will demo ext lag less than 5 degrees for lF MP jt.   4)   Pt will be able to make a full composite fist   5)   Patient to score at 55%  or less on FOTO to demonstrate improved perception of functional right UE Use.    Plan     Updates/Grading for next session:     Hannah Coon OT   8/29/2023

## 2023-08-31 ENCOUNTER — CLINICAL SUPPORT (OUTPATIENT)
Dept: REHABILITATION | Facility: HOSPITAL | Age: 83
End: 2023-08-31
Payer: MEDICARE

## 2023-08-31 ENCOUNTER — HOSPITAL ENCOUNTER (OUTPATIENT)
Dept: RADIOLOGY | Facility: HOSPITAL | Age: 83
Discharge: HOME | End: 2023-08-31
Attending: NEUROLOGICAL SURGERY
Payer: MEDICARE

## 2023-08-31 DIAGNOSIS — M25.641 DECREASED RANGE OF MOTION OF FINGER OF RIGHT HAND: Primary | ICD-10-CM

## 2023-08-31 DIAGNOSIS — R29.898 DECREASED FINGER STRENGTH: ICD-10-CM

## 2023-08-31 PROCEDURE — 73720 MRI LWR EXTREMITY W/O&W/DYE: CPT | Mod: 26,HCNC,LT, | Performed by: RADIOLOGY

## 2023-08-31 PROCEDURE — 97035 APP MDLTY 1+ULTRASOUND EA 15: CPT

## 2023-08-31 PROCEDURE — 73720 MRI LWR EXTREMITY W/O&W/DYE: CPT | Mod: TC,HCNC,LT

## 2023-08-31 PROCEDURE — 97110 THERAPEUTIC EXERCISES: CPT

## 2023-08-31 PROCEDURE — A9585 GADOBUTROL INJECTION: HCPCS | Mod: HCNC | Performed by: NEUROLOGICAL SURGERY

## 2023-08-31 PROCEDURE — 97140 MANUAL THERAPY 1/> REGIONS: CPT

## 2023-08-31 PROCEDURE — 25500020 PHARM REV CODE 255: Mod: HCNC | Performed by: NEUROLOGICAL SURGERY

## 2023-08-31 PROCEDURE — 73720 MRI FEMUR W WO CONTRAST LEFT: ICD-10-PCS | Mod: 26,HCNC,LT, | Performed by: RADIOLOGY

## 2023-08-31 RX ORDER — GADOBUTROL 604.72 MG/ML
7 INJECTION INTRAVENOUS
Status: COMPLETED | OUTPATIENT
Start: 2023-08-31 | End: 2023-08-31

## 2023-08-31 RX ADMIN — GADOBUTROL 7 ML: 604.72 INJECTION INTRAVENOUS at 12:08

## 2023-08-31 NOTE — PROGRESS NOTES
"      [facility]  Occupational Therapy Treatment Note     Date: 8/31/2023  Name: Sofía Mcwilliams  Clinic Number: [id number]    Therapy Diagnosis:   Encounter Diagnoses   Name Primary?    Decreased range of motion of finger of right hand Yes    Decreased finger strength              Physician: Ramona Barrett PA-C      Physician Orders: Eval and Treat  Medical Diagnosis: M66.20 (ICD-10-CM) - Tendon rupture, nontraumatic, extensor  Surgical Procedure and Date: 6/30/2023,   1. Extensor tendon repair right middle finger side to side with index finger tendon.    2. Extensor tendon repair right ring finger side-to-side repair with small finger tendon.    Evaluation Date: 7/26/2023  Insurance Authorization Period Expiration: 12/31/2023  Plan of Care Certification Period: 8 weeks; 9/22/2023  Date of Return to MD: 8/10/2023  Visit # / Visits authorized: 10 / 20  FOTO: 1/ 3        Precautions:  Standard and Fall       Time In: 9:00 AM   Time Out: 09:35  AM   Total Billable Time: 35  minutes    Subjective     Pt reports: "had my 3rd MRI this morning. I feel stiff it took hour and 30 min"   She was compliant with home exercise program given last session.   Response to previous treatment:decreased wrist edema  Functional change: none noted to this date     Pain: 0/10  Location: right wrists      Objective     Observation/Appearance:  Skin intact, Skin dry, Hypertrophic scarring, and Edema present      Edema. Measured in centimeters.    7/26/2023 7/26/2023     Right  Left    Proximal Wrist Crease 15.5 15.0 cm   Figure of 8       MCPs 19.0  18.5             Hand ROM. Measured in degrees.    7/26/2023 7/26/2023     Right  Left            Index: MP  0/35                PIP     0/45                DIP 0/31                BABB 111             Long:  MP 10/45                PIP /45                DIP 0/25                BABB 105             Ring:   MP 5/15                PIP 0/50                DIP 0/25           "      BABB 85             Small:  MP 0/10                 PIP 0/30                 DIP 0/35                BABB 75           Elbow and Wrist ROM. Measured in degrees.    7/31/2023 7/31/2023     Left Right           Wrist Ext/Flex 65/70 26/35                    Sensation: denies numbness and tingling   NT Distribution 7/26/2023 7/26/2023     Right  Left    [place]       Normal 1.65-2.83       Diminished Light Touch 3.22-3.61       Diminished Protective 3.84-4.31       Loss of Protective 4.56-6.65       Untestable >6.65       2 Point Discrimination       Static       Dynamic           Strength (Dyanmometer) and Pinch Strength (Pinch Gauge)  Measured in pounds and psi. Average of three trials.    7/26/2023 7/26/2023     Right  Left    Rung II deferred     Key Pinch       3pt Pinch       2pt Pinch             Intake Outcome Measure for FOTO initial eval; hand Survey     Therapist reviewed FOTO scores for Sofía Mcwilliams on 7/26/2023.   FOTO documents entered into [employer] - see Media section.     Intake Score: 52.5%           Treatment     Sofía received the treatments listed below:             Fluidotherapy: To R hand for 10 min, continuous air, 110 deg, air speed 100 to decrease pain, edema & scar tissue and increased tissue extensibility. NT        Manual massage X 30 mintues with dycem to decrease wrist adhesions   - dycem opp pulling based on direction of skin movement to encourage increased excursion on tendon while patient is performing PIP extension and wrist with all digits   Lymphatouch; 175mmHg  with 60hz vibration (NT)        Patient received ultrasound to R dorsal hand area to increase blood flow, circulation, tissue elasticity, and for pain management for 10 minutes @ 3 Mhz, Intensity 1.0 w/cm2 at 100 % duty cycle.         Therapeutic exercise: 15 min   - hook grasp x 10 reps (3 sets)   - syngeristic wrist x 10 reps (2 sets) with dycem   - Isospheres (2 min)   - Table top finger extension 20 reps ( pt  able to achieve slightly higher ex with moving index and long finger together in extension )     *KT tape applied for edema management       Patient Education and Home Exercises     Education provided:   Compression sleeve   - KT  - day splint vs night splint   - Progress towards goals     Written Home Exercises Provided: yes.  Exercises were reviewed and Sofía was able to demonstrate them prior to the end of the session.  Sofía demonstrated good  understanding of the HEP provided. See EMR under Patient Instructions for exercises provided during therapy sessions.       Assessment     Pt tolerated session well.  Cont  improved tensile excursion with dycem opp direction.      Sofía is progressing well towards her goals and there are no updates to goals at this time. Pt prognosis is Fair.     Pt will continue to benefit from skilled outpatient occupational therapy to address the deficits listed in the problem list on initial evaluation provide pt/family education and to maximize pt's level of independence in the home and community environment.     Pt's spiritual, cultural and educational needs considered and pt agreeable to plan of care and goals.    Anticipated barriers to occupational therapy:     Goals:  Long term goals: (by d/c)  1)  Patient to be IND with HEP and modalities for pain management  2)  Increase ROM 5-10 degrees to increase functional hand use for ADLs/leisure activities  3)   Increase  strength 5-8 lbs. to carry laundry, carry groceries, sweep, and increase functional independence of right hand for ADLs/iADLs  4)   Increase pinch 3-4  psis for  Increase in functional independence in ADLs/iADLs such as manipulating fasteners and opening containers  5) Patient to score at 63%  or less on FOTO to demonstrate improved perception of functional rightUE Use.       Short term Goals: ( 4 weeks)   1)  Patient to be IND with HEP and modalities for pain management  2)  Increase wrist ROM 10 degrees to  increase functional hand use for ADLs/leisure activities  3)   Pt will demo ext lag less than 5 degrees for lF MP jt.   4)   Pt will be able to make a full composite fist   5)   Patient to score at 55%  or less on FOTO to demonstrate improved perception of functional right UE Use.    Plan     Updates/Grading for next session:     Hannah Coon OT   8/31/2023

## 2023-09-01 ENCOUNTER — OFFICE VISIT (OUTPATIENT)
Dept: INTERNAL MEDICINE | Facility: CLINIC | Age: 83
End: 2023-09-01
Payer: MEDICARE

## 2023-09-01 ENCOUNTER — TELEPHONE (OUTPATIENT)
Dept: NEUROSURGERY | Facility: CLINIC | Age: 83
End: 2023-09-01
Payer: MEDICARE

## 2023-09-01 VITALS
DIASTOLIC BLOOD PRESSURE: 68 MMHG | OXYGEN SATURATION: 96 % | BODY MASS INDEX: 25.56 KG/M2 | WEIGHT: 149.69 LBS | HEART RATE: 79 BPM | SYSTOLIC BLOOD PRESSURE: 134 MMHG | HEIGHT: 64 IN

## 2023-09-01 DIAGNOSIS — R73.9 HYPERGLYCEMIA: ICD-10-CM

## 2023-09-01 DIAGNOSIS — Z78.9 DECREASED ACTIVITIES OF DAILY LIVING (ADL): ICD-10-CM

## 2023-09-01 DIAGNOSIS — Z12.39 ENCOUNTER FOR SCREENING FOR MALIGNANT NEOPLASM OF BREAST, UNSPECIFIED SCREENING MODALITY: ICD-10-CM

## 2023-09-01 DIAGNOSIS — K59.00 CONSTIPATION, UNSPECIFIED CONSTIPATION TYPE: ICD-10-CM

## 2023-09-01 DIAGNOSIS — G57.02 NEUROPATHY OF LEFT SCIATIC NERVE: Primary | ICD-10-CM

## 2023-09-01 DIAGNOSIS — Z12.31 ENCOUNTER FOR SCREENING MAMMOGRAM FOR MALIGNANT NEOPLASM OF BREAST: ICD-10-CM

## 2023-09-01 DIAGNOSIS — Z00.00 PREVENTATIVE HEALTH CARE: ICD-10-CM

## 2023-09-01 DIAGNOSIS — I10 PRIMARY HYPERTENSION: ICD-10-CM

## 2023-09-01 DIAGNOSIS — E78.5 HYPERLIPIDEMIA, UNSPECIFIED HYPERLIPIDEMIA TYPE: ICD-10-CM

## 2023-09-01 PROBLEM — M66.20 TENDON RUPTURE, NONTRAUMATIC, EXTENSOR: Status: RESOLVED | Noted: 2022-07-25 | Resolved: 2023-09-01

## 2023-09-01 PROCEDURE — 3288F FALL RISK ASSESSMENT DOCD: CPT | Mod: HCNC,CPTII,S$GLB, | Performed by: INTERNAL MEDICINE

## 2023-09-01 PROCEDURE — 1125F PR PAIN SEVERITY QUANTIFIED, PAIN PRESENT: ICD-10-PCS | Mod: HCNC,CPTII,S$GLB, | Performed by: INTERNAL MEDICINE

## 2023-09-01 PROCEDURE — 1125F AMNT PAIN NOTED PAIN PRSNT: CPT | Mod: HCNC,CPTII,S$GLB, | Performed by: INTERNAL MEDICINE

## 2023-09-01 PROCEDURE — 99999 PR PBB SHADOW E&M-EST. PATIENT-LVL V: CPT | Mod: PBBFAC,HCNC,, | Performed by: INTERNAL MEDICINE

## 2023-09-01 PROCEDURE — 99215 OFFICE O/P EST HI 40 MIN: CPT | Mod: HCNC,S$GLB,, | Performed by: INTERNAL MEDICINE

## 2023-09-01 PROCEDURE — 3075F PR MOST RECENT SYSTOLIC BLOOD PRESS GE 130-139MM HG: ICD-10-PCS | Mod: HCNC,CPTII,S$GLB, | Performed by: INTERNAL MEDICINE

## 2023-09-01 PROCEDURE — 3075F SYST BP GE 130 - 139MM HG: CPT | Mod: HCNC,CPTII,S$GLB, | Performed by: INTERNAL MEDICINE

## 2023-09-01 PROCEDURE — 3078F DIAST BP <80 MM HG: CPT | Mod: HCNC,CPTII,S$GLB, | Performed by: INTERNAL MEDICINE

## 2023-09-01 PROCEDURE — 99215 PR OFFICE/OUTPT VISIT, EST, LEVL V, 40-54 MIN: ICD-10-PCS | Mod: HCNC,S$GLB,, | Performed by: INTERNAL MEDICINE

## 2023-09-01 PROCEDURE — 1159F MED LIST DOCD IN RCRD: CPT | Mod: HCNC,CPTII,S$GLB, | Performed by: INTERNAL MEDICINE

## 2023-09-01 PROCEDURE — 3078F PR MOST RECENT DIASTOLIC BLOOD PRESSURE < 80 MM HG: ICD-10-PCS | Mod: HCNC,CPTII,S$GLB, | Performed by: INTERNAL MEDICINE

## 2023-09-01 PROCEDURE — 3288F PR FALLS RISK ASSESSMENT DOCUMENTED: ICD-10-PCS | Mod: HCNC,CPTII,S$GLB, | Performed by: INTERNAL MEDICINE

## 2023-09-01 PROCEDURE — 1160F RVW MEDS BY RX/DR IN RCRD: CPT | Mod: HCNC,CPTII,S$GLB, | Performed by: INTERNAL MEDICINE

## 2023-09-01 PROCEDURE — 1160F PR REVIEW ALL MEDS BY PRESCRIBER/CLIN PHARMACIST DOCUMENTED: ICD-10-PCS | Mod: HCNC,CPTII,S$GLB, | Performed by: INTERNAL MEDICINE

## 2023-09-01 PROCEDURE — 1101F PR PT FALLS ASSESS DOC 0-1 FALLS W/OUT INJ PAST YR: ICD-10-PCS | Mod: HCNC,CPTII,S$GLB, | Performed by: INTERNAL MEDICINE

## 2023-09-01 PROCEDURE — 99999 PR PBB SHADOW E&M-EST. PATIENT-LVL V: ICD-10-PCS | Mod: PBBFAC,HCNC,, | Performed by: INTERNAL MEDICINE

## 2023-09-01 PROCEDURE — 1159F PR MEDICATION LIST DOCUMENTED IN MEDICAL RECORD: ICD-10-PCS | Mod: HCNC,CPTII,S$GLB, | Performed by: INTERNAL MEDICINE

## 2023-09-01 PROCEDURE — 1101F PT FALLS ASSESS-DOCD LE1/YR: CPT | Mod: HCNC,CPTII,S$GLB, | Performed by: INTERNAL MEDICINE

## 2023-09-01 RX ORDER — TIZANIDINE 2 MG/1
2 TABLET ORAL NIGHTLY PRN
Qty: 90 TABLET | Refills: 1 | Status: SHIPPED | OUTPATIENT
Start: 2023-09-01

## 2023-09-01 RX ORDER — GABAPENTIN 300 MG/1
300 CAPSULE ORAL 3 TIMES DAILY
COMMUNITY

## 2023-09-01 NOTE — TELEPHONE ENCOUNTER
Patient called to let her know about the results of her MRIs and EMG.  She was informed that no mass was identified on the MRI to explain her sciatic nerve dysfunction.  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  The etiology of this neuropathy is unknown and might be inflammatory or autoimmune.    The patient reports that with physical therapy she has noticed an improvement in her range of motion at the ankle and in her toes.  Overall she appears satisfied with the results of the MRI and the improvement in her function.  She has not appointment with me and with Neurology in the next few weeks.    All questions answered    Glynn Farfan MD

## 2023-09-02 NOTE — PROGRESS NOTES
"Subjective     Patient ID: Sofía Mcwilliams is a 82 y.o. female.    Chief Complaint: Follow-up and Hypertension    HPI 82-year-old female presents to clinic today for follow-up of hypertension dyslipidemia.  Patient has been through a difficult time since she underwent hand in June.  Patient awoke from surgery with a left foot drop new onset.  While she was still hospitalized postoperatively she saw neurosurgeon Dr Farfan.  She states after this she saw his physician assistant outpatient as she awaited her workup including MRI of her left lower extremity and eventually a nerve conduction evaluation.  She expresses she found extreme challenges and delays undergoing her diagnostic workup.  Her testing has been unrevealing and she is been diagnosed with a sciatic neuropathy.  She initiated physical therapy.  Her physical therapist recommended a external neurologist.  Patient [facility] Naseem Lobo MD. she underwent another nerve conduction study that was consistent with sciatic neuropathy.  She reports that she was very relieved and felt in good hands with this neurologist and provider.    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 Her EMG/NCS supports this localization, although I would have liked to see the semimembranosus/semitendinosus as well for a sciatic-derived S1 above the knee as short head of biceps is mostly L5. I want to increase her gabapentin so she can sleep at night. She will need to work with PT for the next several years to try and maintain her joint mobility and muscle mass. She will likely need to repeat her EMG/NCS in a few weeks to look at the extent of the denervation at that point as it will have prognostic bearing on her recovery."    Patient has been extremely distraught and this is severely affected the quality of her very active life.  She is been very active in her family life and " personal life.  She loves to travel and takes tap dancing classes and was doing significant exercising and weight lifting prior to this.  She is really praying for recovery.  She is trying to be very hopeful and optimistic.  She does feel like she is had some shocking pain in her foot and the neurologist Dr. Gilmore feels like this is a good sign.  He is going to repeat the nerve conduction for prognostic evaluation in 1 month.    Patient has traveled to a great friend in [place] scheduled for tomorrow.  Review of Systems  Otherwise negative     Objective     Physical Exam  General: Well-appearing, well-nourished.  No distress  HEENT: conjunctivae are normal.  Pupils are equal and reative to light.  TM's are clear and intact bilaterally.  Hearing is grossly normal.  Nasopharynx is clear.  Oropharynx is clear.  Neck: Supple.  No thyroid megaly.  No bruits.  Lymph: No cervical or supraclavicular adenopathy.  Heart: Regular rate and rhythm, without murmur, rub or gallop.  Lungs: Clear to auscultation; respiratory effort normal.  Abdomen: Soft, nontender, nondistended.  Normoactive bowel sounds.  No hepatomegaly.  No masses.  Extremities: Good distal pulses.  No edema.  Psych: Oriented to time person place.  Judgment and insight seem unimpaired.  Mood and affect are appropriate.  Walking with a cane with an assist device on her left lower leg to help with left foot drop     Assessment and Plan     1. Neuropathy of left sciatic nerve  -     tiZANidine (ZANAFLEX) 2 MG tablet; Take 1 tablet (2 mg total) by mouth nightly as needed.  Dispense: 90 tablet; Refill: 1  Medication adjusted as patient reports this helps but she would like a lower dose.  Currently on gabapentin and followed by neurologist Dr. Gilmore with upcoming follow-up in nerve conduction testing.  2. Primary hypertension  -     Comprehensive Metabolic Panel; Standing; Expected date: 09/01/2023  -     Lipid Panel; Standing; Expected date: 09/01/2023  -      Hemoglobin A1C; Standing; Expected date: 09/01/2023  -     CBC Auto Differential; Future; Expected date: 09/01/2023  -     TSH; Future; Expected date: 09/01/2023  Controlled.  Continue current medical regimen.  Prescription refills addressed.  Followup advised. See after visit summary.  3. Hyperlipidemia, unspecified hyperlipidemia type  -     Comprehensive Metabolic Panel; Standing; Expected date: 09/01/2023  -     Lipid Panel; Standing; Expected date: 09/01/2023  -     Hemoglobin A1C; Standing; Expected date: 09/01/2023  -     CBC Auto Differential; Future; Expected date: 09/01/2023  -     TSH; Future; Expected date: 09/01/2023  Controlled.  Continue current medical regimen.  Prescription refills addressed.  Followup advised. See after visit summary.  4. Decreased activities of daily living (ADL)  Working with Physical therapy and Neurology follow-up.  Patient is Praying in hoping that this will have resolution and returned to normal extremely functional baseline  5. Constipation, unspecified constipation type  Discussed water fiber and MiraLax  6. Hyperglycemia  -     Hemoglobin A1C; Standing; Expected date: 09/01/2023  -     CBC Auto Differential; Future; Expected date: 09/01/2023  -     TSH; Future; Expected date: 09/01/2023    7. Encounter for screening for malignant neoplasm of breast, unspecified screening modality  -     Mammo Digital Screening Bilat w/ Jaime; Future; Expected date: 09/01/2023    8. Encounter for screening mammogram for malignant neoplasm of breast  -     Mammo Digital Screening Bilat w/ Jaime; Future; Expected date: 09/01/2023    9. Preventative health care  -     CBC Auto Differential; Future; Expected date: 09/01/2023  -     TSH; Future; Expected date: 09/01/2023        Sofía was seen today for follow-up and hypertension.    Diagnoses and all orders for this visit:    Neuropathy of left sciatic nerve  -     tiZANidine (ZANAFLEX) 2 MG tablet; Take 1 tablet (2 mg total) by mouth nightly as  needed.    Primary hypertension  -     Comprehensive Metabolic Panel; Standing  -     Lipid Panel; Standing  -     Hemoglobin A1C; Standing  -     CBC Auto Differential; Future  -     TSH; Future    Hyperlipidemia, unspecified hyperlipidemia type  -     Comprehensive Metabolic Panel; Standing  -     Lipid Panel; Standing  -     Hemoglobin A1C; Standing  -     CBC Auto Differential; Future  -     TSH; Future    Decreased activities of daily living (ADL)    Constipation, unspecified constipation type    Hyperglycemia  -     Hemoglobin A1C; Standing  -     CBC Auto Differential; Future  -     TSH; Future    Encounter for screening for malignant neoplasm of breast, unspecified screening modality  -     Mammo Digital Screening Bilat w/ Jaime; Future    Encounter for screening mammogram for malignant neoplasm of breast  -     Mammo Digital Screening Bilat w/ Jaime; Future    Preventative health care  -     CBC Auto Differential; Future  -     TSH; Future       Total visit time 90 minutes counseling time 85 minutes       Follow up in about 6 months (around 3/1/2024) for HTN cmp lipids, HEMOGLOBIN A1C, mammo.

## 2023-09-13 ENCOUNTER — OFFICE VISIT (OUTPATIENT)
Dept: NEUROSURGERY | Facility: CLINIC | Age: 83
End: 2023-09-13
Payer: MEDICARE

## 2023-09-13 ENCOUNTER — LAB VISIT (OUTPATIENT)
Dept: LAB | Facility: HOSPITAL | Age: 83
End: 2023-09-13
Attending: INTERNAL MEDICINE
Payer: MEDICARE

## 2023-09-13 VITALS
DIASTOLIC BLOOD PRESSURE: 68 MMHG | HEART RATE: 79 BPM | SYSTOLIC BLOOD PRESSURE: 134 MMHG | HEIGHT: 64 IN | WEIGHT: 149.69 LBS | BODY MASS INDEX: 25.56 KG/M2

## 2023-09-13 DIAGNOSIS — I10 PRIMARY HYPERTENSION: ICD-10-CM

## 2023-09-13 DIAGNOSIS — R73.9 HYPERGLYCEMIA: ICD-10-CM

## 2023-09-13 DIAGNOSIS — G54.5 PARSONAGE-TURNER SYNDROME: Primary | ICD-10-CM

## 2023-09-13 DIAGNOSIS — G57.02 NEUROPATHY OF LEFT SCIATIC NERVE: ICD-10-CM

## 2023-09-13 DIAGNOSIS — E78.5 HYPERLIPIDEMIA, UNSPECIFIED HYPERLIPIDEMIA TYPE: ICD-10-CM

## 2023-09-13 LAB
ALBUMIN SERPL BCP-MCNC: 3.8 G/DL (ref 3.5–5.2)
ALP SERPL-CCNC: 104 U/L (ref 55–135)
ALT SERPL W/O P-5'-P-CCNC: 27 U/L (ref 10–44)
ANION GAP SERPL CALC-SCNC: 10 MMOL/L (ref 8–16)
AST SERPL-CCNC: 27 U/L (ref 10–40)
BILIRUB SERPL-MCNC: 0.5 MG/DL (ref 0.1–1)
BUN SERPL-MCNC: 13 MG/DL (ref 8–23)
CALCIUM SERPL-MCNC: 9.7 MG/DL (ref 8.7–10.5)
CHLORIDE SERPL-SCNC: 106 MMOL/L (ref 95–110)
CHOLEST SERPL-MCNC: 191 MG/DL (ref 120–199)
CHOLEST/HDLC SERPL: 3.4 {RATIO} (ref 2–5)
CO2 SERPL-SCNC: 26 MMOL/L (ref 23–29)
CREAT SERPL-MCNC: 0.8 MG/DL (ref 0.5–1.4)
EST. GFR  (NO RACE VARIABLE): >60 ML/MIN/1.73 M^2
ESTIMATED AVG GLUCOSE: 117 MG/DL (ref 68–131)
GLUCOSE SERPL-MCNC: 108 MG/DL (ref 70–110)
HBA1C MFR BLD: 5.7 % (ref 4–5.6)
HDLC SERPL-MCNC: 57 MG/DL (ref 40–75)
HDLC SERPL: 29.8 % (ref 20–50)
LDLC SERPL CALC-MCNC: 82 MG/DL (ref 63–159)
NONHDLC SERPL-MCNC: 134 MG/DL
POTASSIUM SERPL-SCNC: 4.5 MMOL/L (ref 3.5–5.1)
PROT SERPL-MCNC: 6.5 G/DL (ref 6–8.4)
SODIUM SERPL-SCNC: 142 MMOL/L (ref 136–145)
TRIGL SERPL-MCNC: 260 MG/DL (ref 30–150)

## 2023-09-13 PROCEDURE — 1101F PR PT FALLS ASSESS DOC 0-1 FALLS W/OUT INJ PAST YR: ICD-10-PCS | Mod: HCNC,CPTII,S$GLB, | Performed by: NEUROLOGICAL SURGERY

## 2023-09-13 PROCEDURE — 1159F PR MEDICATION LIST DOCUMENTED IN MEDICAL RECORD: ICD-10-PCS | Mod: HCNC,CPTII,S$GLB, | Performed by: NEUROLOGICAL SURGERY

## 2023-09-13 PROCEDURE — 3075F SYST BP GE 130 - 139MM HG: CPT | Mod: HCNC,CPTII,S$GLB, | Performed by: NEUROLOGICAL SURGERY

## 2023-09-13 PROCEDURE — 36415 COLL VENOUS BLD VENIPUNCTURE: CPT | Mod: HCNC,PO | Performed by: INTERNAL MEDICINE

## 2023-09-13 PROCEDURE — 99999 PR PBB SHADOW E&M-EST. PATIENT-LVL III: CPT | Mod: PBBFAC,HCNC,, | Performed by: NEUROLOGICAL SURGERY

## 2023-09-13 PROCEDURE — 1159F MED LIST DOCD IN RCRD: CPT | Mod: HCNC,CPTII,S$GLB, | Performed by: NEUROLOGICAL SURGERY

## 2023-09-13 PROCEDURE — 80061 LIPID PANEL: CPT | Mod: HCNC | Performed by: INTERNAL MEDICINE

## 2023-09-13 PROCEDURE — 3288F FALL RISK ASSESSMENT DOCD: CPT | Mod: HCNC,CPTII,S$GLB, | Performed by: NEUROLOGICAL SURGERY

## 2023-09-13 PROCEDURE — 3288F PR FALLS RISK ASSESSMENT DOCUMENTED: ICD-10-PCS | Mod: HCNC,CPTII,S$GLB, | Performed by: NEUROLOGICAL SURGERY

## 2023-09-13 PROCEDURE — 99213 OFFICE O/P EST LOW 20 MIN: CPT | Mod: HCNC,S$GLB,, | Performed by: NEUROLOGICAL SURGERY

## 2023-09-13 PROCEDURE — 1125F PR PAIN SEVERITY QUANTIFIED, PAIN PRESENT: ICD-10-PCS | Mod: HCNC,CPTII,S$GLB, | Performed by: NEUROLOGICAL SURGERY

## 2023-09-13 PROCEDURE — 99999 PR PBB SHADOW E&M-EST. PATIENT-LVL III: ICD-10-PCS | Mod: PBBFAC,HCNC,, | Performed by: NEUROLOGICAL SURGERY

## 2023-09-13 PROCEDURE — 99213 PR OFFICE/OUTPT VISIT, EST, LEVL III, 20-29 MIN: ICD-10-PCS | Mod: HCNC,S$GLB,, | Performed by: NEUROLOGICAL SURGERY

## 2023-09-13 PROCEDURE — 80053 COMPREHEN METABOLIC PANEL: CPT | Mod: HCNC | Performed by: INTERNAL MEDICINE

## 2023-09-13 PROCEDURE — 83036 HEMOGLOBIN GLYCOSYLATED A1C: CPT | Mod: HCNC | Performed by: INTERNAL MEDICINE

## 2023-09-13 PROCEDURE — 3075F PR MOST RECENT SYSTOLIC BLOOD PRESS GE 130-139MM HG: ICD-10-PCS | Mod: HCNC,CPTII,S$GLB, | Performed by: NEUROLOGICAL SURGERY

## 2023-09-13 PROCEDURE — 3078F DIAST BP <80 MM HG: CPT | Mod: HCNC,CPTII,S$GLB, | Performed by: NEUROLOGICAL SURGERY

## 2023-09-13 PROCEDURE — 3078F PR MOST RECENT DIASTOLIC BLOOD PRESSURE < 80 MM HG: ICD-10-PCS | Mod: HCNC,CPTII,S$GLB, | Performed by: NEUROLOGICAL SURGERY

## 2023-09-13 PROCEDURE — 1125F AMNT PAIN NOTED PAIN PRSNT: CPT | Mod: HCNC,CPTII,S$GLB, | Performed by: NEUROLOGICAL SURGERY

## 2023-09-13 PROCEDURE — 1101F PT FALLS ASSESS-DOCD LE1/YR: CPT | Mod: HCNC,CPTII,S$GLB, | Performed by: NEUROLOGICAL SURGERY

## 2023-09-13 NOTE — PROGRESS NOTES
NEUROSURGICAL PROGRESS NOTE    DATE OF SERVICE:  09/13/2023    ATTENDING PHYSICIAN:  Glynn Farfan MD    SUBJECTIVE:    INTERIM HISTORY:    This is a very pleasant 82 y.o. 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  A full workup including a lumbar spine MRI, lumbosacral plexus MRI, hip and leg MRIs did not show any mass or nerve root compression.  EMG was consistent with sciatic nerve neuropathy.  The most recent lumbar spine MRI shows left iliopsoas muscle atrophy compared to prior MRI done in 2021.  She is been doing physical therapy and has noticed improvement in range of motion of her left dorsiflexion and plantar flexion.  She is walking using a cane.  She is not using her AFO brace.  She also has paresthesias and neuropathic pain.  The pain involves the foot and ankle.  The pain intensity between 3 and 5/10.  Pain is usually worse at night and can interfere with sleeping.  She is taking gabapentin and tizanidine which help the symptoms at night.              PAST MEDICAL HISTORY:  Active Ambulatory Problems     Diagnosis Date Noted    Retina disorder - Both Eyes 07/16/2012    Nuclear sclerosis - Both Eyes 09/20/2013    Hypertension 09/29/2014    Hyperlipidemia 09/29/2014    Arthritis 11/13/2015    Osteopenia 11/13/2015    Anxiety 05/02/2016    Mild carotid artery disease 11/03/2016    Chronic pain 01/09/2019    Osteoarthritis of hip 04/11/2019    Primary osteoarthritis of both hands 01/17/2020    Restless leg syndrome due to iron deficiency anemia 01/17/2020    Decreased  strength 02/11/2021    Decreased pinch strength 02/11/2021    Decreased activities of daily living (ADL) 02/11/2021    Reduced sensation 02/11/2021    History of colon polyps 03/16/2021    Lumbar radiculopathy 08/25/2021    Decreased range of motion 09/08/2022    Localized edema 09/08/2022    Sensory ataxia 06/14/2023    Foot drop, left foot 07/01/2023     Flail joint, left ankle and foot 07/04/2023    Numbness of left foot 07/04/2023    Decreased range of motion of finger of right hand 07/31/2023    Decreased finger strength 07/31/2023     Resolved Ambulatory Problems     Diagnosis Date Noted    Pain in limb 05/13/2015    Colon cancer screening 09/28/2015    Pain 08/25/2021    Tendon rupture, nontraumatic, extensor 07/25/2022     Past Medical History:   Diagnosis Date    Basal cell carcinoma 2013    Cataract     Other and unspecified hyperlipidemia        PAST SURGICAL HISTORY:  Past Surgical History:   Procedure Laterality Date    APPENDECTOMY      bunion      right    COLONOSCOPY N/A 9/28/2015    Procedure: COLONOSCOPY;  Surgeon: Joe Amos MD;  Location: [facility] ENDO (4TH FLR);  Service: Endoscopy;  Laterality: N/A;    COLONOSCOPY N/A 3/16/2021    Procedure: COLONOSCOPY;  Surgeon: Brice Zayas MD;  Location: [facility] (4TH FLR);  Service: Endoscopy;  Laterality: N/A;  covid test 3/13-primary care    hip surgery x 2      HYSTERECTOMY      partial    INJECTION OF JOINT Left 1/9/2019    Procedure: Injection, LEFT HIP INTRAARTICULAR INJECTION;  Surgeon: Omega Estevez MD;  Location: [facility] PAIN MGT;  Service: Pain Management;  Laterality: Left;    INJECTION OF JOINT Left 3/13/2019    Procedure: INJECTION, JOINT LEFT HIP;  Surgeon: Omega Estevez MD;  Location: [facility] PAIN MGT;  Service: Pain Management;  Laterality: Left;  Left Hip Intrarticular Steroid Injection    INJECTION, SACROILIAC JOINT Right 2/23/2023    Procedure: INJECTION,SACROILIAC JOINT Right SI Joint, Right GTB;  Surgeon: Omega Estevez MD;  Location: [facility] PAIN MGT;  Service: Pain Management;  Laterality: Right;    JOINT REPLACEMENT      SURGICAL REMOVAL OF BONE SPUR  8/16/2022    Procedure: EXCISION, BONE SPUR ULNA;  Surgeon: Brennen Prabhakar Jr., MD;  Location: [facility] OR;  Service: Orthopedics;;    SURGICAL REMOVAL OF DISTAL ULNA Right 8/16/2022    Procedure: EXCISION, ULNA, DISTAL;   Surgeon: Brennen Prabhakar Jr., MD;  Location: [facility] OR;  Service: Orthopedics;  Laterality: Right;    TONSILLECTOMY      TRANSFER OF HAND TENDON Right 2022    Procedure: TRANSFER, TENDON, HAND;  Surgeon: Brennen Prabhakar Jr., MD;  Location: [facility] OR;  Service: Orthopedics;  Laterality: Right;  transfer extensor tendon small finger    TRANSFER OF HAND TENDON Right 2023    Procedure: TRANSFER, TENDON, HAND;  Surgeon: Brennen Prabhakar Jr., MD;  Location: [facility] OR;  Service: Orthopedics;  Laterality: Right;    TRANSFORAMINAL EPIDURAL INJECTION OF STEROID Left 2021    Procedure: Injection,steroid,epidural,transforaminal approach; Levels: L5-S1;  Surgeon: Heather Mederos MD;  Location: [facility] PAIN T;  Service: Pain Management;  Laterality: Left;  No pacemaker. Patient is taking ASA.     TRANSFORAMINAL EPIDURAL INJECTION OF STEROID Bilateral 2022    Procedure: Injection,steroid,epidural,transforaminal bilateral L5;  Surgeon: Heather Mederos MD;  Location: [facility] PAIN T;  Service: Pain Management;  Laterality: Bilateral;  ASA   no pacemaker       SOCIAL HISTORY:   Social History     Socioeconomic History    Marital status:    Occupational History    Occupation: RN   Tobacco Use    Smoking status: Former     Current packs/day: 0.00     Types: Cigarettes     Quit date: 1965     Years since quittin.8    Smokeless tobacco: Never   Substance and Sexual Activity    Alcohol use: Yes     Alcohol/week: 0.0 standard drinks of alcohol     Comment: Occ.    Drug use: No    Sexual activity: Yes   Other Topics Concern    Are you pregnant or think you may be? No    Breast-feeding No     Social Determinants of Health     Financial Resource Strain: Medium Risk (2023)    Overall Financial Resource Strain (CARDIA)     Difficulty of Paying Living Expenses: Somewhat hard   Food Insecurity: No Food Insecurity (2023)    Hunger Vital Sign     Worried About Running Out of Food in the Last Year: Never true      Ran Out of Food in the Last Year: Never true   Transportation Needs: No Transportation Needs (9/1/2023)    PRAPARE - Transportation     Lack of Transportation (Medical): No     Lack of Transportation (Non-Medical): No   Physical Activity: Inactive (9/1/2023)    Exercise Vital Sign     Days of Exercise per Week: 0 days     Minutes of Exercise per Session: 30 min   Stress: No Stress Concern Present (9/1/2023)    [employer] - Occupational Stress Questionnaire     Feeling of Stress : Only a little   Social Connections: Unknown (9/1/2023)    Social Connection and Isolation Panel [NHANES]     Frequency of Communication with Friends and Family: More than three times a week     Frequency of Social Gatherings with Friends and Family: Twice a week     Active Member of Clubs or Organizations: Yes     Attends Club or Organization Meetings: Never     Marital Status:    Housing Stability: Low Risk  (9/1/2023)    Housing Stability Vital Sign     Unable to Pay for Housing in the Last Year: No     Number of Places Lived in the Last Year: 1     Unstable Housing in the Last Year: No       FAMILY HISTORY:  Family History   Adopted: Yes   Problem Relation Age of Onset    Melanoma Neg Hx        CURRENTS MEDICATIONS:  Current Outpatient Medications on File Prior to Visit   Medication Sig Dispense Refill    aspirin (ECOTRIN) 81 MG EC tablet Take 1 tablet by mouth.      atorvastatin (LIPITOR) 20 MG tablet TAKE 1 TABLET EVERY DAY 90 tablet 2    CALCIUM CARB/VIT D3/MINERALS (CALCIUM-VITAMIN D ORAL) once daily.       coenzyme Q10 10 mg capsule Take by mouth.      gabapentin (NEURONTIN) 300 MG capsule Take 300 mg by mouth 3 (three) times daily.      losartan (COZAAR) 25 MG tablet TAKE 1 TABLET EVERY DAY 90 tablet 0    multivitamin (THERAGRAN) per tablet Take 1 tablet by mouth.      rOPINIRole (REQUIP XL) 2 mg 24 hr tablet TAKE 1 TABLET BY MOUTH EVERY EVENING 90 tablet 3    rOPINIRole (REQUIP) 0.5 MG  tablet Take 1 tablet as needed up to 3 times a day for breakthrough RLS symptoms. 90 tablet 11    tiZANidine (ZANAFLEX) 2 MG tablet Take 1 tablet (2 mg total) by mouth nightly as needed. 90 tablet 1    VITAMIN B COMPLEX ORAL Take by mouth once daily.       vitamin D 1000 units Tab Take 185 mg by mouth once daily.      urea 20 % Crea Apply 1 application topically once daily. To dry skin on the feet. 75 g 10     No current facility-administered medications on file prior to visit.       ALLERGIES:  Review of patient's allergies indicates:   Allergen Reactions    Celecoxib      Other reaction(s): Swelling    Sulfa (sulfonamide antibiotics)      Other reaction(s): Hives       REVIEW OF SYSTEMS:  Review of Systems   Constitutional:  Negative for diaphoresis, fever and weight loss.   Respiratory:  Negative for shortness of breath.    Cardiovascular:  Negative for chest pain.   Gastrointestinal:  Negative for blood in stool.   Genitourinary:  Negative for hematuria.   Endo/Heme/Allergies:  Does not bruise/bleed easily.   All other systems reviewed and are negative.        OBJECTIVE:    PHYSICAL EXAMINATION:   Vitals:    09/13/23 1128   BP: 134/68   Pulse: 79       Physical Exam:  Vitals reviewed.    Constitutional: She appears well-developed and well-nourished.     Eyes: Pupils are equal, round, and reactive to light. Conjunctivae and EOM are normal.     Cardiovascular: Normal distal pulses and no edema.     Abdominal: Soft.     Skin: Skin displays no rash on trunk and no rash on extremities. Skin displays no lesions on trunk and no lesions on extremities.     Psych/Behavior: She is alert. She is oriented to person, place, and time. She has a normal mood and affect.     Musculoskeletal:        Neck: Range of motion is full.     Neurological:        DTRs: Tricep reflexes are 2+ on the right side and 2+ on the left side. Bicep reflexes are 2+ on the right side and 2+ on the left side. Brachioradialis reflexes are 2+ on the  right side and 2+ on the left side. Patellar reflexes are 2+ on the right side and 2+ on the left side. Achilles reflexes are 2+ on the right side and 0 on the left side.       Back Exam     Muscle Strength   Right Quadriceps:  5/5   Left Quadriceps:  5/5   Right Hamstrings:  5/5   Left Hamstrings:  5/5             SI joint:   Palpation at the right and left SI joints not painful  MARTIN test is negative bilaterally  Gaenslen test is negative bilaterally  Thigh thrust test is negative bilaterally    Neurologic Exam     Mental Status   Oriented to person, place, and time.   Speech: speech is normal   Level of consciousness: alert    Cranial Nerves   Cranial nerves II through XII intact.     CN III, IV, VI   Pupils are equal, round, and reactive to light.  Extraocular motions are normal.     Motor Exam   Muscle bulk: normal  Overall muscle tone: normal    Strength   Right deltoid: 5/5  Left deltoid: 5/5  Right biceps: 5/5  Left biceps: 5/5  Right triceps: 5/5  Left triceps: 5/5  Right wrist flexion: 5/5  Left wrist flexion: 5/5  Right wrist extension: 5/5  Left wrist extension: 5/5  Right interossei: 5/5  Left interossei: 5/5  Right iliopsoas: 5/5  Left iliopsoas: 5/5  Right quadriceps: 5/5  Left quadriceps: 5/5  Right hamstrin/5  Left hamstrin/5  Right anterior tibial: 5/5  Left anterior tibial: 3/5  Right posterior tibial: 5/5  Left posterior tibial: 3/5  Right peroneal: 5/5  Left peroneal: 3/5  Right gastroc: 5/5  Left gastroc: 3/5    Sensory Exam   Light touch normal.   Pinprick normal.     Gait, Coordination, and Reflexes     Gait  Gait: (unstable)    Coordination   Finger to nose coordination: normal    Reflexes   Right brachioradialis: 2+  Left brachioradialis: 2+  Right biceps: 2+  Left biceps: 2+  Right triceps: 2+  Left triceps: 2+  Right patellar: 2+  Left patellar: 2+  Right achilles: 2+  Left achilles: 0  Right plantar: normal  Left plantar: normal  Right Berkowitz: absent  Left Berkowitz:  absent  Right ankle clonus: absent  Left ankle clonus: absent        DIAGNOSTIC DATA:  I personally interpreted the following imaging:   See history    ASSESMENT:  This is a 82 y.o. 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  All questions answered.  More than 50% of the time was spent on discussing conservative management treatments (medication, physical therapy exercises) and possible interventions. Care coordination was discussed.    20 minutes        Glynn Farfan MD  Cell:[phone]

## 2023-09-14 ENCOUNTER — PATIENT MESSAGE (OUTPATIENT)
Dept: REHABILITATION | Facility: HOSPITAL | Age: 83
End: 2023-09-14
Payer: MEDICARE

## 2023-09-15 ENCOUNTER — CLINICAL SUPPORT (OUTPATIENT)
Dept: REHABILITATION | Facility: HOSPITAL | Age: 83
End: 2023-09-15
Payer: MEDICARE

## 2023-09-15 DIAGNOSIS — R29.898 DECREASED FINGER STRENGTH: ICD-10-CM

## 2023-09-15 DIAGNOSIS — M25.641 DECREASED RANGE OF MOTION OF FINGER OF RIGHT HAND: Primary | ICD-10-CM

## 2023-09-15 PROCEDURE — 97022 WHIRLPOOL THERAPY: CPT

## 2023-09-15 PROCEDURE — 97110 THERAPEUTIC EXERCISES: CPT

## 2023-09-15 PROCEDURE — 97035 APP MDLTY 1+ULTRASOUND EA 15: CPT

## 2023-09-15 PROCEDURE — 97140 MANUAL THERAPY 1/> REGIONS: CPT

## 2023-09-15 NOTE — PROGRESS NOTES
"      [facility]  Occupational Therapy Treatment Note     Date: 9/15/2023  Name: Sofía Mcwilliams  Clinic Number: [id number]    Therapy Diagnosis:   Encounter Diagnoses   Name Primary?    Decreased range of motion of finger of right hand Yes    Decreased finger strength        Physician: Ramona Barrett PA-C      Physician Orders: Eval and Treat  Medical Diagnosis: M66.20 (ICD-10-CM) - Tendon rupture, nontraumatic, extensor  Surgical Procedure and Date: 6/30/2023,   1. Extensor tendon repair right middle finger side to side with index finger tendon.    2. Extensor tendon repair right ring finger side-to-side repair with small finger tendon.    Evaluation Date: 7/26/2023  Insurance Authorization Period Expiration: 12/31/2023  Plan of Care Certification Period: 8 weeks; 9/22/2023  Date of Return to MD: 8/10/2023  Visit # / Visits authorized: 15 / 20  FOTO: 1/ 3        Precautions:  Standard and Fall       Time In: 08:00 AM   Time Out: 09:00  AM   Total Billable Time: 60  minutes    Subjective     Pt reports: "I wore my splint at night while I was in [place]. One night I was sitting down and I felt a pain in my hand and when I looked at it I had a massive bump/swelling on top of my hand and it was very red. But then after 2 days it felt better. But now my fingers don't want to straighten as much"   She was compliant with home exercise program given last session.   Response to previous treatment: good   Functional change: none noted to this date     Pain: 4/10  Location: right wrists      Objective     Observation/Appearance:  Skin intact, Skin dry, Hypertrophic scarring, and Edema present      Edema. Measured in centimeters.    7/26/2023 7/26/2023     Right  Left    Proximal Wrist Crease 15.5 15.0 cm   Figure of 8       MCPs 19.0  18.5             Hand ROM. Measured in degrees.    7/26/2023 7/26/2023 9/15/2023      Right  Left  Right              Index: MP  0/35   45/               PIP     0/45  "                 DIP 0/31                  BABB 111                 Long:  MP 10/45   50/                PIP /45                  DIP 0/25                  BABB 105                 Ring:   MP 5/15                  PIP 0/50                  DIP 0/25                  BABB 85                 Small:  MP 0/10                   PIP 0/30                   DIP 0/35                  BABB 75             Elbow and Wrist ROM. Measured in degrees.    7/31/2023 7/31/2023 9/15/2023     Left Right Right            Wrist Ext/Flex 65/70 26/35 WNL                           Strength (Dyanmometer) and Pinch Strength (Pinch Gauge)  Measured in pounds and psi. Average of three trials.    7/26/2023 7/26/2023     Right  Left    Rung II deferred     Key Pinch       3pt Pinch       2pt Pinch             Intake Outcome Measure for FOTO initial eval; hand Survey     Therapist reviewed FOTO scores for Sfoía Mcwilliams on 7/26/2023.   FOTO documents entered into [employer] - see Media section.     Intake Score: 52.5%           Treatment     Sofía received the treatments listed below:             Fluidotherapy: To R hand for 10 min, continuous air, 110 deg, air speed 100 to decrease pain, edema & scar tissue and increased tissue extensibility. NT        Manual massage X 30 mintues with dycem to decrease wrist adhesions   - dycem opp pulling based on direction of skin movement to encourage increased excursion on tendon while patient is performing PIP extension and wrist with all digits   Lymphatouch; 175mmHg  with 60hz vibration (NT)        Patient received ultrasound to R dorsal hand area to increase blood flow, circulation, tissue elasticity, and for pain management for 10 minutes @ 3 Mhz, Intensity 1.0 w/cm2 at 100 % duty cycle.         Therapeutic exercise: 15 min   - hook grasp x 10 reps (3 sets)   - syngeristic wrist x 10 reps (2 sets) with dycem   - Isospheres (2 min)   - Table top finger extension 20 reps ( pt able to achieve slightly higher ex with  moving index and long finger together in extension )     *KT tape applied for edema management       Patient Education and Home Exercises     Education provided:   Compression sleeve   - uploa  - day splint vs night splint   - Progress towards goals     Written Home Exercises Provided: yes.  Exercises were reviewed and Sofía was able to demonstrate them prior to the end of the session.  Sofía demonstrated good  understanding of the HEP provided. See EMR under Patient Instructions for exercises provided during therapy sessions.       Assessment     Mod ext lag present today. Noticeable increase in diffuse edema in R hand with  erythema - picture uploaded in media section. Concerned with increased extensor lag. Pt given compression and to wear nighttime splint during the day when not using hand to support fingers in full extension. Pt sees Dr. Prabhakar on Monday.     Sofía is progressing well towards her goals and there are no updates to goals at this time. Pt prognosis is Fair.     Pt will continue to benefit from skilled outpatient occupational therapy to address the deficits listed in the problem list on initial evaluation provide pt/family education and to maximize pt's level of independence in the home and community environment.     Pt's spiritual, cultural and educational needs considered and pt agreeable to plan of care and goals.    Anticipated barriers to occupational therapy:     Goals:  Long term goals: (by d/c)  1)  Patient to be IND with HEP and modalities for pain management  2)  Increase ROM 5-10 degrees to increase functional hand use for ADLs/leisure activities  3)   Increase  strength 5-8 lbs. to carry laundry, carry groceries, sweep, and increase functional independence of right hand for ADLs/iADLs  4)   Increase pinch 3-4  psis for  Increase in functional independence in ADLs/iADLs such as manipulating fasteners and opening containers  5) Patient to score at 63%  or less on FOTO to demonstrate  improved perception of functional rightUE Use.       Short term Goals: ( 4 weeks)   1)  Patient to be IND with HEP and modalities for pain management  2)  Increase wrist ROM 10 degrees to increase functional hand use for ADLs/leisure activities  3)   Pt will demo ext lag less than 5 degrees for lF MP jt.   4)   Pt will be able to make a full composite fist   5)   Patient to score at 55%  or less on FOTO to demonstrate improved perception of functional right UE Use.    Plan     Updates/Grading for next session:     Hannah Coon OT   9/15/2023

## 2023-09-18 ENCOUNTER — OFFICE VISIT (OUTPATIENT)
Dept: ORTHOPEDICS | Facility: CLINIC | Age: 83
End: 2023-09-18
Payer: MEDICARE

## 2023-09-18 VITALS — HEIGHT: 64 IN | BODY MASS INDEX: 25.69 KG/M2

## 2023-09-18 DIAGNOSIS — S66.919S: ICD-10-CM

## 2023-09-18 PROCEDURE — 99024 POSTOP FOLLOW-UP VISIT: CPT | Mod: HCNC,S$GLB,, | Performed by: ORTHOPAEDIC SURGERY

## 2023-09-18 PROCEDURE — 1159F PR MEDICATION LIST DOCUMENTED IN MEDICAL RECORD: ICD-10-PCS | Mod: HCNC,CPTII,S$GLB, | Performed by: ORTHOPAEDIC SURGERY

## 2023-09-18 PROCEDURE — 1125F PR PAIN SEVERITY QUANTIFIED, PAIN PRESENT: ICD-10-PCS | Mod: HCNC,CPTII,S$GLB, | Performed by: ORTHOPAEDIC SURGERY

## 2023-09-18 PROCEDURE — 3288F FALL RISK ASSESSMENT DOCD: CPT | Mod: HCNC,CPTII,S$GLB, | Performed by: ORTHOPAEDIC SURGERY

## 2023-09-18 PROCEDURE — 1101F PT FALLS ASSESS-DOCD LE1/YR: CPT | Mod: HCNC,CPTII,S$GLB, | Performed by: ORTHOPAEDIC SURGERY

## 2023-09-18 PROCEDURE — 99999 PR PBB SHADOW E&M-EST. PATIENT-LVL III: CPT | Mod: PBBFAC,HCNC,, | Performed by: ORTHOPAEDIC SURGERY

## 2023-09-18 PROCEDURE — 99024 PR POST-OP FOLLOW-UP VISIT: ICD-10-PCS | Mod: HCNC,S$GLB,, | Performed by: ORTHOPAEDIC SURGERY

## 2023-09-18 PROCEDURE — 3288F PR FALLS RISK ASSESSMENT DOCUMENTED: ICD-10-PCS | Mod: HCNC,CPTII,S$GLB, | Performed by: ORTHOPAEDIC SURGERY

## 2023-09-18 PROCEDURE — 1159F MED LIST DOCD IN RCRD: CPT | Mod: HCNC,CPTII,S$GLB, | Performed by: ORTHOPAEDIC SURGERY

## 2023-09-18 PROCEDURE — 1101F PR PT FALLS ASSESS DOC 0-1 FALLS W/OUT INJ PAST YR: ICD-10-PCS | Mod: HCNC,CPTII,S$GLB, | Performed by: ORTHOPAEDIC SURGERY

## 2023-09-18 PROCEDURE — 99999 PR PBB SHADOW E&M-EST. PATIENT-LVL III: ICD-10-PCS | Mod: PBBFAC,HCNC,, | Performed by: ORTHOPAEDIC SURGERY

## 2023-09-18 PROCEDURE — 1125F AMNT PAIN NOTED PAIN PRSNT: CPT | Mod: HCNC,CPTII,S$GLB, | Performed by: ORTHOPAEDIC SURGERY

## 2023-09-18 NOTE — PROGRESS NOTES
Subjective:      Patient ID: Sofía Mcwilliams is a 82 y.o. female.  Chief Complaint: Post-op Evaluation (Right hand tendon transfer ( Sx 6/30))      HPI  Sofía Mcwilliams is a  82 y.o. female presenting today for post op visit.  She is s/p tendon transfer right hand for extensor tendon rupture to the fingers she is now about 3 months postop she is currently in therapy has little bit of swelling dorsally in the right hand some pain she is also still recovering from the left foot problem related to radiculopathy and neuritis   She does have a neurologist for this   .     Review of patient's allergies indicates:   Allergen Reactions    Celecoxib      Other reaction(s): Swelling    Sulfa (sulfonamide antibiotics)      Other reaction(s): Hives         Current Outpatient Medications   Medication Sig Dispense Refill    aspirin (ECOTRIN) 81 MG EC tablet Take 1 tablet by mouth.      atorvastatin (LIPITOR) 20 MG tablet TAKE 1 TABLET EVERY DAY 90 tablet 2    CALCIUM CARB/VIT D3/MINERALS (CALCIUM-VITAMIN D ORAL) once daily.       coenzyme Q10 10 mg capsule Take by mouth.      gabapentin (NEURONTIN) 300 MG capsule Take 300 mg by mouth 3 (three) times daily.      losartan (COZAAR) 25 MG tablet TAKE 1 TABLET EVERY DAY 90 tablet 0    multivitamin (THERAGRAN) per tablet Take 1 tablet by mouth.      rOPINIRole (REQUIP XL) 2 mg 24 hr tablet TAKE 1 TABLET BY MOUTH EVERY EVENING 90 tablet 3    rOPINIRole (REQUIP) 0.5 MG tablet Take 1 tablet as needed up to 3 times a day for breakthrough RLS symptoms. 90 tablet 11    tiZANidine (ZANAFLEX) 2 MG tablet Take 1 tablet (2 mg total) by mouth nightly as needed. 90 tablet 1    VITAMIN B COMPLEX ORAL Take by mouth once daily.       vitamin D 1000 units Tab Take 185 mg by mouth once daily.       No current facility-administered medications for this visit.       Past Medical History:   Diagnosis Date    Anxiety 5/2/2016    Arthritis     Basal cell carcinoma 2013    left buttock    Cataract     Hypertension      Other and unspecified hyperlipidemia        Past Surgical History:   Procedure Laterality Date    APPENDECTOMY      bunion      right    COLONOSCOPY N/A 9/28/2015    Procedure: COLONOSCOPY;  Surgeon: Joe Amos MD;  [facility]  Service: Endoscopy;  Laterality: N/A;    COLONOSCOPY N/A 3/16/2021    Procedure: COLONOSCOPY;  Surgeon: Brice Zayas MD;  Location: [facility] ([facility]);  Service: Endoscopy;  Laterality: N/A;  covid test 3/13-primary care    hip surgery x 2      HYSTERECTOMY      partial    INJECTION OF JOINT Left 1/9/2019    Procedure: Injection, LEFT HIP INTRAARTICULAR INJECTION;  Surgeon: Omega Estevez MD;  Location: [facility], [place], operated by [facility]GT;  Service: Pain Management;  Laterality: Left;    INJECTION OF JOINT Left 3/13/2019    Procedure: INJECTION, JOINT LEFT HIP;  Surgeon: Omega Estevez MD;  Location: [facility], [place], operated by [facility]IN [facility];  Service: Pain Management;  Laterality: Left;  Left Hip Intrarticular Steroid Injection    INJECTION, SACROILIAC JOINT Right 2/23/2023    Procedure: INJECTION,SACROILIAC JOINT Right SI Joint, Right GTB;  Surgeon: Omega Estevez MD;  Location: [facility] PAIN MGT;  Service: Pain Management;  Laterality: Right;    JOINT REPLACEMENT      SURGICAL REMOVAL OF BONE SPUR  8/16/2022    Procedure: EXCISION, BONE SPUR ULNA;  Surgeon: Brennen Prabhakar Jr., MD;  Location: [facility] OR;  Service: Orthopedics;;    SURGICAL REMOVAL OF DISTAL ULNA Right 8/16/2022    Procedure: EXCISION, ULNA, DISTAL;  Surgeon: Brennen Prabhakar Jr., MD;  Location: [facility] OR;  Service: Orthopedics;  Laterality: Right;    TONSILLECTOMY      TRANSFER OF HAND TENDON Right 8/16/2022    Procedure: TRANSFER, TENDON, HAND;  Surgeon: Brennen Prabhakar Jr., MD;  Location: [facility] OR;  Service: Orthopedics;  Laterality: Right;  transfer extensor tendon small finger    TRANSFER OF HAND TENDON Right 6/30/2023    Procedure: TRANSFER, TENDON, HAND;  Surgeon: Brennen Prabhakar Jr., MD;  Location: [facility] OR;  Service:  "Orthopedics;  Laterality: Right;    TRANSFORAMINAL EPIDURAL INJECTION OF STEROID Left 8/25/2021    Procedure: Injection,steroid,epidural,transforaminal approach; Levels: L5-S1;  Surgeon: Heather Mederos MD;  Location: [facility] PAIN T;  Service: Pain Management;  Laterality: Left;  No pacemaker. Patient is taking ASA.     TRANSFORAMINAL EPIDURAL INJECTION OF STEROID Bilateral 2/23/2022    Procedure: Injection,steroid,epidural,transforaminal bilateral L5;  Surgeon: Heather Mederos MD;  Location: [facility] PAIN MGT;  Service: Pain Management;  Laterality: Bilateral;  ASA   no pacemaker       OBJECTIVE:   PHYSICAL EXAM:  Height: 5' 4" (162.6 cm)    Vitals:    09/18/23 0848   Height: 5' 4" (1.626 m)   PainSc:   3   PainLoc: Hand     Ortho/SPM Exam  Examination for the right hand the incision is well healed there is some swelling dorsally range of motion fingers full slight extensor lag of the middle finger sensation intact   No evidence of infection    RADIOGRAPHS:  None  Comments: I have personally reviewed the imaging and I agree with the above radiologist's report.    ASSESSMENT/PLAN:     IMPRESSION:  Status post tendon transfer right hand    PLAN:  I have ordered some compounding cream for her right hand and wrist she can also use it on her foot where she is having some pain   Continue therapy   Continue nighttime splinting    FOLLOW UP:  For the extensor lag   4-6 weeks    Disclaimer: This note has been generated using voice-recognition software. There may be typographical errors that have been missed during proof-reading.     "

## 2023-09-19 ENCOUNTER — CLINICAL SUPPORT (OUTPATIENT)
Dept: REHABILITATION | Facility: HOSPITAL | Age: 83
End: 2023-09-19
Payer: MEDICARE

## 2023-09-19 DIAGNOSIS — R29.898 DECREASED FINGER STRENGTH: ICD-10-CM

## 2023-09-19 DIAGNOSIS — M25.641 DECREASED RANGE OF MOTION OF FINGER OF RIGHT HAND: Primary | ICD-10-CM

## 2023-09-19 PROCEDURE — 97022 WHIRLPOOL THERAPY: CPT

## 2023-09-19 PROCEDURE — 97110 THERAPEUTIC EXERCISES: CPT

## 2023-09-19 PROCEDURE — 97140 MANUAL THERAPY 1/> REGIONS: CPT

## 2023-09-19 PROCEDURE — 97035 APP MDLTY 1+ULTRASOUND EA 15: CPT

## 2023-09-19 NOTE — PROGRESS NOTES
"        [facility]  Occupational Therapy Treatment Note     Date: 9/19/2023  Name: Sofía Mcwilliams  Clinic Number: [id number]    Therapy Diagnosis:   Encounter Diagnoses   Name Primary?    Decreased range of motion of finger of right hand Yes    Decreased finger strength                Physician: Ramona Barrett PA-C      Physician Orders: Eval and Treat  Medical Diagnosis: M66.20 (ICD-10-CM) - Tendon rupture, nontraumatic, extensor  Surgical Procedure and Date: 6/30/2023,   1. Extensor tendon repair right middle finger side to side with index finger tendon.    2. Extensor tendon repair right ring finger side-to-side repair with small finger tendon.    Evaluation Date: 7/26/2023  Insurance Authorization Period Expiration: 12/31/2023  Plan of Care Certification Period: 8 weeks; 9/22/2023  Date of Return to MD: 8/10/2023  Visit # / Visits authorized: 10 / 20  FOTO: 1/ 3        Precautions:  Standard and Fall       \  Time In: 08:02 AM   Time Out:     08:57  AM   Total Billable Time: 55 minutes          Subjective   "I saw Dr. Prabhakar and he is ordering me this cream"  She was compliant with home exercise program given last session.   Response to previous treatment:decreased wrist edema  Functional change: none noted to this date     Pain: 0/10  Location: right wrists      Objective     Observation/Appearance:  Skin intact, Skin dry, Hypertrophic scarring, and Edema present      Edema. Measured in centimeters.    7/26/2023 7/26/2023     Right  Left    Proximal Wrist Crease 15.5 15.0 cm   Figure of 8       MCPs 19.0  18.5             Hand ROM. Measured in degrees.    7/26/2023 7/26/2023 9/19/2023     Right  Left  Right            Index: MP  0/35   50              PIP     0/45                 DIP 0/31                 BABB 111               Long:  MP 10/45   40              PIP /45                 DIP 0/25                 BABB 105               Ring:   MP 5/15                 PIP 0/50                 " DIP 0/25                 BABB 85               Small:  MP 0/10                  PIP 0/30                  DIP 0/35                 BABB 75            Elbow and Wrist ROM. Measured in degrees.    7/31/2023 7/31/2023     Left Right           Wrist Ext/Flex 65/70 26/35                    Sensation: denies numbness and tingling   NT Distribution 7/26/2023 7/26/2023     Right  Left    [place]       Normal 1.65-2.83       Diminished Light Touch 3.22-3.61       Diminished Protective 3.84-4.31       Loss of Protective 4.56-6.65       Untestable >6.65       2 Point Discrimination       Static       Dynamic           Strength (Dyanmometer) and Pinch Strength (Pinch Gauge)  Measured in pounds and psi. Average of three trials.    7/26/2023 7/26/2023     Right  Left    Rung II deferred     Key Pinch       3pt Pinch       2pt Pinch             Intake Outcome Measure for FOTO initial eval; hand Survey     Therapist reviewed FOTO scores for Sofía Mcwilliams on 7/26/2023.   FOTO documents entered into [employer] - see Media section.     Intake Score: 52.5%           Treatment     Sofía received the treatments listed below:             Fluidotherapy: To R hand for 10 min, continuous air, 110 deg, air speed 100 to decrease pain, edema & scar tissue and increased tissue extensibility.        Manual massage X 15 mintues   - retrograde massage   - scar tissue mobilization       Patient received ultrasound to R dorsal hand area to increase blood flow, circulation, tissue elasticity, and for pain management for 10 minutes @ 3 Mhz, Intensity 1.0 w/cm2 at 100 % duty cycle.         Therapeutic exercise: 15 min   - hook grasp x 10 reps (3 sets)   - syngeristic wrist x 10 reps (2 sets) with dycem   - Isospheres (2 min)   - Table top finger extension 20 reps ( pt able to achieve slightly higher ex with moving index and long finger together in extension )     *KT tape applied for edema management       Patient Education and Home Exercises      Education provided:   Compression sleeve   - KT  - day splint vs night splint   - Progress towards goals     Written Home Exercises Provided: yes.  Exercises were reviewed and Sofía was able to demonstrate them prior to the end of the session.  Sofía demonstrated good  understanding of the HEP provided. See EMR under Patient Instructions for exercises provided during therapy sessions.       Assessment     Pt tolerated session well.  Concerned with extensor lag. Will cont to monitor. Pt has not received cream from pharmacy. She was also told she could apply the cream to her foot as well to help with pain.     Sofía is progressing well towards her goals and there are no updates to goals at this time. Pt prognosis is Fair.     Pt will continue to benefit from skilled outpatient occupational therapy to address the deficits listed in the problem list on initial evaluation provide pt/family education and to maximize pt's level of independence in the home and community environment.     Pt's spiritual, cultural and educational needs considered and pt agreeable to plan of care and goals.    Anticipated barriers to occupational therapy:     Goals:  Long term goals: (by d/c)  1)  Patient to be IND with HEP and modalities for pain management  2)  Increase ROM 5-10 degrees to increase functional hand use for ADLs/leisure activities  3)   Increase  strength 5-8 lbs. to carry laundry, carry groceries, sweep, and increase functional independence of right hand for ADLs/iADLs  4)   Increase pinch 3-4  psis for  Increase in functional independence in ADLs/iADLs such as manipulating fasteners and opening containers  5) Patient to score at 63%  or less on FOTO to demonstrate improved perception of functional rightUE Use.       Short term Goals: ( 4 weeks)   1)  Patient to be IND with HEP and modalities for pain management  2)  Increase wrist ROM 10 degrees to increase functional hand use for ADLs/leisure activities  3)   Pt  will demo ext lag less than 5 degrees for lF MP jt.   4)   Pt will be able to make a full composite fist   5)   Patient to score at 55%  or less on FOTO to demonstrate improved perception of functional right UE Use.    Plan     Updates/Grading for next session:     Hannah Coon OT   9/19/2023

## 2023-09-21 DIAGNOSIS — Z78.0 MENOPAUSE: ICD-10-CM

## 2023-09-22 ENCOUNTER — CLINICAL SUPPORT (OUTPATIENT)
Dept: REHABILITATION | Facility: HOSPITAL | Age: 83
End: 2023-09-22
Payer: MEDICARE

## 2023-09-22 DIAGNOSIS — R29.898 DECREASED FINGER STRENGTH: ICD-10-CM

## 2023-09-22 DIAGNOSIS — M25.641 DECREASED RANGE OF MOTION OF FINGER OF RIGHT HAND: Primary | ICD-10-CM

## 2023-09-22 PROCEDURE — 97140 MANUAL THERAPY 1/> REGIONS: CPT

## 2023-09-22 PROCEDURE — 97035 APP MDLTY 1+ULTRASOUND EA 15: CPT

## 2023-09-22 PROCEDURE — 97022 WHIRLPOOL THERAPY: CPT

## 2023-09-22 NOTE — PROGRESS NOTES
[facility]  Occupational Therapy Treatment Note     Date: 9/22/2023  Name: Sofía Mcwilliams  Clinic Number: [id number]    Therapy Diagnosis:   Encounter Diagnoses   Name Primary?    Decreased range of motion of finger of right hand Yes    Decreased finger strength            Physician: Ramona Barrett PA-C      Physician Orders: Eval and Treat  Medical Diagnosis: M66.20 (ICD-10-CM) - Tendon rupture, nontraumatic, extensor  Surgical Procedure and Date: 6/30/2023,   1. Extensor tendon repair right middle finger side to side with index finger tendon.    2. Extensor tendon repair right ring finger side-to-side repair with small finger tendon.    Evaluation Date: 7/26/2023  Insurance Authorization Period Expiration: 12/31/2023  Plan of Care Certification Period: 8 weeks; 9/22/2023  Date of Return to MD: 8/10/2023  Visit # / Visits authorized: 10 / 20  FOTO: 1/ 3        Precautions:  Standard and Fall       \  Time In: 08:02 AM   Time Out: 09:00       AM   Total Billable Time: 58  minutes        11 weeks 5 days   Subjective     She was compliant with home exercise program given last session.   Response to previous treatment:decreased wrist edema  Functional change: none noted to this date     Pain: 0/10  Location: right wrists      Objective     Observation/Appearance:  Skin intact, Skin dry, Hypertrophic scarring, and Edema present      Edema. Measured in centimeters.    7/26/2023 7/26/2023     Right  Left    Proximal Wrist Crease 15.5 15.0 cm   Figure of 8       MCPs 19.0  18.5             Hand ROM. Measured in degrees.    7/26/2023 7/26/2023 9/19/2023     Right  Left  Right            Index: MP  0/35   50              PIP     0/45                 DIP 0/31                 BABB 111               Long:  MP 10/45   40              PIP /45                 DIP 0/25                 BABB 105               Ring:   MP 5/15                 PIP 0/50                 DIP 0/25                 BABB 85                Small:  MP 0/10                  PIP 0/30                  DIP 0/35                 BABB 75            Elbow and Wrist ROM. Measured in degrees.    7/31/2023 7/31/2023     Left Right           Wrist Ext/Flex 65/70 26/35                    Sensation: denies numbness and tingling   NT Distribution 7/26/2023 7/26/2023     Right  Left    [place]       Normal 1.65-2.83       Diminished Light Touch 3.22-3.61       Diminished Protective 3.84-4.31       Loss of Protective 4.56-6.65       Untestable >6.65       2 Point Discrimination       Static       Dynamic           Strength (Dyanmometer) and Pinch Strength (Pinch Gauge)  Measured in pounds and psi. Average of three trials.    7/26/2023 7/26/2023     Right  Left    Rung II deferred     Key Pinch       3pt Pinch       2pt Pinch             Intake Outcome Measure for FOTO initial eval; hand Survey     Therapist reviewed FOTO scores for Sofía Mcwilliams on 7/26/2023.   FOTO documents entered into [employer] - see Media section.     Intake Score: 52.5%           Treatment     Sofía received the treatments listed below:             Fluidotherapy: To R hand for 10 min, continuous air, 110 deg, air speed 100 to decrease pain, edema & scar tissue and increased tissue extensibility.        Manual massage X 30 mintues with dycem to decrease wrist adhesions   - dycem opp pulling based on direction of skin movement to encourage increased excursion on tendon while patient is performing PIP extension and wrist with all digits   Lymphatouch; 175mmHg  with 60hz vibration (NT)        Patient received ultrasound to R dorsal hand area to increase blood flow, circulation, tissue elasticity, and for pain management for 10 minutes @ 3 Mhz, Intensity 1.0 w/cm2 at 100 % duty cycle.         Therapeutic exercise: 15 min   - hook grasp x 10 reps (3 sets)   - syngeristic wrist x 10 reps (2 sets) with dycem   - Isospheres (2 min)   - Table top finger extension 20 reps ( pt able to achieve  slightly higher ex with moving index and long finger together in extension )     *KT tape applied for edema management       Patient Education and Home Exercises     Education provided:   Compression sleeve   - KT  - day splint vs night splint   - Progress towards goals     Written Home Exercises Provided: yes.  Exercises were reviewed and Sofía was able to demonstrate them prior to the end of the session.  Sofía demonstrated good  understanding of the HEP provided. See EMR under Patient Instructions for exercises provided during therapy sessions.       Assessment     Pt tolerated session well.  Cont to monitor extensor lag     Sofía is progressing well towards her goals and there are no updates to goals at this time. Pt prognosis is Fair.     Pt will continue to benefit from skilled outpatient occupational therapy to address the deficits listed in the problem list on initial evaluation provide pt/family education and to maximize pt's level of independence in the home and community environment.     Pt's spiritual, cultural and educational needs considered and pt agreeable to plan of care and goals.    Anticipated barriers to occupational therapy:     Goals:  Long term goals: (by d/c)  1)  Patient to be IND with HEP and modalities for pain management  2)  Increase ROM 5-10 degrees to increase functional hand use for ADLs/leisure activities  3)   Increase  strength 5-8 lbs. to carry laundry, carry groceries, sweep, and increase functional independence of right hand for ADLs/iADLs  4)   Increase pinch 3-4  psis for  Increase in functional independence in ADLs/iADLs such as manipulating fasteners and opening containers  5) Patient to score at 63%  or less on FOTO to demonstrate improved perception of functional rightUE Use.       Short term Goals: ( 4 weeks)   1)  Patient to be IND with HEP and modalities for pain management  2)  Increase wrist ROM 10 degrees to increase functional hand use for ADLs/leisure  activities  3)   Pt will demo ext lag less than 5 degrees for lF MP jt.   4)   Pt will be able to make a full composite fist   5)   Patient to score at 55%  or less on FOTO to demonstrate improved perception of functional right UE Use.    Plan     Updates/Grading for next session:     Hannah Coon OT   9/22/2023

## 2023-09-26 ENCOUNTER — PATIENT MESSAGE (OUTPATIENT)
Dept: ADMINISTRATIVE | Facility: HOSPITAL | Age: 83
End: 2023-09-26
Payer: MEDICARE

## 2023-09-26 ENCOUNTER — CLINICAL SUPPORT (OUTPATIENT)
Dept: REHABILITATION | Facility: HOSPITAL | Age: 83
End: 2023-09-26
Payer: MEDICARE

## 2023-09-26 DIAGNOSIS — R29.898 DECREASED FINGER STRENGTH: ICD-10-CM

## 2023-09-26 DIAGNOSIS — M25.641 DECREASED RANGE OF MOTION OF FINGER OF RIGHT HAND: Primary | ICD-10-CM

## 2023-09-26 PROCEDURE — 97035 APP MDLTY 1+ULTRASOUND EA 15: CPT

## 2023-09-26 PROCEDURE — 97140 MANUAL THERAPY 1/> REGIONS: CPT

## 2023-09-26 PROCEDURE — 97022 WHIRLPOOL THERAPY: CPT

## 2023-09-26 PROCEDURE — 97110 THERAPEUTIC EXERCISES: CPT

## 2023-09-26 NOTE — PROGRESS NOTES
[facility]  Occupational Therapy Treatment Note     Date: 9/26/2023  Name: Sofía Mcwilliams  Clinic Number: [id number]    Therapy Diagnosis:   Encounter Diagnoses   Name Primary?    Decreased range of motion of finger of right hand Yes    Decreased finger strength            Physician: Ramona Barrett PA-C      Physician Orders: Eval and Treat  Medical Diagnosis: M66.20 (ICD-10-CM) - Tendon rupture, nontraumatic, extensor  Surgical Procedure and Date: 6/30/2023,   1. Extensor tendon repair right middle finger side to side with index finger tendon.    2. Extensor tendon repair right ring finger side-to-side repair with small finger tendon.    Evaluation Date: 7/26/2023  Insurance Authorization Period Expiration: 12/31/2023  Plan of Care Certification Period: 8 weeks; 9/22/2023  Date of Return to MD: 8/10/2023  Visit # / Visits authorized: 10 / 20  FOTO: 1/ 3        Precautions:  Standard and Fall       \  Time In: 08:02 AM   Time Out: 09:00       AM   Total Billable Time: 58  minutes        11 weeks 5 days   Subjective     She was compliant with home exercise program given last session.   Response to previous treatment:decreased wrist edema  Functional change: none noted to this date     Pain: 0/10  Location: right wrists      Objective     Observation/Appearance:  Skin intact, Skin dry, Hypertrophic scarring, and Edema present      Edema. Measured in centimeters.    7/26/2023 7/26/2023     Right  Left    Proximal Wrist Crease 15.5 15.0 cm   Figure of 8       MCPs 19.0  18.5             Hand ROM. Measured in degrees.    7/26/2023 7/26/2023 9/19/2023     Right  Left  Right            Index: MP  0/35   50              PIP     0/45                 DIP 0/31                 BABB 111               Long:  MP 10/45   40              PIP /45                 DIP 0/25                 BABB 105               Ring:   MP 5/15                 PIP 0/50                 DIP 0/25                 BABB 85                Small:  MP 0/10                  PIP 0/30                  DIP 0/35                 BABB 75            Elbow and Wrist ROM. Measured in degrees.    7/31/2023 7/31/2023     Left Right           Wrist Ext/Flex 65/70 26/35                    Sensation: denies numbness and tingling   NT Distribution 7/26/2023 7/26/2023     Right  Left    [place] Shaye       Normal 1.65-2.83       Diminished Light Touch 3.22-3.61       Diminished Protective 3.84-4.31       Loss of Protective 4.56-6.65       Untestable >6.65       2 Point Discrimination       Static       Dynamic           Strength (Dyanmometer) and Pinch Strength (Pinch Gauge)  Measured in pounds and psi. Average of three trials.    7/26/2023 7/26/2023     Right  Left    Rung II deferred     Key Pinch       3pt Pinch       2pt Pinch             Intake Outcome Measure for FOTO initial eval; hand Survey     Therapist reviewed FOTO scores for Sofía Mcwilliams on 7/26/2023.   FOTO documents entered into [employer] [facility] [employer] - see Media section.     Intake Score: 52.5%           Treatment     Sofía received the treatments listed below:             Fluidotherapy: To R hand for 10 min, continuous air, 110 deg, air speed 100 to decrease pain, edema & scar tissue and increased tissue extensibility.        Manual massage X 20 mintues with dycem to decrease wrist adhesions   - dycem opp pulling based on direction of skin movement to encourage increased excursion on tendon while patient is performing PIP extension and wrist with all digits    Cupping over scar X 3 minutes multiple trials        Patient received ultrasound to R dorsal hand area to increase blood flow, circulation, tissue elasticity, and for pain management for 10 minutes @ 3 Mhz, Intensity 1.0 w/cm2 at 100 % duty cycle.         Therapeutic exercise: 15 min   - hook grasp x 10 reps (3 sets)   - syngeristic wrist x 10 reps (2 sets) with dycem   - Isospheres (2 min)   - Table top finger extension 20 reps ( pt able to achieve  slightly higher ex with moving index and long finger together in extension )           Patient Education and Home Exercises     Education provided:   Compression sleeve   - KT  - day splint vs night splint   - Progress towards goals     Written Home Exercises Provided: yes.  Exercises were reviewed and Sofía was able to demonstrate them prior to the end of the session.  Sofía demonstrated good  understanding of the HEP provided. See EMR under Patient Instructions for exercises provided during therapy sessions.       Assessment     Pt tolerated session well.  Cont to monitor extensor lag     Sofía is progressing well towards her goals and there are no updates to goals at this time. Pt prognosis is Fair.     Pt will continue to benefit from skilled outpatient occupational therapy to address the deficits listed in the problem list on initial evaluation provide pt/family education and to maximize pt's level of independence in the home and community environment.     Pt's spiritual, cultural and educational needs considered and pt agreeable to plan of care and goals.    Anticipated barriers to occupational therapy:     Goals:  Long term goals: (by d/c)  1)  Patient to be IND with HEP and modalities for pain management  2)  Increase ROM 5-10 degrees to increase functional hand use for ADLs/leisure activities  3)   Increase  strength 5-8 lbs. to carry laundry, carry groceries, sweep, and increase functional independence of right hand for ADLs/iADLs  4)   Increase pinch 3-4  psis for  Increase in functional independence in ADLs/iADLs such as manipulating fasteners and opening containers  5) Patient to score at 63%  or less on FOTO to demonstrate improved perception of functional rightUE Use.       Short term Goals: ( 4 weeks)   1)  Patient to be IND with HEP and modalities for pain management  2)  Increase wrist ROM 10 degrees to increase functional hand use for ADLs/leisure activities  3)   Pt will demo ext lag less  than 5 degrees for lF MP jt.   4)   Pt will be able to make a full composite fist   5)   Patient to score at 55%  or less on FOTO to demonstrate improved perception of functional right UE Use.    Plan     Updates/Grading for next session:     Kiera Ramsey OT   9/26/2023

## 2023-09-27 ENCOUNTER — HOSPITAL ENCOUNTER (OUTPATIENT)
Dept: RADIOLOGY | Facility: CLINIC | Age: 83
Discharge: HOME | End: 2023-09-27
Attending: INTERNAL MEDICINE
Payer: MEDICARE

## 2023-09-27 DIAGNOSIS — Z78.0 MENOPAUSE: ICD-10-CM

## 2023-09-27 PROCEDURE — 77080 DXA BONE DENSITY AXIAL: CPT | Mod: 26,HCNC,, | Performed by: INTERNAL MEDICINE

## 2023-09-27 PROCEDURE — 77080 DXA BONE DENSITY AXIAL SKELETON 1 OR MORE SITES: ICD-10-PCS | Mod: 26,HCNC,, | Performed by: INTERNAL MEDICINE

## 2023-09-27 PROCEDURE — 77080 DXA BONE DENSITY AXIAL: CPT | Mod: TC,HCNC

## 2023-09-29 ENCOUNTER — CLINICAL SUPPORT (OUTPATIENT)
Dept: REHABILITATION | Facility: HOSPITAL | Age: 83
End: 2023-09-29
Payer: MEDICARE

## 2023-09-29 DIAGNOSIS — R29.898 DECREASED FINGER STRENGTH: ICD-10-CM

## 2023-09-29 DIAGNOSIS — M25.641 DECREASED RANGE OF MOTION OF FINGER OF RIGHT HAND: Primary | ICD-10-CM

## 2023-09-29 PROCEDURE — 97110 THERAPEUTIC EXERCISES: CPT

## 2023-09-29 PROCEDURE — 97140 MANUAL THERAPY 1/> REGIONS: CPT

## 2023-09-29 PROCEDURE — 97022 WHIRLPOOL THERAPY: CPT

## 2023-09-29 NOTE — PROGRESS NOTES
"    [facility] OUTPATIENT THERAPY AND WELLNESS  Occupational Therapy Treatment Note     Date: 9/29/2023  Name: Sofía Mcwilliams  Clinic Number: [id number]    Therapy Diagnosis:   Encounter Diagnoses   Name Primary?    Decreased range of motion of finger of right hand Yes    Decreased finger strength              Physician: Ramona Barrett PA-C      Physician Orders: Eval and Treat  Medical Diagnosis: M66.20 (ICD-10-CM) - Tendon rupture, nontraumatic, extensor  Surgical Procedure and Date: 6/30/2023,   1. Extensor tendon repair right middle finger side to side with index finger tendon.    2. Extensor tendon repair right ring finger side-to-side repair with small finger tendon.    Evaluation Date: 7/26/2023  Insurance Authorization Period Expiration: 12/31/2023  Plan of Care Certification Period: 8 weeks; 9/22/2023  Date of Return to MD: 8/10/2023  Visit # / Visits authorized: 10 / 20  FOTO: 1/ 3        Precautions:  Standard and Fall       \  Time In: 08:02 AM   Time Out: 09:00       AM   Total Billable Time: 58  minutes        11 weeks 5 days   Subjective   "I randomly get pain it just hits me. I think the cream has helped my swelling though"      She was compliant with home exercise program given last session.   Response to previous treatment:decreased wrist edema  Functional change: none noted to this date     Pain: 0/10  Location: right wrists      Objective     Observation/Appearance:  Skin intact, Skin dry, Hypertrophic scarring, and Edema present      Edema. Measured in centimeters.    7/26/2023 7/26/2023     Right  Left    Proximal Wrist Crease 15.5 15.0 cm   Figure of 8       MCPs 19.0  18.5             Hand ROM. Measured in degrees.    7/26/2023 7/26/2023 9/19/2023 9/29/2023     Right  Left  Right Right             Index: MP  0/35   50 50/64              PIP     0/45    88              DIP 0/31    35              BABB 111                 Long:  MP 10/45   40 45/70              PIP /45    /95              DIP 0/25    " 54              BABB 105                 Ring:   MP 5/15    20/55              PIP 0/50    /106              DIP 0/25    /36              BABB 85                 Small:  MP 0/10    75               PIP 0/30    90               DIP 0/35    40              BABB 75             Elbow and Wrist ROM. Measured in degrees.    7/31/2023 7/31/2023     Left Right           Wrist Ext/Flex 65/70 26/35                    Sensation: denies numbness and tingling   NT Distribution 7/26/2023 7/26/2023     Right  Left    [place] Shaye       Normal 1.65-2.83       Diminished Light Touch 3.22-3.61       Diminished Protective 3.84-4.31       Loss of Protective 4.56-6.65       Untestable >6.65       2 Point Discrimination       Static       Dynamic               Strength (Dyanmometer) and Pinch Strength (Pinch Gauge)  Measured in pounds and psi. Average of three trials.    7/26/2023 7/26/2023     Right  Left    Rung II deferred     Key Pinch       3pt Pinch       2pt Pinch             Intake Outcome Measure for FOTO initial eval; hand Survey     Therapist reviewed FOTO scores for Sofía Mcwilliams on 7/26/2023.   FOTO documents entered into [employer] - see Media section.     Intake Score: 52.5%           Treatment     Sofía received the treatments listed below:             Fluidotherapy: To R hand for 10 min, continuous air, 110 deg, air speed 100 to decrease pain, edema & scar tissue and increased tissue extensibility.        Manual massage X 20 mintues with dycem to decrease wrist adhesions   - dycem opp pulling based on direction of skin movement to encourage increased excursion on tendon while patient is performing PIP extension and wrist with all digits    Cupping over scar X 3 minutes multiple trials   Lymphatouch vibration 60 and pressure 150 over dorsal hand incision        Patient received ultrasound to R dorsal hand area to increase blood flow, circulation, tissue elasticity, and for pain management for 10 minutes @ 3 Mhz, Intensity  1.0 w/cm2 at 100 % duty cycle.           Therapeutic activity: 15 min   - hook grasp x 10 reps (3 sets)   - syngeristic wrist x 10 reps (2 sets) with dycem   - Isospheres (2 min)     - IF digit extension exercise with bowling ball (2 sets)   - LF digit extension exercise with bowling ball (2 sets)   - holding tennis ball lifting IF x 10 reps holding 3s  - holding tennis ball lifting LF x 10 reps holding 3s       Patient Education and Home Exercises     Education provided:   Compression glove size XS   - KT  - day splint vs night splint   - Progress towards goals     Written Home Exercises Provided: yes.  Exercises were reviewed and Sofía was able to demonstrate them prior to the end of the session.  Sofía demonstrated good  understanding of the HEP provided. See EMR under Patient Instructions for exercises provided during therapy sessions.       Assessment     Pt tolerated session well.  Cont to monitor extensor lag     Sofía is progressing well towards her goals and there are no updates to goals at this time. Pt prognosis is Fair.     Pt will continue to benefit from skilled outpatient occupational therapy to address the deficits listed in the problem list on initial evaluation provide pt/family education and to maximize pt's level of independence in the home and community environment.     Pt's spiritual, cultural and educational needs considered and pt agreeable to plan of care and goals.    Anticipated barriers to occupational therapy:     Goals:  Long term goals: (by d/c)  1)  Patient to be IND with HEP and modalities for pain management  2)  Increase ROM 5-10 degrees to increase functional hand use for ADLs/leisure activities  3)   Increase  strength 5-8 lbs. to carry laundry, carry groceries, sweep, and increase functional independence of right hand for ADLs/iADLs  4)   Increase pinch 3-4  psis for  Increase in functional independence in ADLs/iADLs such as manipulating fasteners and opening containers  5)  Patient to score at 63%  or less on FOTO to demonstrate improved perception of functional rightUE Use.       Short term Goals: ( 4 weeks)   1)  Patient to be IND with HEP and modalities for pain management  2)  Increase wrist ROM 10 degrees to increase functional hand use for ADLs/leisure activities  3)   Pt will demo ext lag less than 5 degrees for lF MP jt.   4)   Pt will be able to make a full composite fist   5)   Patient to score at 55%  or less on FOTO to demonstrate improved perception of functional right UE Use.    Plan     Updates/Grading for next session:     Hannah Coon OT   9/29/2023

## 2023-10-03 ENCOUNTER — CLINICAL SUPPORT (OUTPATIENT)
Dept: REHABILITATION | Facility: HOSPITAL | Age: 83
End: 2023-10-03
Payer: MEDICARE

## 2023-10-03 DIAGNOSIS — M25.641 DECREASED RANGE OF MOTION OF FINGER OF RIGHT HAND: Primary | ICD-10-CM

## 2023-10-03 DIAGNOSIS — R29.898 DECREASED FINGER STRENGTH: ICD-10-CM

## 2023-10-03 PROCEDURE — 97022 WHIRLPOOL THERAPY: CPT

## 2023-10-03 PROCEDURE — 97110 THERAPEUTIC EXERCISES: CPT

## 2023-10-03 PROCEDURE — 97140 MANUAL THERAPY 1/> REGIONS: CPT

## 2023-10-03 NOTE — PROGRESS NOTES
[facility]  Occupational Therapy Treatment Note     Date: 10/3/2023  Name: Sofía Mcwilliams  Clinic Number: [id number]    Therapy Diagnosis:   Encounter Diagnoses   Name Primary?    Decreased range of motion of finger of right hand Yes    Decreased finger strength              Physician: Ramona Barrett PA-C      Physician Orders: Eval and Treat  Medical Diagnosis: M66.20 (ICD-10-CM) - Tendon rupture, nontraumatic, extensor  Surgical Procedure and Date: 6/30/2023,   1. Extensor tendon repair right middle finger side to side with index finger tendon.    2. Extensor tendon repair right ring finger side-to-side repair with small finger tendon.    Evaluation Date: 7/26/2023  Insurance Authorization Period Expiration: 12/31/2023  Plan of Care Certification Period: 8 weeks; 9/22/2023  Date of Return to MD: 8/10/2023  Visit # / Visits authorized: 10 / 20  FOTO: 1/ 3        Precautions:  Standard and Fall       \  Time In: 08:02 AM   Time Out: 09:00       AM   Total Billable Time: 58  minutes        11 weeks 5 days   Subjective     She was compliant with home exercise program given last session.   Response to previous treatment:decreased wrist edema  Functional change: none noted to this date     Pain: 0/10  Location: right wrists      Objective     Observation/Appearance:  Skin intact, Skin dry, Hypertrophic scarring, and Edema present      Edema. Measured in centimeters.    7/26/2023 7/26/2023     Right  Left    Proximal Wrist Crease 15.5 15.0 cm   Figure of 8       MCPs 19.0  18.5             Hand ROM. Measured in degrees.    7/26/2023 7/26/2023 9/19/2023 9/29/2023     Right  Left  Right Right             Index: MP  0/35   50 50/64              PIP     0/45    88              DIP 0/31    35              BABB 111                 Long:  MP 10/45   40 45/70              PIP /45    /95              DIP 0/25    54              BABB 105                 Ring:   MP 5/15    20/55              PIP 0/50     /106              DIP 0/25    /36              BABB 85                 Small:  MP 0/10    75               PIP 0/30    90               DIP 0/35    40              BABB 75             Elbow and Wrist ROM. Measured in degrees.    7/31/2023 7/31/2023     Left Right           Wrist Ext/Flex 65/70 26/35                    Sensation: denies numbness and tingling   NT Distribution 7/26/2023 7/26/2023     Right  Left    [place] Shaye       Normal 1.65-2.83       Diminished Light Touch 3.22-3.61       Diminished Protective 3.84-4.31       Loss of Protective 4.56-6.65       Untestable >6.65       2 Point Discrimination       Static       Dynamic               Strength (Dyanmometer) and Pinch Strength (Pinch Gauge)  Measured in pounds and psi. Average of three trials.    7/26/2023 7/26/2023     Right  Left    Rung II deferred     Key Pinch       3pt Pinch       2pt Pinch             Intake Outcome Measure for FOTO initial eval; hand Survey     Therapist reviewed FOTO scores for Sofía Mcwilliams on 7/26/2023.   FOTO documents entered into [employer] - see Media section.     Intake Score: 52.5%           Treatment     Sofía received the treatments listed below:             Fluidotherapy: To R hand for 10 min, continuous air, 110 deg, air speed 100 to decrease pain, edema & scar tissue and increased tissue extensibility.        Manual massage X 20 mintues with dycem to decrease wrist adhesions   - dycem opp pulling based on direction of skin movement to encourage increased excursion on tendon while patient is performing PIP extension and wrist with all digits    Cupping over scar X 3 minutes multiple trials   Lymphatouch vibration 60 and pressure 150 over dorsal hand incision        Patient received ultrasound to R dorsal hand area to increase blood flow, circulation, tissue elasticity, and for pain management for 10 minutes @ 3 Mhz, Intensity 1.0 w/cm2 at 100 % duty cycle. NT           Therapeutic activity: 15 min   - hook grasp x  10 reps (3 sets)   - syngeristic wrist x 10 reps (2 sets) with dycem   - Isospheres (2 min)     - IF digit extension exercise with bowling ball (2 sets)   - LF digit extension exercise with bowling ball (2 sets)   - holding tennis ball lifting IF x 10 reps holding 3s  - holding tennis ball lifting LF x 10 reps holding 3s       Patient Education and Home Exercises     Education provided:   Compression glove size XS   - KT  - day splint vs night splint   - Progress towards goals     Written Home Exercises Provided: yes.  Exercises were reviewed and Sofía was able to demonstrate them prior to the end of the session.  Sofía demonstrated good  understanding of the HEP provided. See EMR under Patient Instructions for exercises provided during therapy sessions.       Assessment     Pt tolerated session well.  Cont to monitor extensor lag     Sofía is progressing well towards her goals and there are no updates to goals at this time. Pt prognosis is Fair.     Pt will continue to benefit from skilled outpatient occupational therapy to address the deficits listed in the problem list on initial evaluation provide pt/family education and to maximize pt's level of independence in the home and community environment.     Pt's spiritual, cultural and educational needs considered and pt agreeable to plan of care and goals.    Anticipated barriers to occupational therapy:     Goals:  Long term goals: (by d/c)  1)  Patient to be IND with HEP and modalities for pain management  2)  Increase ROM 5-10 degrees to increase functional hand use for ADLs/leisure activities  3)   Increase  strength 5-8 lbs. to carry laundry, carry groceries, sweep, and increase functional independence of right hand for ADLs/iADLs  4)   Increase pinch 3-4  psis for  Increase in functional independence in ADLs/iADLs such as manipulating fasteners and opening containers  5) Patient to score at 63%  or less on FOTO to demonstrate improved perception of  functional rightUE Use.       Short term Goals: ( 4 weeks)   1)  Patient to be IND with HEP and modalities for pain management  2)  Increase wrist ROM 10 degrees to increase functional hand use for ADLs/leisure activities  3)   Pt will demo ext lag less than 5 degrees for lF MP jt.   4)   Pt will be able to make a full composite fist   5)   Patient to score at 55%  or less on FOTO to demonstrate improved perception of functional right UE Use.    Plan     Updates/Grading for next session:     Hannah Coon OT   10/3/2023

## 2023-10-04 ENCOUNTER — CLINICAL SUPPORT (OUTPATIENT)
Dept: REHABILITATION | Facility: HOSPITAL | Age: 83
End: 2023-10-04
Payer: MEDICARE

## 2023-10-04 DIAGNOSIS — R29.898 DECREASED FINGER STRENGTH: ICD-10-CM

## 2023-10-04 DIAGNOSIS — M25.641 DECREASED RANGE OF MOTION OF FINGER OF RIGHT HAND: Primary | ICD-10-CM

## 2023-10-04 PROCEDURE — 97110 THERAPEUTIC EXERCISES: CPT

## 2023-10-04 PROCEDURE — 97022 WHIRLPOOL THERAPY: CPT

## 2023-10-04 PROCEDURE — 97035 APP MDLTY 1+ULTRASOUND EA 15: CPT

## 2023-10-04 PROCEDURE — 97140 MANUAL THERAPY 1/> REGIONS: CPT

## 2023-10-06 ENCOUNTER — CLINICAL SUPPORT (OUTPATIENT)
Dept: REHABILITATION | Facility: HOSPITAL | Age: 83
End: 2023-10-06
Payer: MEDICARE

## 2023-10-06 DIAGNOSIS — M25.641 DECREASED RANGE OF MOTION OF FINGER OF RIGHT HAND: Primary | ICD-10-CM

## 2023-10-06 DIAGNOSIS — R29.898 DECREASED FINGER STRENGTH: ICD-10-CM

## 2023-10-06 PROCEDURE — 97140 MANUAL THERAPY 1/> REGIONS: CPT

## 2023-10-06 PROCEDURE — 97035 APP MDLTY 1+ULTRASOUND EA 15: CPT

## 2023-10-06 PROCEDURE — 97022 WHIRLPOOL THERAPY: CPT

## 2023-10-06 PROCEDURE — 97530 THERAPEUTIC ACTIVITIES: CPT

## 2023-10-06 NOTE — PROGRESS NOTES
"    [facility]  Occupational Therapy Treatment Note     Date: 10/6/2023  Name: Sofía Mcwilliams  Clinic Number: [id number]    Therapy Diagnosis:   Encounter Diagnoses   Name Primary?    Decreased range of motion of finger of right hand Yes    Decreased finger strength            Physician: Ramona Barrett PA-C      Physician Orders: Eval and Treat  Medical Diagnosis: M66.20 (ICD-10-CM) - Tendon rupture, nontraumatic, extensor  Surgical Procedure and Date: 6/30/2023,   1. Extensor tendon repair right middle finger side to side with index finger tendon.    2. Extensor tendon repair right ring finger side-to-side repair with small finger tendon.    Evaluation Date: 7/26/2023  Insurance Authorization Period Expiration: 12/31/2023  Plan of Care Certification Period: 8 weeks; 9/22/2023  Date of Return to MD: 8/10/2023  Visit # / Visits authorized: 10 / 20  FOTO: 1/ 3        Precautions:  Standard and Fall       \  Time In: 08:02 AM   Time Out: 09:00   AM   Total Billable Time: 58  minutes        11 weeks 5 days   Subjective     "It hurts this part right here (ulnar styloid)"  She was compliant with home exercise program given last session.   Response to previous treatment:decreased wrist edema  Functional change: lgith activity with dynamic     Pain: 0/10  Location: right wrists      Objective     Observation/Appearance:  Skin intact, Skin dry, Hypertrophic scarring, and Edema present      Edema. Measured in centimeters.    7/26/2023 7/26/2023     Right  Left    Proximal Wrist Crease 15.5 15.0 cm   Figure of 8       MCPs 19.0  18.5             Hand ROM. Measured in degrees.    7/26/2023 7/26/2023 9/19/2023 9/29/2023     Right  Left  Right Right             Index: MP  0/35   50 50/64              PIP     0/45    88              DIP 0/31    35              BABB 111                 Long:  MP 10/45   40 45/70              PIP /45    /95              DIP 0/25    54              BABB 105               "   Ring:   MP 5/15    20/55              PIP 0/50    /106              DIP 0/25    /36              BABB 85                 Small:  MP 0/10    75               PIP 0/30    90               DIP 0/35    40              BABB 75             Elbow and Wrist ROM. Measured in degrees.    7/31/2023 7/31/2023     Left Right           Wrist Ext/Flex 65/70 26/35                    Sensation: denies numbness and tingling   NT Distribution 7/26/2023 7/26/2023     Right  Left    [place] Shaye       Normal 1.65-2.83       Diminished Light Touch 3.22-3.61       Diminished Protective 3.84-4.31       Loss of Protective 4.56-6.65       Untestable >6.65       2 Point Discrimination       Static       Dynamic               Strength (Dyanmometer) and Pinch Strength (Pinch Gauge)  Measured in pounds and psi. Average of three trials.    7/26/2023 7/26/2023     Right  Left    Rung II deferred     Key Pinch       3pt Pinch       2pt Pinch             Intake Outcome Measure for FOTO initial eval; hand Survey     Therapist reviewed FOTO scores for Sofía Mcwilliams on 7/26/2023.   FOTO documents entered into [employer] - see Media section.     Intake Score: 52.5%           Treatment     Sofía received the treatments listed below:             Fluidotherapy: To R hand for 10 min, continuous air, 110 deg, air speed 100 to decrease pain, edema & scar tissue and increased tissue extensibility.        Manual massage X 20 mintues with dycem to decrease wrist adhesions   - dycem opp pulling based on direction of skin movement to encourage increased excursion on tendon while patient is performing PIP extension and wrist with all digits    Cupping over scar X 3 minutes multiple trials   Lymphatouch vibration 60 and pressure 150 over dorsal hand incision NT        Patient received ultrasound to R dorsal hand area to increase blood flow, circulation, tissue elasticity, and for pain management for 10 minutes @ 3 Mhz, Intensity 1.0 w/cm2 at 100 % duty cycle.            Therapeutic activity: 25 min   - hook grasp x 10 reps (3 sets)   - syngeristic wrist x 10 reps (2 sets) with dycem   - Isospheres (2 min)   - practicing making jewelry with splint on (10 min)     - IF digit extension exercise with bowling ball (2 sets)   - LF digit extension exercise with bowling ball (2 sets)   - holding tennis ball lifting IF x 10 reps holding 3s  - holding tennis ball lifting LF x 10 reps holding 3s       Patient Education and Home Exercises     Education provided:   Compression glove size XS   - KT  - day splint vs night splint   - Progress towards goals     Written Home Exercises Provided: yes.  Exercises were reviewed and Sofía was able to demonstrate them prior to the end of the session.  Sofía demonstrated good  understanding of the HEP provided. See EMR under Patient Instructions for exercises provided during therapy sessions.       Assessment     Pt tolerated session well.  Cont to monitor extensor lag. Able to liza light activity with splint.     Sofía is progressing well towards her goals and there are no updates to goals at this time. Pt prognosis is Fair.     Pt will continue to benefit from skilled outpatient occupational therapy to address the deficits listed in the problem list on initial evaluation provide pt/family education and to maximize pt's level of independence in the home and community environment.     Pt's spiritual, cultural and educational needs considered and pt agreeable to plan of care and goals.    Anticipated barriers to occupational therapy:     Goals:  Long term goals: (by d/c)  1)  Patient to be IND with HEP and modalities for pain management  2)  Increase ROM 5-10 degrees to increase functional hand use for ADLs/leisure activities  3)   Increase  strength 5-8 lbs. to carry laundry, carry groceries, sweep, and increase functional independence of right hand for ADLs/iADLs  4)   Increase pinch 3-4  psis for  Increase in functional independence in  ADLs/iADLs such as manipulating fasteners and opening containers  5) Patient to score at 63%  or less on FOTO to demonstrate improved perception of functional rightUE Use.       Short term Goals: ( 4 weeks)   1)  Patient to be IND with HEP and modalities for pain management  2)  Increase wrist ROM 10 degrees to increase functional hand use for ADLs/leisure activities  3)   Pt will demo ext lag less than 5 degrees for lF MP jt.   4)   Pt will be able to make a full composite fist   5)   Patient to score at 55%  or less on FOTO to demonstrate improved perception of functional right UE Use.    Plan     Updates/Grading for next session:     Hannah Coon OT   10/6/2023

## 2023-10-06 NOTE — PROGRESS NOTES
"  [facility]  Occupational Therapy Treatment Note     Date: 10/4/2023  Name: Sofía Mcwilliams  Clinic Number: [id number]    Therapy Diagnosis:   Encounter Diagnoses   Name Primary?    Decreased range of motion of finger of right hand Yes    Decreased finger strength          Physician: Ramona Barrett PA-C      Physician Orders: Eval and Treat  Medical Diagnosis: M66.20 (ICD-10-CM) - Tendon rupture, nontraumatic, extensor  Surgical Procedure and Date: 6/30/2023,   1. Extensor tendon repair right middle finger side to side with index finger tendon.    2. Extensor tendon repair right ring finger side-to-side repair with small finger tendon.    Evaluation Date: 7/26/2023  Insurance Authorization Period Expiration: 12/31/2023  Plan of Care Certification Period: 8 weeks;   Date of Return to MD: 8/10/2023  Visit # / Visits authorized: 23 / 20  FOTO: 1/ 3        Precautions:  Standard and Fall       Time In: 08:02 AM   Time Out: 08: 50   AM   Total Billable Time: 48  minutes        11 weeks 5 days   Subjective     "I randomly get pain it just hits me. I think the cream has helped my swelling though"    She was compliant with home exercise program given last session.   Response to previous treatment:decreased wrist edema  Functional change: none noted to this date     Pain: 0/10  Location: right wrists      Objective     Observation/Appearance:  Skin intact, Skin dry, Hypertrophic scarring, and Edema present      Edema. Measured in centimeters.    7/26/2023 7/26/2023     Right  Left    Proximal Wrist Crease 15.5 15.0 cm   Figure of 8       MCPs 19.0  18.5             Hand ROM. Measured in degrees.    7/26/2023 7/26/2023 9/19/2023 9/29/2023     Right  Left  Right Right             Index: MP  0/35   50 50/64              PIP     0/45    88              DIP 0/31    35              BABB 111                 Long:  MP 10/45   40 45/70              PIP /45    /95              DIP 0/25    54              BABB " 105                 Ring:   MP 5/15    20/55              PIP 0/50    /106              DIP 0/25    /36              BABB 85                 Small:  MP 0/10    75               PIP 0/30    90               DIP 0/35    40              BABB 75             Elbow and Wrist ROM. Measured in degrees.    7/31/2023 7/31/2023     Left Right           Wrist Ext/Flex 65/70 26/35                    Sensation: denies numbness and tingling   NT Distribution 7/26/2023 7/26/2023     Right  Left    [place] Shaye       Normal 1.65-2.83       Diminished Light Touch 3.22-3.61       Diminished Protective 3.84-4.31       Loss of Protective 4.56-6.65       Untestable >6.65       2 Point Discrimination       Static       Dynamic               Strength (Dyanmometer) and Pinch Strength (Pinch Gauge)  Measured in pounds and psi. Average of three trials.    7/26/2023 7/26/2023     Right  Left    Rung II deferred     Key Pinch       3pt Pinch       2pt Pinch             Intake Outcome Measure for FOTO initial eval; hand Survey     Therapist reviewed FOTO scores for Sofía Mcwilliams on 7/26/2023.   FOTO documents entered into [employer] - see Media section.     Intake Score: 52.5%           Treatment     Sofía received the treatments listed below:             Fluidotherapy: To R hand for 10 min, continuous air, 110 deg, air speed 100 to decrease pain, edema & scar tissue and increased tissue extensibility.       Manual therapy: 15  - retrograde massage   - scar tissue mobilization        Direct contact modalities:   Patient received ultrasound to R dorsal hand area to increase blood flow, circulation, tissue elasticity, and for pain management for 10 minutes @ 3 Mhz, Intensity 1.0 w/cm2 at 100 % duty cycle.       Therapeutic activity: 15 min   - hook grasp x 10 reps (3 sets)   - syngeristic wrist x 10 reps (3 sets) with dycem   - Isospheres (2 min)         Patient Education and Home Exercises     Education provided:   Compression glove size XS   -  KT  - day splint vs night splint   - Progress towards goals     Written Home Exercises Provided: yes.  Exercises were reviewed and Sofía was able to demonstrate them prior to the end of the session.  Sofía demonstrated good  understanding of the HEP provided. See EMR under Patient Instructions for exercises provided during therapy sessions.       Assessment     Cont to monitor extensor lag     Sofía is progressing well towards her goals and there are no updates to goals at this time. Pt prognosis is Fair.     Pt will continue to benefit from skilled outpatient occupational therapy to address the deficits listed in the problem list on initial evaluation provide pt/family education and to maximize pt's level of independence in the home and community environment.     Pt's spiritual, cultural and educational needs considered and pt agreeable to plan of care and goals.    Anticipated barriers to occupational therapy:     GOALS  1)  Patient to be IND with HEP and modalities for pain management  2)  Increase ROM 5-10 degrees to increase functional hand use for ADLs/leisure activities  3)   Increase  strength 5-8 lbs. to carry laundry, carry groceries, sweep, and increase functional independence of right hand for ADLs/iADLs  4)   Increase pinch 3-4  psis for  Increase in functional independence in ADLs/iADLs such as manipulating fasteners and opening containers  5) Patient to score at 63%  or less on FOTO to demonstrate improved perception of functional rightUE Use.      1)  Patient to be IND with HEP and modalities for pain management  2)  Increase wrist ROM 10 degrees to increase functional hand use for ADLs/leisure activities  3)   Pt will demo ext lag less than 5 degrees for lF MP jt.   4)   Pt will be able to make a full composite fist   5)   Patient to score at 55%  or less on FOTO to demonstrate improved perception of functional right UE Use.    Reasons for Recertification of Therapy:   functional     PLAN      Updated Certification Period: 9/22/2023 to 12/17/2023   Recommended Treatment Plan: 1-2 times per week for 8 weeks:  Fluidotherapy, Manual Therapy, Moist Heat/ Ice, Neuromuscular Re-ed, Orthotic Management and Training, Paraffin, Patient Education, Therapeutic Activities, Therapeutic Exercise, and Ultrasound  Other Recommendations:     MELY Gill OT   10/4/2023

## 2023-10-10 ENCOUNTER — CLINICAL SUPPORT (OUTPATIENT)
Dept: REHABILITATION | Facility: HOSPITAL | Age: 83
End: 2023-10-10
Payer: MEDICARE

## 2023-10-10 DIAGNOSIS — M25.641 DECREASED RANGE OF MOTION OF FINGER OF RIGHT HAND: Primary | ICD-10-CM

## 2023-10-10 DIAGNOSIS — R29.898 DECREASED FINGER STRENGTH: ICD-10-CM

## 2023-10-10 PROCEDURE — 97530 THERAPEUTIC ACTIVITIES: CPT

## 2023-10-10 PROCEDURE — 97140 MANUAL THERAPY 1/> REGIONS: CPT

## 2023-10-10 NOTE — PROGRESS NOTES
"    [facility]  Occupational Therapy Treatment Note     Date: 10/10/2023  Name: Sofía Mcwilliams  Clinic Number: [id number]    Therapy Diagnosis:   No diagnosis found.          Physician: Ramona Barrett PA-C      Physician Orders: Eval and Treat  Medical Diagnosis: M66.20 (ICD-10-CM) - Tendon rupture, nontraumatic, extensor  Surgical Procedure and Date: 6/30/2023,   1. Extensor tendon repair right middle finger side to side with index finger tendon.    2. Extensor tendon repair right ring finger side-to-side repair with small finger tendon.    Evaluation Date: 7/26/2023  Insurance Authorization Period Expiration: 12/31/2023  Plan of Care Certification Period: 8 weeks; 9/22/2023  Date of Return to MD: 8/10/2023  Visit # / Visits authorized: 10 / 20  FOTO: 1/ 3        Precautions:  Standard and Fall       \  Time In: 9:00 am   Time Out: 10:00 am   Total Billable Time: 58  minutes        11 weeks 5 days   Subjective     "It hurts this part right here (ulnar styloid)" and splint over MCP area     She was compliant with home exercise program given last session.   Response to previous treatment:decreased wrist edema  Functional change: lgith activity with dynamic     Pain: 0/10  Location: right wrists      Objective     Observation/Appearance:  Skin intact, Skin dry, Hypertrophic scarring, and Edema present      Edema. Measured in centimeters.    7/26/2023 7/26/2023     Right  Left    Proximal Wrist Crease 15.5 15.0 cm   Figure of 8       MCPs 19.0  18.5             Hand ROM. Measured in degrees.    7/26/2023 7/26/2023 9/19/2023 9/29/2023     Right  Left  Right Right             Index: MP  0/35   50 50/64              PIP     0/45    88              DIP 0/31    35              BABB 111                 Long:  MP 10/45   40 45/70              PIP /45    /95              DIP 0/25    54              BABB 105                 Ring:   MP 5/15    20/55              PIP 0/50    /106              DIP 0/25    " /36              BABB 85                 Small:  MP 0/10    75               PIP 0/30    90               DIP 0/35    40              BABB 75             Elbow and Wrist ROM. Measured in degrees.    7/31/2023 7/31/2023     Left Right           Wrist Ext/Flex 65/70 26/35                    Sensation: denies numbness and tingling   NT Distribution 7/26/2023 7/26/2023     Right  Left    [place] Shaye       Normal 1.65-2.83       Diminished Light Touch 3.22-3.61       Diminished Protective 3.84-4.31       Loss of Protective 4.56-6.65       Untestable >6.65       2 Point Discrimination       Static       Dynamic               Strength (Dyanmometer) and Pinch Strength (Pinch Gauge)  Measured in pounds and psi. Average of three trials.    7/26/2023 7/26/2023     Right  Left    Rung II deferred     Key Pinch       3pt Pinch       2pt Pinch             Intake Outcome Measure for FOTO initial eval; hand Survey     Therapist reviewed FOTO scores for Sofía Mcwilliams on 7/26/2023.   FOTO documents entered into [employer] - see Media section.     Intake Score: 52.5%           Treatment     Sofía received the treatments listed below:             Fluidotherapy: To R hand for 10 min, continuous air, 110 deg, air speed 100 to decrease pain, edema & scar tissue and increased tissue extensibility.        Manual massage X 20 mintues with dycem to decrease wrist adhesions   - dycem opp pulling based on direction of skin movement to encourage increased excursion on tendon while patient is performing PIP extension and wrist with all digits    Cupping over scar X 3 minutes multiple trials           Adjusted  splint for pressure areas           Therapeutic activity: 25 min    Tenodesis and wrist flexion and pt has active MP extension     - hook grasp x 10 reps (3 sets)   - syngeristic wrist x 10 reps (2 sets) with dycem   - Isospheres (2 min)   - practicing making jewelry with splint on (10 min)        Unable to do these exercises below due to  changing custom splint     - IF digit extension exercise with bowling ball (2 sets)   - LF digit extension exercise with bowling ball (2 sets)   - holding tennis ball lifting IF x 10 reps holding 3s  - holding tennis ball lifting LF x 10 reps holding 3s       Patient Education and Home Exercises     Education provided:   Compression glove size XS   -   - day splint vs night splint   - Progress towards goals     Written Home Exercises Provided: yes.  Exercises were reviewed and Sofía was able to demonstrate them prior to the end of the session.  Sofía demonstrated good  understanding of the HEP provided. See EMR under Patient Instructions for exercises provided during therapy sessions.       Assessment     Pt tolerated session well.  Cont to monitor extensor lag. Able to liza light activity with splint. She is trying radial nerve extension splint . She is frustrated she does not have more MP extension of long and ring fingers     Sofía is progressing well towards her goals and there are no updates to goals at this time. Pt prognosis is Fair.     Pt will continue to benefit from skilled outpatient occupational therapy to address the deficits listed in the problem list on initial evaluation provide pt/family education and to maximize pt's level of independence in the home and community environment.     Pt's spiritual, cultural and educational needs considered and pt agreeable to plan of care and goals.    Anticipated barriers to occupational therapy:     Goals:  Long term goals: (by d/c)  1)  Patient to be IND with HEP and modalities for pain management  2)  Increase ROM 5-10 degrees to increase functional hand use for ADLs/leisure activities  3)   Increase  strength 5-8 lbs. to carry laundry, carry groceries, sweep, and increase functional independence of right hand for ADLs/iADLs  4)   Increase pinch 3-4  psis for  Increase in functional independence in ADLs/iADLs such as manipulating fasteners and opening  containers  5) Patient to score at 63%  or less on FOTO to demonstrate improved perception of functional rightUE Use.       Short term Goals: ( 4 weeks)   1)  Patient to be IND with HEP and modalities for pain management  2)  Increase wrist ROM 10 degrees to increase functional hand use for ADLs/leisure activities  3)   Pt will demo ext lag less than 5 degrees for lF MP jt.   4)   Pt will be able to make a full composite fist   5)   Patient to score at 55%  or less on FOTO to demonstrate improved perception of functional right UE Use.    Plan     Updates/Grading for next session:     Kiera Ramsey, MELY   10/10/2023

## 2023-10-13 ENCOUNTER — CLINICAL SUPPORT (OUTPATIENT)
Dept: REHABILITATION | Facility: HOSPITAL | Age: 83
End: 2023-10-13
Payer: MEDICARE

## 2023-10-13 DIAGNOSIS — M25.641 DECREASED RANGE OF MOTION OF FINGER OF RIGHT HAND: Primary | ICD-10-CM

## 2023-10-13 DIAGNOSIS — R29.898 DECREASED FINGER STRENGTH: ICD-10-CM

## 2023-10-13 PROCEDURE — 97530 THERAPEUTIC ACTIVITIES: CPT

## 2023-10-13 PROCEDURE — 97022 WHIRLPOOL THERAPY: CPT

## 2023-10-13 PROCEDURE — 97760 ORTHOTIC MGMT&TRAING 1ST ENC: CPT

## 2023-10-13 NOTE — PROGRESS NOTES
"    [facility]  Occupational Therapy Treatment Note     Date: 10/13/2023  Name: Sofía Mcwilliams  Clinic Number: [id number]    Therapy Diagnosis:   Encounter Diagnoses   Name Primary?    Decreased range of motion of finger of right hand Yes    Decreased finger strength              Physician: Ramona Barrett PA-C      Physician Orders: Eval and Treat  Medical Diagnosis: M66.20 (ICD-10-CM) - Tendon rupture, nontraumatic, extensor  Surgical Procedure and Date: 6/30/2023,   1. Extensor tendon repair right middle finger side to side with index finger tendon.    2. Extensor tendon repair right ring finger side-to-side repair with small finger tendon.    Evaluation Date: 7/26/2023  Insurance Authorization Period Expiration: 12/31/2023  Plan of Care Certification Period: 8 weeks; 9/22/2023  Date of Return to MD: 8/10/2023  Visit # / Visits authorized: 10 / 20  FOTO: 1/ 3        Precautions:  Standard and Fall         Time In: 08:05 am   Time Out: 09:00 am   Total Billable Time: 55  minutes          Subjective     "It's still pressing right here. I tried to write the other day and I couldn't"    She was compliant with home exercise program given last session.   Response to previous treatment:decreased wrist edema  Functional change: lgith activity with dynamic     Pain: 0/10  Location: right wrists      Objective     Observation/Appearance:  Skin intact, Skin dry, Hypertrophic scarring, and Edema present      Edema. Measured in centimeters.    7/26/2023 7/26/2023     Right  Left    Proximal Wrist Crease 15.5 15.0 cm   Figure of 8       MCPs 19.0  18.5             Hand ROM. Measured in degrees.    7/26/2023 7/26/2023 9/19/2023 9/29/2023 10/13/2023     Right  Left  Right Right Right              Index: MP  0/35   50 50/64 45/              PIP     0/45    88               DIP 0/31    35               BABB 111                   Long:  MP 10/45   40 45/70 40/              PIP /45    /95               DIP " 0/25    54               BABB 105                   Ring:   MP 5/15    20/55               PIP 0/50    /106               DIP 0/25    /36               BABB 85                   Small:  MP 0/10    75                PIP 0/30    90                DIP 0/35    40               BABB 75              Elbow and Wrist ROM. Measured in degrees.    7/31/2023 7/31/2023     Left Right           Wrist Ext/Flex 65/70 26/35                    Sensation: denies numbness and tingling   NT Distribution 7/26/2023 7/26/2023     Right  Left    [facility]       Normal 1.65-2.83       Diminished Light Touch 3.22-3.61       Diminished Protective 3.84-4.31       Loss of Protective 4.56-6.65       Untestable >6.65       2 Point Discrimination       Static       Dynamic               Strength (Dyanmometer) and Pinch Strength (Pinch Gauge)  Measured in pounds and psi. Average of three trials.    7/26/2023 7/26/2023     Right  Left    Rung II deferred     Key Pinch       3pt Pinch       2pt Pinch             Intake Outcome Measure for FOTO initial eval; hand Survey     Therapist reviewed FOTO scores for Sofía Mcwilliams on 7/26/2023.   FOTO documents entered into [employer] - see Media section.     Intake Score: 52.5%           Treatment     Sofía received the treatments listed below:             Fluidotherapy: To R hand for 10 min, continuous air, 110 deg, air speed 100 to decrease pain, edema & scar tissue and increased tissue extensibility.          Orthotic fit and train: 25 min   - remolded part of splint to reduce pressure areas   - added cushion to reduce pressure point   - applied edge tape               Therapeutic activity: 15 min    Tenodesis and wrist flexion and pt has active MP extension   - hook grasp x 10 reps (3 sets)   - syngeristic wrist x 10 reps (2 sets) with dycem   - Isospheres (2 min)   - practicing making jewelry with splint on (10 min)             Patient Education and Home Exercises     Education provided:   Compression  glove size XS   - day splint vs night splint   - Progress towards goals     Written Home Exercises Provided: yes.  Exercises were reviewed and Sofía was able to demonstrate them prior to the end of the session.  Sofía demonstrated good  understanding of the HEP provided. See EMR under Patient Instructions for exercises provided during therapy sessions.       Assessment      Cont to monitor  moderate extensor lag. IF cont to have MP ext lag ~ 45 degrees and LF ~ 40 degrees. Possible partial rupture of EIP, which is causing LF to lag. Will send message to Dr. Prabhakar. Cont to have mod swelling within surrounding surgical site and ulnar styloid.   Able to liza light activity with splint - however reports having to remove splint after prolonged time when using hand due to pain. She is frustrated with process since sx.       Sofía is progressing well towards her goals and there are no updates to goals at this time. Pt prognosis is Fair.     Pt will continue to benefit from skilled outpatient occupational therapy to address the deficits listed in the problem list on initial evaluation provide pt/family education and to maximize pt's level of independence in the home and community environment.     Pt's spiritual, cultural and educational needs considered and pt agreeable to plan of care and goals.    Anticipated barriers to occupational therapy:     Goals:  Long term goals: (by d/c)  1)  Patient to be IND with HEP and modalities for pain management  2)  Increase ROM 5-10 degrees to increase functional hand use for ADLs/leisure activities  3)   Increase  strength 5-8 lbs. to carry laundry, carry groceries, sweep, and increase functional independence of right hand for ADLs/iADLs  4)   Increase pinch 3-4  psis for  Increase in functional independence in ADLs/iADLs such as manipulating fasteners and opening containers  5) Patient to score at 63%  or less on FOTO to demonstrate improved perception of functional rightUE  Use.       Short term Goals: ( 4 weeks)   1)  Patient to be IND with HEP and modalities for pain management MET   2)  Increase wrist ROM 10 degrees to increase functional hand use for ADLs/leisure activities  3)   Pt will demo ext lag less than 5 degrees for lF MP jt.   4)   Pt will be able to make a full composite fist MET   5)   Patient to score at 55%  or less on FOTO to demonstrate improved perception of functional right UE Use.    Plan     Updates/Grading for next session:     Hannah Coon OT   10/13/2023

## 2023-10-16 ENCOUNTER — OFFICE VISIT (OUTPATIENT)
Dept: ORTHOPEDICS | Facility: CLINIC | Age: 83
End: 2023-10-16
Payer: MEDICARE

## 2023-10-16 VITALS — BODY MASS INDEX: 25.1 KG/M2 | WEIGHT: 147 LBS | HEIGHT: 64 IN

## 2023-10-16 DIAGNOSIS — S66.919S: Primary | ICD-10-CM

## 2023-10-16 PROCEDURE — 1101F PT FALLS ASSESS-DOCD LE1/YR: CPT | Mod: HCNC,CPTII,S$GLB, | Performed by: ORTHOPAEDIC SURGERY

## 2023-10-16 PROCEDURE — 1125F AMNT PAIN NOTED PAIN PRSNT: CPT | Mod: HCNC,CPTII,S$GLB, | Performed by: ORTHOPAEDIC SURGERY

## 2023-10-16 PROCEDURE — 99213 PR OFFICE/OUTPT VISIT, EST, LEVL III, 20-29 MIN: ICD-10-PCS | Mod: HCNC,S$GLB,, | Performed by: ORTHOPAEDIC SURGERY

## 2023-10-16 PROCEDURE — 3288F PR FALLS RISK ASSESSMENT DOCUMENTED: ICD-10-PCS | Mod: HCNC,CPTII,S$GLB, | Performed by: ORTHOPAEDIC SURGERY

## 2023-10-16 PROCEDURE — 1159F PR MEDICATION LIST DOCUMENTED IN MEDICAL RECORD: ICD-10-PCS | Mod: HCNC,CPTII,S$GLB, | Performed by: ORTHOPAEDIC SURGERY

## 2023-10-16 PROCEDURE — 3288F FALL RISK ASSESSMENT DOCD: CPT | Mod: HCNC,CPTII,S$GLB, | Performed by: ORTHOPAEDIC SURGERY

## 2023-10-16 PROCEDURE — 1159F MED LIST DOCD IN RCRD: CPT | Mod: HCNC,CPTII,S$GLB, | Performed by: ORTHOPAEDIC SURGERY

## 2023-10-16 PROCEDURE — 99213 OFFICE O/P EST LOW 20 MIN: CPT | Mod: HCNC,S$GLB,, | Performed by: ORTHOPAEDIC SURGERY

## 2023-10-16 PROCEDURE — 1101F PR PT FALLS ASSESS DOC 0-1 FALLS W/OUT INJ PAST YR: ICD-10-PCS | Mod: HCNC,CPTII,S$GLB, | Performed by: ORTHOPAEDIC SURGERY

## 2023-10-16 PROCEDURE — 99999 PR PBB SHADOW E&M-EST. PATIENT-LVL III: ICD-10-PCS | Mod: PBBFAC,HCNC,, | Performed by: ORTHOPAEDIC SURGERY

## 2023-10-16 PROCEDURE — 99999 PR PBB SHADOW E&M-EST. PATIENT-LVL III: CPT | Mod: PBBFAC,HCNC,, | Performed by: ORTHOPAEDIC SURGERY

## 2023-10-16 PROCEDURE — 1125F PR PAIN SEVERITY QUANTIFIED, PAIN PRESENT: ICD-10-PCS | Mod: HCNC,CPTII,S$GLB, | Performed by: ORTHOPAEDIC SURGERY

## 2023-10-16 NOTE — PROGRESS NOTES
Subjective:      Patient ID: Sofía Mcwilliams is a 82 y.o. female.  Chief Complaint: Post-op Evaluation      HPI  Sofía Mcwilliams is a  82 y.o. female presenting today for follow up of extensor tendon transfer right hand and fingers.  She reports that she is now about 4 months postop still in therapy making progress her left foot is also improving from previous radiculopathy and neuritis.    Review of patient's allergies indicates:   Allergen Reactions    Celecoxib      Other reaction(s): Swelling    Sulfa (sulfonamide antibiotics)      Other reaction(s): Hives         Current Outpatient Medications   Medication Sig Dispense Refill    aspirin (ECOTRIN) 81 MG EC tablet Take 1 tablet by mouth.      atorvastatin (LIPITOR) 20 MG tablet TAKE 1 TABLET EVERY DAY 90 tablet 2    CALCIUM CARB/VIT D3/MINERALS (CALCIUM-VITAMIN D ORAL) once daily.       coenzyme Q10 10 mg capsule Take by mouth.      gabapentin (NEURONTIN) 300 MG capsule Take 300 mg by mouth 3 (three) times daily.      losartan (COZAAR) 25 MG tablet TAKE 1 TABLET EVERY DAY 90 tablet 0    multivitamin (THERAGRAN) per tablet Take 1 tablet by mouth.      rOPINIRole (REQUIP XL) 2 mg 24 hr tablet TAKE 1 TABLET BY MOUTH EVERY EVENING 90 tablet 3    rOPINIRole (REQUIP) 0.5 MG tablet Take 1 tablet as needed up to 3 times a day for breakthrough RLS symptoms. 90 tablet 11    tiZANidine (ZANAFLEX) 2 MG tablet Take 1 tablet (2 mg total) by mouth nightly as needed. 90 tablet 1    VITAMIN B COMPLEX ORAL Take by mouth once daily.       vitamin D 1000 units Tab Take 185 mg by mouth once daily.       No current facility-administered medications for this visit.       Past Medical History:   Diagnosis Date    Anxiety 5/2/2016    Arthritis     Basal cell carcinoma 2013    left buttock    Cataract     Hypertension     Other and unspecified hyperlipidemia        Past Surgical History:   Procedure Laterality Date    APPENDECTOMY      bunion      right    COLONOSCOPY N/A 9/28/2015     Procedure: COLONOSCOPY;  Surgeon: Joe Amos MD;  Location: [facility] ENDO (4TH FLR);  Service: Endoscopy;  Laterality: N/A;    COLONOSCOPY N/A 3/16/2021    Procedure: COLONOSCOPY;  Surgeon: Brice Zayas MD;  [facility]  Service: Endoscopy;  Laterality: N/A;  covid test 3/13-primary care    hip surgery x 2      HYSTERECTOMY      partial    INJECTION OF JOINT Left 1/9/2019    Procedure: Injection, LEFT HIP INTRAARTICULAR INJECTION;  Surgeon: Omega Estevez MD;  Location: [facility] PAIN MGT;  Service: Pain Management;  Laterality: Left;    INJECTION OF JOINT Left 3/13/2019    Procedure: INJECTION, JOINT LEFT HIP;  Surgeon: Omega Estevez MD;  Location: [facility] PAIN MGT;  Service: Pain Management;  Laterality: Left;  Left Hip Intrarticular Steroid Injection    INJECTION, SACROILIAC JOINT Right 2/23/2023    Procedure: INJECTION,SACROILIAC JOINT Right SI Joint, Right GTB;  Surgeon: Omega Estevez MD;  Location: [facility] PAIN MGT;  Service: Pain Management;  Laterality: Right;    JOINT REPLACEMENT      SURGICAL REMOVAL OF BONE SPUR  8/16/2022    Procedure: EXCISION, BONE SPUR ULNA;  Surgeon: Brennen Prabhakar Jr., MD;  Location: [facility] OR;  Service: Orthopedics;;    SURGICAL REMOVAL OF DISTAL ULNA Right 8/16/2022    Procedure: EXCISION, ULNA, DISTAL;  Surgeon: Brennen Prabhakar Jr., MD;  Location: [facility] OR;  Service: Orthopedics;  Laterality: Right;    TONSILLECTOMY      TRANSFER OF HAND TENDON Right 8/16/2022    Procedure: TRANSFER, TENDON, HAND;  Surgeon: Brennen Prabhakar Jr., MD;  Location: [facility] OR;  Service: Orthopedics;  Laterality: Right;  transfer extensor tendon small finger    TRANSFER OF HAND TENDON Right 6/30/2023    Procedure: TRANSFER, TENDON, HAND;  Surgeon: Brennen Prabhakar Jr., MD;  Location: [facility] OR;  Service: Orthopedics;  Laterality: Right;    TRANSFORAMINAL EPIDURAL INJECTION OF STEROID Left 8/25/2021    Procedure: Injection,steroid,epidural,transforaminal approach; Levels:  "L5-S1;  Surgeon: Heather Mederos MD;  Location: [facility];  Service: Pain Management;  Laterality: Left;  No pacemaker. Patient is taking ASA.     TRANSFORAMINAL EPIDURAL INJECTION OF STEROID Bilateral 2/23/2022    Procedure: Injection,steroid,epidural,transforaminal bilateral L5;  Surgeon: Heather Mederos MD;  Location: [facility];  Service: Pain Management;  Laterality: Bilateral;  ASA   no pacemaker       OBJECTIVE:   PHYSICAL EXAM:  Height: 5' 4" (162.6 cm) Weight: 66.7 kg (147 lb)  Vitals:    10/16/23 0832   Weight: 66.7 kg (147 lb)   Height: 5' 4" (1.626 m)   PainSc:   3     Ortho/SPM Exam  Examination right hand the incision well healed swelling minimal no tenderness   Range of motion fingers is pretty good especially with flexion has a slight extensor lag to the index and middle finger about 30°   Full extension to the ring and small finger       RADIOGRAPHS:  None  Comments: I have personally reviewed the imaging and I agree with the above radiologist's report.    ASSESSMENT/PLAN:     IMPRESSION:  Status post extensor tendon transfer right hand    PLAN:  Continue therapy but I think she can cut back to once a week   Discontinue the splint during the day but continue nighttime splinting told her fingers in extension   Advance activities as tolerated    FOLLOW UP:  4-6 weeks    Disclaimer: This note has been generated using voice-recognition software. There may be typographical errors that have been missed during proof-reading.     "

## 2023-10-18 ENCOUNTER — CLINICAL SUPPORT (OUTPATIENT)
Dept: REHABILITATION | Facility: HOSPITAL | Age: 83
End: 2023-10-18
Payer: MEDICARE

## 2023-10-18 DIAGNOSIS — R29.898 DECREASED FINGER STRENGTH: ICD-10-CM

## 2023-10-18 DIAGNOSIS — M25.641 DECREASED RANGE OF MOTION OF FINGER OF RIGHT HAND: Primary | ICD-10-CM

## 2023-10-18 PROCEDURE — 97140 MANUAL THERAPY 1/> REGIONS: CPT

## 2023-10-18 PROCEDURE — 97022 WHIRLPOOL THERAPY: CPT

## 2023-10-18 PROCEDURE — 97530 THERAPEUTIC ACTIVITIES: CPT

## 2023-10-18 NOTE — PROGRESS NOTES
" [facility]  Occupational Therapy Treatment Note     Date: 10/18/2023  Name: Sofía Mcwilliams  Clinic Number: [id number]    Therapy Diagnosis:   Encounter Diagnoses   Name Primary?    Decreased range of motion of finger of right hand Yes    Decreased finger strength          Physician: Ramona Barrett PA-C      Physician Orders: Eval and Treat  Medical Diagnosis: M66.20 (ICD-10-CM) - Tendon rupture, nontraumatic, extensor  Surgical Procedure and Date: 6/30/2023,   1. Extensor tendon repair right middle finger side to side with index finger tendon.    2. Extensor tendon repair right ring finger side-to-side repair with small finger tendon.    Evaluation Date: 7/26/2023  Insurance Authorization Period Expiration: 12/31/2023  Plan of Care Certification Period: 8 weeks; 9/22/2023  Date of Return to MD: 8/10/2023  Visit # / Visits authorized: 10 / 20  FOTO: 1/ 3        Precautions:  Standard and Fall         Time In: 08:05 am   Time Out: 09:00 am   Total Billable Time: 55  minutes          Subjective     "I saw Dr. Prabhakar"    She was compliant with home exercise program given last session.   Response to previous treatment:decreased wrist edema  Functional change: lgith activity with dynamic     Pain: 0/10  Location: right wrists      Objective     Observation/Appearance:  Skin intact, Skin dry, Hypertrophic scarring, and Edema present      Edema. Measured in centimeters.    7/26/2023 7/26/2023     Right  Left    Proximal Wrist Crease 15.5 15.0 cm   Figure of 8       MCPs 19.0  18.5             Hand ROM. Measured in degrees.    7/26/2023 7/26/2023 9/19/2023 9/29/2023 10/13/2023     Right  Left  Right Right Right              Index: MP  0/35   50 50/64 45/              PIP     0/45    88               DIP 0/31    35               BABB 111                   Long:  MP 10/45   40 45/70 40/              PIP /45    /95               DIP 0/25    54               BABB 105                   Ring:   MP 5/15 "    20/55               PIP 0/50    /106               DIP 0/25    /36               BABB 85                   Small:  MP 0/10    75                PIP 0/30    90                DIP 0/35    40               BABB 75              Elbow and Wrist ROM. Measured in degrees.    7/31/2023 7/31/2023 10/18/2023     Left Right Right            Wrist Ext/Flex 65/70 26/35                      Sensation: denies numbness and tingling   NT Distribution 7/26/2023 7/26/2023     Right  Left    [place] [facility]       Normal 1.65-2.83       Diminished Light Touch 3.22-3.61       Diminished Protective 3.84-4.31       Loss of Protective 4.56-6.65       Untestable >6.65       2 Point Discrimination       Static       Dynamic            Fine motor Coordination:   R) 26s  L) 26s       Strength (Dyanmometer) and Pinch Strength (Pinch Gauge)  Measured in pounds and psi. Average of three trials.    7/26/2023 7/26/2023     Right  Left    Rung II deferred     Key Pinch       3pt Pinch       2pt Pinch             Intake Outcome Measure for FOTO initial eval; hand Survey     Therapist reviewed FOTO scores for Sofía Mcwilliams on 7/26/2023.   FOTO documents entered into [employer] - see Media section.     Intake Score: 52.5%           Treatment     Sofía received the treatments listed below:             Fluidotherapy: To R hand for 10 min, continuous air, 110 deg, air speed 100 to decrease pain, edema & scar tissue and increased tissue extensibility.     Manual 15  Retrograde  Scar tissue STM extensors     Therapeutic activity: 25 min    Tenodesis and wrist flexion and pt has active MP extension   - hook grasp x 10 reps (3 sets)   - syngeristic wrist x 10 reps (2 sets) with dycem   - towels walks  In hand manipulation with poms   - practicing making jewelry with splint on (10 min)             Patient Education and Home Exercises     Education provided:   Compression glove size XS   - day splint vs night splint   - Progress towards goals     Written Home  Exercises Provided: yes.  Exercises were reviewed and Sofía was able to demonstrate them prior to the end of the session.  Sofía demonstrated good  understanding of the HEP provided. See EMR under Patient Instructions for exercises provided during therapy sessions.       Assessment      Cont to monitor  moderate extensor lag. IF cont to have MP ext lag ~ 45 degrees and LF ~ 40 degrees. Possible partial rupture of EIP, which is causing LF to lag. Will send message to Dr. Prabhakar. Cont to have mod swelling within surrounding surgical site and ulnar styloid.   Able to liza light activity with splint - however reports having to remove splint after prolonged time when using hand due to pain. She is frustrated with process since sx.       Sofía is progressing well towards her goals and there are no updates to goals at this time. Pt prognosis is Fair.     Pt will continue to benefit from skilled outpatient occupational therapy to address the deficits listed in the problem list on initial evaluation provide pt/family education and to maximize pt's level of independence in the home and community environment.     Pt's spiritual, cultural and educational needs considered and pt agreeable to plan of care and goals.    Anticipated barriers to occupational therapy:     Goals:  Long term goals: (by d/c)  1)  Patient to be IND with HEP and modalities for pain management  2)  Increase ROM 5-10 degrees to increase functional hand use for ADLs/leisure activities  3)   Increase  strength 5-8 lbs. to carry laundry, carry groceries, sweep, and increase functional independence of right hand for ADLs/iADLs  4)   Increase pinch 3-4  psis for  Increase in functional independence in ADLs/iADLs such as manipulating fasteners and opening containers  5) Patient to score at 63%  or less on FOTO to demonstrate improved perception of functional rightUE Use.       Short term Goals: ( 4 weeks)   1)  Patient to be IND with HEP and modalities for  pain management MET   2)  Increase wrist ROM 10 degrees to increase functional hand use for ADLs/leisure activities  3)   Pt will demo ext lag less than 5 degrees for lF MP jt.   4)   Pt will be able to make a full composite fist MET   5)   Patient to score at 55%  or less on FOTO to demonstrate improved perception of functional right UE Use.    Plan     Updates/Grading for next session:     Hannah Coon OT   10/18/2023

## 2023-10-20 ENCOUNTER — CLINICAL SUPPORT (OUTPATIENT)
Dept: REHABILITATION | Facility: HOSPITAL | Age: 83
End: 2023-10-20
Payer: MEDICARE

## 2023-10-20 DIAGNOSIS — M25.641 DECREASED RANGE OF MOTION OF FINGER OF RIGHT HAND: Primary | ICD-10-CM

## 2023-10-20 DIAGNOSIS — R29.898 DECREASED FINGER STRENGTH: ICD-10-CM

## 2023-10-20 PROCEDURE — 97530 THERAPEUTIC ACTIVITIES: CPT

## 2023-10-20 PROCEDURE — 97110 THERAPEUTIC EXERCISES: CPT

## 2023-10-20 PROCEDURE — 97140 MANUAL THERAPY 1/> REGIONS: CPT

## 2023-10-20 PROCEDURE — 97022 WHIRLPOOL THERAPY: CPT

## 2023-10-20 NOTE — PROGRESS NOTES
"    [facility]  Occupational Therapy Treatment Note     Date: 10/20/2023  Name: Sofía Mcwilliams  Clinic Number: [id number]    Therapy Diagnosis:   Encounter Diagnoses   Name Primary?    Decreased range of motion of finger of right hand Yes    Decreased finger strength            Physician: Ramona Barrett PA-C      Physician Orders: Eval and Treat  Medical Diagnosis: M66.20 (ICD-10-CM) - Tendon rupture, nontraumatic, extensor  Surgical Procedure and Date: 6/30/2023,   1. Extensor tendon repair right middle finger side to side with index finger tendon.    2. Extensor tendon repair right ring finger side-to-side repair with small finger tendon.    Evaluation Date: 7/26/2023  Insurance Authorization Period Expiration: 12/31/2023  Plan of Care Certification Period: 8 weeks; 9/22/2023  Date of Return to MD: 8/10/2023  Visit # / Visits authorized: 10 / 20  FOTO: 1/ 3        Precautions:  Standard and Fall         Time In: 08:05 am   Time Out: 09:00 am   Total Billable Time: 55  minutes          Subjective     "It's still pressing right here. I tried to write the other day and I couldn't"    She was compliant with home exercise program given last session.   Response to previous treatment:decreased wrist edema  Functional change: lgith activity with dynamic     Pain: 0/10  Location: right wrists      Objective     Observation/Appearance:  Skin intact, Skin dry, Hypertrophic scarring, and Edema present      Edema. Measured in centimeters.    7/26/2023 7/26/2023     Right  Left    Proximal Wrist Crease 15.5 15.0 cm   Figure of 8       MCPs 19.0  18.5             Hand ROM. Measured in degrees.    7/26/2023 7/26/2023 9/19/2023 9/29/2023 10/13/2023     Right  Left  Right Right Right              Index: MP  0/35   50 50/64 45/              PIP     0/45    88               DIP 0/31    35               BABB 111                   Long:  MP 10/45   40 45/70 40/              PIP /45    /95               DIP " 0/25    54               BABB 105                   Ring:   MP 5/15    20/55               PIP 0/50    /106               DIP 0/25    /36               BABB 85                   Small:  MP 0/10    75                PIP 0/30    90                DIP 0/35    40               BABB 75              Elbow and Wrist ROM. Measured in degrees.    7/31/2023 7/31/2023 10/18/2023     Left Right Right            Wrist Ext/Flex 65/70 26/35                      Sensation: denies numbness and tingling   NT Distribution 7/26/2023 7/26/2023     Right  Left    [place]       Normal 1.65-2.83       Diminished Light Touch 3.22-3.61       Diminished Protective 3.84-4.31       Loss of Protective 4.56-6.65       Untestable >6.65       2 Point Discrimination       Static       Dynamic            Fine motor Coordination:   R) 26s  L) 26s       Strength (Dyanmometer) and Pinch Strength (Pinch Gauge)  Measured in pounds and psi. Average of three trials.    7/26/2023 7/26/2023     Right  Left    Rung II deferred     Key Pinch       3pt Pinch       2pt Pinch             Intake Outcome Measure for FOTO initial eval; hand Survey     Therapist reviewed FOTO scores for Sofía Mcwilliams on 7/26/2023.   FOTO documents entered into [employer] - see Media section.     Intake Score: 52.5%           Treatment     Sofía received the treatments listed below:             Fluidotherapy: To R hand for 10 min, continuous air, 110 deg, air speed 100 to decrease pain, edema & scar tissue and increased tissue extensibility.         Therapeutic activity: 15 min    Tenodesis and wrist flexion and pt has active MP extension   - hook grasp x 10 reps (3 sets)   - syngeristic wrist x 10 reps (2 sets) with dycem     - practicing making jewelry with splint on (10 min)             Patient Education and Home Exercises     Education provided:   Compression glove size XS   - day splint vs night splint   - Progress towards goals     Written Home Exercises Provided:  yes.  Exercises were reviewed and Sofía was able to demonstrate them prior to the end of the session.  Sofía demonstrated good  understanding of the HEP provided. See EMR under Patient Instructions for exercises provided during therapy sessions.       Assessment      Cont to monitor  moderate extensor lag. IF cont to have MP ext lag ~ 45 degrees and LF ~ 40 degrees. Possible partial rupture of EIP, which is causing LF to lag. Will send message to Dr. Prabhakar. Cont to have mod swelling within surrounding surgical site and ulnar styloid.   Able to liza light activity with splint - however reports having to remove splint after prolonged time when using hand due to pain. She is frustrated with process since sx.       Sofía is progressing well towards her goals and there are no updates to goals at this time. Pt prognosis is Fair.     Pt will continue to benefit from skilled outpatient occupational therapy to address the deficits listed in the problem list on initial evaluation provide pt/family education and to maximize pt's level of independence in the home and community environment.     Pt's spiritual, cultural and educational needs considered and pt agreeable to plan of care and goals.    Anticipated barriers to occupational therapy:     Goals:  Long term goals: (by d/c)  1)  Patient to be IND with HEP and modalities for pain management  2)  Increase ROM 5-10 degrees to increase functional hand use for ADLs/leisure activities  3)   Increase  strength 5-8 lbs. to carry laundry, carry groceries, sweep, and increase functional independence of right hand for ADLs/iADLs  4)   Increase pinch 3-4  psis for  Increase in functional independence in ADLs/iADLs such as manipulating fasteners and opening containers  5) Patient to score at 63%  or less on FOTO to demonstrate improved perception of functional rightUE Use.       Short term Goals: ( 4 weeks)   1)  Patient to be IND with HEP and modalities for pain management MET    2)  Increase wrist ROM 10 degrees to increase functional hand use for ADLs/leisure activities  3)   Pt will demo ext lag less than 5 degrees for lF MP jt.   4)   Pt will be able to make a full composite fist MET   5)   Patient to score at 55%  or less on FOTO to demonstrate improved perception of functional right UE Use.    Plan     Updates/Grading for next session:     Hannah Coon OT   10/20/2023

## 2023-10-25 ENCOUNTER — TELEPHONE (OUTPATIENT)
Dept: FAMILY MEDICINE | Facility: CLINIC | Age: 83
End: 2023-10-25
Payer: MEDICARE

## 2023-10-25 ENCOUNTER — PATIENT MESSAGE (OUTPATIENT)
Dept: REHABILITATION | Facility: HOSPITAL | Age: 83
End: 2023-10-25
Payer: MEDICARE

## 2023-10-31 NOTE — PROGRESS NOTES
Sofía Mcwilliams presents for follow up evaluation of   Encounter Diagnoses   Name Primary?    Pain in joint, multiple sites Yes    Chronic pain of right wrist     Tendon rupture of wrist, sequela      Patient is a 83 y.o. right hand dominant female who presents today with complaints of right index finger, right long finger and right ring finger radial sideded pins/needles.  She also had extensor tendon rupture in the right hand and underwent side-to-side tendon repair with Dr. Prabhakar.  She underwent a 2nd surgery for her extensor tendons including right small finger tendon transfer EIP tendon transfer and distal ulna cheilectomy.  She is having some difficulty with extension of the index and long fingers again and is concerned for possible rerupture.  She is here for a 2nd medical opinion.    She also had a left foot drop after surgery and is continuing to rehab from this.    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 Prabhakar  8.16.22 extensor tendon repair right small finger (EDC, EDQ), tendon transfer EDC ring finger to EDC small finger, tendon transfer EIP index finger to EDQ small finger, distal ulna cheilectomy Dr. Prabhakar    PE:    AA&O x 4.  NAD  HEENT:  NCAT, sclera nonicteric  Lungs:  Respirations are equal and unlabored.  CV:  2+ bilateral upper and lower extremity pulses.  MSK:  Mild extensor lag right index and long finger, mild edema right dorsal hand.  Full range of motion right ring and small fingers.  Well-healed incisions consistent with previous surgeries.  No erythema or signs of infection.  Neurovascularly intact bilaterally.  5/5 thenar and intrinsic musculature strength.     X-rays AP, lateral and oblique right hand taken today are independently reviewed by me and shows advanced arthritic changes right hand and wrist, mild soft tissue swelling dorsally.     A/P: Status  post right hand tendon transfers  1) We have discussed the natural history of extensor tendon ruptures and repair, we have discussed that they can lengthen over time.  I have ordered an MRI of the right wrist to evaluate for tendon lengthening versus possible rerupture.  Follow-up after MRI right wrist.    2) she also has pain in multiple joints and I have ordered a rheumatoid panel to evaluate for any underlying inflammatory arthropathy.        Leslie Jon MD    The patient's pathophysiology was explained in detail with reference to x-rays, models, other visual aids as appropriate.  Treatment options were discussed in detail.  Questions were invited and answered to the patient's satisfaction. I reviewed Primary care , and other specialty's notes to better coordinate patient's care.    Please be aware that this note has been generated with the assistance of [employer] voice-to-text.  Please excuse any spelling or grammatical errors.

## 2023-11-02 DIAGNOSIS — M79.641 RIGHT HAND PAIN: Primary | ICD-10-CM

## 2023-11-03 ENCOUNTER — CLINICAL SUPPORT (OUTPATIENT)
Dept: REHABILITATION | Facility: HOSPITAL | Age: 83
End: 2023-11-03
Payer: MEDICARE

## 2023-11-03 DIAGNOSIS — R29.898 DECREASED FINGER STRENGTH: ICD-10-CM

## 2023-11-03 DIAGNOSIS — M25.641 DECREASED RANGE OF MOTION OF FINGER OF RIGHT HAND: Primary | ICD-10-CM

## 2023-11-03 PROCEDURE — 97530 THERAPEUTIC ACTIVITIES: CPT

## 2023-11-03 PROCEDURE — 97140 MANUAL THERAPY 1/> REGIONS: CPT

## 2023-11-03 PROCEDURE — 97022 WHIRLPOOL THERAPY: CPT

## 2023-11-06 ENCOUNTER — HOSPITAL ENCOUNTER (OUTPATIENT)
Dept: RADIOLOGY | Facility: HOSPITAL | Age: 83
Discharge: HOME | End: 2023-11-06
Attending: ORTHOPAEDIC SURGERY
Payer: MEDICARE

## 2023-11-06 ENCOUNTER — OFFICE VISIT (OUTPATIENT)
Dept: ORTHOPEDICS | Facility: CLINIC | Age: 83
End: 2023-11-06
Payer: MEDICARE

## 2023-11-06 DIAGNOSIS — G89.29 CHRONIC PAIN OF RIGHT WRIST: ICD-10-CM

## 2023-11-06 DIAGNOSIS — S66.919S: ICD-10-CM

## 2023-11-06 DIAGNOSIS — M79.641 RIGHT HAND PAIN: ICD-10-CM

## 2023-11-06 DIAGNOSIS — M25.531 CHRONIC PAIN OF RIGHT WRIST: ICD-10-CM

## 2023-11-06 DIAGNOSIS — M25.50 PAIN IN JOINT, MULTIPLE SITES: Primary | ICD-10-CM

## 2023-11-06 PROCEDURE — 99999 PR PBB SHADOW E&M-EST. PATIENT-LVL III: ICD-10-PCS | Mod: PBBFAC,HCNC,, | Performed by: ORTHOPAEDIC SURGERY

## 2023-11-06 PROCEDURE — 3288F FALL RISK ASSESSMENT DOCD: CPT | Mod: HCNC,CPTII,S$GLB, | Performed by: ORTHOPAEDIC SURGERY

## 2023-11-06 PROCEDURE — 73130 X-RAY EXAM OF HAND: CPT | Mod: TC,HCNC,PO,RT

## 2023-11-06 PROCEDURE — 99999 PR PBB SHADOW E&M-EST. PATIENT-LVL III: CPT | Mod: PBBFAC,HCNC,, | Performed by: ORTHOPAEDIC SURGERY

## 2023-11-06 PROCEDURE — 1159F MED LIST DOCD IN RCRD: CPT | Mod: HCNC,CPTII,S$GLB, | Performed by: ORTHOPAEDIC SURGERY

## 2023-11-06 PROCEDURE — 1125F PR PAIN SEVERITY QUANTIFIED, PAIN PRESENT: ICD-10-PCS | Mod: HCNC,CPTII,S$GLB, | Performed by: ORTHOPAEDIC SURGERY

## 2023-11-06 PROCEDURE — 3288F PR FALLS RISK ASSESSMENT DOCUMENTED: ICD-10-PCS | Mod: HCNC,CPTII,S$GLB, | Performed by: ORTHOPAEDIC SURGERY

## 2023-11-06 PROCEDURE — 1125F AMNT PAIN NOTED PAIN PRSNT: CPT | Mod: HCNC,CPTII,S$GLB, | Performed by: ORTHOPAEDIC SURGERY

## 2023-11-06 PROCEDURE — 1160F PR REVIEW ALL MEDS BY PRESCRIBER/CLIN PHARMACIST DOCUMENTED: ICD-10-PCS | Mod: HCNC,CPTII,S$GLB, | Performed by: ORTHOPAEDIC SURGERY

## 2023-11-06 PROCEDURE — 99214 PR OFFICE/OUTPT VISIT, EST, LEVL IV, 30-39 MIN: ICD-10-PCS | Mod: HCNC,S$GLB,, | Performed by: ORTHOPAEDIC SURGERY

## 2023-11-06 PROCEDURE — 99214 OFFICE O/P EST MOD 30 MIN: CPT | Mod: HCNC,S$GLB,, | Performed by: ORTHOPAEDIC SURGERY

## 2023-11-06 PROCEDURE — 73130 XR HAND COMPLETE 3 VIEW RIGHT: ICD-10-PCS | Mod: 26,HCNC,RT, | Performed by: RADIOLOGY

## 2023-11-06 PROCEDURE — 1160F RVW MEDS BY RX/DR IN RCRD: CPT | Mod: HCNC,CPTII,S$GLB, | Performed by: ORTHOPAEDIC SURGERY

## 2023-11-06 PROCEDURE — 1101F PR PT FALLS ASSESS DOC 0-1 FALLS W/OUT INJ PAST YR: ICD-10-PCS | Mod: HCNC,CPTII,S$GLB, | Performed by: ORTHOPAEDIC SURGERY

## 2023-11-06 PROCEDURE — 73130 X-RAY EXAM OF HAND: CPT | Mod: 26,HCNC,RT, | Performed by: RADIOLOGY

## 2023-11-06 PROCEDURE — 1101F PT FALLS ASSESS-DOCD LE1/YR: CPT | Mod: HCNC,CPTII,S$GLB, | Performed by: ORTHOPAEDIC SURGERY

## 2023-11-06 PROCEDURE — 1159F PR MEDICATION LIST DOCUMENTED IN MEDICAL RECORD: ICD-10-PCS | Mod: HCNC,CPTII,S$GLB, | Performed by: ORTHOPAEDIC SURGERY

## 2023-11-07 ENCOUNTER — PATIENT MESSAGE (OUTPATIENT)
Dept: INTERNAL MEDICINE | Facility: CLINIC | Age: 83
End: 2023-11-07
Payer: MEDICARE

## 2023-11-10 ENCOUNTER — CLINICAL SUPPORT (OUTPATIENT)
Dept: REHABILITATION | Facility: HOSPITAL | Age: 83
End: 2023-11-10
Payer: MEDICARE

## 2023-11-10 DIAGNOSIS — M25.641 DECREASED RANGE OF MOTION OF FINGER OF RIGHT HAND: Primary | ICD-10-CM

## 2023-11-10 DIAGNOSIS — R29.898 DECREASED FINGER STRENGTH: ICD-10-CM

## 2023-11-10 PROCEDURE — 97530 THERAPEUTIC ACTIVITIES: CPT | Mod: CO

## 2023-11-10 PROCEDURE — 97022 WHIRLPOOL THERAPY: CPT | Mod: CO

## 2023-11-10 PROCEDURE — 97140 MANUAL THERAPY 1/> REGIONS: CPT | Mod: CO

## 2023-11-10 NOTE — PROGRESS NOTES
"      [facility]  Occupational Therapy Treatment Note     Date: 11/10/2023  Name: Sofía Mcwilliams  Clinic Number: [id number]    Therapy Diagnosis:   Encounter Diagnoses   Name Primary?    Decreased range of motion of finger of right hand Yes    Decreased finger strength              Physician: Ramona Barrett PA-C      Physician Orders: Eval and Treat  Medical Diagnosis: M66.20 (ICD-10-CM) - Tendon rupture, nontraumatic, extensor  Surgical Procedure and Date: 6/30/2023,   1. Extensor tendon repair right middle finger side to side with index finger tendon.    2. Extensor tendon repair right ring finger side-to-side repair with small finger tendon.    Evaluation Date: 7/26/2023  Insurance Authorization Period Expiration: 12/31/2023  Plan of Care Certification Period: 8 weeks; 9/22/2023  Date of Return to MD: 8/10/2023  Visit # / Visits authorized: 26/40  FOTO: 1/ 3        Precautions:  Standard and Fall         Time In: 08:00 am   Time Out: 08:58 am   Total Billable Time: 58  minutes          Subjective     "I've been working on a project at home with push pins and sequins."    She was compliant with home exercise program given last session.   Response to previous treatment:decreased wrist edema  Functional change: lgith activity with dynamic     Pain: 0/10  Location: right wrists      Objective     Observation/Appearance:  Skin intact, Skin dry, Hypertrophic scarring, and Edema present      Edema. Measured in centimeters.    7/26/2023 7/26/2023     Right  Left    Proximal Wrist Crease 15.5 15.0 cm   Figure of 8       MCPs 19.0  18.5             Hand ROM. Measured in degrees.    7/26/2023 7/26/2023 9/19/2023 9/29/2023 10/13/2023     Right  Left  Right Right Right              Index: MP  0/35   50 50/64 45/              PIP     0/45    88               DIP 0/31    35               BABB 111                   Long:  MP 10/45   40 45/70 40/              PIP /45    /95               DIP 0/25    54  "              BABB 105                   Ring:   MP 5/15    20/55               PIP 0/50    /106               DIP 0/25    /36               BABB 85                   Small:  MP 0/10    75                PIP 0/30    90                DIP 0/35    40               BABB 75              Elbow and Wrist ROM. Measured in degrees.    7/31/2023 7/31/2023 10/18/2023     Left Right Right            Wrist Ext/Flex 65/70 26/35                      Sensation: denies numbness and tingling   NT Distribution 7/26/2023 7/26/2023     Right  Left    [place]       Normal 1.65-2.83       Diminished Light Touch 3.22-3.61       Diminished Protective 3.84-4.31       Loss of Protective 4.56-6.65       Untestable >6.65       2 Point Discrimination       Static       Dynamic            Fine motor Coordination:   R) 26s  L) 26s       Strength (Dyanmometer) and Pinch Strength (Pinch Gauge)  Measured in pounds and psi. Average of three trials.    7/26/2023 7/26/2023     Right  Left    Rung II deferred     Key Pinch       3pt Pinch       2pt Pinch             Intake Outcome Measure for FOTO initial eval; hand Survey     Therapist reviewed FOTO scores for Sofía Mcwilliams on 7/26/2023.   FOTO documents entered into [employer] - see Media section.     Intake Score: 52.5%           Treatment     Sofía received the treatments listed below:             Fluidotherapy: To R hand for 10 min, continuous air, 110 deg, air speed 100 to decrease pain, edema & scar tissue and increased tissue extensibility.     Manual: x 8 min   Scar massage and mobilization with vibrating tool    Therapeutic activity: 40 min    Tenodesis and wrist flexion and pt has active MP extension   - hook grasp x 10 reps (3 sets)   - syngeristic wrist x 10 reps (2 sets) with dycem (NT)  - lite brite x 10 min   - in hand manip with coins x 2 sets  - isospheres x 2 min   - grooved peg board FMC and in hand manip x 1 set              Patient Education and Home Exercises     Education  provided:   Compression glove size XS   - day splint vs night splint   - Progress towards goals     Written Home Exercises Provided: yes.  Exercises were reviewed and Sofía was able to demonstrate them prior to the end of the session.  Sofía demonstrated good  understanding of the HEP provided. See EMR under Patient Instructions for exercises provided during therapy sessions.       Assessment      Pt tolerated session well and is progressing with FMC. Cont to monitor  moderate extensor lag. IF cont to have MP ext lag . Possible partial rupture of EIP, which is causing LF to lag. Cont to have mod swelling within surrounding surgical site and ulnar styloid.   She is frustrated with process since sx.       Sofía is progressing well towards her goals and there are no updates to goals at this time. Pt prognosis is Fair.     Pt will continue to benefit from skilled outpatient occupational therapy to address the deficits listed in the problem list on initial evaluation provide pt/family education and to maximize pt's level of independence in the home and community environment.     Pt's spiritual, cultural and educational needs considered and pt agreeable to plan of care and goals.    Anticipated barriers to occupational therapy:     Goals:  Long term goals: (by d/c)  1)  Patient to be IND with HEP and modalities for pain management  2)  Increase ROM 5-10 degrees to increase functional hand use for ADLs/leisure activities  3)   Increase  strength 5-8 lbs. to carry laundry, carry groceries, sweep, and increase functional independence of right hand for ADLs/iADLs  4)   Increase pinch 3-4  psis for  Increase in functional independence in ADLs/iADLs such as manipulating fasteners and opening containers  5) Patient to score at 63%  or less on FOTO to demonstrate improved perception of functional rightUE Use.       Short term Goals: ( 4 weeks)   1)  Patient to be IND with HEP and modalities for pain management MET   2)   Increase wrist ROM 10 degrees to increase functional hand use for ADLs/leisure activities  3)   Pt will demo ext lag less than 5 degrees for lF MP jt.   4)   Pt will be able to make a full composite fist MET   5)   Patient to score at 55%  or less on FOTO to demonstrate improved perception of functional right UE Use.    Plan     Updates/Grading for next session:     TOM Rodriguez   11/10/2023

## 2023-11-13 ENCOUNTER — HOSPITAL ENCOUNTER (OUTPATIENT)
Dept: RADIOLOGY | Facility: HOSPITAL | Age: 83
Discharge: HOME | End: 2023-11-13
Attending: INTERNAL MEDICINE
Payer: MEDICARE

## 2023-11-13 VITALS — BODY MASS INDEX: 24.37 KG/M2 | WEIGHT: 142 LBS

## 2023-11-13 DIAGNOSIS — Z12.31 ENCOUNTER FOR SCREENING MAMMOGRAM FOR MALIGNANT NEOPLASM OF BREAST: ICD-10-CM

## 2023-11-13 DIAGNOSIS — Z12.39 ENCOUNTER FOR SCREENING FOR MALIGNANT NEOPLASM OF BREAST, UNSPECIFIED SCREENING MODALITY: ICD-10-CM

## 2023-11-13 PROCEDURE — 77063 MAMMO DIGITAL SCREENING BILAT WITH TOMO: ICD-10-PCS | Mod: 26,HCNC,, | Performed by: RADIOLOGY

## 2023-11-13 PROCEDURE — 77067 MAMMO DIGITAL SCREENING BILAT WITH TOMO: ICD-10-PCS | Mod: 26,HCNC,, | Performed by: RADIOLOGY

## 2023-11-13 PROCEDURE — 77067 SCR MAMMO BI INCL CAD: CPT | Mod: TC,HCNC

## 2023-11-13 PROCEDURE — 77063 BREAST TOMOSYNTHESIS BI: CPT | Mod: 26,HCNC,, | Performed by: RADIOLOGY

## 2023-11-13 PROCEDURE — 77067 SCR MAMMO BI INCL CAD: CPT | Mod: 26,HCNC,, | Performed by: RADIOLOGY

## 2023-11-15 ENCOUNTER — OFFICE VISIT (OUTPATIENT)
Dept: PODIATRY | Facility: CLINIC | Age: 83
End: 2023-11-15
Payer: MEDICARE

## 2023-11-15 VITALS
HEIGHT: 64 IN | WEIGHT: 142 LBS | HEART RATE: 81 BPM | SYSTOLIC BLOOD PRESSURE: 146 MMHG | BODY MASS INDEX: 24.24 KG/M2 | DIASTOLIC BLOOD PRESSURE: 77 MMHG

## 2023-11-15 DIAGNOSIS — B35.1 DERMATOPHYTOSIS OF NAIL: Primary | Chronic | ICD-10-CM

## 2023-11-15 PROCEDURE — 3288F FALL RISK ASSESSMENT DOCD: CPT | Mod: HCNC,CPTII,S$GLB, | Performed by: PODIATRIST

## 2023-11-15 PROCEDURE — 1101F PR PT FALLS ASSESS DOC 0-1 FALLS W/OUT INJ PAST YR: ICD-10-PCS | Mod: HCNC,CPTII,S$GLB, | Performed by: PODIATRIST

## 2023-11-15 PROCEDURE — 99999 PR PBB SHADOW E&M-EST. PATIENT-LVL III: ICD-10-PCS | Mod: PBBFAC,HCNC,, | Performed by: PODIATRIST

## 2023-11-15 PROCEDURE — 1160F RVW MEDS BY RX/DR IN RCRD: CPT | Mod: HCNC,CPTII,S$GLB, | Performed by: PODIATRIST

## 2023-11-15 PROCEDURE — 99213 OFFICE O/P EST LOW 20 MIN: CPT | Mod: HCNC,S$GLB,, | Performed by: PODIATRIST

## 2023-11-15 PROCEDURE — 1159F PR MEDICATION LIST DOCUMENTED IN MEDICAL RECORD: ICD-10-PCS | Mod: HCNC,CPTII,S$GLB, | Performed by: PODIATRIST

## 2023-11-15 PROCEDURE — 3078F DIAST BP <80 MM HG: CPT | Mod: HCNC,CPTII,S$GLB, | Performed by: PODIATRIST

## 2023-11-15 PROCEDURE — 1126F AMNT PAIN NOTED NONE PRSNT: CPT | Mod: HCNC,CPTII,S$GLB, | Performed by: PODIATRIST

## 2023-11-15 PROCEDURE — 1101F PT FALLS ASSESS-DOCD LE1/YR: CPT | Mod: HCNC,CPTII,S$GLB, | Performed by: PODIATRIST

## 2023-11-15 PROCEDURE — 99999 PR PBB SHADOW E&M-EST. PATIENT-LVL III: CPT | Mod: PBBFAC,HCNC,, | Performed by: PODIATRIST

## 2023-11-15 PROCEDURE — 3077F SYST BP >= 140 MM HG: CPT | Mod: HCNC,CPTII,S$GLB, | Performed by: PODIATRIST

## 2023-11-15 PROCEDURE — 1159F MED LIST DOCD IN RCRD: CPT | Mod: HCNC,CPTII,S$GLB, | Performed by: PODIATRIST

## 2023-11-15 PROCEDURE — 3288F PR FALLS RISK ASSESSMENT DOCUMENTED: ICD-10-PCS | Mod: HCNC,CPTII,S$GLB, | Performed by: PODIATRIST

## 2023-11-15 PROCEDURE — 1160F PR REVIEW ALL MEDS BY PRESCRIBER/CLIN PHARMACIST DOCUMENTED: ICD-10-PCS | Mod: HCNC,CPTII,S$GLB, | Performed by: PODIATRIST

## 2023-11-15 PROCEDURE — 3078F PR MOST RECENT DIASTOLIC BLOOD PRESSURE < 80 MM HG: ICD-10-PCS | Mod: HCNC,CPTII,S$GLB, | Performed by: PODIATRIST

## 2023-11-15 PROCEDURE — 99213 PR OFFICE/OUTPT VISIT, EST, LEVL III, 20-29 MIN: ICD-10-PCS | Mod: HCNC,S$GLB,, | Performed by: PODIATRIST

## 2023-11-15 PROCEDURE — 1126F PR PAIN SEVERITY QUANTIFIED, NO PAIN PRESENT: ICD-10-PCS | Mod: HCNC,CPTII,S$GLB, | Performed by: PODIATRIST

## 2023-11-15 PROCEDURE — 3077F PR MOST RECENT SYSTOLIC BLOOD PRESSURE >= 140 MM HG: ICD-10-PCS | Mod: HCNC,CPTII,S$GLB, | Performed by: PODIATRIST

## 2023-11-15 RX ORDER — CICLOPIROX 80 MG/ML
SOLUTION TOPICAL NIGHTLY
Qty: 6.6 ML | Refills: 6 | Status: SHIPPED | OUTPATIENT
Start: 2023-11-15

## 2023-11-15 NOTE — PROGRESS NOTES
Chief Complaint   Patient presents with    Nail Problem     Fungus on nail of left big toe         HPI:   Sofía Mcwilliams is a 83 y.o. female who presents to clinic with concerns of Nail Problem (Fungus on nail of left big toe)   , chronic.  Used OTC antifungal without improvement.          PCP:  Natalia tSubbs MD       Patient Active Problem List   Diagnosis    Retina disorder - Both Eyes    Nuclear sclerosis - Both Eyes    Hypertension    Hyperlipidemia    Arthritis    Osteopenia    Anxiety    Mild carotid artery disease    Chronic pain    Osteoarthritis of hip    Primary osteoarthritis of both hands    Restless leg syndrome due to iron deficiency anemia    Decreased  strength    Decreased pinch strength    Decreased activities of daily living (ADL)    Reduced sensation    History of colon polyps    Lumbar radiculopathy    Decreased range of motion    Localized edema    Sensory ataxia    Foot drop, left foot    Flail joint, left ankle and foot    Numbness of left foot    Decreased range of motion of finger of right hand    Decreased finger strength    Tendon rupture of wrist, sequela             Current Outpatient Medications on File Prior to Visit   Medication Sig Dispense Refill    aspirin (ECOTRIN) 81 MG EC tablet Take 1 tablet by mouth.      atorvastatin (LIPITOR) 20 MG tablet TAKE 1 TABLET EVERY DAY 90 tablet 2    CALCIUM CARB/VIT D3/MINERALS (CALCIUM-VITAMIN D ORAL) once daily.       coenzyme Q10 10 mg capsule Take by mouth.      gabapentin (NEURONTIN) 300 MG capsule Take 300 mg by mouth 3 (three) times daily.      losartan (COZAAR) 25 MG tablet TAKE 1 TABLET EVERY DAY 90 tablet 0    multivitamin (THERAGRAN) per tablet Take 1 tablet by mouth.      rOPINIRole (REQUIP XL) 2 mg 24 hr tablet TAKE 1 TABLET BY MOUTH EVERY EVENING 90 tablet 3    rOPINIRole (REQUIP) 0.5 MG tablet Take 1 tablet as needed up to 3 times a day for breakthrough RLS symptoms. 90 tablet 11    tiZANidine (ZANAFLEX) 2 MG tablet Take 1  "tablet (2 mg total) by mouth nightly as needed. 90 tablet 1    VITAMIN B COMPLEX ORAL Take by mouth once daily.       vitamin D 1000 units Tab Take 185 mg by mouth once daily.       No current facility-administered medications on file prior to visit.         Review of patient's allergies indicates:   Allergen Reactions    Celecoxib      Other reaction(s): Swelling    Sulfa (sulfonamide antibiotics)      Other reaction(s): Hives           ROS:  General ROS: negative for - chills, fatigue or fever  Cardiovascular ROS: no chest pain or dyspnea on exertion  Musculoskeletal ROS: negative for - joint pain or joint stiffness   Skin: Negative for rash, negative for nail or hair changes. Positive for  Corn/callus on the foot.         EXAM:    Vitals:    11/15/23 1001   BP: (!) 146/77   Pulse: 81   Weight: 64.4 kg (142 lb)   Height: 5' 4" (1.626 m)          General: alert and orient and in no apparent distress.        left  foot:  Vasc:   Palpable pedal pulses  Feet appropriately warm to touch.   Capillary refill time within normal limits.   Edema: Absent      Neuro:   Epicritic sensation intact.   Tinels sign:  Absent  Mulders click: Absent  SWMF: Absent      MSK:     normal toes without deformities  Muscle strength:  5/5  Joints:  without crepitus  Deformity: none      Derm:      Thickened, distally loose left hallux toenail with subungual debris   absent paronychia.  No redness or bruising.   No other open lesions, macerations, or rashes noted.   Skin is otherwise thin and atrophic on the soles.   Erythema:  none at the affected location  Bruising:  absent          MEDICAL DECISION MAKING:         ICD-10-CM ICD-9-CM    1. Dermatophytosis of nail  B35.1 110.1 ciclopirox (PENLAC) 8 % Soln    left great toe                I counseled the patient on the patient's conditions, their implications and medical management.  Shoe and activity modification as needed for relief.   Prescription Meds as ordered.   Avoid barefoot walking " to protect skin on the feet.    General nail care measures for abnormal nails include:  Keeping nails trimmed short, but avoid overzealous trimming  Avoiding trauma   Avoiding contact irritants   Keeping nails dry (avoiding wet work)  Avoiding all nail cosmetics  Wearing shoes that fit well at the toe box.  Avoid tight shoes.

## 2023-11-17 ENCOUNTER — CLINICAL SUPPORT (OUTPATIENT)
Dept: REHABILITATION | Facility: HOSPITAL | Age: 83
End: 2023-11-17
Payer: MEDICARE

## 2023-11-17 DIAGNOSIS — R29.898 DECREASED FINGER STRENGTH: ICD-10-CM

## 2023-11-17 DIAGNOSIS — M25.641 DECREASED RANGE OF MOTION OF FINGER OF RIGHT HAND: Primary | ICD-10-CM

## 2023-11-17 PROCEDURE — 97530 THERAPEUTIC ACTIVITIES: CPT

## 2023-11-17 PROCEDURE — 97140 MANUAL THERAPY 1/> REGIONS: CPT

## 2023-11-17 PROCEDURE — 97022 WHIRLPOOL THERAPY: CPT

## 2023-11-17 PROCEDURE — 97110 THERAPEUTIC EXERCISES: CPT

## 2023-11-17 NOTE — PROGRESS NOTES
"    [facility]  Occupational Therapy Updated POC      Date: 11/3/2023  Name: Sofía Mcwilliams  Clinic Number: [id number]    Therapy Diagnosis:   Encounter Diagnoses   Name Primary?    Decreased range of motion of finger of right hand Yes    Decreased finger strength              Physician: Ramona Barrett PA-C      Physician Orders: Eval and Treat  Medical Diagnosis: M66.20 (ICD-10-CM) - Tendon rupture, nontraumatic, extensor  Surgical Procedure and Date: 6/30/2023,   1. Extensor tendon repair right middle finger side to side with index finger tendon.    2. Extensor tendon repair right ring finger side-to-side repair with small finger tendon.    Evaluation Date: 7/26/2023  Insurance Authorization Period Expiration: 12/31/2023  Plan of Care Certification Period: 8 weeks; 9/22/2023  Date of Return to MD: 8/10/2023  Visit # / Visits authorized: 27 / 20  FOTO: 1/ 3        Precautions:  Standard and Fall         Time In: 08:05 am   Time Out: 09:00 am   Total Billable Time: 55  minutes          Subjective     "It's still pressing right here. I tried to write the other day and I couldn't"    She was compliant with home exercise program given last session.   Response to previous treatment:decreased wrist edema  Functional change: lgith activity with dynamic     Pain: 0/10  Location: right wrists      Objective     Observation/Appearance:  Skin intact, Skin dry, Hypertrophic scarring, and Edema present      Edema. Measured in centimeters.    7/26/2023 7/26/2023     Right  Left    Proximal Wrist Crease 15.5 15.0 cm   Figure of 8       MCPs 19.0  18.5             Hand ROM. Measured in degrees.    7/26/2023 7/26/2023 9/19/2023 9/29/2023 10/13/2023     Right  Left  Right Right Right              Index: MP  0/35   50 50/64 45/              PIP     0/45    88               DIP 0/31    35               BABB 111                   Long:  MP 10/45   40 45/70 40/              PIP /45    /95               DIP 0/25 "    54               BABB 105                   Ring:   MP 5/15    20/55               PIP 0/50    /106               DIP 0/25    /36               BABB 85                   Small:  MP 0/10    75                PIP 0/30    90                DIP 0/35    40               BABB 75              Elbow and Wrist ROM. Measured in degrees.    7/31/2023 7/31/2023 10/18/2023     Left Right Right            Wrist Ext/Flex 65/70 26/35                      Sensation: denies numbness and tingling   NT Distribution 7/26/2023 7/26/2023     Right  Left    [place] Shaye       Normal 1.65-2.83       Diminished Light Touch 3.22-3.61       Diminished Protective 3.84-4.31       Loss of Protective 4.56-6.65       Untestable >6.65       2 Point Discrimination       Static       Dynamic            Fine motor Coordination:   R) 26s  L) 26s       Strength (Dyanmometer) and Pinch Strength (Pinch Gauge)  Measured in pounds and psi. Average of three trials.    7/26/2023 7/26/2023     Right  Left    Rung II deferred     Key Pinch       3pt Pinch       2pt Pinch             Intake Outcome Measure for FOTO initial eval; hand Survey     Therapist reviewed FOTO scores for Sofía Mcwilliams on 7/26/2023.   FOTO documents entered into [employer] - see Media section.     Intake Score: 52.5%           Treatment     Sofía received the treatments listed below:             Fluidotherapy: To R hand for 10 min, continuous air, 110 deg, air speed 100 to decrease pain, edema & scar tissue and increased tissue extensibility.         Manual therapy:  10 minutes   - retrograde massage   - scar tissue mobilization   - vibration tool      Therapeutic activity: 25 min   Tenodesis x 10 reps (2 sets)   hook grasp x 10 reps (3 sets)   syngeristic wrist x 10 reps (2 sets) with dycem   FMC h keyboard pegs (5 sets)   3pt pinch with yellow chip clip   Towel walks (3 min)           Patient Education and Home Exercises     Education provided:   Compression glove size XS   Splint with  "heavy duty exercises   - Progress towards goals     Written Home Exercises Provided: yes.  Exercises were reviewed and Sofía was able to demonstrate them prior to the end of the session.  Sofía demonstrated good  understanding of the HEP provided. See EMR under Patient Instructions for exercises provided during therapy sessions.       Assessment     Cont mod ext lag in IF and LF on R hand. Good BABB with RF/SF. Does report "burning" sensation along ulnar aspect of dorsal hand with activities lasting more than 10 min. Increased MP extension when wrist is flexed/neutral. Lag presents when wrist is in extension.           Sofía is progressing well towards her goals and there are no updates to goals at this time. Pt prognosis is Fair.     Pt will continue to benefit from skilled outpatient occupational therapy to address the deficits listed in the problem list on initial evaluation provide pt/family education and to maximize pt's level of independence in the home and community environment.     Pt's spiritual, cultural and educational needs considered and pt agreeable to plan of care and goals.    Anticipated barriers to occupational therapy:     Goals:          Previous Short Term Goals Status:     1)  Patient to be IND with HEP and modalities for pain management MET   2)  Increase wrist ROM 10 degrees to increase functional hand use for ADLs/leisure activities  3)   Pt will demo ext lag less than 5 degrees for lF MP jt.   4)   Pt will be able to make a full composite fist MET   5)   Patient to score at 55%  or less on FOTO to demonstrate improved perception of functional right UE Use.    New Long Term Goals Status:    Pt will demo ext lag of 40 in IF/RF on R hand   Assess  and pinch strength   Pt will demo increased FMC using 9HPT by decreasing time by 3s     5.   Increase  strength 5-8 lbs. to carry laundry, carry groceries, sweep, and increase functional independence of right hand for ADLs/iADLs  6.   Increase " pinch 3-4  psis for  Increase in functional independence in ADLs/iADLs such as manipulating fasteners and opening containers  7.  Patient to score at 30%  or less on FOTO to demonstrate improved perception of functional rightUE Use.  Reasons for Recertification of Therapy:   cont to have pain during daily tasks and decreased strength        PLAN     Updated Certification Period: 9/22/2023 to 01/10/2023   Recommended Treatment Plan: 1-2 times per week for 11 weeks:  Electrical Stimulation NMES, Fluidotherapy, Iontophoresis (with dexamethazone), Manual Therapy, Moist Heat/ Ice, Neuromuscular Re-ed, Orthotic Management and Training, Paraffin, Patient Education, Self Care, Therapeutic Activities, Therapeutic Exercise, and Ultrasound  Other Recommendations:     Hannah Coon, OT

## 2023-11-17 NOTE — PROGRESS NOTES
[facility]  Occupational Therapy Treatment Note     Date: 11/17/2023  Name: Sofía Mcwilliams  Clinic Number: [id number]    Therapy Diagnosis:   Encounter Diagnoses   Name Primary?    Decreased range of motion of finger of right hand Yes    Decreased finger strength                Physician: Ramona Barrett PA-C      Physician Orders: Eval and Treat  Medical Diagnosis: M66.20 (ICD-10-CM) - Tendon rupture, nontraumatic, extensor  Surgical Procedure and Date: 6/30/2023,   1. Extensor tendon repair right middle finger side to side with index finger tendon.    2. Extensor tendon repair right ring finger side-to-side repair with small finger tendon.    Evaluation Date: 7/26/2023  Insurance Authorization Period Expiration: 12/31/2023  Plan of Care Certification Period: 8 weeks; 9/22/2023  Date of Return to MD: 8/10/2023  Visit # / Visits authorized: 27/40  FOTO: 1/ 3        Precautions:  Standard and Fall       Time In: 09:14 am   Time Out: 10:00  am   Total Billable Time: 46   minutes          Subjective         She was compliant with home exercise program given last session.   Response to previous treatment:decreased wrist edema  Functional change: lgith activity with dynamic     Pain: 0/10  Location: right wrists      Objective     Observation/Appearance:  Skin intact, Skin dry, Hypertrophic scarring, and Edema present      Edema. Measured in centimeters.    7/26/2023 7/26/2023     Right  Left    Proximal Wrist Crease 15.5 15.0 cm   Figure of 8       MCPs 19.0  18.5             Hand ROM. Measured in degrees.    7/26/2023 7/26/2023 9/19/2023 9/29/2023 10/13/2023 11/17/2023      Right  Left  Right Right Right Right               Index: MP  0/35   50 50/64 45/ 50/              PIP     0/45    88                DIP 0/31    35                BABB 111                     Long:  MP 10/45   40 45/70 40/ 50/              PIP /45    /95                DIP 0/25    54                BABB 105                      Ring:   MP 5/15    20/55                PIP 0/50    /106                DIP 0/25    /36                BABB 85                     Small:  MP 0/10    75                 PIP 0/30    90                 DIP 0/35    40                BABB 75               Elbow and Wrist ROM. Measured in degrees.    7/31/2023 7/31/2023 10/18/2023     Left Right Right            Wrist Ext/Flex 65/70 26/35                      Sensation: denies numbness and tingling   NT Distribution 7/26/2023 7/26/2023     Right  Left    [place] Shaye       Normal 1.65-2.83       Diminished Light Touch 3.22-3.61       Diminished Protective 3.84-4.31       Loss of Protective 4.56-6.65       Untestable >6.65       2 Point Discrimination       Static       Dynamic            Fine motor Coordination:   R) 26s  L) 26s       Strength (Dyanmometer) and Pinch Strength (Pinch Gauge)  Measured in pounds and psi. Average of three trials.    7/26/2023 7/26/2023     Right  Left    Rung II deferred     Key Pinch       3pt Pinch       2pt Pinch             Intake Outcome Measure for FOTO initial eval; hand Survey     Therapist reviewed FOTO scores for Sofía Mcwilliams on 7/26/2023.   FOTO documents entered into [employer] - see Media section.     Intake Score: 52.5%           Treatment     Sofía received the treatments listed below:             Fluidotherapy: To R hand for 10 min, continuous air, 110 deg, air speed 100 to decrease pain, edema & scar tissue and increased tissue extensibility.       Manual: x 8 min   Scar massage and mobilization with vibrating tool      Therapeutic activity: 40 min    Tenodesis and wrist flexion and pt has active MP extension   - hook grasp x 10 reps (3 sets)   - syngeristic wrist x 10 reps (2 sets) with dycem (NT)  - lite brite x 10 min   - in hand manip with coins x 2 sets  - isospheres x 2 min   - grooved peg board FMC and in hand manip x 1 set              Patient Education and Home Exercises     Education provided:   Compression  glove size XS   - day splint vs night splint   - Progress towards goals     Written Home Exercises Provided: yes.  Exercises were reviewed and Sofía was able to demonstrate them prior to the end of the session.  Sofía demonstrated good  understanding of the HEP provided. See EMR under Patient Instructions for exercises provided during therapy sessions.       Assessment      Pt tolerated session well and is progressing with FMC. Cont to monitor  moderate extensor lag. IF cont to have MP ext lag . Possible partial rupture of EIP, which is causing LF to lag. Cont to have mod swelling within surrounding surgical site and ulnar styloid.   She is frustrated with process since sx.       Sofía is progressing well towards her goals and there are no updates to goals at this time. Pt prognosis is Fair.     Pt will continue to benefit from skilled outpatient occupational therapy to address the deficits listed in the problem list on initial evaluation provide pt/family education and to maximize pt's level of independence in the home and community environment.     Pt's spiritual, cultural and educational needs considered and pt agreeable to plan of care and goals.    Anticipated barriers to occupational therapy:     Goals:  Long term goals: (by d/c)  1)  Patient to be IND with HEP and modalities for pain management  2)  Increase ROM 5-10 degrees to increase functional hand use for ADLs/leisure activities  3)   Increase  strength 5-8 lbs. to carry laundry, carry groceries, sweep, and increase functional independence of right hand for ADLs/iADLs  4)   Increase pinch 3-4  psis for  Increase in functional independence in ADLs/iADLs such as manipulating fasteners and opening containers  5) Patient to score at 63%  or less on FOTO to demonstrate improved perception of functional rightUE Use.       Short term Goals: ( 4 weeks)   1)  Patient to be IND with HEP and modalities for pain management MET   2)  Increase wrist ROM 10  degrees to increase functional hand use for ADLs/leisure activities  3)   Pt will demo ext lag less than 5 degrees for lF MP jt.   4)   Pt will be able to make a full composite fist MET   5)   Patient to score at 55%  or less on FOTO to demonstrate improved perception of functional right UE Use.    Plan     Updates/Grading for next session:     Hannah Coon OT   11/17/2023

## 2023-11-21 ENCOUNTER — CLINICAL SUPPORT (OUTPATIENT)
Dept: REHABILITATION | Facility: HOSPITAL | Age: 83
End: 2023-11-21
Payer: MEDICARE

## 2023-11-21 DIAGNOSIS — M25.641 DECREASED RANGE OF MOTION OF FINGER OF RIGHT HAND: Primary | ICD-10-CM

## 2023-11-21 DIAGNOSIS — R29.898 DECREASED FINGER STRENGTH: ICD-10-CM

## 2023-11-21 PROCEDURE — 97022 WHIRLPOOL THERAPY: CPT

## 2023-11-21 PROCEDURE — 97530 THERAPEUTIC ACTIVITIES: CPT

## 2023-11-21 PROCEDURE — 97140 MANUAL THERAPY 1/> REGIONS: CPT

## 2023-11-27 ENCOUNTER — HOSPITAL ENCOUNTER (OUTPATIENT)
Dept: RADIOLOGY | Facility: HOSPITAL | Age: 83
Discharge: HOME | End: 2023-11-27
Attending: ORTHOPAEDIC SURGERY
Payer: MEDICARE

## 2023-11-27 DIAGNOSIS — M25.50 PAIN IN JOINT, MULTIPLE SITES: ICD-10-CM

## 2023-11-27 PROCEDURE — 73221 MRI JOINT UPR EXTREM W/O DYE: CPT | Mod: TC,HCNC,RT

## 2023-11-27 PROCEDURE — 73221 MRI WRIST WITHOUT CONTRAST RIGHT: ICD-10-PCS | Mod: 26,HCNC,RT, | Performed by: RADIOLOGY

## 2023-11-27 PROCEDURE — 73221 MRI JOINT UPR EXTREM W/O DYE: CPT | Mod: 26,HCNC,RT, | Performed by: RADIOLOGY

## 2023-11-28 ENCOUNTER — CLINICAL SUPPORT (OUTPATIENT)
Dept: REHABILITATION | Facility: HOSPITAL | Age: 83
End: 2023-11-28
Payer: MEDICARE

## 2023-11-28 DIAGNOSIS — M25.641 DECREASED RANGE OF MOTION OF FINGER OF RIGHT HAND: Primary | ICD-10-CM

## 2023-11-28 DIAGNOSIS — R29.898 DECREASED FINGER STRENGTH: ICD-10-CM

## 2023-11-28 PROCEDURE — 97530 THERAPEUTIC ACTIVITIES: CPT | Mod: CO

## 2023-11-28 PROCEDURE — 97022 WHIRLPOOL THERAPY: CPT | Mod: CO

## 2023-11-28 PROCEDURE — 97140 MANUAL THERAPY 1/> REGIONS: CPT | Mod: CO

## 2023-11-28 NOTE — PROGRESS NOTES
[facility]  Occupational Therapy Treatment Note     Date: 11/21/2023  Name: Sofía Mcwilliams  Clinic Number: [id number]    Therapy Diagnosis:   Encounter Diagnoses   Name Primary?    Decreased range of motion of finger of right hand Yes    Decreased finger strength                  Physician: Ramona Barrett PA-C      Physician Orders: Eval and Treat  Medical Diagnosis: M66.20 (ICD-10-CM) - Tendon rupture, nontraumatic, extensor  Surgical Procedure and Date: 6/30/2023,   1. Extensor tendon repair right middle finger side to side with index finger tendon.    2. Extensor tendon repair right ring finger side-to-side repair with small finger tendon.    Evaluation Date: 7/26/2023  Insurance Authorization Period Expiration: 12/31/2023  Plan of Care Certification Period: 8 weeks; 9/22/2023  Date of Return to MD: 8/10/2023  Visit # / Visits authorized: 27/40  FOTO: 1/ 3        Precautions:  Standard and Fall       Time In: 08:00 am   Time Out: 9:00  am   Total Billable Time: 60   minutes          Subjective     Hurts when I am doing something longer than 10 min      She was compliant with home exercise program given last session.   Response to previous treatment:decreased wrist edema  Functional change: lgith activity with dynamic     Pain: 0/10  Location: right wrists      Objective     Observation/Appearance:  Skin intact, Skin dry, Hypertrophic scarring, and Edema present      Edema. Measured in centimeters.    7/26/2023 7/26/2023     Right  Left    Proximal Wrist Crease 15.5 15.0 cm   Figure of 8       MCPs 19.0  18.5             Hand ROM. Measured in degrees.    7/26/2023 7/26/2023 9/19/2023 9/29/2023 10/13/2023 11/17/2023      Right  Left  Right Right Right Right               Index: MP  0/35   50 50/64 45/ 50/              PIP     0/45    88                DIP 0/31    35                BABB 111                     Long:  MP 10/45   40 45/70 40/ 50/              PIP /45    /95                 DIP 0/25    54                BABB 105                     Ring:   MP 5/15    20/55                PIP 0/50    /106                DIP 0/25    /36                BABB 85                     Small:  MP 0/10    75                 PIP 0/30    90                 DIP 0/35    40                BABB 75               Elbow and Wrist ROM. Measured in degrees.    7/31/2023 7/31/2023 10/18/2023     Left Right Right            Wrist Ext/Flex 65/70 26/35                      Sensation: denies numbness and tingling   NT Distribution 7/26/2023 7/26/2023     Right  Left    [place] Shaye       Normal 1.65-2.83       Diminished Light Touch 3.22-3.61       Diminished Protective 3.84-4.31       Loss of Protective 4.56-6.65       Untestable >6.65       2 Point Discrimination       Static       Dynamic            Fine motor Coordination:   R) 26s  L) 26s       Strength (Dyanmometer) and Pinch Strength (Pinch Gauge)  Measured in pounds and psi. Average of three trials.    7/26/2023 7/26/2023     Right  Left    Rung II deferred     Key Pinch       3pt Pinch       2pt Pinch             Intake Outcome Measure for FOTO initial eval; hand Survey     Therapist reviewed FOTO scores for Sofía Mcwilliams on 7/26/2023.   FOTO documents entered into [employer] - see Media section.     Intake Score: 52.5%           Treatment     Sofía received the treatments listed below:             Fluidotherapy: To R hand for 10 min, continuous air, 110 deg, air speed 100 to decrease pain, edema & scar tissue and increased tissue extensibility.       Manual: x 10 min   Scar massage and mobilization with vibrating tool      Therapeutic activity: 40 min    - hook grasp x 10 reps (3 sets)   - MP extension with hand edge of table x 10 reps (3 sets)    - lite brite x 10 min   - in hand manip with coins x 2 sets  - isospheres x 2 min   - grooved peg board FMC and in hand manip x 1 set              Patient Education and Home Exercises     Education provided:   Compression  glove size XS   - day splint vs night splint   - Progress towards goals     Written Home Exercises Provided: yes.  Exercises were reviewed and Sofía was able to demonstrate them prior to the end of the session.  Sofía demonstrated good  understanding of the HEP provided. See EMR under Patient Instructions for exercises provided during therapy sessions.       Assessment     Improved MP extension with wrist slightly flexed. Cont working with endurance with FMC   She is frustrated with process since sx.       Sofía is progressing well towards her goals and there are no updates to goals at this time. Pt prognosis is Fair.     Pt will continue to benefit from skilled outpatient occupational therapy to address the deficits listed in the problem list on initial evaluation provide pt/family education and to maximize pt's level of independence in the home and community environment.     Pt's spiritual, cultural and educational needs considered and pt agreeable to plan of care and goals.    Anticipated barriers to occupational therapy:     Goals:  Long term goals: (by d/c)  1)  Patient to be IND with HEP and modalities for pain management  2)  Increase ROM 5-10 degrees to increase functional hand use for ADLs/leisure activities  3)   Increase  strength 5-8 lbs. to carry laundry, carry groceries, sweep, and increase functional independence of right hand for ADLs/iADLs  4)   Increase pinch 3-4  psis for  Increase in functional independence in ADLs/iADLs such as manipulating fasteners and opening containers  5) Patient to score at 63%  or less on FOTO to demonstrate improved perception of functional rightUE Use.       Short term Goals: ( 4 weeks)   1)  Patient to be IND with HEP and modalities for pain management MET   2)  Increase wrist ROM 10 degrees to increase functional hand use for ADLs/leisure activities  3)   Pt will demo ext lag less than 5 degrees for lF MP jt.   4)   Pt will be able to make a full composite fist  MET   5)   Patient to score at 55%  or less on FOTO to demonstrate improved perception of functional right UE Use.    Plan     Updates/Grading for next session:     Hannah Coon OT   11/21/2023

## 2023-11-28 NOTE — PROGRESS NOTES
"      [facility]  Occupational Therapy Treatment Note     Date: 11/28/2023  Name: Sofía Mcwilliams  Clinic Number: [id number]    Therapy Diagnosis:   Encounter Diagnoses   Name Primary?    Decreased range of motion of finger of right hand Yes    Decreased finger strength                Physician: Ramona Barrett PA-C      Physician Orders: Eval and Treat  Medical Diagnosis: M66.20 (ICD-10-CM) - Tendon rupture, nontraumatic, extensor  Surgical Procedure and Date: 6/30/2023,   1. Extensor tendon repair right middle finger side to side with index finger tendon.    2. Extensor tendon repair right ring finger side-to-side repair with small finger tendon.    Evaluation Date: 7/26/2023  Insurance Authorization Period Expiration: 12/31/2023  Plan of Care Certification Period: 8 weeks; 9/22/2023  Date of Return to MD: 8/10/2023  Visit # / Visits authorized: 29/40  FOTO: 1/ 3        Precautions:  Standard and Fall       Time In: 7:49 am   Time Out: 7:45  am   Total Billable Time: 56   minutes          Subjective       "I got my MRI yesterday."  She was compliant with home exercise program given last session.   Response to previous treatment:decreased wrist edema  Functional change: lgith activity with dynamic     Pain: 0/10  Location: right wrists      Objective     Observation/Appearance:  Skin intact, Skin dry, Hypertrophic scarring, and Edema present      Edema. Measured in centimeters.    7/26/2023 7/26/2023     Right  Left    Proximal Wrist Crease 15.5 15.0 cm   Figure of 8       MCPs 19.0  18.5             Hand ROM. Measured in degrees.    7/26/2023 7/26/2023 9/19/2023 9/29/2023 10/13/2023 11/17/2023      Right  Left  Right Right Right Right               Index: MP  0/35   50 50/64 45/ 50/              PIP     0/45    88                DIP 0/31    35                BABB 111                     Long:  MP 10/45   40 45/70 40/ 50/              PIP /45    /95                DIP 0/25    54              "   BABB 105                     Ring:   MP 5/15    20/55                PIP 0/50    /106                DIP 0/25    /36                BABB 85                     Small:  MP 0/10    75                 PIP 0/30    90                 DIP 0/35    40                BABB 75               Elbow and Wrist ROM. Measured in degrees.    7/31/2023 7/31/2023 10/18/2023     Left Right Right            Wrist Ext/Flex 65/70 26/35                      Sensation: denies numbness and tingling   NT Distribution 7/26/2023 7/26/2023     Right  Left    [place]       Normal 1.65-2.83       Diminished Light Touch 3.22-3.61       Diminished Protective 3.84-4.31       Loss of Protective 4.56-6.65       Untestable >6.65       2 Point Discrimination       Static       Dynamic            Fine motor Coordination:   R) 26s  L) 26s    11/28/23  R) 15 s     Strength (Dyanmometer) and Pinch Strength (Pinch Gauge)  Measured in pounds and psi. Average of three trials.    7/26/2023 7/26/2023     Right  Left    Rung II deferred     Key Pinch       3pt Pinch       2pt Pinch             Intake Outcome Measure for FOTO initial eval; hand Survey     Therapist reviewed FOTO scores for Sofía Mcwilliams on 7/26/2023.   FOTO documents entered into [employer] - see Media section.     Intake Score: 52.5%           Treatment     Sofía received the treatments listed below:             Fluidotherapy: To R hand for 10 min, continuous air, 110 deg, air speed 100 to decrease pain, edema & scar tissue and increased tissue extensibility.     Manual: x 8 min   Scar massage and mobilization with vibrating tool    Therapeutic activity: 36 min    Tenodesis and wrist flexion and pt has active MP extension   - hook grasp x 10 reps (3 sets)   - digit lifts with wrist in slight flexion   - syngeristic wrist x 10 reps (2 sets) with dycem (NT)  - lite brite x 10 min   - in hand manip with coins x 2 sets  - isospheres x 2 min   - grooved peg board FMC and in hand manip x 1 set  (NT)  True balance x 2 min               Patient Education and Home Exercises     Education provided:   Compression glove size XS   - day splint vs night splint   - Progress towards goals     Written Home Exercises Provided: yes.  Exercises were reviewed and Sofía was able to demonstrate them prior to the end of the session.  Sofía demonstrated good  understanding of the HEP provided. See EMR under Patient Instructions for exercises provided during therapy sessions.       Assessment      Pt tolerated session well. Noted improvement with FMC today per objective measure update. Cont to monitor  moderate extensor lag. IF cont to have MP ext lag .improved ext ROM with wrist in flexion. Cont to have mod swelling within surrounding surgical site and ulnar styloid.      Sofía is progressing well towards her goals and there are no updates to goals at this time. Pt prognosis is Fair.     Pt will continue to benefit from skilled outpatient occupational therapy to address the deficits listed in the problem list on initial evaluation provide pt/family education and to maximize pt's level of independence in the home and community environment.     Pt's spiritual, cultural and educational needs considered and pt agreeable to plan of care and goals.    Anticipated barriers to occupational therapy:     Goals:  Long term goals: (by d/c)  1)  Patient to be IND with HEP and modalities for pain management  2)  Increase ROM 5-10 degrees to increase functional hand use for ADLs/leisure activities  3)   Increase  strength 5-8 lbs. to carry laundry, carry groceries, sweep, and increase functional independence of right hand for ADLs/iADLs  4)   Increase pinch 3-4  psis for  Increase in functional independence in ADLs/iADLs such as manipulating fasteners and opening containers  5) Patient to score at 63%  or less on FOTO to demonstrate improved perception of functional rightUE Use.       Short term Goals: ( 4 weeks)   1)  Patient to be  IND with HEP and modalities for pain management MET   2)  Increase wrist ROM 10 degrees to increase functional hand use for ADLs/leisure activities  3)   Pt will demo ext lag less than 5 degrees for lF MP jt.   4)   Pt will be able to make a full composite fist MET   5)   Patient to score at 55%  or less on FOTO to demonstrate improved perception of functional right UE Use.    Plan     Updates/Grading for next session:     TOM Rodriguez   11/28/2023

## 2023-12-05 ENCOUNTER — CLINICAL SUPPORT (OUTPATIENT)
Dept: REHABILITATION | Facility: HOSPITAL | Age: 83
End: 2023-12-05
Payer: MEDICARE

## 2023-12-05 DIAGNOSIS — M25.641 DECREASED RANGE OF MOTION OF FINGER OF RIGHT HAND: Primary | ICD-10-CM

## 2023-12-05 DIAGNOSIS — R29.898 DECREASED FINGER STRENGTH: ICD-10-CM

## 2023-12-05 PROCEDURE — 97022 WHIRLPOOL THERAPY: CPT | Mod: CO

## 2023-12-05 PROCEDURE — 97530 THERAPEUTIC ACTIVITIES: CPT | Mod: CO

## 2023-12-05 PROCEDURE — 97140 MANUAL THERAPY 1/> REGIONS: CPT | Mod: CO

## 2023-12-05 NOTE — PROGRESS NOTES
"      [facility]  Occupational Therapy Treatment Note     Date: 12/5/2023  Name: Sofía Mcwilliams  Clinic Number: [id number]    Therapy Diagnosis:   Encounter Diagnoses   Name Primary?    Decreased range of motion of finger of right hand Yes    Decreased finger strength                Physician: Ramona Barrett PA-C      Physician Orders: Eval and Treat  Medical Diagnosis: M66.20 (ICD-10-CM) - Tendon rupture, nontraumatic, extensor  Surgical Procedure and Date: 6/30/2023,   1. Extensor tendon repair right middle finger side to side with index finger tendon.    2. Extensor tendon repair right ring finger side-to-side repair with small finger tendon.    Evaluation Date: 7/26/2023  Insurance Authorization Period Expiration: 12/31/2023  Plan of Care Certification Period: 8 weeks; 1/10/2023  Date of Return to MD: 8/10/2023  Visit # / Visits authorized: 30/40  FOTO: 1/ 3        Precautions:  Standard and Fall       Time In: 7:49 am   Time Out: 8:47  am   Total Billable Time: 58 minutes          Subjective       "I keep dropping things."  She was compliant with home exercise program given last session.   Response to previous treatment:decreased wrist edema  Functional change: lgith activity with dynamic     Pain: 0/10  Location: right wrists      Objective     Observation/Appearance:  Skin intact, Skin dry, Hypertrophic scarring, and Edema present      Edema. Measured in centimeters.    7/26/2023 7/26/2023     Right  Left    Proximal Wrist Crease 15.5 15.0 cm   Figure of 8       MCPs 19.0  18.5             Hand ROM. Measured in degrees.    7/26/2023 7/26/2023 9/19/2023 9/29/2023 10/13/2023 11/17/2023      Right  Left  Right Right Right Right               Index: MP  0/35   50 50/64 45/ 50/              PIP     0/45    88                DIP 0/31    35                BABB 111                     Long:  MP 10/45   40 45/70 40/ 50/              PIP /45    /95                DIP 0/25    54                " BABB 105                     Ring:   MP 5/15    20/55                PIP 0/50    /106                DIP 0/25    /36                BABB 85                     Small:  MP 0/10    75                 PIP 0/30    90                 DIP 0/35    40                BABB 75               Elbow and Wrist ROM. Measured in degrees.    7/31/2023 7/31/2023 10/18/2023     Left Right Right            Wrist Ext/Flex 65/70 26/35                      Sensation: denies numbness and tingling   NT Distribution 7/26/2023 7/26/2023     Right  Left    [place]       Normal 1.65-2.83       Diminished Light Touch 3.22-3.61       Diminished Protective 3.84-4.31       Loss of Protective 4.56-6.65       Untestable >6.65       2 Point Discrimination       Static       Dynamic            Fine motor Coordination:   R) 26s  L) 26s    11/28/23  R) 15 s     Strength (Dyanmometer) and Pinch Strength (Pinch Gauge)  Measured in pounds and psi. Average of three trials.    7/26/2023 7/26/2023     Right  Left    Rung II deferred     Key Pinch       3pt Pinch       2pt Pinch             Intake Outcome Measure for FOTO initial eval; hand Survey     Therapist reviewed FOTO scores for Sofía Mcwilliams on 7/26/2023.   FOTO documents entered into [employer] - see Media section.     Intake Score: 52.5%           Treatment     Sofía received the treatments listed below:             Fluidotherapy: To R hand for 10 min, continuous air, 110 deg, air speed 100 to decrease pain, edema & scar tissue and increased tissue extensibility.     Manual: x 15 min   Scar massage and mobilization with vibrating tool and albin tool      Therapeutic activity: 33 min    Tenodesis and wrist flexion and pt has active MP extension   - hook grasp x 10 reps (3 sets)   - digit lifts with wrist in slight flexion   - syngeristic wrist x 10 reps (2 sets) with dycem (NT)  - lite brite x 10 min (NT)  - in hand manip with coins x 2 sets (NT)  - isospheres x 2 min   - grooved peg board FMC and in  hand manip x 1 set (NT)  True balance x 2 min (NT)    Taping distal to proximal to IF/LF to increase extension, activation of EDC muscle in FA with ~25% stretch, trial of scar mobilization with taping           Patient Education and Home Exercises     Education provided:   Compression glove size XS   - day splint vs night splint   - Progress towards goals     Written Home Exercises Provided: yes.  Exercises were reviewed and Sofía was able to demonstrate them prior to the end of the session.  Sofía demonstrated good  understanding of the HEP provided. See EMR under Patient Instructions for exercises provided during therapy sessions.       Assessment      Pt tolerated session well. Attempted taping concepts to assist with digit extension of digits 2 and 3. Some success noted with activation at EDC muscle belly.     Sofía is progressing well towards her goals and there are no updates to goals at this time. Pt prognosis is Fair.     Pt will continue to benefit from skilled outpatient occupational therapy to address the deficits listed in the problem list on initial evaluation provide pt/family education and to maximize pt's level of independence in the home and community environment.     Pt's spiritual, cultural and educational needs considered and pt agreeable to plan of care and goals.    Anticipated barriers to occupational therapy:     Goals:  Long term goals: (by d/c)  1)  Patient to be IND with HEP and modalities for pain management  2)  Increase ROM 5-10 degrees to increase functional hand use for ADLs/leisure activities  3)   Increase  strength 5-8 lbs. to carry laundry, carry groceries, sweep, and increase functional independence of right hand for ADLs/iADLs  4)   Increase pinch 3-4  psis for  Increase in functional independence in ADLs/iADLs such as manipulating fasteners and opening containers  5) Patient to score at 63%  or less on FOTO to demonstrate improved perception of functional rightUE Use.        Short term Goals: ( 4 weeks)   1)  Patient to be IND with HEP and modalities for pain management MET   2)  Increase wrist ROM 10 degrees to increase functional hand use for ADLs/leisure activities  3)   Pt will demo ext lag less than 5 degrees for lF MP jt.   4)   Pt will be able to make a full composite fist MET   5)   Patient to score at 55%  or less on FOTO to demonstrate improved perception of functional right UE Use.    Plan     Updates/Grading for next session:     TOM Rodriguez   12/5/2023

## 2023-12-11 NOTE — PROGRESS NOTES
HPI     DLS: 10/17/2016  Pt states no noticeable vision changes. No eye allergies. No f/f. No   problems with glare.     No gtts     Retina Disorder OU   Hx CAT OU   S/p focal laser for peripheral hole OD   Vit traction OS   JOLENE     Last edited by Kareem Muhammad, OD on 10/18/2017  2:15 PM. (History)        ROS     Positive for: Eyes (focal laser OD for periph hole, vit traction OS)    Negative for: Constitutional, Gastrointestinal, Neurological, Skin,   Genitourinary, Musculoskeletal, HENT, Endocrine, Cardiovascular,   Respiratory, Psychiatric, Allergic/Imm, Heme/Lymph    Last edited by Kareem Muhammad, OD on 10/18/2017  2:15 PM. (History)        Assessment /Plan     For exam results, see Encounter Report.    History of repair of retinal tear by laser photocoagulation    Nuclear sclerosis, bilateral    Screening for glaucoma    Presbyopia        1. Cat OU--pt happy w Rx  2. Sp focal laser for periph hole OD--stable  3. Vit traction OS--stable  4. JOLENE--advised SYSTANE or REFRESH ATs prn    PLAN:    rtc 1 yr, or immediately if F/F                  High Dose Vitamin A Pregnancy And Lactation Text: High dose vitamin A therapy is contraindicated during pregnancy and breast feeding. Tranexamic Acid Counseling:  Patient advised of the small risk of bleeding problems with tranexamic acid. They were also instructed to call if they developed any nausea, vomiting or diarrhea. All of the patient's questions and concerns were addressed. Prednisone Counseling:  I discussed with the patient the risks of prolonged use of prednisone including but not limited to weight gain, insomnia, osteoporosis, mood changes, diabetes, susceptibility to infection, glaucoma and high blood pressure.  In cases where prednisone use is prolonged, patients should be monitored with blood pressure checks, serum glucose levels and an eye exam.  Additionally, the patient may need to be placed on GI prophylaxis, PCP prophylaxis, and calcium and vitamin D supplementation and/or a bisphosphonate.  The patient verbalized understanding of the proper use and the possible adverse effects of prednisone.  All of the patient's questions and concerns were addressed. Minoxidil Pregnancy And Lactation Text: This medication has not been assigned a Pregnancy Risk Category but animal studies failed to show danger with the topical medication. It is unknown if the medication is excreted in breast milk. Eucrisa Counseling: Patient may experience a mild burning sensation during topical application. Eucrisa is not approved in children less than 2 years of age. Carac Pregnancy And Lactation Text: This medication is Pregnancy Category X and contraindicated in pregnancy and in women who may become pregnant. It is unknown if this medication is excreted in breast milk. Azathioprine Pregnancy And Lactation Text: This medication is Pregnancy Category D and isn't considered safe during pregnancy. It is unknown if this medication is excreted in breast milk. Cephalexin Pregnancy And Lactation Text: This medication is Pregnancy Category B and considered safe during pregnancy.  It is also excreted in breast milk but can be used safely for shorter doses. Qbrexza Counseling:  I discussed with the patient the risks of Qbrexza including but not limited to headache, mydriasis, blurred vision, dry eyes, nasal dryness, dry mouth, dry throat, dry skin, urinary hesitation, and constipation.  Local skin reactions including erythema, burning, stinging, and itching can also occur. Topical Steroids Applications Pregnancy And Lactation Text: Most topical steroids are considered safe to use during pregnancy and lactation.  Any topical steroid applied to the breast or nipple should be washed off before breastfeeding. Detail Level: Simple Arava Counseling:  Patient counseled regarding adverse effects of Arava including but not limited to nausea, vomiting, abnormalities in liver function tests. Patients may develop mouth sores, rash, diarrhea, and abnormalities in blood counts. The patient understands that monitoring is required including LFTs and blood counts.  There is a rare possibility of scarring of the liver and lung problems that can occur when taking methotrexate. Persistent nausea, loss of appetite, pale stools, dark urine, cough, and shortness of breath should be reported immediately. Patient advised to discontinue Arava treatment and consult with a physician prior to attempting conception. The patient will have to undergo a treatment to eliminate Arava from the body prior to conception. Dupixent Counseling: I discussed with the patient the risks of dupilumab including but not limited to eye infection and irritation, cold sores, injection site reactions, worsening of asthma, allergic reactions and increased risk of parasitic infection.  Live vaccines should be avoided while taking dupilumab. Dupilumab will also interact with certain medications such as warfarin and cyclosporine. The patient understands that monitoring is required and they must alert us or the primary physician if symptoms of infection or other concerning signs are noted. Topical Retinoid Pregnancy And Lactation Text: This medication is Pregnancy Category C. It is unknown if this medication is excreted in breast milk. Albendazole Counseling:  I discussed with the patient the risks of albendazole including but not limited to cytopenia, kidney damage, nausea/vomiting and severe allergy.  The patient understands that this medication is being used in an off-label manner. Sotyktu Pregnancy And Lactation Text: There is insufficient data to evaluate whether or not Sotyktu is safe to use during pregnancy.? ?It is not known if Sotyktu passes into breast milk and whether or not it is safe to use when breastfeeding.?? Terbinafine Counseling: Patient counseling regarding adverse effects of terbinafine including but not limited to headache, diarrhea, rash, upset stomach, liver function test abnormalities, itching, taste/smell disturbance, nausea, abdominal pain, and flatulence.  There is a rare possibility of liver failure that can occur when taking terbinafine.  The patient understands that a baseline LFT and kidney function test may be required. The patient verbalized understanding of the proper use and possible adverse effects of terbinafine.  All of the patient's questions and concerns were addressed. VTAMA Counseling: I discussed with the patient that VTAMA is not for use in the eyes, mouth or mouth. They should call the office if they develop any signs of allergic reactions to VTAMA. The patient verbalized understanding of the proper use and possible adverse effects of VTAMA.  All of the patient's questions and concerns were addressed. Skyrizi Counseling: I discussed with the patient the risks of risankizumab-rzaa including but not limited to immunosuppression, and serious infections.  The patient understands that monitoring is required including a PPD at baseline and must alert us or the primary physician if symptoms of infection or other concerning signs are noted. Xolair Pregnancy And Lactation Text: This medication is Pregnancy Category B and is considered safe during pregnancy. This medication is excreted in breast milk. Dutasteride Pregnancy And Lactation Text: This medication is absolutely contraindicated in women, especially during pregnancy and breast feeding. Feminization of male fetuses is possible if taking while pregnant. Minocycline Counseling: Patient advised regarding possible photosensitivity and discoloration of the teeth, skin, lips, tongue and gums.  Patient instructed to avoid sunlight, if possible.  When exposed to sunlight, patients should wear protective clothing, sunglasses, and sunscreen.  The patient was instructed to call the office immediately if the following severe adverse effects occur:  hearing changes, easy bruising/bleeding, severe headache, or vision changes.  The patient verbalized understanding of the proper use and possible adverse effects of minocycline.  All of the patient's questions and concerns were addressed. Ilumya Pregnancy And Lactation Text: The risk during pregnancy and breastfeeding is uncertain with this medication. Oxybutynin Pregnancy And Lactation Text: This medication is Pregnancy Category B and is considered safe during pregnancy. It is unknown if it is excreted in breast milk. Nsaids Counseling: NSAID Counseling: I discussed with the patient that NSAIDs should be taken with food. Prolonged use of NSAIDs can result in the development of stomach ulcers.  Patient advised to stop taking NSAIDs if abdominal pain occurs.  The patient verbalized understanding of the proper use and possible adverse effects of NSAIDs.  All of the patient's questions and concerns were addressed. Tetracycline Pregnancy And Lactation Text: This medication is Pregnancy Category D and not consider safe during pregnancy. It is also excreted in breast milk. Gabapentin Pregnancy And Lactation Text: This medication is Pregnancy Category C and isn't considered safe during pregnancy. It is excreted in breast milk. Prednisone Pregnancy And Lactation Text: This medication is Pregnancy Category C and it isn't know if it is safe during pregnancy. This medication is excreted in breast milk. High Dose Vitamin A Counseling: Side effects reviewed, pt to contact office should one occur. Carac Counseling:  I discussed with the patient the risks of Carac including but not limited to erythema, scaling, itching, weeping, crusting, and pain. Tranexamic Acid Pregnancy And Lactation Text: It is unknown if this medication is safe during pregnancy or breast feeding. Cellcept Counseling:  I discussed with the patient the risks of mycophenolate mofetil including but not limited to infection/immunosuppression, GI upset, hypokalemia, hypercholesterolemia, bone marrow suppression, lymphoproliferative disorders, malignancy, GI ulceration/bleed/perforation, colitis, interstitial lung disease, kidney failure, progressive multifocal leukoencephalopathy, and birth defects.  The patient understands that monitoring is required including a baseline creatinine and regular CBC testing. In addition, patient must alert us immediately if symptoms of infection or other concerning signs are noted. Protopic Pregnancy And Lactation Text: This medication is Pregnancy Category C. It is unknown if this medication is excreted in breast milk when applied topically. Topical Sulfur Applications Counseling: Topical Sulfur Counseling: Patient counseled that this medication may cause skin irritation or allergic reactions.  In the event of skin irritation, the patient was advised to reduce the amount of the drug applied or use it less frequently.   The patient verbalized understanding of the proper use and possible adverse effects of topical sulfur application.  All of the patient's questions and concerns were addressed. Cephalexin Counseling: I counseled the patient regarding use of cephalexin as an antibiotic for prophylactic and/or therapeutic purposes. Cephalexin (commonly prescribed under brand name Keflex) is a cephalosporin antibiotic which is active against numerous classes of bacteria, including most skin bacteria. Side effects may include nausea, diarrhea, gastrointestinal upset, rash, hives, yeast infections, and in rare cases, hepatitis, kidney disease, seizures, fever, confusion, neurologic symptoms, and others. Patients with severe allergies to penicillin medications are cautioned that there is about a 10% incidence of cross-reactivity with cephalosporins. When possible, patients with penicillin allergies should use alternatives to cephalosporins for antibiotic therapy. Minoxidil Counseling: Minoxidil is a topical medication which can increase blood flow where it is applied. It is uncertain how this medication increases hair growth. Side effects are uncommon and include stinging and allergic reactions. Topical Retinoid counseling:  Patient advised to apply a pea-sized amount only at bedtime and wait 30 minutes after washing their face before applying.  If too drying, patient may add a non-comedogenic moisturizer. The patient verbalized understanding of the proper use and possible adverse effects of retinoids.  All of the patient's questions and concerns were addressed. Tazorac Pregnancy And Lactation Text: This medication is not safe during pregnancy. It is unknown if this medication is excreted in breast milk. Xeljanz Counseling: I discussed with the patient the risks of Xeljanz therapy including increased risk of infection, liver issues, headache, diarrhea, or cold symptoms. Live vaccines should be avoided. They were instructed to call if they have any problems. Ketoconazole Pregnancy And Lactation Text: This medication is Pregnancy Category C and it isn't know if it is safe during pregnancy. It is also excreted in breast milk and breast feeding isn't recommended. Vtama Pregnancy And Lactation Text: It is unknown if this medication can cause problems during pregnancy and breastfeeding. Arava Pregnancy And Lactation Text: This medication is Pregnancy Category X and is absolutely contraindicated during pregnancy. It is unknown if it is excreted in breast milk. Dupixent Pregnancy And Lactation Text: This medication likely crosses the placenta but the risk for the fetus is uncertain. This medication is excreted in breast milk. Metronidazole Pregnancy And Lactation Text: This medication is Pregnancy Category B and considered safe during pregnancy.  It is also excreted in breast milk. Finasteride Male Counseling: Finasteride Counseling:  I discussed with the patient the risks of use of finasteride including but not limited to decreased libido, decreased ejaculate volume, gynecomastia, and depression. Women should not handle medication.  All of the patient's questions and concerns were addressed. Infliximab Counseling:  I discussed with the patient the risks of infliximab including but not limited to myelosuppression, immunosuppression, autoimmune hepatitis, demyelinating diseases, lymphoma, and serious infections.  The patient understands that monitoring is required including a PPD at baseline and must alert us or the primary physician if symptoms of infection or other concerning signs are noted. Nsaids Pregnancy And Lactation Text: These medications are considered safe up to 30 weeks gestation. It is excreted in breast milk. Tetracycline Counseling: Patient counseled regarding possible photosensitivity and increased risk for sunburn.  Patient instructed to avoid sunlight, if possible.  When exposed to sunlight, patients should wear protective clothing, sunglasses, and sunscreen.  The patient was instructed to call the office immediately if the following severe adverse effects occur:  hearing changes, easy bruising/bleeding, severe headache, or vision changes.  The patient verbalized understanding of the proper use and possible adverse effects of tetracycline.  All of the patient's questions and concerns were addressed. Patient understands to avoid pregnancy while on therapy due to potential birth defects. Glycopyrrolate Counseling:  I discussed with the patient the risks of glycopyrrolate including but not limited to skin rash, drowsiness, dry mouth, difficulty urinating, and blurred vision. Isotretinoin Pregnancy And Lactation Text: This medication is Pregnancy Category X and is considered extremely dangerous during pregnancy. It is unknown if it is excreted in breast milk. Erivedge Counseling- I discussed with the patient the risks of Erivedge including but not limited to nausea, vomiting, diarrhea, constipation, weight loss, changes in the sense of taste, decreased appetite, muscle spasms, and hair loss.  The patient verbalized understanding of the proper use and possible adverse effects of Erivedge.  All of the patient's questions and concerns were addressed. Benzoyl Peroxide Pregnancy And Lactation Text: This medication is Pregnancy Category C. It is unknown if benzoyl peroxide is excreted in breast milk. Protopic Counseling: Patient may experience a mild burning sensation during topical application. Protopic is not approved in children less than 2 years of age. There have been case reports of hematologic and skin malignancies in patients using topical calcineurin inhibitors although causality is questionable. Hydroxyzine Pregnancy And Lactation Text: This medication is not safe during pregnancy and should not be taken. It is also excreted in breast milk and breast feeding isn't recommended. Topical Sulfur Applications Pregnancy And Lactation Text: This medication is Pregnancy Category C and has an unknown safety profile during pregnancy. It is unknown if this topical medication is excreted in breast milk. Klisyri Pregnancy And Lactation Text: It is unknown if this medication can harm a developing fetus or if it is excreted in breast milk. Valtrex Counseling: I discussed with the patient the risks of valacyclovir including but not limited to kidney damage, nausea, vomiting and severe allergy.  The patient understands that if the infection seems to be worsening or is not improving, they are to call. Elidel Counseling: Patient may experience a mild burning sensation during topical application. Elidel is not approved in children less than 2 years of age. There have been case reports of hematologic and skin malignancies in patients using topical calcineurin inhibitors although causality is questionable. Soolantra Pregnancy And Lactation Text: This medication is Pregnancy Category C. This medication is considered safe during breast feeding. Topical Clindamycin Counseling: Patient counseled that this medication may cause skin irritation or allergic reactions.  In the event of skin irritation, the patient was advised to reduce the amount of the drug applied or use it less frequently.   The patient verbalized understanding of the proper use and possible adverse effects of clindamycin.  All of the patient's questions and concerns were addressed. Bactrim Pregnancy And Lactation Text: This medication is Pregnancy Category D and is known to cause fetal risk.  It is also excreted in breast milk. Ketoconazole Counseling:   Patient counseled regarding improving absorption with orange juice.  Adverse effects include but are not limited to breast enlargement, headache, diarrhea, nausea, upset stomach, liver function test abnormalities, taste disturbance, and stomach pain.  There is a rare possibility of liver failure that can occur when taking ketoconazole. The patient understands that monitoring of LFTs may be required, especially at baseline. The patient verbalized understanding of the proper use and possible adverse effects of ketoconazole.  All of the patient's questions and concerns were addressed. Zoryve Counseling:  I discussed with the patient that Zoryve is not for use in the eyes, mouth or vagina. The most commonly reported side effects include diarrhea, headache, insomnia, application site pain, upper respiratory tract infections, and urinary tract infections.  All of the patient's questions and concerns were addressed. Stelara Counseling:  I discussed with the patient the risks of ustekinumab including but not limited to immunosuppression, malignancy, posterior leukoencephalopathy syndrome, and serious infections.  The patient understands that monitoring is required including a PPD at baseline and must alert us or the primary physician if symptoms of infection or other concerning signs are noted. Clofazimine Counseling:  I discussed with the patient the risks of clofazimine including but not limited to skin and eye pigmentation, liver damage, nausea/vomiting, gastrointestinal bleeding and allergy. Enbrel Counseling:  I discussed with the patient the risks of etanercept including but not limited to myelosuppression, immunosuppression, autoimmune hepatitis, demyelinating diseases, lymphoma, and infections.  The patient understands that monitoring is required including a PPD at baseline and must alert us or the primary physician if symptoms of infection or other concerning signs are noted. Finasteride Pregnancy And Lactation Text: This medication is absolutely contraindicated during pregnancy. It is unknown if it is excreted in breast milk. Olanzapine Counseling- I discussed with the patient the common side effects of olanzapine including but are not limited to: lack of energy, dry mouth, increased appetite, sleepiness, tremor, constipation, dizziness, changes in behavior, or restlessness.  Explained that teenagers are more likely to experience headaches, abdominal pain, pain in the arms or legs, tiredness, and sleepiness.  Serious side effects include but are not limited: increased risk of death in elderly patients who are confused, have memory loss, or dementia-related psychosis; hyperglycemia; increased cholesterol and triglycerides; and weight gain. Metronidazole Counseling:  I discussed with the patient the risks of metronidazole including but not limited to seizures, nausea/vomiting, a metallic taste in the mouth, nausea/vomiting and severe allergy. Cibinqo Counseling: I discussed with the patient the risks of Cibinqo therapy including but not limited to common cold, nausea, headache, cold sores, increased blood CPK levels, dizziness, UTIs, fatigue, acne, and vomitting. Live vaccines should be avoided.  This medication has been linked to serious infections; higher rate of mortality; malignancy and lymphoproliferative disorders; major adverse cardiovascular events; thrombosis; thrombocytopenia and lymphopenia; lipid elevations; and retinal detachment. Infliximab Pregnancy And Lactation Text: This medication is Pregnancy Category B and is considered safe during pregnancy. It is unknown if this medication is excreted in breast milk. Xelalisonz Pregnancy And Lactation Text: This medication is Pregnancy Category D and is not considered safe during pregnancy.  The risk during breast feeding is also uncertain. Glycopyrrolate Pregnancy And Lactation Text: This medication is Pregnancy Category B and is considered safe during pregnancy. It is unknown if it is excreted breast milk. Cyclophosphamide Counseling:  I discussed with the patient the risks of cyclophosphamide including but not limited to hair loss, hormonal abnormalities, decreased fertility, abdominal pain, diarrhea, nausea and vomiting, bone marrow suppression and infection. The patient understands that monitoring is required while taking this medication. Isotretinoin Counseling: Patient should get monthly blood tests, not donate blood, not drive at night if vision affected, not share medication, and not undergo elective surgery for 6 months after tx completed. Side effects reviewed, pt to contact office should one occur. Propranolol Counseling:  I discussed with the patient the risks of propranolol including but not limited to low heart rate, low blood pressure, low blood sugar, restlessness and increased cold sensitivity. They should call the office if they experience any of these side effects. Hydroxyzine Counseling: Patient advised that the medication is sedating and not to drive a car after taking this medication.  Patient informed of potential adverse effects including but not limited to dry mouth, urinary retention, and blurry vision.  The patient verbalized understanding of the proper use and possible adverse effects of hydroxyzine.  All of the patient's questions and concerns were addressed. Klisyri Counseling:  I discussed with the patient the risks of Klisyri including but not limited to erythema, scaling, itching, weeping, crusting, and pain. Valtrex Pregnancy And Lactation Text: this medication is Pregnancy Category B and is considered safe during pregnancy. This medication is not directly found in breast milk but it's metabolite acyclovir is present. Drysol Pregnancy And Lactation Text: This medication is considered safe during pregnancy and breast feeding. Benzoyl Peroxide Counseling: Patient counseled that medicine may cause skin irritation and bleach clothing.  In the event of skin irritation, the patient was advised to reduce the amount of the drug applied or use it less frequently.   The patient verbalized understanding of the proper use and possible adverse effects of benzoyl peroxide.  All of the patient's questions and concerns were addressed. Soolantra Counseling: I discussed with the patients the risks of topial Soolantra. This is a medicine which decreases the number of mites and inflammation in the skin. You experience burning, stinging, eye irritation or allergic reactions.  Please call our office if you develop any problems from using this medication. Bactrim Counseling:  I discussed with the patient the risks of sulfa antibiotics including but not limited to GI upset, allergic reaction, drug rash, diarrhea, dizziness, photosensitivity, and yeast infections.  Rarely, more serious reactions can occur including but not limited to aplastic anemia, agranulocytosis, methemoglobinemia, blood dyscrasias, liver or kidney failure, lung infiltrates or desquamative/blistering drug rashes. Wartpeel Counseling:  I discussed with the patient the risks of Wartpeel including but not limited to erythema, scaling, itching, weeping, crusting, and pain. Erythromycin Pregnancy And Lactation Text: This medication is Pregnancy Category B and is considered safe during pregnancy. It is also excreted in breast milk. Cibinqo Pregnancy And Lactation Text: It is unknown if this medication will adversely affect pregnancy or breast feeding.  You should not take this medication if you are currently pregnant or planning a pregnancy or while breastfeeding. Itraconazole Pregnancy And Lactation Text: This medication is Pregnancy Category C and it isn't know if it is safe during pregnancy. It is also excreted in breast milk. Birth Control Pills Counseling: Birth Control Pill Counseling: I discussed with the patient the potential side effects of OCPs including but not limited to increased risk of stroke, heart attack, thrombophlebitis, deep venous thrombosis, hepatic adenomas, breast changes, GI upset, headaches, and depression.  The patient verbalized understanding of the proper use and possible adverse effects of OCPs. All of the patient's questions and concerns were addressed. Adbry Counseling: I discussed with the patient the risks of tralokinumab including but not limited to eye infection and irritation, cold sores, injection site reactions, worsening of asthma, allergic reactions and increased risk of parasitic infection.  Live vaccines should be avoided while taking tralokinumab. The patient understands that monitoring is required and they must alert us or the primary physician if symptoms of infection or other concerning signs are noted. Hydroxychloroquine Counseling:  I discussed with the patient that a baseline ophthalmologic exam is needed at the start of therapy and every year thereafter while on therapy. A CBC may also be warranted for monitoring.  The side effects of this medication were discussed with the patient, including but not limited to agranulocytosis, aplastic anemia, seizures, rashes, retinopathy, and liver toxicity. Patient instructed to call the office should any adverse effect occur.  The patient verbalized understanding of the proper use and possible adverse effects of Plaquenil.  All the patient's questions and concerns were addressed. Rituxan Counseling:  I discussed with the patient the risks of Rituxan infusions. Side effects can include infusion reactions, severe drug rashes including mucocutaneous reactions, reactivation of latent hepatitis and other infections and rarely progressive multifocal leukoencephalopathy.  All of the patient's questions and concerns were addressed. Olanzapine Pregnancy And Lactation Text: This medication is pregnancy category C.   There are no adequate and well controlled trials with olanzapine in pregnant females.  Olanzapine should be used during pregnancy only if the potential benefit justifies the potential risk to the fetus.   In a study in lactating healthy women, olanzapine was excreted in breast milk.  It is recommended that women taking olanzapine should not breast feed. Sarecycline Counseling: Patient advised regarding possible photosensitivity and discoloration of the teeth, skin, lips, tongue and gums.  Patient instructed to avoid sunlight, if possible.  When exposed to sunlight, patients should wear protective clothing, sunglasses, and sunscreen.  The patient was instructed to call the office immediately if the following severe adverse effects occur:  hearing changes, easy bruising/bleeding, severe headache, or vision changes.  The patient verbalized understanding of the proper use and possible adverse effects of sarecycline.  All of the patient's questions and concerns were addressed. Use Enhanced Medication Counseling?: No Doxepin Pregnancy And Lactation Text: This medication is Pregnancy Category C and it isn't known if it is safe during pregnancy. It is also excreted in breast milk and breast feeding isn't recommended. Libtayo Counseling- I discussed with the patient the risks of Libtayo including but not limited to nausea, vomiting, diarrhea, and bone or muscle pain.  The patient verbalized understanding of the proper use and possible adverse effects of Libtayo.  All of the patient's questions and concerns were addressed. Drysol Counseling:  I discussed with the patient the risks of drysol/aluminum chloride including but not limited to skin rash, itching, irritation, burning. Acitretin Counseling:  I discussed with the patient the risks of acitretin including but not limited to hair loss, dry lips/skin/eyes, liver damage, hyperlipidemia, depression/suicidal ideation, photosensitivity.  Serious rare side effects can include but are not limited to pancreatitis, pseudotumor cerebri, bony changes, clot formation/stroke/heart attack.  Patient understands that alcohol is contraindicated since it can result in liver toxicity and significantly prolong the elimination of the drug by many years. Picato Counseling:  I discussed with the patient the risks of Picato including but not limited to erythema, scaling, itching, weeping, crusting, and pain. Propranolol Pregnancy And Lactation Text: This medication is Pregnancy Category C and it isn't known if it is safe during pregnancy. It is excreted in breast milk. Cyclophosphamide Pregnancy And Lactation Text: This medication is Pregnancy Category D and it isn't considered safe during pregnancy. This medication is excreted in breast milk. Azithromycin Pregnancy And Lactation Text: This medication is considered safe during pregnancy and is also secreted in breast milk. Humira Counseling:  I discussed with the patient the risks of adalimumab including but not limited to myelosuppression, immunosuppression, autoimmune hepatitis, demyelinating diseases, lymphoma, and serious infections.  The patient understands that monitoring is required including a PPD at baseline and must alert us or the primary physician if symptoms of infection or other concerning signs are noted. Solaraze Pregnancy And Lactation Text: This medication is Pregnancy Category B and is considered safe. There is some data to suggest avoiding during the third trimester. It is unknown if this medication is excreted in breast milk. Topical Ketoconazole Counseling: Patient counseled that this medication may cause skin irritation or allergic reactions.  In the event of skin irritation, the patient was advised to reduce the amount of the drug applied or use it less frequently.   The patient verbalized understanding of the proper use and possible adverse effects of ketoconazole.  All of the patient's questions and concerns were addressed. Erythromycin Counseling:  I discussed with the patient the risks of erythromycin including but not limited to GI upset, allergic reaction, drug rash, diarrhea, increase in liver enzymes, and yeast infections. Taltz Counseling: I discussed with the patient the risks of ixekizumab including but not limited to immunosuppression, serious infections, worsening of inflammatory bowel disease and drug reactions.  The patient understands that monitoring is required including a PPD at baseline and must alert us or the primary physician if symptoms of infection or other concerning signs are noted. Itraconazole Counseling:  I discussed with the patient the risks of itraconazole including but not limited to liver damage, nausea/vomiting, neuropathy, and severe allergy.  The patient understands that this medication is best absorbed when taken with acidic beverages such as non-diet cola or ginger ale.  The patient understands that monitoring is required including baseline LFTs and repeat LFTs at intervals.  The patient understands that they are to contact us or the primary physician if concerning signs are noted. Zyclara Counseling:  I discussed with the patient the risks of imiquimod including but not limited to erythema, scaling, itching, weeping, crusting, and pain.  Patient understands that the inflammatory response to imiquimod is variable from person to person and was educated regarded proper titration schedule.  If flu-like symptoms develop, patient knows to discontinue the medication and contact us. Adbry Pregnancy And Lactation Text: It is unknown if this medication will adversely affect pregnancy or breast feeding. Birth Control Pills Pregnancy And Lactation Text: This medication should be avoided if pregnant and for the first 30 days post-partum. Rituxan Pregnancy And Lactation Text: This medication is Pregnancy Category C and it isn't know if it is safe during pregnancy. It is unknown if this medication is excreted in breast milk but similar antibodies are known to be excreted. Olumiant Counseling: I discussed with the patient the risks of Olumiant therapy including but not limited to upper respiratory tract infections, shingles, cold sores, and nausea. Live vaccines should be avoided.  This medication has been linked to serious infections; higher rate of mortality; malignancy and lymphoproliferative disorders; major adverse cardiovascular events; thrombosis; gastrointestinal perforations; neutropenia; lymphopenia; anemia; liver enzyme elevations; and lipid elevations. Oral Minoxidil Counseling- I discussed with the patient the risks of oral minoxidil including but not limited to shortness of breath, swelling of the feet or ankles, dizziness, lightheadedness, unwanted hair growth and allergic reaction.  The patient verbalized understanding of the proper use and possible adverse effects of oral minoxidil.  All of the patient's questions and concerns were addressed. Rifampin Pregnancy And Lactation Text: This medication is Pregnancy Category C and it isn't know if it is safe during pregnancy. It is also excreted in breast milk and should not be used if you are breast feeding. Hydroxychloroquine Pregnancy And Lactation Text: This medication has been shown to cause fetal harm but it isn't assigned a Pregnancy Risk Category. There are small amounts excreted in breast milk. Doxepin Counseling:  Patient advised that the medication is sedating and not to drive a car after taking this medication. Patient informed of potential adverse effects including but not limited to dry mouth, urinary retention, and blurry vision.  The patient verbalized understanding of the proper use and possible adverse effects of doxepin.  All of the patient's questions and concerns were addressed. Acitretin Pregnancy And Lactation Text: This medication is Pregnancy Category X and should not be given to women who are pregnant or may become pregnant in the future. This medication is excreted in breast milk. Colchicine Counseling:  Patient counseled regarding adverse effects including but not limited to stomach upset (nausea, vomiting, stomach pain, or diarrhea).  Patient instructed to limit alcohol consumption while taking this medication.  Colchicine may reduce blood counts especially with prolonged use.  The patient understands that monitoring of kidney function and blood counts may be required, especially at baseline. The patient verbalized understanding of the proper use and possible adverse effects of colchicine.  All of the patient's questions and concerns were addressed. Azelaic Acid Counseling: Patient counseled that medicine may cause skin irritation and to avoid applying near the eyes.  In the event of skin irritation, the patient was advised to reduce the amount of the drug applied or use it less frequently.   The patient verbalized understanding of the proper use and possible adverse effects of azelaic acid.  All of the patient's questions and concerns were addressed. Libtayo Pregnancy And Lactation Text: This medication is contraindicated in pregnancy and when breast feeding. Opzelura Pregnancy And Lactation Text: There is insufficient data to evaluate drug-associated risk for major birth defects, miscarriage, or other adverse maternal or fetal outcomes.  There is a pregnancy registry that monitors pregnancy outcomes in pregnant persons exposed to the medication during pregnancy.  It is unknown if this medication is excreted in breast milk.  Do not breastfeed during treatment and for about 4 weeks after the last dose. SSKI Counseling:  I discussed with the patient the risks of SSKI including but not limited to thyroid abnormalities, metallic taste, GI upset, fever, headache, acne, arthralgias, paraesthesias, lymphadenopathy, easy bleeding, arrhythmias, and allergic reaction. Cyclosporine Counseling:  I discussed with the patient the risks of cyclosporine including but not limited to hypertension, gingival hyperplasia,myelosuppression, immunosuppression, liver damage, kidney damage, neurotoxicity, lymphoma, and serious infections. The patient understands that monitoring is required including baseline blood pressure, CBC, CMP, lipid panel and uric acid, and then 1-2 times monthly CMP and blood pressure. Winlevi Counseling:  I discussed with the patient the risks of topical clascoterone including but not limited to erythema, scaling, itching, and stinging. Patient voiced their understanding. Azithromycin Counseling:  I discussed with the patient the risks of azithromycin including but not limited to GI upset, allergic reaction, drug rash, diarrhea, and yeast infections. Imiquimod Counseling:  I discussed with the patient the risks of imiquimod including but not limited to erythema, scaling, itching, weeping, crusting, and pain.  Patient understands that the inflammatory response to imiquimod is variable from person to person and was educated regarded proper titration schedule.  If flu-like symptoms develop, patient knows to discontinue the medication and contact us. Solaraze Counseling:  I discussed with the patient the risks of Solaraze including but not limited to erythema, scaling, itching, weeping, crusting, and pain. Griseofulvin Pregnancy And Lactation Text: This medication is Pregnancy Category X and is known to cause serious birth defects. It is unknown if this medication is excreted in breast milk but breast feeding should be avoided. Cimzia Counseling:  I discussed with the patient the risks of Cimzia including but not limited to immunosuppression, allergic reactions and infections.  The patient understands that monitoring is required including a PPD at baseline and must alert us or the primary physician if symptoms of infection or other concerning signs are noted. Doxycycline Pregnancy And Lactation Text: This medication is Pregnancy Category D and not consider safe during pregnancy. It is also excreted in breast milk but is considered safe for shorter treatment courses. Spironolactone Counseling: Patient advised regarding risks of diarrhea, abdominal pain, hyperkalemia, birth defects (for female patients), liver toxicity and renal toxicity. The patient may need blood work to monitor liver and kidney function and potassium levels while on therapy. The patient verbalized understanding of the proper use and possible adverse effects of spironolactone.  All of the patient's questions and concerns were addressed. Oral Minoxidil Pregnancy And Lactation Text: This medication should only be used when clearly needed if you are pregnant, attempting to become pregnant or breast feeding. Olumiant Pregnancy And Lactation Text: Based on animal studies, Olumiant may cause embryo-fetal harm when administered to pregnant women.  The medication should not be used in pregnancy.  Breastfeeding is not recommended during treatment. Siliq Counseling:  I discussed with the patient the risks of Siliq including but not limited to new or worsening depression, suicidal thoughts and behavior, immunosuppression, malignancy, posterior leukoencephalopathy syndrome, and serious infections.  The patient understands that monitoring is required including a PPD at baseline and must alert us or the primary physician if symptoms of infection or other concerning signs are noted. There is also a special program designed to monitor depression which is required with Siliq. Rifampin Counseling: I discussed with the patient the risks of rifampin including but not limited to liver damage, kidney damage, red-orange body fluids, nausea/vomiting and severe allergy. Low Dose Naltrexone Counseling- I discussed with the patient the potential risks and side effects of low dose naltrexone including but not limited to: more vivid dreams, headaches, nausea, vomiting, abdominal pain, fatigue, dizziness, and anxiety. Bexarotene Counseling:  I discussed with the patient the risks of bexarotene including but not limited to hair loss, dry lips/skin/eyes, liver abnormalities, hyperlipidemia, pancreatitis, depression/suicidal ideation, photosensitivity, drug rash/allergic reactions, hypothyroidism, anemia, leukopenia, infection, cataracts, and teratogenicity.  Patient understands that they will need regular blood tests to check lipid profile, liver function tests, white blood cell count, thyroid function tests and pregnancy test if applicable. Aklief Pregnancy And Lactation Text: It is unknown if this medication is safe to use during pregnancy.  It is unknown if this medication is excreted in breast milk.  Breastfeeding women should use the topical cream on the smallest area of the skin for the shortest time needed while breastfeeding.  Do not apply to nipple and areola. Sski Pregnancy And Lactation Text: This medication is Pregnancy Category D and isn't considered safe during pregnancy. It is excreted in breast milk. Opzelura Counseling:  I discussed with the patient the risks of Opzelura including but not limited to nasopharngitis, bronchitis, ear infection, eosinophila, hives, diarrhea, folliculitis, tonsillitis, and rhinorrhea.  Taken orally, this medication has been linked to serious infections; higher rate of mortality; malignancy and lymphoproliferative disorders; major adverse cardiovascular events; thrombosis; thrombocytopenia, anemia, and neutropenia; and lipid elevations. Cimetidine Pregnancy And Lactation Text: This medication is Pregnancy Category B and is considered safe during pregnancy. It is also excreted in breast milk and breast feeding isn't recommended. Odomzo Counseling- I discussed with the patient the risks of Odomzo including but not limited to nausea, vomiting, diarrhea, constipation, weight loss, changes in the sense of taste, decreased appetite, muscle spasms, and hair loss.  The patient verbalized understanding of the proper use and possible adverse effects of Odomzo.  All of the patient's questions and concerns were addressed. 5-Fu Counseling: 5-Fluorouracil Counseling:  I discussed with the patient the risks of 5-fluorouracil including but not limited to erythema, scaling, itching, weeping, crusting, and pain. Rhofade Pregnancy And Lactation Text: This medication has not been assigned a Pregnancy Risk Category. It is unknown if the medication is excreted in breast milk. Topical Metronidazole Counseling: Metronidazole is a topical antibiotic medication. You may experience burning, stinging, redness, or allergic reactions.  Please call our office if you develop any problems from using this medication. Griseofulvin Counseling:  I discussed with the patient the risks of griseofulvin including but not limited to photosensitivity, cytopenia, liver damage, nausea/vomiting and severe allergy.  The patient understands that this medication is best absorbed when taken with a fatty meal (e.g., ice cream or french fries). Rinvoq Counseling: I discussed with the patient the risks of Rinvoq therapy including but not limited to upper respiratory tract infections, shingles, cold sores, bronchitis, nausea, cough, fever, acne, and headache. Live vaccines should be avoided.  This medication has been linked to serious infections; higher rate of mortality; malignancy and lymphoproliferative disorders; major adverse cardiovascular events; thrombosis; thrombocytopenia, anemia, and neutropenia; lipid elevations; liver enzyme elevations; and gastrointestinal perforations. Doxycycline Counseling:  Patient counseled regarding possible photosensitivity and increased risk for sunburn.  Patient instructed to avoid sunlight, if possible.  When exposed to sunlight, patients should wear protective clothing, sunglasses, and sunscreen.  The patient was instructed to call the office immediately if the following severe adverse effects occur:  hearing changes, easy bruising/bleeding, severe headache, or vision changes.  The patient verbalized understanding of the proper use and possible adverse effects of doxycycline.  All of the patient's questions and concerns were addressed. Ivermectin Pregnancy And Lactation Text: This medication is Pregnancy Category C and it isn't known if it is safe during pregnancy. It is also excreted in breast milk. Tremfya Counseling: I discussed with the patient the risks of guselkumab including but not limited to immunosuppression, serious infections, worsening of inflammatory bowel disease and drug reactions.  The patient understands that monitoring is required including a PPD at baseline and must alert us or the primary physician if symptoms of infection or other concerning signs are noted. Spironolactone Pregnancy And Lactation Text: This medication can cause feminization of the male fetus and should be avoided during pregnancy. The active metabolite is also found in breast milk. Cimzia Pregnancy And Lactation Text: This medication crosses the placenta but can be considered safe in certain situations. Cimzia may be excreted in breast milk. Cantharidin Pregnancy And Lactation Text: This medication has not been proven safe during pregnancy. It is unknown if this medication is excreted in breast milk. Otezla Counseling: The side effects of Otezla were discussed with the patient, including but not limited to worsening or new depression, weight loss, diarrhea, nausea, upper respiratory tract infection, and headache. Patient instructed to call the office should any adverse effect occur.  The patient verbalized understanding of the proper use and possible adverse effects of Otezla.  All the patient's questions and concerns were addressed. Low Dose Naltrexone Pregnancy And Lactation Text: Naltrexone is pregnancy category C.  There have been no adequate and well-controlled studies in pregnant women.  It should be used in pregnancy only if the potential benefit justifies the potential risk to the fetus.   Limited data indicates that naltrexone is minimally excreted into breastmilk. Dapsone Counseling: I discussed with the patient the risks of dapsone including but not limited to hemolytic anemia, agranulocytosis, rashes, methemoglobinemia, kidney failure, peripheral neuropathy, headaches, GI upset, and liver toxicity.  Patients who start dapsone require monitoring including baseline LFTs and weekly CBCs for the first month, then every month thereafter.  The patient verbalized understanding of the proper use and possible adverse effects of dapsone.  All of the patient's questions and concerns were addressed. Aklief counseling:  Patient advised to apply a pea-sized amount only at bedtime and wait 30 minutes after washing their face before applying.  If too drying, patient may add a non-comedogenic moisturizer.  The most commonly reported side effects including irritation, redness, scaling, dryness, stinging, burning, itching, and increased risk of sunburn.  The patient verbalized understanding of the proper use and possible adverse effects of retinoids.  All of the patient's questions and concerns were addressed. Cimetidine Counseling:  I discussed with the patient the risks of Cimetidine including but not limited to gynecomastia, headache, diarrhea, nausea, drowsiness, arrhythmias, pancreatitis, skin rashes, psychosis, bone marrow suppression and kidney toxicity. Opioid Counseling: I discussed with the patient the potential side effects of opioids including but not limited to addiction, altered mental status, and depression. I stressed avoiding alcohol, benzodiazepines, muscle relaxants and sleep aids unless specifically okayed by a physician. The patient verbalized understanding of the proper use and possible adverse effects of opioids. All of the patient's questions and concerns were addressed. They were instructed to flush the remaining pills down the toilet if they did not need them for pain. Hydroquinone Counseling:  Patient advised that medication may result in skin irritation, lightening (hypopigmentation), dryness, and burning.  In the event of skin irritation, the patient was advised to reduce the amount of the drug applied or use it less frequently.  Rarely, spots that are treated with hydroquinone can become darker (pseudoochronosis).  Should this occur, patient instructed to stop medication and call the office. The patient verbalized understanding of the proper use and possible adverse effects of hydroquinone.  All of the patient's questions and concerns were addressed. Cantharidin Counseling:  I discussed with the patient the risks of Cantharidin including but not limited to pain, redness, burning, itching, and blistering. Bexarotene Pregnancy And Lactation Text: This medication is Pregnancy Category X and should not be given to women who are pregnant or may become pregnant. This medication should not be used if you are breast feeding. Methotrexate Counseling:  Patient counseled regarding adverse effects of methotrexate including but not limited to nausea, vomiting, abnormalities in liver function tests. Patients may develop mouth sores, rash, diarrhea, and abnormalities in blood counts. The patient understands that monitoring is required including LFT's and blood counts.  There is a rare possibility of scarring of the liver and lung problems that can occur when taking methotrexate. Persistent nausea, loss of appetite, pale stools, dark urine, cough, and shortness of breath should be reported immediately. Patient advised to discontinue methotrexate treatment at least three months before attempting to become pregnant.  I discussed the need for folate supplements while taking methotrexate.  These supplements can decrease side effects during methotrexate treatment. The patient verbalized understanding of the proper use and possible adverse effects of methotrexate.  All of the patient's questions and concerns were addressed. Rhofade Counseling: Rhofade is a topical medication which can decrease superficial blood flow where applied. Side effects are uncommon and include stinging, redness and allergic reactions. Topical Metronidazole Pregnancy And Lactation Text: This medication is Pregnancy Category B and considered safe during pregnancy.  It is also considered safe to use while breastfeeding. Thalidomide Counseling: I discussed with the patient the risks of thalidomide including but not limited to birth defects, anxiety, weakness, chest pain, dizziness, cough and severe allergy. Clindamycin Pregnancy And Lactation Text: This medication can be used in pregnancy if certain situations. Clindamycin is also present in breast milk. Calcipotriene Pregnancy And Lactation Text: The use of this medication during pregnancy or lactation is not recommended as there is insufficient data. Cosentyx Counseling:  I discussed with the patient the risks of Cosentyx including but not limited to worsening of Crohn's disease, immunosuppression, allergic reactions and infections.  The patient understands that monitoring is required including a PPD at baseline and must alert us or the primary physician if symptoms of infection or other concerning signs are noted. Quinolones Counseling:  I discussed with the patient the risks of fluoroquinolones including but not limited to GI upset, allergic reaction, drug rash, diarrhea, dizziness, photosensitivity, yeast infections, liver function test abnormalities, tendonitis/tendon rupture. Niacinamide Counseling: I recommended taking niacin or niacinamide, also know as vitamin B3, twice daily. Recent evidence suggests that taking vitamin B3 (500 mg twice daily) can reduce the risk of actinic keratoses and non-melanoma skin cancers. Side effects of vitamin B3 include flushing and headache. Ivermectin Counseling:  Patient instructed to take medication on an empty stomach with a full glass of water.  Patient informed of potential adverse effects including but not limited to nausea, diarrhea, dizziness, itching, and swelling of the extremities or lymph nodes.  The patient verbalized understanding of the proper use and possible adverse effects of ivermectin.  All of the patient's questions and concerns were addressed. Simponi Counseling:  I discussed with the patient the risks of golimumab including but not limited to myelosuppression, immunosuppression, autoimmune hepatitis, demyelinating diseases, lymphoma, and serious infections.  The patient understands that monitoring is required including a PPD at baseline and must alert us or the primary physician if symptoms of infection or other concerning signs are noted. Otezla Pregnancy And Lactation Text: This medication is Pregnancy Category C and it isn't known if it is safe during pregnancy. It is unknown if it is excreted in breast milk. Rinvoq Pregnancy And Lactation Text: Based on animal studies, Rinvoq may cause embryo-fetal harm when administered to pregnant women.  The medication should not be used in pregnancy.  Breastfeeding is not recommended during treatment and for 6 days after the last dose. Dapsone Pregnancy And Lactation Text: This medication is Pregnancy Category C and is not considered safe during pregnancy or breast feeding. Opioid Pregnancy And Lactation Text: These medications can lead to premature delivery and should be avoided during pregnancy. These medications are also present in breast milk in small amounts. Azathioprine Counseling:  I discussed with the patient the risks of azathioprine including but not limited to myelosuppression, immunosuppression, hepatotoxicity, lymphoma, and infections.  The patient understands that monitoring is required including baseline LFTs, Creatinine, possible TPMP genotyping and weekly CBCs for the first month and then every 2 weeks thereafter.  The patient verbalized understanding of the proper use and possible adverse effects of azathioprine.  All of the patient's questions and concerns were addressed. Qbrexza Pregnancy And Lactation Text: There is no available data on Qbrexza use in pregnant women.  There is no available data on Qbrexza use in lactation. Methotrexate Pregnancy And Lactation Text: This medication is Pregnancy Category X and is known to cause fetal harm. This medication is excreted in breast milk. Mirvaso Counseling: Mirvaso is a topical medication which can decrease superficial blood flow where applied. Side effects are uncommon and include stinging, redness and allergic reactions. Calcipotriene Counseling:  I discussed with the patient the risks of calcipotriene including but not limited to erythema, scaling, itching, and irritation. Tazorac Counseling:  Patient advised that medication is irritating and drying.  Patient may need to apply sparingly and wash off after an hour before eventually leaving it on overnight.  The patient verbalized understanding of the proper use and possible adverse effects of tazorac.  All of the patient's questions and concerns were addressed. Topical Steroids Counseling: I discussed with the patient that prolonged use of topical steroids can result in the increased appearance of superficial blood vessels (telangiectasias), lightening (hypopigmentation) and thinning of the skin (atrophy).  Patient understands to avoid using high potency steroids in skin folds, the groin or the face.  The patient verbalized understanding of the proper use and possible adverse effects of topical steroids.  All of the patient's questions and concerns were addressed. Clindamycin Counseling: I counseled the patient regarding use of clindamycin as an antibiotic for prophylactic and/or therapeutic purposes. Clindamycin is active against numerous classes of bacteria, including skin bacteria. Side effects may include nausea, diarrhea, gastrointestinal upset, rash, hives, yeast infections, and in rare cases, colitis. Xolair Counseling:  Patient informed of potential adverse effects including but not limited to fever, muscle aches, rash and allergic reactions.  The patient verbalized understanding of the proper use and possible adverse effects of Xolair.  All of the patient's questions and concerns were addressed. Winlevi Pregnancy And Lactation Text: This medication is considered safe during pregnancy and breastfeeding. Fluconazole Counseling:  Patient counseled regarding adverse effects of fluconazole including but not limited to headache, diarrhea, nausea, upset stomach, liver function test abnormalities, taste disturbance, and stomach pain.  There is a rare possibility of liver failure that can occur when taking fluconazole.  The patient understands that monitoring of LFTs and kidney function test may be required, especially at baseline. The patient verbalized understanding of the proper use and possible adverse effects of fluconazole.  All of the patient's questions and concerns were addressed. Dutasteride Male Counseling: Dustasteride Counseling:  I discussed with the patient the risks of use of dutasteride including but not limited to decreased libido, decreased ejaculate volume, and gynecomastia. Women who can become pregnant should not handle medication.  All of the patient's questions and concerns were addressed. Sotyktu Counseling:  I discussed the most common side effects of Sotyktu including: common cold, sore throat, sinus infections, cold sores, canker sores, folliculitis, and acne.? I also discussed more serious side effects of Sotyktu including but not limited to: serious allergic reactions; increased risk for infections such as TB; cancers such as lymphomas; rhabdomyolysis and elevated CPK; and elevated triglycerides and liver enzymes.? Oxybutynin Counseling:  I discussed with the patient the risks of oxybutynin including but not limited to skin rash, drowsiness, dry mouth, difficulty urinating, and blurred vision. Gabapentin Counseling: I discussed with the patient the risks of gabapentin including but not limited to dizziness, somnolence, fatigue and ataxia. Niacinamide Pregnancy And Lactation Text: These medications are considered safe during pregnancy. Ilumya Counseling: I discussed with the patient the risks of tildrakizumab including but not limited to immunosuppression, malignancy, posterior leukoencephalopathy syndrome, and serious infections.  The patient understands that monitoring is required including a PPD at baseline and must alert us or the primary physician if symptoms of infection or other concerning signs are noted. Litfulo Pregnancy And Lactation Text: Based on animal studies, Lifulo may cause embryo-fetal harm when administered to pregnant women.  The medication should not be used in pregnancy.  Breastfeeding is not recommended during treatment. Litfulo Counseling: I discussed with the patient the risks of Litfulo therapy including but not limited to upper respiratory tract infections, shingles, cold sores, and nausea. Live vaccines should be avoided.  This medication has been linked to serious infections; higher rate of mortality; malignancy and lymphoproliferative disorders; major adverse cardiovascular events; thrombosis; gastrointestinal perforations; neutropenia; lymphopenia; anemia; liver enzyme elevations; and lipid elevations.

## 2023-12-12 ENCOUNTER — CLINICAL SUPPORT (OUTPATIENT)
Dept: REHABILITATION | Facility: HOSPITAL | Age: 83
End: 2023-12-12
Payer: MEDICARE

## 2023-12-12 DIAGNOSIS — R29.898 DECREASED FINGER STRENGTH: ICD-10-CM

## 2023-12-12 DIAGNOSIS — M25.641 DECREASED RANGE OF MOTION OF FINGER OF RIGHT HAND: Primary | ICD-10-CM

## 2023-12-12 PROCEDURE — 97112 NEUROMUSCULAR REEDUCATION: CPT

## 2023-12-12 PROCEDURE — 97022 WHIRLPOOL THERAPY: CPT | Mod: 59

## 2023-12-12 PROCEDURE — 97140 MANUAL THERAPY 1/> REGIONS: CPT

## 2023-12-12 NOTE — PROGRESS NOTES
"      [facility]  Occupational Therapy Treatment Note     Date: 12/12/2023  Name: Sofía Mcwilliams  Clinic Number: [id number]    Therapy Diagnosis:   Encounter Diagnoses   Name Primary?    Decreased range of motion of finger of right hand Yes    Decreased finger strength          Physician: Ramona Barrett PA-C      Physician Orders: Eval and Treat  Medical Diagnosis: M66.20 (ICD-10-CM) - Tendon rupture, nontraumatic, extensor  Surgical Procedure and Date: 6/30/2023,   1. Extensor tendon repair right middle finger side to side with index finger tendon.    2. Extensor tendon repair right ring finger side-to-side repair with small finger tendon.    Evaluation Date: 7/26/2023  Insurance Authorization Period Expiration: 12/31/2023  Plan of Care Certification Period: 8 weeks; 1/10/2023  Date of Return to MD: 8/10/2023  Visit # / Visits authorized: 30/40  FOTO: 1/ 3        Precautions:  Standard and Fall       Time In: 7:49 am   Time Out: 8:47  am   Total Billable Time: 58 minutes          Subjective       "I keep dropping things."  She was compliant with home exercise program given last session.   Response to previous treatment:decreased wrist edema  Functional change: lgith activity with dynamic     Pain: 0/10  Location: right wrists      Objective     Observation/Appearance:  Skin intact, Skin dry, Hypertrophic scarring, and Edema present      Edema. Measured in centimeters.    7/26/2023 7/26/2023     Right  Left    Proximal Wrist Crease 15.5 15.0 cm   Figure of 8       MCPs 19.0  18.5             Hand ROM. Measured in degrees.    7/26/2023 7/26/2023 9/19/2023 9/29/2023 10/13/2023 11/17/2023  12/12/2023     Right  Left  Right Right Right Right Right                Index: MP  0/35   50 50/64 45/ 50/ 58 (wrist neutral)  30/ (wrist flexed)              PIP     0/45    88                 DIP 0/31    35                 BABB 111                       Long:  MP 10/45   40 45/70 40/ 50/ 60 (wrist " neutral)  25/ (wrist flexed)              PIP /45    /95                 DIP 0/25    54                 BABB 105                       Ring:   MP 5/15    20/55                 PIP 0/50    /106                 DIP 0/25    /36                 BABB 85                       Small:  MP 0/10    75                  PIP 0/30    90                  DIP 0/35    40                 BABB 75                Elbow and Wrist ROM. Measured in degrees.    7/31/2023 7/31/2023 10/18/2023     Left Right Right            Wrist Ext/Flex 65/70 26/35 50/60                     Sensation: denies numbness and tingling   NT Distribution 7/26/2023 7/26/2023     Right  Left    [place]       Normal 1.65-2.83       Diminished Light Touch 3.22-3.61       Diminished Protective 3.84-4.31       Loss of Protective 4.56-6.65       Untestable >6.65       2 Point Discrimination       Static       Dynamic            Fine motor Coordination:   R) 26s  L) 26s    11/28/23  R) 15 s     Strength (Dyanmometer) and Pinch Strength (Pinch Gauge)  Measured in pounds and psi. Average of three trials.    7/26/2023 7/26/2023     Right  Left    Rung II deferred     Key Pinch       3pt Pinch       2pt Pinch             Intake Outcome Measure for FOTO initial eval; hand Survey     Therapist reviewed FOTO scores for Sofía Mcwilliams on 7/26/2023.   FOTO documents entered into [email or web address] - see Media section.     Intake Score: 52.5%           Treatment     Sofía received the treatments listed below:             Fluidotherapy: To R hand for 10 min, continuous air, 110 deg, air speed 100 to decrease pain, edema & scar tissue and increased tissue extensibility.     Manual: x 15 min   Scar massage and mobilization with vibrating tool and albin tool      Therapeutic activity: 33 min    Tenodesis and wrist flexion and pt has active MP extension   - hook grasp x 10 reps (3 sets)   - digit lifts with wrist in slight flexion   - syngeristic wrist x 10 reps (2 sets) with dycem (NT)  -  lite brite x 10 min (NT)  - in hand manip with coins x 2 sets (NT)  - isospheres x 2 min   - grooved peg board FMC and in hand manip x 1 set (NT)  True balance x 2 min (NT)    Taping distal to proximal to IF/LF to increase extension, activation of EDC muscle in FA with ~25% stretch, trial of scar mobilization with taping           Patient Education and Home Exercises     Education provided:   Compression glove size XS   - day splint vs night splint   - Progress towards goals     Written Home Exercises Provided: yes.  Exercises were reviewed and Sofía was able to demonstrate them prior to the end of the session.  Sofía demonstrated good  understanding of the HEP provided. See EMR under Patient Instructions for exercises provided during therapy sessions.       Assessment     Following heat, manual, and taping Sofía demonstrated ~30 degrees extensor lag in IF and ~25 degrees extensor in LF compared to initial measurements taken. Joint correction dorsal glide of pisiform and lateral glide of PCR. Palpable triggering in LF during composite fist.     Sofía is progressing well towards her goals and there are no updates to goals at this time. Pt prognosis is Fair.     Pt will continue to benefit from skilled outpatient occupational therapy to address the deficits listed in the problem list on initial evaluation provide pt/family education and to maximize pt's level of independence in the home and community environment.     Pt's spiritual, cultural and educational needs considered and pt agreeable to plan of care and goals.    Anticipated barriers to occupational therapy:     Goals:  Long term goals: (by d/c)  1)  Patient to be IND with HEP and modalities for pain management  2)  Increase ROM 5-10 degrees to increase functional hand use for ADLs/leisure activities  3)   Increase  strength 5-8 lbs. to carry laundry, carry groceries, sweep, and increase functional independence of right hand for ADLs/iADLs  4)   Increase  pinch 3-4  psis for  Increase in functional independence in ADLs/iADLs such as manipulating fasteners and opening containers  5) Patient to score at 63%  or less on FOTO to demonstrate improved perception of functional rightUE Use.       Short term Goals: ( 4 weeks)   1)  Patient to be IND with HEP and modalities for pain management MET   2)  Increase wrist ROM 10 degrees to increase functional hand use for ADLs/leisure activities  3)   Pt will demo ext lag less than 5 degrees for lF MP jt.   4)   Pt will be able to make a full composite fist MET   5)   Patient to score at 55%  or less on FOTO to demonstrate improved perception of functional right UE Use.    Plan     Updates/Grading for next session:     Hannah Coon OT   12/12/2023

## 2024-01-03 ENCOUNTER — CLINICAL SUPPORT (OUTPATIENT)
Dept: REHABILITATION | Facility: HOSPITAL | Age: 84
End: 2024-01-03
Payer: MEDICARE

## 2024-01-03 DIAGNOSIS — R29.898 DECREASED FINGER STRENGTH: ICD-10-CM

## 2024-01-03 DIAGNOSIS — M25.641 DECREASED RANGE OF MOTION OF FINGER OF RIGHT HAND: Primary | ICD-10-CM

## 2024-01-03 PROCEDURE — 97022 WHIRLPOOL THERAPY: CPT

## 2024-01-03 PROCEDURE — 97110 THERAPEUTIC EXERCISES: CPT

## 2024-01-03 NOTE — PROGRESS NOTES
[facility]  Occupational Therapy updated POC     Date: 1/3/2024  Name: Sofía Mcwilliams  Clinic Number: [id number]    Therapy Diagnosis:   Encounter Diagnoses   Name Primary?    Decreased range of motion of finger of right hand Yes    Decreased finger strength            Physician: Ramona Barrett PA-C      Physician Orders: Eval and Treat  Medical Diagnosis: M66.20 (ICD-10-CM) - Tendon rupture, nontraumatic, extensor  Surgical Procedure and Date: 6/30/2023,   1. Extensor tendon repair right middle finger side to side with index finger tendon.    2. Extensor tendon repair right ring finger side-to-side repair with small finger tendon.    Evaluation Date: 7/26/2023  Insurance Authorization Period Expiration: 12/31/2023  Plan of Care Certification Period: 8 weeks; 1/10/2023  Date of Return to MD: 8/10/2023  Visit # / Visits authorized: 30/40  FOTO: 1/ 3        Precautions:  Standard and Fall       Time In: 7:49 am   Time Out: 8:47 am   Total Billable Time: 58 minutes          Subjective   Returns to therapy from 2 week break      She was compliant with home exercise program given last session.   Response to previous treatment:decreased wrist edema  Functional change: lgith activity with dynamic     Pain: 0/10  Location: right wrists      Objective     Observation/Appearance:  Skin intact, Skin dry, Hypertrophic scarring, and Edema present      Edema. Measured in centimeters.    7/26/2023 7/26/2023     Right  Left    Proximal Wrist Crease 15.5 15.0 cm   Figure of 8       MCPs 19.0  18.5             Hand ROM. Measured in degrees.    7/26/2023 7/26/2023 9/19/2023 9/29/2023 10/13/2023 11/17/2023  12/12/2023     Right  Left  Right Right Right Right Right                Index: MP  0/35   50 50/64 45/ 50/ 58 (wrist neutral)  30/ (wrist flexed)              PIP     0/45    88                 DIP 0/31    35                 BABB 111                       Long:  MP 10/45   40 45/70 40/ 50/ 60 (wrist  neutral)  25/ (wrist flexed)              PIP /45    /95                 DIP 0/25    54                 BABB 105                       Ring:   MP 5/15    20/55                 PIP 0/50    /106                 DIP 0/25    /36                 BABB 85                       Small:  MP 0/10    75                  PIP 0/30    90                  DIP 0/35    40                 BABB 75                Elbow and Wrist ROM. Measured in degrees.    7/31/2023 7/31/2023 10/18/2023     Left Right Right            Wrist Ext/Flex 65/70 26/35 50/60                     Sensation: denies numbness and tingling   NT Distribution 7/26/2023 7/26/2023     Right  Left    [place] Shaye       Normal 1.65-2.83       Diminished Light Touch 3.22-3.61       Diminished Protective 3.84-4.31       Loss of Protective 4.56-6.65       Untestable >6.65       2 Point Discrimination       Static       Dynamic            Fine motor Coordination:   R) 26s  L) 26s      11/28/23  R) 15 s      1/3/2024  R) 36s        Strength (Dyanmometer) and Pinch Strength (Pinch Gauge)  Measured in pounds and psi. Average of three trials.    12/12/2023 12/12/2023     Right  Left    Rung II 29/21/21  35/32   Purcell Pinch  9 10    3pt Pinch  7.5/10 8.5    2pt Pinch  8.5/7  7/9         Intake Outcome Measure for FOTO initial eval; hand Survey     Therapist reviewed FOTO scores for Sofía Mcwilliams on 7/26/2023.   FOTO documents entered into [email or web address] - see Media section.     Intake Score: 52.5%           Treatment     Sofía received the treatments listed below:           Fluidotherapy: To R hand for 10 min, continuous air, 110 deg, air speed 100 to decrease pain, edema & scar tissue and increased tissue extensibility.       Manual: x 5   Scar massage and mobilization with vibrating tool and albin tool      Therapeutic activity: 30 min    Tenodesis and wrist flexion and pt has active MP extension   - hook grasp x 10 reps (3 sets)   - digit lifts with wrist in slight flexion   -  syngeristic wrist x 10 reps (2 sets) with dycem (NT)  - lite brite x 10 min (NT)  - in hand manip with coins x 2 sets  - wrist PRE 3 ways 1 # x 10 reps   - isospheres x 2 min   - grooved peg board FMC and in hand manip x 1 set (NT)              Patient Education and Home Exercises     Education provided:   Compression glove size XS   - day splint vs night splint   - Progress towards goals     Written Home Exercises Provided: yes.  Exercises were reviewed and Sofía was able to demonstrate them prior to the end of the session.  Sofía demonstrated good  understanding of the HEP provided. See EMR under Patient Instructions for exercises provided during therapy sessions.       Assessment       Cont to have Mod ext lag in IF and LF MP jt. Pt verbalizes cont issue with fatigue and strength. Tx will focus on strength and endurance.     Sofía is progressing well towards her goals and there are no updates to goals at this time. Pt prognosis is Fair.     Pt will continue to benefit from skilled outpatient occupational therapy to address the deficits listed in the problem list on initial evaluation provide pt/family education and to maximize pt's level of independence in the home and community environment.     Pt's spiritual, cultural and educational needs considered and pt agreeable to plan of care and goals.    Anticipated barriers to occupational therapy:     Goals:  Long term goals: (by d/c)  1)  Patient to be IND with HEP and modalities for pain management  2)  Increase ROM 5-10 degrees to increase functional hand use for ADLs/leisure activities  3)   Increase  strength 5-8 lbs. to carry laundry, carry groceries, sweep, and increase functional independence of right hand for ADLs/iADLs  4)   Increase pinch 3-4  psis for  Increase in functional independence in ADLs/iADLs such as manipulating fasteners and opening containers  5) Patient to score at 63%  or less on FOTO to demonstrate improved perception of functional  rightUE Use.       Short term Goals: ( 4 weeks)   1)  Patient to be IND with HEP and modalities for pain management MET   2)  Increase wrist ROM 10 degrees to increase functional hand use for ADLs/leisure activities  3)   Pt will demo ext lag less than 5 degrees for lF MP jt.   4)   Pt will be able to make a full composite fist MET   5)   Patient to score at 55%  or less on FOTO to demonstrate improved perception of functional right UE Use.      PLAN     Updated Certification Period: 1/10/2024 to 2/16/2024  Recommended Treatment Plan: 1 times per week for 6 weeks:  Fluidotherapy, Manual Therapy, Moist Heat/ Ice, Neuromuscular Re-ed, Orthotic Management and Training, Paraffin, Patient Education, Self Care, Therapeutic Activities, Therapeutic Exercise, and Ultrasound  Other Recommendations:     Hannah Coon, MELY Coon OT   1/3/2024

## 2024-01-03 NOTE — PATIENT INSTRUCTIONS
[facility], OCCUPATIONAL TH[facility]APY  HOME EXERCISE PROGRAM           Hannah Coon, OTR/L

## 2024-01-08 DIAGNOSIS — I10 ESSENTIAL HYPERTENSION: ICD-10-CM

## 2024-01-08 NOTE — TELEPHONE ENCOUNTER
No care due was identified.  [facility] Embedded Care Due Messages. Reference number: [id number].   1/08/2024 12:30:21 PM CST

## 2024-01-09 ENCOUNTER — CLINICAL SUPPORT (OUTPATIENT)
Dept: REHABILITATION | Facility: HOSPITAL | Age: 84
End: 2024-01-09
Payer: MEDICARE

## 2024-01-09 DIAGNOSIS — M25.641 DECREASED RANGE OF MOTION OF FINGER OF RIGHT HAND: Primary | ICD-10-CM

## 2024-01-09 DIAGNOSIS — R29.898 DECREASED FINGER STRENGTH: ICD-10-CM

## 2024-01-09 PROCEDURE — 97140 MANUAL THERAPY 1/> REGIONS: CPT

## 2024-01-09 PROCEDURE — 97530 THERAPEUTIC ACTIVITIES: CPT

## 2024-01-09 PROCEDURE — 97022 WHIRLPOOL THERAPY: CPT

## 2024-01-09 NOTE — PATIENT INSTRUCTIONS
"[facility]  OCCUPATIONAL THERAPY  HOME EXERCISE PROGRAM          Make the shape of the letter "c" using your thumb & index finger.   Hold for 10 seconds. Do 10 repetitions.        Make the shape of the letter "c" using your thumb & index finger. Slowly start to pinch the tips of your thumb and index finger together like you are closing the "c". Stop before you lose the [place] shape. Return to the "c" position and repeat.   Do 10 repetitions, 2x/day.     Rest your hand on your small finger. Lift your index finger away from your middle finger. Use your other hand to resist your index finger. Hold 10 seconds. Do 10 repetitions, 2x/day.    GABE Gill/CLINTON        "

## 2024-01-09 NOTE — PROGRESS NOTES
[facility]  Occupational Therapy Treatment Note      Date: 1/9/2024  Name: Sofía Mcwilliams  Clinic Number: [id number]    Therapy Diagnosis:   Encounter Diagnoses   Name Primary?    Decreased range of motion of finger of right hand Yes    Decreased finger strength              Physician: Ramona Barrett PA-C      Physician Orders: Eval and Treat  Medical Diagnosis: M66.20 (ICD-10-CM) - Tendon rupture, nontraumatic, extensor  Surgical Procedure and Date: 6/30/2023,   1. Extensor tendon repair right middle finger side to side with index finger tendon.    2. Extensor tendon repair right ring finger side-to-side repair with small finger tendon.    Evaluation Date: 7/26/2023  Insurance Authorization Period Expiration: 12/31/2023  Plan of Care Certification Period: 2/6/2024  Date of Return to MD: GIOVANNY  Visit # / Visits authorized: 2/  FOTO: 1/ 3        Precautions:  Standard and Fall       Time In: 7:49 am   Time Out: 8:47 am   Total Billable Time: 58 minutes          Subjective   Returns to therapy from 2 week break      She was compliant with home exercise program given last session.   Response to previous treatment:decreased wrist edema  Functional change: lgith activity with dynamic     Pain: 0/10  Location: right wrists      Objective     Observation/Appearance:  Skin intact, Skin dry, Hypertrophic scarring, and Edema present      Edema. Measured in centimeters.    7/26/2023 7/26/2023     Right  Left    Proximal Wrist Crease 15.5 15.0 cm   Figure of 8       MCPs 19.0  18.5             Hand ROM. Measured in degrees.    7/26/2023 7/26/2023 9/19/2023 9/29/2023 10/13/2023 11/17/2023  12/12/2023     Right  Left  Right Right Right Right Right                Index: MP  0/35   50 50/64 45/ 50/ 58 (wrist neutral)  30/ (wrist flexed)              PIP     0/45    88                 DIP 0/31    35                 BABB 111                       Long:  MP 10/45   40 45/70 40/ 50/ 60 (wrist neutral)  25/  (wrist flexed)              PIP /45    /95                 DIP 0/25    54                 BABB 105                       Ring:   MP 5/15    20/55                 PIP 0/50    /106                 DIP 0/25    /36                 BABB 85                       Small:  MP 0/10    75                  PIP 0/30    90                  DIP 0/35    40                 BABB 75                Elbow and Wrist ROM. Measured in degrees.    7/31/2023 7/31/2023 10/18/2023     Left Right Right            Wrist Ext/Flex 65/70 26/35 50/60                     Sensation: denies numbness and tingling   NT Distribution 7/26/2023 7/26/2023     Right  Left    [place] Shaye       Normal 1.65-2.83       Diminished Light Touch 3.22-3.61       Diminished Protective 3.84-4.31       Loss of Protective 4.56-6.65       Untestable >6.65       2 Point Discrimination       Static       Dynamic            Fine motor Coordination:   R) 26s  L) 26s      1/3/2024  R) 36s     1/9/24  R) 27s       Strength (Dyanmometer) and Pinch Strength (Pinch Gauge)  Measured in pounds and psi. Average of three trials.    12/12/2023 12/12/2023 1/9/2024     Right  Left  Right   Rung II 24  35/32 33   Purcell Pinch  9 10  11.5   3pt Pinch  8.5 8.5  14.5   2pt Pinch  8.5/7  8 9         Intake Outcome Measure for FOTO initial eval; hand Survey     Therapist reviewed FOTO scores for Sofía Mcwilliams on 7/26/2023.   FOTO documents entered into [facility] - see Media section.     Intake Score: 52.5%           Treatment     Sofía received the treatments listed below:           Fluidotherapy: To R hand for 10 min, continuous air, 110 deg, air speed 100 to decrease pain, edema & scar tissue and increased tissue extensibility.       Manual: x 10   Scar massage and mobilization with vibrating tool and tissue tool       Therapeutic activity: 40 min   - updated objective measurements, please see above   - isometric dorsal 1st I   - isospheres (2 min)   - IF abduction x 30 reps   - digit extension  with yellow rubber band over container (2 sets)  - vibration massage dorsal wrist/hand (3 min)       Not performed today:    Tenodesis and wrist flexion and pt has active MP extension   - hook grasp x 10 reps (3 sets)   - digit lifts with wrist in slight flexion   - syngeristic wrist x 10 reps (2 sets) with dycem (NT)  - lite brite x 10 min (NT)  - in hand manip with coins x 2 sets  - wrist PRE 3 ways 1 # x 10 reps   - isospheres x 2 min   - grooved peg board FMC and in hand manip x 1 set (NT)      Patient Education and Home Exercises     Education provided:   - HEP; digit ext with RB, dorsal I isometrics, vibration ball   - day splint vs night splint   - Progress towards goals     Written Home Exercises Provided: yes.  Exercises were reviewed and Sofía was able to demonstrate them prior to the end of the session.  Sofía demonstrated good  understanding of the HEP provided. See EMR under Patient Instructions for exercises provided during therapy sessions.       Assessment     Good liza today. Demonstrated EDC fatigue during second set of digit ext with RB.  Tx will focus on strength and endurance. Pt will most likely on have 2 more tx then discharge.     Sofía is progressing well towards her goals and there are no updates to goals at this time. Pt prognosis is Fair.     Pt will continue to benefit from skilled outpatient occupational therapy to address the deficits listed in the problem list on initial evaluation provide pt/family education and to maximize pt's level of independence in the home and community environment.     Pt's spiritual, cultural and educational needs considered and pt agreeable to plan of care and goals.    Anticipated barriers to occupational therapy:     Goals:  Long term goals: (by d/c)  1)  Patient to be IND with HEP and modalities for pain management  2)  Increase ROM 5-10 degrees to increase functional hand use for ADLs/leisure activities  3)   Increase  strength 5-8 lbs. to carry  laundry, carry groceries, sweep, and increase functional independence of right hand for ADLs/iADLs  4)   Increase pinch 3-4  psis for  Increase in functional independence in ADLs/iADLs such as manipulating fasteners and opening containers  5) Patient to score at 63%  or less on FOTO to demonstrate improved perception of functional rightUE Use.       Short term Goals: ( 4 weeks)   1)  Patient to be IND with HEP and modalities for pain management MET   2)  Increase wrist ROM 10 degrees to increase functional hand use for ADLs/leisure activities  3)   Pt will demo ext lag less than 5 degrees for lF MP jt.   4)   Pt will be able to make a full composite fist MET   5)   Patient to score at 55%  or less on FOTO to demonstrate improved perception of functional right UE Use.      PLAN     Updated Certification Period: 1/10/2024 to 2/16/2024  Recommended Treatment Plan: 1 times per week for 6 weeks:  Fluidotherapy, Manual Therapy, Moist Heat/ Ice, Neuromuscular Re-ed, Orthotic Management and Training, Paraffin, Patient Education, Self Care, Therapeutic Activities, Therapeutic Exercise, and Ultrasound  Other Recommendations:     Hannah Coon, MELY Coon OT   1/9/2024

## 2024-01-09 NOTE — TELEPHONE ENCOUNTER
Refill Routing Note   Medication(s) are not appropriate for processing by [facility] for the following reason(s):        No active prescription written by provider  Required vitals abnormal    [facility] action(s):  Defer               Appointments  past 12m or future 3m with PCP    Date Provider   Last Visit   9/1/2023 Natalia Stubbs MD   Next Visit   3/1/2024 Natalia Stubbs MD   ED visits in past 90 days: 0        Note composed:10:35 AM 01/09/2024

## 2024-01-10 RX ORDER — LOSARTAN POTASSIUM 25 MG/1
TABLET ORAL
Qty: 90 TABLET | Refills: 2 | Status: SHIPPED | OUTPATIENT
Start: 2024-01-10

## 2024-01-16 ENCOUNTER — CLINICAL SUPPORT (OUTPATIENT)
Dept: REHABILITATION | Facility: HOSPITAL | Age: 84
End: 2024-01-16
Payer: MEDICARE

## 2024-01-16 DIAGNOSIS — M25.641 DECREASED RANGE OF MOTION OF FINGER OF RIGHT HAND: Primary | ICD-10-CM

## 2024-01-16 DIAGNOSIS — R29.898 DECREASED FINGER STRENGTH: ICD-10-CM

## 2024-01-16 PROCEDURE — 97022 WHIRLPOOL THERAPY: CPT

## 2024-01-16 PROCEDURE — 97140 MANUAL THERAPY 1/> REGIONS: CPT

## 2024-01-16 NOTE — PROGRESS NOTES
[facility]  Occupational Therapy Treatment Note      Date: 1/16/2024  Name: Sofía Mcwilliams  Clinic Number: [id number]    Therapy Diagnosis:   No diagnosis found.            Physician: Ramona Barrett PA-C      Physician Orders: Eval and Treat  Medical Diagnosis: M66.20 (ICD-10-CM) - Tendon rupture, nontraumatic, extensor  Surgical Procedure and Date: 6/30/2023,   1. Extensor tendon repair right middle finger side to side with index finger tendon.    2. Extensor tendon repair right ring finger side-to-side repair with small finger tendon.    Evaluation Date: 7/26/2023  Insurance Authorization Period Expiration: 12/31/2023  Plan of Care Certification Period: 2/6/2024  Date of Return to MD: GIOVANNY  Visit # / Visits authorized: 2/  FOTO: 1/ 3        Precautions:  Standard and Fall       Time In: 09:05 am   Time Out: 10:05  am   Total Billable Time: 60  minutes          Subjective     Has been using vibration ball; says seems less swollen   She was compliant with home exercise program given last session.   Response to previous treatment:decreased wrist edema  Functional change: lgith activity with dynamic     Pain: 0/10  Location: right wrists      Objective     Observation/Appearance:  Skin intact, Skin dry, Hypertrophic scarring, and Edema present      Edema. Measured in centimeters.    7/26/2023 7/26/2023     Right  Left    Proximal Wrist Crease 15.5 15.0 cm   Figure of 8       MCPs 19.0  18.5             Hand ROM. Measured in degrees.    7/26/2023 7/26/2023 9/19/2023 9/29/2023 10/13/2023 11/17/2023  12/12/2023     Right  Left  Right Right Right Right Right                Index: MP  0/35   50 50/64 45/ 50/ 58 (wrist neutral)  30/ (wrist flexed)              PIP     0/45    88                 DIP 0/31    35                 BABB 111                       Long:  MP 10/45   40 45/70 40/ 50/ 60 (wrist neutral)  25/ (wrist flexed)              PIP /45    /95                 DIP 0/25    54                  BABB 105                       Ring:   MP 5/15    20/55                 PIP 0/50    /106                 DIP 0/25    /36                 BABB 85                       Small:  MP 0/10    75                  PIP 0/30    90                  DIP 0/35    40                 BABB 75                Elbow and Wrist ROM. Measured in degrees.    7/31/2023 7/31/2023 10/18/2023     Left Right Right            Wrist Ext/Flex 65/70 26/35 50/60                     Sensation: denies numbness and tingling   NT Distribution 7/26/2023 7/26/2023     Right  Left    [place] Shaye       Normal 1.65-2.83       Diminished Light Touch 3.22-3.61       Diminished Protective 3.84-4.31       Loss of Protective 4.56-6.65       Untestable >6.65       2 Point Discrimination       Static       Dynamic            Fine motor Coordination:   R) 26s  L) 26s    1/3/2024  R) 36s     1/9/24  R) 27s       Strength (Dyanmometer) and Pinch Strength (Pinch Gauge)  Measured in pounds and psi. Average of three trials.    12/12/2023 12/12/2023 1/9/2024     Right  Left  Right   Rung II 24  35/32 33   Purcell Pinch  9 10  11.5   3pt Pinch  8.5 8.5  14.5   2pt Pinch  8.5/7  8 9         Intake Outcome Measure for FOTO initial eval; hand Survey     Therapist reviewed FOTO scores for Sofía Mcwilliams on 7/26/2023.   FOTO documents entered into [employer] - see Media section.     Intake Score: 52.5%           Treatment     Sofía received the treatments listed below:           Fluidotherapy: To R hand for 10 min, continuous air, 110 deg, air speed 100 to decrease pain, edema & scar tissue and increased tissue extensibility.       Manual: x 10 min  Scar massage and mobilization with vibrating tool and tissue tool           Therapeutic activity: 40 min   - isometric dorsal 1st I   - IF abduction x 30 reps   - digit extension with yellow rubber band over container (3 sets)  - vibration massage dorsal wrist/hand (3 min)   - 14 coins on towel (21s) without towel (1 min 31s)  -  "holding "c" for 30s x 3 reps         Not performed today:    Tenodesis and wrist flexion and pt has active MP extension   - hook grasp x 10 reps (3 sets)   - digit lifts with wrist in slight flexion   - syngeristic wrist x 10 reps (2 sets) with dycem (NT)  - lite brite x 10 min (NT)  - in hand manip with coins x 2 sets  - wrist PRE 3 ways 1 # x 10 reps   - isospheres x 2 min   - grooved peg board FMC and in hand manip x 1 set (NT)      Patient Education and Home Exercises     Education provided:   - HEP; digit ext with RB, dorsal I isometrics, vibration ball   - day splint vs night splint   - Progress towards goals     Written Home Exercises Provided: yes.  Exercises were reviewed and Sofía was able to demonstrate them prior to the end of the session.  Sofía demonstrated good  understanding of the HEP provided. See EMR under Patient Instructions for exercises provided during therapy sessions.       Assessment     Good liza today. Great digit extension with IF and LF. Noticeable decreased edema.  Pt will most likely on have 2 more tx then discharge.     Sofía is progressing well towards her goals and there are no updates to goals at this time. Pt prognosis is Fair.     Pt will continue to benefit from skilled outpatient occupational therapy to address the deficits listed in the problem list on initial evaluation provide pt/family education and to maximize pt's level of independence in the home and community environment.     Pt's spiritual, cultural and educational needs considered and pt agreeable to plan of care and goals.    Anticipated barriers to occupational therapy:     Goals:  Long term goals: (by d/c)  1)  Patient to be IND with HEP and modalities for pain management  2)  Increase ROM 5-10 degrees to increase functional hand use for ADLs/leisure activities  3)   Increase  strength 5-8 lbs. to carry laundry, carry groceries, sweep, and increase functional independence of right hand for ADLs/iADLs  4)   " Increase pinch 3-4  psis for  Increase in functional independence in ADLs/iADLs such as manipulating fasteners and opening containers  5) Patient to score at 63%  or less on FOTO to demonstrate improved perception of functional rightUE Use.       Short term Goals: ( 4 weeks)   1)  Patient to be IND with HEP and modalities for pain management MET   2)  Increase wrist ROM 10 degrees to increase functional hand use for ADLs/leisure activities  3)   Pt will demo ext lag less than 5 degrees for lF MP jt.   4)   Pt will be able to make a full composite fist MET   5)   Patient to score at 55%  or less on FOTO to demonstrate improved perception of functional right UE Use.      PLAN     Updated Certification Period: 1/10/2024 to 2/16/2024  Recommended Treatment Plan: 1 times per week for 6 weeks:  Fluidotherapy, Manual Therapy, Moist Heat/ Ice, Neuromuscular Re-ed, Orthotic Management and Training, Paraffin, Patient Education, Self Care, Therapeutic Activities, Therapeutic Exercise, and Ultrasound  Other Recommendations:     Hannah Coon, MELY Coon OT   1/16/2024

## 2024-01-23 ENCOUNTER — CLINICAL SUPPORT (OUTPATIENT)
Dept: REHABILITATION | Facility: HOSPITAL | Age: 84
End: 2024-01-23
Payer: MEDICARE

## 2024-01-23 DIAGNOSIS — R29.898 DECREASED FINGER STRENGTH: ICD-10-CM

## 2024-01-23 DIAGNOSIS — M25.641 DECREASED RANGE OF MOTION OF FINGER OF RIGHT HAND: Primary | ICD-10-CM

## 2024-01-23 PROCEDURE — 97110 THERAPEUTIC EXERCISES: CPT

## 2024-01-23 PROCEDURE — 97022 WHIRLPOOL THERAPY: CPT

## 2024-01-23 PROCEDURE — 97530 THERAPEUTIC ACTIVITIES: CPT

## 2024-01-23 PROCEDURE — 97140 MANUAL THERAPY 1/> REGIONS: CPT

## 2024-01-23 NOTE — PROGRESS NOTES
" [facility]  Occupational Therapy Treatment Note      Date: 1/23/2024  Name: Sofía Mcwilliams  Clinic Number: [id number]    Therapy Diagnosis:   Encounter Diagnoses   Name Primary?    Decreased range of motion of finger of right hand Yes    Decreased finger strength            Physician: Ramona Barrett PA-C      Physician Orders: Eval and Treat  Medical Diagnosis: M66.20 (ICD-10-CM) - Tendon rupture, nontraumatic, extensor  Surgical Procedure and Date: 6/30/2023,   1. Extensor tendon repair right middle finger side to side with index finger tendon.    2. Extensor tendon repair right ring finger side-to-side repair with small finger tendon.    Evaluation Date: 7/26/2023  Insurance Authorization Period Expiration: 12/31/2023  Plan of Care Certification Period: 2/6/2024  Date of Return to MD: GIOVANNY  Visit # / Visits authorized: 2/  FOTO: 1/ 3        Precautions:  Standard and Fall       Time In: 09:05 am   Time Out: 10:00 am   Total Billable Time: 55  minutes          Subjective     "I just get that shock pain and it goes away"     She was compliant with home exercise program given last session.   Response to previous treatment:decreased wrist edema  Functional change: lgith activity with dynamic     Pain: 0/10  Location: right wrists      Objective     Observation/Appearance:  Skin intact, Skin dry, Hypertrophic scarring, and Edema present      Edema. Measured in centimeters.    7/26/2023 7/26/2023     Right  Left    Proximal Wrist Crease 15.5 15.0 cm   Figure of 8       MCPs 19.0  18.5             Hand ROM. Measured in degrees.    7/26/2023 7/26/2023 9/19/2023 9/29/2023 10/13/2023 11/17/2023  12/12/2023     Right  Left  Right Right Right Right Right                Index: MP  0/35   50 50/64 45/ 50/ 58 (wrist neutral)  30/ (wrist flexed)              PIP     0/45    88                 DIP 0/31    35                 BABB 111                       Long:  MP 10/45   40 45/70 40/ 50/ 60 (wrist " neutral)  25/ (wrist flexed)              PIP /45    /95                 DIP 0/25    54                 BABB 105                       Ring:   MP 5/15    20/55                 PIP 0/50    /106                 DIP 0/25    /36                 BABB 85                       Small:  MP 0/10    75                  PIP 0/30    90                  DIP 0/35    40                 BABB 75                Elbow and Wrist ROM. Measured in degrees.    7/31/2023 7/31/2023 10/18/2023     Left Right Right            Wrist Ext/Flex 65/70 26/35 50/60                     Sensation: denies numbness and tingling   NT Distribution 7/26/2023 7/26/2023     Right  Left    Fosters Shaye       Normal 1.65-2.83       Diminished Light Touch 3.22-3.61       Diminished Protective 3.84-4.31       Loss of Protective 4.56-6.65       Untestable >6.65       2 Point Discrimination       Static       Dynamic            Fine motor Coordination:   R) 26s  L) 26s    1/3/2024  R) 36s     1/9/24  R) 27s       Strength (Dyanmometer) and Pinch Strength (Pinch Gauge)  Measured in pounds and psi. Average of three trials.    12/12/2023 12/12/2023 1/9/2024 1/23/2024     Right  Left  Right Right   Rung II 24  33.5 33 35/34/32   Purcell Pinch  9 10  11.5    3pt Pinch  8.5 8.5  14.5    2pt Pinch  8.5/7  8 9          Intake Outcome Measure for FOTO initial eval; hand Survey     Therapist reviewed FOTO scores for Sofía Mcwilliams on 7/26/2023.   FOTO documents entered into [employer] - see Media section.     Intake Score: 52.5%           Treatment     Sofía received the treatments listed below:           Fluidotherapy: To R hand for 10 min, continuous air, 110 deg, air speed 100 to decrease pain, edema & scar tissue and increased tissue extensibility.           Manual: x 10 min  Scar massage and mobilization with vibrating tool and tissue tool           Therapeutic activity: 40 min   - isometric dorsal 1st I   - IF abduction x 30 reps   - digit extension with yellow rubber band  "over container (3 sets)  - vibration massage dorsal wrist/hand (3 min)   - 14 coins on towel (21s) without towel (1 min 31s)  - holding "c" for 30s x 3 reps         Not performed today:    Tenodesis and wrist flexion and pt has active MP extension   - hook grasp x 10 reps (3 sets)   - digit lifts with wrist in slight flexion   - syngeristic wrist x 10 reps (2 sets) with dycem (NT)  - lite brite x 10 min (NT)  - in hand manip with coins x 2 sets  - wrist PRE 3 ways 1 # x 10 reps   - isospheres x 2 min   - grooved peg board FMC and in hand manip x 1 set (NT)      Patient Education and Home Exercises     Education provided:   - HEP; digit ext with RB, dorsal I isometrics, vibration ball   - day splint vs night splint   - Progress towards goals     Written Home Exercises Provided: yes.  Exercises were reviewed and Sofía was able to demonstrate them prior to the end of the session.  Sofía demonstrated good  understanding of the HEP provided. See EMR under Patient Instructions for exercises provided during therapy sessions.       Assessment     Good liza today. Great digit extension with IF and LF. Noticeable decreased edema. 1 more tx for discharge    Sofía is progressing well towards her goals and there are no updates to goals at this time. Pt prognosis is Fair.     Pt will continue to benefit from skilled outpatient occupational therapy to address the deficits listed in the problem list on initial evaluation provide pt/family education and to maximize pt's level of independence in the home and community environment.     Pt's spiritual, cultural and educational needs considered and pt agreeable to plan of care and goals.    Anticipated barriers to occupational therapy:     Goals:  Long term goals: (by d/c)  1)  Patient to be IND with HEP and modalities for pain management  2)  Increase ROM 5-10 degrees to increase functional hand use for ADLs/leisure activities  3)   Increase  strength 5-8 lbs. to carry laundry, " carry groceries, sweep, and increase functional independence of right hand for ADLs/iADLs  4)   Increase pinch 3-4  psis for  Increase in functional independence in ADLs/iADLs such as manipulating fasteners and opening containers  5) Patient to score at 63%  or less on FOTO to demonstrate improved perception of functional rightUE Use.       Short term Goals: ( 4 weeks)   1)  Patient to be IND with HEP and modalities for pain management MET   2)  Increase wrist ROM 10 degrees to increase functional hand use for ADLs/leisure activities  3)   Pt will demo ext lag less than 5 degrees for lF MP jt.   4)   Pt will be able to make a full composite fist MET   5)   Patient to score at 55%  or less on FOTO to demonstrate improved perception of functional right UE Use.      PLAN     Updated Certification Period: 1/10/2024 to 2/16/2024  Recommended Treatment Plan: 1 times per week for 6 weeks:  Fluidotherapy, Manual Therapy, Moist Heat/ Ice, Neuromuscular Re-ed, Orthotic Management and Training, Paraffin, Patient Education, Self Care, Therapeutic Activities, Therapeutic Exercise, and Ultrasound  Other Recommendations:     Hannah Coon, MELY Coon OT   1/23/2024

## 2024-01-30 ENCOUNTER — CLINICAL SUPPORT (OUTPATIENT)
Dept: REHABILITATION | Facility: HOSPITAL | Age: 84
End: 2024-01-30
Payer: MEDICARE

## 2024-01-30 DIAGNOSIS — R29.898 DECREASED FINGER STRENGTH: ICD-10-CM

## 2024-01-30 DIAGNOSIS — M25.641 DECREASED RANGE OF MOTION OF FINGER OF RIGHT HAND: Primary | ICD-10-CM

## 2024-01-30 PROCEDURE — 97110 THERAPEUTIC EXERCISES: CPT

## 2024-01-30 PROCEDURE — 97140 MANUAL THERAPY 1/> REGIONS: CPT

## 2024-01-30 PROCEDURE — 97022 WHIRLPOOL THERAPY: CPT

## 2024-01-30 NOTE — PROGRESS NOTES
"  [facility]  Occupational Therapy Discharge Summary      Date: 1/30/2024  Name: Sofía Mcwilliams  Clinic Number: [id number]    Therapy Diagnosis:   Encounter Diagnoses   Name Primary?    Decreased range of motion of finger of right hand Yes    Decreased finger strength              Physician: Ramona Barrett PA-C      Physician Orders: Eval and Treat  Medical Diagnosis: M66.20 (ICD-10-CM) - Tendon rupture, nontraumatic, extensor  Surgical Procedure and Date: 6/30/2023,   1. Extensor tendon repair right middle finger side to side with index finger tendon.    2. Extensor tendon repair right ring finger side-to-side repair with small finger tendon.    Evaluation Date: 7/26/2023  Insurance Authorization Period Expiration: 12/31/2023  Plan of Care Certification Period: 2/6/2024  Date of Return to MD: GIOVANNY  Visit # / Visits authorized: 2/  FOTO: 1/ 3        Precautions:  Standard and Fall       Time In: 09:05 am   Time Out: 10:00 am   Total Billable Time: 55  minutes          Subjective     "I've been doing everything that I need to do. Sometimes I just need to take a break."      She was compliant with home exercise program given last session.   Response to previous treatment: decreased wrist edema  Functional change: light activity with dynamic     Pain: 0/10  Location: right wrists      Objective       Observation/Appearance:  Skin intact, Skin dry, Hypertrophic scarring, and Edema present        Edema. Measured in centimeters.    7/26/2023 7/26/2023     Right  Left    Proximal Wrist Crease 15.5 15.0 cm   Figure of 8       MCPs 19.0  18.5             Hand ROM. Measured in degrees.    7/26/2023 7/26/2023 9/19/2023 9/29/2023 10/13/2023 11/17/2023  12/12/2023 1/30/2024     Right  Left  Right Right Right Right Right Right                 Index: MP  0/35   50 50/64 45/ 50/ 58 (wrist neutral)  30/ (wrist flexed) 40/              PIP     0/45    88                  DIP 0/31    35                  BABB " 111                         Long:  MP 10/45   40 45/70 40/ 50/ 60 (wrist neutral)  25/ (wrist flexed) 30/              PIP /45    /95                  DIP 0/25    54                  BABB 105                         Ring:   MP 5/15    20/55                  PIP 0/50    /106                  DIP 0/25    /36                  BABB 85                         Small:  MP 0/10    75                   PIP 0/30    90                   DIP 0/35    40                  BABB 75                 Elbow and Wrist ROM. Measured in degrees.    7/31/2023 7/31/2023 10/18/2023     Left Right Right            Wrist Ext/Flex 65/70 26/35 50/60                     Sensation: denies numbness and tingling   NT Distribution 7/26/2023 7/26/2023     Right  Left    Lorenzo Shaye       Normal 1.65-2.83       Diminished Light Touch 3.22-3.61       Diminished Protective 3.84-4.31       Loss of Protective 4.56-6.65       Untestable >6.65       2 Point Discrimination       Static       Dynamic            Fine motor Coordination:   R) 26s  L) 26s    1/3/2024  R) 36s     1/9/24  R) 27s    1/30/2024  R) 20s       Strength (Dyanmometer) and Pinch Strength (Pinch Gauge)  Measured in pounds and psi. Average of three trials.    12/12/2023 12/12/2023 1/9/2024 1/23/2024 1/30/2024     Right  Left  Right Right Right   Rung II 24  33.5 33 35/34/32 36   Purcell Pinch  9 10  11.5  12   3pt Pinch  8.5 8.5  14.5  15   2pt Pinch  8.5/7  8 9  6.5         Intake Outcome Measure for FOTO initial eval; hand Survey     Therapist reviewed FOTO scores for Sofía Mcwilliams on 7/26/2023.   FOTO documents entered into [facility] - see Media section.     Intake Score: 52.5%           Treatment     Sofía received the treatments listed below:           Fluidotherapy: To R hand for 10 min, continuous air, 110 deg, air speed 100 to decrease pain, edema & scar tissue and increased tissue extensibility.           Manual: x 10 min  Scar massage and mobilization with vibrating tool and tissue tool  "          Therapeutic activity: 40 min   - isometric dorsal 1st I   - IF abduction x 30 reps   - digit extension with yellow rubber band over container (3 sets)  - vibration massage dorsal wrist/hand (3 min)   - 14 coins on towel (21s) without towel (1 min 31s)  - holding "c" for 30s x 3 reps         Not performed today:    Tenodesis and wrist flexion and pt has active MP extension   - hook grasp x 10 reps (3 sets)   - digit lifts with wrist in slight flexion   - syngeristic wrist x 10 reps (2 sets) with dycem (NT)  - lite brite x 10 min (NT)  - in hand manip with coins x 2 sets  - wrist PRE 3 ways 1 # x 10 reps   - isospheres x 2 min   - grooved peg board FMC and in hand manip x 1 set (NT)      Patient Education and Home Exercises     Education provided:   - HEP; digit ext with RB, dorsal I isometrics, vibration ball   - day splint vs night splint   - Progress towards goals     Written Home Exercises Provided: yes.  Exercises were reviewed and Sofía was able to demonstrate them prior to the end of the session.  Sofía demonstrated good  understanding of the HEP provided. See EMR under Patient Instructions for exercises provided during therapy sessions.       Assessment     Discharge from occupational therapy services.     Pt's spiritual, cultural and educational needs considered and pt agreeable to plan of care and goals.    Anticipated barriers to occupational therapy:     Goals:  Long term goals: (by d/c)  1)  Patient to be IND with HEP and modalities for pain management met   2)  Increase ROM 5-10 degrees to increase functional hand use for ADLs/leisure activities met  3)   Increase  strength 5-8 lbs. to carry laundry, carry groceries, sweep, and increase functional independence of right hand for ADLs/iADLs met   4)   Increase pinch 3-4  psis for  Increase in functional independence in ADLs/iADLs such as manipulating fasteners and opening containers  5) Patient to score at 63%  or less on FOTO to demonstrate " improved perception of functional rightUE Use.             PLAN     Updated Certification Period: 1/10/2024 to 2/16/2024  Recommended Treatment Plan: 1 times per week for 6 weeks:  Fluidotherapy, Manual Therapy, Moist Heat/ Ice, Neuromuscular Re-ed, Orthotic Management and Training, Paraffin, Patient Education, Self Care, Therapeutic Activities, Therapeutic Exercise, and Ultrasound  Other Recommendations:     Hannah Coon, MELY Coon OT   1/30/2024

## 2024-02-26 ENCOUNTER — LAB VISIT (OUTPATIENT)
Dept: LAB | Facility: HOSPITAL | Age: 84
End: 2024-02-26
Attending: INTERNAL MEDICINE
Payer: MEDICARE

## 2024-02-26 DIAGNOSIS — E78.5 HYPERLIPIDEMIA, UNSPECIFIED HYPERLIPIDEMIA TYPE: ICD-10-CM

## 2024-02-26 DIAGNOSIS — I10 PRIMARY HYPERTENSION: ICD-10-CM

## 2024-02-26 DIAGNOSIS — R73.9 HYPERGLYCEMIA: ICD-10-CM

## 2024-02-26 DIAGNOSIS — Z00.00 PREVENTATIVE HEALTH CARE: ICD-10-CM

## 2024-02-26 LAB
ALBUMIN SERPL BCP-MCNC: 3.7 G/DL (ref 3.5–5.2)
ALP SERPL-CCNC: 96 U/L (ref 55–135)
ALT SERPL W/O P-5'-P-CCNC: 26 U/L (ref 10–44)
ANION GAP SERPL CALC-SCNC: 10 MMOL/L (ref 8–16)
AST SERPL-CCNC: 23 U/L (ref 10–40)
BASOPHILS # BLD AUTO: 0.03 K/UL (ref 0–0.2)
BASOPHILS NFR BLD: 0.4 % (ref 0–1.9)
BILIRUB SERPL-MCNC: 0.6 MG/DL (ref 0.1–1)
BUN SERPL-MCNC: 17 MG/DL (ref 8–23)
CALCIUM SERPL-MCNC: 9.6 MG/DL (ref 8.7–10.5)
CHLORIDE SERPL-SCNC: 107 MMOL/L (ref 95–110)
CHOLEST SERPL-MCNC: 155 MG/DL (ref 120–199)
CHOLEST/HDLC SERPL: 2.7 {RATIO} (ref 2–5)
CO2 SERPL-SCNC: 27 MMOL/L (ref 23–29)
CREAT SERPL-MCNC: 0.8 MG/DL (ref 0.5–1.4)
DIFFERENTIAL METHOD BLD: ABNORMAL
EOSINOPHIL # BLD AUTO: 0.3 K/UL (ref 0–0.5)
EOSINOPHIL NFR BLD: 3.9 % (ref 0–8)
ERYTHROCYTE [DISTWIDTH] IN BLOOD BY AUTOMATED COUNT: 14.9 % (ref 11.5–14.5)
EST. GFR  (NO RACE VARIABLE): >60 ML/MIN/1.73 M^2
ESTIMATED AVG GLUCOSE: 117 MG/DL (ref 68–131)
GLUCOSE SERPL-MCNC: 98 MG/DL (ref 70–110)
HBA1C MFR BLD: 5.7 % (ref 4–5.6)
HCT VFR BLD AUTO: 41.2 % (ref 37–48.5)
HDLC SERPL-MCNC: 58 MG/DL (ref 40–75)
HDLC SERPL: 37.4 % (ref 20–50)
HGB BLD-MCNC: 13.3 G/DL (ref 12–16)
IMM GRANULOCYTES # BLD AUTO: 0.01 K/UL (ref 0–0.04)
IMM GRANULOCYTES NFR BLD AUTO: 0.1 % (ref 0–0.5)
LDLC SERPL CALC-MCNC: 79.4 MG/DL (ref 63–159)
LYMPHOCYTES # BLD AUTO: 2.7 K/UL (ref 1–4.8)
LYMPHOCYTES NFR BLD: 39.9 % (ref 18–48)
MCH RBC QN AUTO: 28.9 PG (ref 27–31)
MCHC RBC AUTO-ENTMCNC: 32.3 G/DL (ref 32–36)
MCV RBC AUTO: 89 FL (ref 82–98)
MONOCYTES # BLD AUTO: 0.5 K/UL (ref 0.3–1)
MONOCYTES NFR BLD: 7.6 % (ref 4–15)
NEUTROPHILS # BLD AUTO: 3.3 K/UL (ref 1.8–7.7)
NEUTROPHILS NFR BLD: 48.1 % (ref 38–73)
NONHDLC SERPL-MCNC: 97 MG/DL
NRBC BLD-RTO: 0 /100 WBC
PLATELET # BLD AUTO: 203 K/UL (ref 150–450)
PMV BLD AUTO: 11.9 FL (ref 9.2–12.9)
POTASSIUM SERPL-SCNC: 4.5 MMOL/L (ref 3.5–5.1)
PROT SERPL-MCNC: 6.2 G/DL (ref 6–8.4)
RBC # BLD AUTO: 4.61 M/UL (ref 4–5.4)
SODIUM SERPL-SCNC: 144 MMOL/L (ref 136–145)
TRIGL SERPL-MCNC: 88 MG/DL (ref 30–150)
TSH SERPL DL<=0.005 MIU/L-ACNC: 1.97 UIU/ML (ref 0.4–4)
WBC # BLD AUTO: 6.85 K/UL (ref 3.9–12.7)

## 2024-02-26 PROCEDURE — 36415 COLL VENOUS BLD VENIPUNCTURE: CPT | Mod: HCNC,PO | Performed by: INTERNAL MEDICINE

## 2024-02-26 PROCEDURE — 80053 COMPREHEN METABOLIC PANEL: CPT | Mod: HCNC | Performed by: INTERNAL MEDICINE

## 2024-02-26 PROCEDURE — 85025 COMPLETE CBC W/AUTO DIFF WBC: CPT | Mod: HCNC | Performed by: INTERNAL MEDICINE

## 2024-02-26 PROCEDURE — 83036 HEMOGLOBIN GLYCOSYLATED A1C: CPT | Mod: HCNC | Performed by: INTERNAL MEDICINE

## 2024-02-26 PROCEDURE — 80061 LIPID PANEL: CPT | Mod: HCNC | Performed by: INTERNAL MEDICINE

## 2024-02-26 PROCEDURE — 84443 ASSAY THYROID STIM HORMONE: CPT | Mod: HCNC | Performed by: INTERNAL MEDICINE

## 2024-03-01 ENCOUNTER — PATIENT OUTREACH (OUTPATIENT)
Dept: ADMINISTRATIVE | Facility: OTHER | Age: 84
End: 2024-03-01
Payer: MEDICARE

## 2024-03-01 ENCOUNTER — PATIENT MESSAGE (OUTPATIENT)
Dept: ADMINISTRATIVE | Facility: OTHER | Age: 84
End: 2024-03-01
Payer: MEDICARE

## 2024-03-01 ENCOUNTER — OFFICE VISIT (OUTPATIENT)
Dept: INTERNAL MEDICINE | Facility: CLINIC | Age: 84
End: 2024-03-01
Payer: MEDICARE

## 2024-03-01 VITALS
WEIGHT: 149.69 LBS | SYSTOLIC BLOOD PRESSURE: 132 MMHG | HEIGHT: 64 IN | DIASTOLIC BLOOD PRESSURE: 66 MMHG | HEART RATE: 82 BPM | OXYGEN SATURATION: 99 % | BODY MASS INDEX: 25.56 KG/M2

## 2024-03-01 DIAGNOSIS — E78.5 HYPERLIPIDEMIA, UNSPECIFIED HYPERLIPIDEMIA TYPE: ICD-10-CM

## 2024-03-01 DIAGNOSIS — S64.492A INJURY OF DIGITAL NERVE OF RIGHT MIDDLE FINGER, INITIAL ENCOUNTER: ICD-10-CM

## 2024-03-01 DIAGNOSIS — I10 PRIMARY HYPERTENSION: ICD-10-CM

## 2024-03-01 DIAGNOSIS — Z00.00 PREVENTATIVE HEALTH CARE: ICD-10-CM

## 2024-03-01 DIAGNOSIS — M25.641 DECREASED RANGE OF MOTION OF FINGER OF RIGHT HAND: Primary | ICD-10-CM

## 2024-03-01 DIAGNOSIS — G25.81 RLS (RESTLESS LEGS SYNDROME): ICD-10-CM

## 2024-03-01 DIAGNOSIS — I77.9 MILD CAROTID ARTERY DISEASE: ICD-10-CM

## 2024-03-01 PROCEDURE — 99999 PR PBB SHADOW E&M-EST. PATIENT-LVL V: CPT | Mod: PBBFAC,HCNC,, | Performed by: INTERNAL MEDICINE

## 2024-03-01 PROCEDURE — 3075F SYST BP GE 130 - 139MM HG: CPT | Mod: HCNC,CPTII,S$GLB, | Performed by: INTERNAL MEDICINE

## 2024-03-01 PROCEDURE — 3078F DIAST BP <80 MM HG: CPT | Mod: HCNC,CPTII,S$GLB, | Performed by: INTERNAL MEDICINE

## 2024-03-01 PROCEDURE — 1101F PT FALLS ASSESS-DOCD LE1/YR: CPT | Mod: HCNC,CPTII,S$GLB, | Performed by: INTERNAL MEDICINE

## 2024-03-01 PROCEDURE — 1160F RVW MEDS BY RX/DR IN RCRD: CPT | Mod: HCNC,CPTII,S$GLB, | Performed by: INTERNAL MEDICINE

## 2024-03-01 PROCEDURE — 3288F FALL RISK ASSESSMENT DOCD: CPT | Mod: HCNC,CPTII,S$GLB, | Performed by: INTERNAL MEDICINE

## 2024-03-01 PROCEDURE — 1126F AMNT PAIN NOTED NONE PRSNT: CPT | Mod: HCNC,CPTII,S$GLB, | Performed by: INTERNAL MEDICINE

## 2024-03-01 PROCEDURE — 99214 OFFICE O/P EST MOD 30 MIN: CPT | Mod: HCNC,S$GLB,, | Performed by: INTERNAL MEDICINE

## 2024-03-01 PROCEDURE — 1159F MED LIST DOCD IN RCRD: CPT | Mod: HCNC,CPTII,S$GLB, | Performed by: INTERNAL MEDICINE

## 2024-03-01 NOTE — PROGRESS NOTES
CHW - Outreach Attempt    Community Health Worker left a In Basket message for 1st attempt to contact patient regarding: SDOH  Community Health Worker to attempt to contact patient on: 3/22  CHW - Case Closure    This Community Health Worker spoke to patient via telephone today.   Pt/Caregiver reported: Pt stated that she drives and she does not need assistance with transportation at this time. SDOH completed updated verified by CHW  Pt/Caregiver denied any additional needs at this time and agrees with episode closure at this time.  Provided patient with Community Health Worker's contact information and encouraged him/her to contact this Community Health Worker if additional needs arise.

## 2024-03-01 NOTE — PROGRESS NOTES
Patient ID: Sofía Mcwilliams is a 83 y.o. female    Chief Complaint: Hypertension    History of Present Illness  The patient is an 83-year-old female coming in today for hypertension follow-up.    Her last visit was in 09/2023. She was diagnosed with sciatic neuropathy. Her foot is about as good as probably she can expect because it takes so long for the nerve to regenerate. Yesterday, she went to the neurologist, Dr. Pederson, who checked the strength of the hip, but it was not great. She will see him in 4 months. She needs a brace to walk for exercise or if she is walking long distances. She has been doing physical therapy once every 2 weeks to do some balance. She does her home exercise program to try to keep herself strong.    Her hand is worse than before. She went to OT until about 3 weeks ago, but she just stopped because they could not do everything. At one point, they had suggested that she get a second opinion. She saw Dr. Leslie Chadwick, who thought she had rheumatoid arthritis. She can not lift 2 of her fingers. She had an MRI of the hand and a bunch of blood work in 10/2023, but she never made an appointment. She decided not to follow up because Dr. Leslie Chadwick suggested surgery. She has difficulty holding things. She has not had a nerve conduction study of her hands. Her original surgery was for 2 fingers, the separate tendon, by Dr. Prabhakar. Dr. Prabhakar said this was because of arthritis in the joint. She had an MRI of the wrist after the surgery. Her wrist hurts now. She injured her wrist in 1962.    She has weird restless legs. It happens at 6:00. She takes Requip at 4:00. Her neurologist told her she can take it at 1:00 in the afternoon, but she gets sleepy. Her voice has changed. She can not project her voice. Her neurologist has not mentioned anything about it. Her next appointment with the neurologist is on 07/01/2024.    Physical Exam  Finger range of motion decreased on the right.  Second and 3rd  fingers evidence of osteoarthritis when looking at her distal interphalangeal joints and MCP of her 1st  General: Well-appearing, well-nourished.  No distress  HEENT: conjunctivae are normal.  Pupils are equal and reative to light.  TM's are clear and intact bilaterally.  Hearing is grossly normal.  Nasopharynx is clear.  Oropharynx is clear.  Neck: Supple.  No thyroid megaly.  No bruits.  Lymph: No cervical or supraclavicular adenopathy.  Heart: Regular rate and rhythm, without murmur, rub or gallop.  Lungs: Clear to auscultation; respiratory effort normal.  Abdomen: Soft, nontender, nondistended.  Normoactive bowel sounds.  No hepatomegaly.  No masses.  Extremities: Good distal pulses.  No edema.  Psych: Oriented to time person place.  Judgment and insight seem unimpaired.  Mood and affect are appropriate.  Results  Imaging  Right hand x-ray from 11/2023 showed degenerative arthritis.  Recent MRI of her hand reviewed   Assessment & Plan  1. Hypertension.  This is well controlled. There is no change in her medication.    2. Restless legs syndrome.  This may be all arthritis related. I recommend she make an appointment with Dr. Thakkar in consultation. I will get a nerve conduction test to check her nerves in her upper extremities. A referral was placed to orthopedics.    3. Hypertension.  It is controlled. She will continue her medication.    4. Hyperlipidemia.  This is well controlled. She will continue Requip.    5. Health maintenance prevention.  She is not due for anything. I will order labs for her next visit.    6. Hand pain.  It does not feel like a neurologic weakness. Her strength feels good. I think there is something in the metacarpal that is restricting normal extension of her hand. I will consider seeing orthopedics, Dr. Thakkar.    Follow-up  The patient will follow up in 6 months for her physical with blood work beforehand.      Follow up in about 6 months (around 9/1/2024) for physical cbc tsh cmp  lipids +/- Psa.    This note was generated with the assistance of ambient listening technology. Verbal consent was obtained by the patient and accompanying visitor(s) for the recording of patient appointment to facilitate this note. I attest to having reviewed and edited the generated note for accuracy, though some syntax or spelling errors may persist. Please contact the author of this note for any clarification.    Sofía was seen today for hypertension.    Diagnoses and all orders for this visit:    Decreased range of motion of finger of right hand  -     EMG W/ ULTRASOUND AND NERVE CONDUCTION TEST 2 Extremities; Future  -     Ambulatory referral/consult to Orthopedics; Future    Injury of digital nerve of right middle finger, initial encounter  -     EMG W/ ULTRASOUND AND NERVE CONDUCTION TEST 2 Extremities; Future  -     Ambulatory referral/consult to Orthopedics; Future    Primary hypertension  -     CBC Auto Differential; Future  -     Comprehensive Metabolic Panel; Future  -     Lipid Panel; Future  -     TSH; Future  Controlled.  Continue current medical regimen.  Prescription refills addressed.  Followup advised. See after visit summary.  Hyperlipidemia, unspecified hyperlipidemia type  -     CBC Auto Differential; Future  -     Comprehensive Metabolic Panel; Future  -     Lipid Panel; Future  -  Controlled.  Continue current medical regimen.  Prescription refills addressed.  Followup advised. See after visit summary.   TSH; Future    RLS (restless legs syndrome)  Controlled.  Continue current medical regimen.  Prescription refills addressed.  Followup advised. See after visit summary.  Mild carotid artery disease  Continue statin risk factor management  Preventative health care  -     CBC Auto Differential; Future  -     Comprehensive Metabolic Panel; Future  -     Lipid Panel; Future  -     TSH; Future  Scheduled

## 2024-03-04 ENCOUNTER — TELEPHONE (OUTPATIENT)
Dept: INTERNAL MEDICINE | Facility: CLINIC | Age: 84
End: 2024-03-04
Payer: MEDICARE

## 2024-03-06 ENCOUNTER — TELEPHONE (OUTPATIENT)
Dept: INTERNAL MEDICINE | Facility: CLINIC | Age: 84
End: 2024-03-06
Payer: MEDICARE

## 2024-04-18 ENCOUNTER — PROCEDURE VISIT (OUTPATIENT)
Dept: NEUROLOGY | Facility: CLINIC | Age: 84
End: 2024-04-18
Payer: MEDICARE

## 2024-04-18 DIAGNOSIS — R20.2 RIGHT HAND PARESTHESIA: ICD-10-CM

## 2024-04-18 DIAGNOSIS — M25.641 DECREASED RANGE OF MOTION OF FINGER OF RIGHT HAND: ICD-10-CM

## 2024-04-18 PROCEDURE — 95910 NRV CNDJ TEST 7-8 STUDIES: CPT | Mod: S$GLB,,, | Performed by: PSYCHIATRY & NEUROLOGY

## 2024-04-18 PROCEDURE — 99214 OFFICE O/P EST MOD 30 MIN: CPT | Mod: 25,S$GLB,, | Performed by: PSYCHIATRY & NEUROLOGY

## 2024-04-18 PROCEDURE — 95886 MUSC TEST DONE W/N TEST COMP: CPT | Mod: S$GLB,,, | Performed by: PSYCHIATRY & NEUROLOGY

## 2024-04-18 NOTE — PROCEDURES
EMG W/ ULTRASOUND AND NERVE CONDUCTION TEST 2 Extremities    Date/Time: 2024 1:00 PM    Performed by: Naseem Darden MD  Authorized by: Natalia Stubbs MD                                                                 [facility] Neurology    Subjective:       Patient ID: Sofía Mcwilliams is a 83 y.o. female here for a EMG focused evaluation for right hand paresthesias and limited range of motion. Previous visits and diagnostic evaluation reviewed.  Patient with a history of right hand surgery 2023.  She describes continued decreased range of motion of her right fingers as well as some numbness involving right 3rd and 4th digits.  HPI  Review of patient's allergies indicates:   Allergen Reactions    Celecoxib      Other reaction(s): Swelling    Sulfa (sulfonamide antibiotics)      Other reaction(s): Hives      There were no vitals filed for this visit.   Chief Complaint: No chief complaint on file.    Past Medical History:   Diagnosis Date    Anxiety 2016    Arthritis     Basal cell carcinoma 2013    left buttock    Cataract     Hypertension     Other and unspecified hyperlipidemia       Social History     Socioeconomic History    Marital status:    Occupational History    Occupation: RN   Tobacco Use    Smoking status: Former     Current packs/day: 0.00     Types: Cigarettes     Quit date: 1965     Years since quittin.4    Smokeless tobacco: Never   Substance and Sexual Activity    Alcohol use: Yes     Alcohol/week: 0.0 standard drinks of alcohol     Comment: Occ.    Drug use: No    Sexual activity: Yes   Other Topics Concern    Are you pregnant or think you may be? No    Breast-feeding No     Social Determinants of Health     Financial Resource Strain: Low Risk  (2024)    Overall Financial Resource Strain (CARDIA)     Difficulty of Paying Living Expenses: Not hard at all   Food Insecurity: No Food Insecurity (2024)    Hunger Vital Sign     Worried  About Running Out of Food in the Last Year: Never true     Ran Out of Food in the Last Year: Never true   Transportation Needs: No Transportation Needs (3/21/2024)    PRAPARE - Transportation     Lack of Transportation (Medical): No     Lack of Transportation (Non-Medical): No   Recent Concern: Transportation Needs - Unmet Transportation Needs (2/25/2024)    PRAPARE - Transportation     Lack of Transportation (Medical): No     Lack of Transportation (Non-Medical): Yes   Physical Activity: Insufficiently Active (2/25/2024)    Exercise Vital Sign     Days of Exercise per Week: 4 days     Minutes of Exercise per Session: 30 min   Stress: No Stress Concern Present (2/25/2024)    [employer] [place] [employer] - Occupational Stress Questionnaire     Feeling of Stress : Not at all   Social Connections: Socially Integrated (3/21/2024)    Social Connection and Isolation Panel [NHANES]     Frequency of Communication with Friends and Family: More than three times a week     Frequency of Social Gatherings with Friends and Family: More than three times a week     Attends [employer] Services: 1 to 4 times per year     Active Member of Clubs or Organizations: Yes     Attends Club or Organization Meetings: 1 to 4 times per year     Marital Status:    Housing Stability: Low Risk  (2/25/2024)    Housing Stability Vital Sign     Unable to Pay for Housing in the Last Year: No     Number of Places Lived in the Last Year: 1     Unstable Housing in the Last Year: No      Review of Systems:   No Fever  No SOB  No vomiting  No visual disturbance      Objective:      Physical Exam    Constitutional: Patient appears well-nourished.   Head: Normocephalic and atraumatic.   Mouth/Throat: Oropharynx is clear and moist.   Pulmonary/Chest: Effort normal.   Abdominal: Soft.   Skin: Skin is warm and dry.      General:  Patient is alert and cooperative.  Affect:  Patient is appropriate to surroundings and environment.  Language:   Speech is fluent.  HEENT:  There are no outward signs of trauma to head or face.  Cranial Nerves:  Pupils are equal round and reactive to light. Extra-ocular movements are intact. Face, tongue, and palate are symmetrical.  Motor:  Patient exhibits some limited right finger extension otherwise normal strength testing in bilateral proximal and distal upper extremities.  Reflexes:  Symmetrical in bilateral upper extremities.  Gait:  Ambulation is independent without use of cane or walker without signs of ataxia or circumduction.  Cerebellar:  Normal finger to nose testing without dysmetria.  Sensory:  Hyperesthesia to light touch involving the medial aspect of the 3rd digit as well as both sides of the 4th digit on the right  Musculoskeletal:  There is no severe tenderness to palpation and manipulation of the cervical spine region.   Assessment:       We reviewed and discussed at length results of EMG of the right upper extremity performed today which was normal.  Specifically no significant evidence of significant median, radial or ulnar nerve injury.  Clinically patient does have some hypersensitivity involving the 3rd and 4th digits of the right hand.  Clinical correlation is therefore necessary.  These findings are available via media section of chart review.   1. Decreased range of motion of finger of right hand    2. Right hand paresthesia              Plan:       We discussed treatment options at length. Recommend patient keep appointment with referring provider.         I spent a total of 35 minutes on the day of the visit. This includes face to face time and non-face to face time preparing to see the patient (eg, review of tests), obtaining and/or reviewing separately obtained history, documenting clinical information in the electronic or other health record, independently interpreting results and communicating results to the patient/family/caregiver, or care coordinator  .  Naseem Darden MD, FAAN    04/18/2024   1:56 PM     A dictation device was used to produce this document. Use of such devices sometimes results in grammatical errors or replacement of words that sound similarly.      none

## 2024-05-17 DIAGNOSIS — G57.02 NEUROPATHY OF LEFT SCIATIC NERVE: ICD-10-CM

## 2024-05-17 RX ORDER — TIZANIDINE 2 MG/1
TABLET ORAL
Qty: 90 TABLET | Refills: 1 | Status: SHIPPED | OUTPATIENT
Start: 2024-05-17

## 2024-05-17 NOTE — TELEPHONE ENCOUNTER
Refill Routing Note   Medication(s) are not appropriate for processing by [facility] for the following reason(s):        Outside of protocol    [facility] action(s):  Route               Appointments  past 12m or future 3m with PCP    Date Provider   Last Visit   3/1/2024 Natalia Stubbs MD   Next Visit   9/4/2024 Natalia Stubbs MD   ED visits in past 90 days: 0        Note composed:12:10 PM 05/17/2024

## 2024-05-17 NOTE — TELEPHONE ENCOUNTER
No care due was identified.  [employer] Embedded Care Due Messages. Reference number: [id number].   5/17/2024 8:24:38 AM CDT

## 2024-05-20 ENCOUNTER — OFFICE VISIT (OUTPATIENT)
Dept: URGENT CARE | Facility: CLINIC | Age: 84
End: 2024-05-20
Payer: MEDICARE

## 2024-05-20 VITALS
DIASTOLIC BLOOD PRESSURE: 80 MMHG | OXYGEN SATURATION: 98 % | SYSTOLIC BLOOD PRESSURE: 138 MMHG | HEIGHT: 64 IN | RESPIRATION RATE: 17 BRPM | TEMPERATURE: 98 F | BODY MASS INDEX: 25.44 KG/M2 | HEART RATE: 74 BPM | WEIGHT: 149 LBS

## 2024-05-20 DIAGNOSIS — Z87.39 HISTORY OF OSTEOARTHRITIS: ICD-10-CM

## 2024-05-20 DIAGNOSIS — M25.512 ACUTE PAIN OF LEFT SHOULDER: Primary | ICD-10-CM

## 2024-05-20 PROCEDURE — 99213 OFFICE O/P EST LOW 20 MIN: CPT | Mod: S$GLB,,, | Performed by: NURSE PRACTITIONER

## 2024-05-20 PROCEDURE — 73030 X-RAY EXAM OF SHOULDER: CPT | Mod: LT,S$GLB,, | Performed by: RADIOLOGY

## 2024-05-20 RX ORDER — DICLOFENAC SODIUM 10 MG/G
GEL TOPICAL
Qty: 100 G | Refills: 0 | Status: SHIPPED | OUTPATIENT
Start: 2024-05-20

## 2024-05-20 NOTE — PROGRESS NOTES
"Subjective:      Patient ID: Sofía Mcwilliams is a 83 y.o. female.    Vitals:  height is 5' 4.02" (1.626 m) and weight is 67.6 kg (149 lb). Her oral temperature is 98 °F (36.7 °C). Her blood pressure is 138/80 and her pulse is 74. Her respiration is 17 and oxygen saturation is 98%.     Chief Complaint: Shoulder Pain    This is a 83 y.o female who presents today with chief complaint of Lt shoulder pain (on and off) x3 weeks.   Patient reports that pain started after she tried to reach something from the washer machine ( some clothing was stuck) heard a crack at the time.   Home tx: Gabapentin and Tylenol and reports no improvement.     Shoulder Pain   The pain is present in the left shoulder. This is a new problem. The current episode started 1 to 4 weeks ago. The quality of the pain is described as aching. The pain is at a severity of 4/10. The pain is moderate. Pertinent negatives include no fever, headaches, inability to bear weight, itching, joint locking, joint swelling, limited range of motion, numbness, stiffness, tingling or visual symptoms. She has tried acetaminophen (Gabapentin) for the symptoms.       Constitution: Negative for fever.   Musculoskeletal:  Positive for pain, joint pain and arthritis. Negative for trauma, joint swelling and abnormal ROM of joint.   Skin:  Negative for erythema.   Neurological:  Negative for headaches and numbness.      Objective:     Physical Exam   Constitutional: She is oriented to person, place, and time. She appears well-developed.  Non-toxic appearance. She does not appear ill. No distress.   HENT:   Head: Normocephalic and atraumatic. Head is without abrasion, without contusion and without laceration.   Ears:   Right Ear: External ear normal.   Left Ear: External ear normal.   Nose: Nose normal.   Mouth/Throat: Oropharynx is clear and moist and mucous membranes are normal.   Eyes: Conjunctivae, EOM and lids are normal. Pupils are equal, round, and reactive to light. "   Neck: Trachea normal and phonation normal. Neck supple.   Cardiovascular: Normal rate, regular rhythm and normal heart sounds.   Pulmonary/Chest: Effort normal and breath sounds normal. No stridor. No respiratory distress.   Musculoskeletal: Normal range of motion.         General: Normal range of motion.      Left shoulder: She exhibits tenderness and crepitus. She exhibits normal range of motion, no bony tenderness, no swelling, no effusion, no deformity, no laceration, normal pulse and normal strength.   Neurological: She is alert and oriented to person, place, and time.   Skin: Skin is warm, dry, intact, not diaphoretic and no rash. Capillary refill takes less than 2 seconds. No abrasion, No burn, No bruising, No erythema and No ecchymosis   Psychiatric: Her speech is normal and behavior is normal. Judgment and thought content normal.   Nursing note and vitals reviewed.      Assessment:     1. Acute pain of left shoulder    2. History of osteoarthritis        Plan:   X-Ray Shoulder 2 or More Views Left    Result Date: 5/20/2024  EXAMINATION: XR SHOULDER COMPLETE 2 OR MORE VIEWS LEFT CLINICAL HISTORY: Pain in left shoulder TECHNIQUE: Two or three views of the left shoulder were performed. COMPARISON: None FINDINGS: No convincing evidence of acute displaced fracture or dislocation.  Relatively mild degenerative change at the acromioclavicular and glenohumeral joints. No definite radiopaque foreign body.  No acute findings in the visualized portion of the chest.     No convincing evidence of acute fracture or dislocation. Electronically signed by: Parag Crawford Date:    05/20/2024 Time:    13:51     Xray discussed w/ patient.  Referral placed for f/u.       Acute pain of left shoulder  -     X-Ray Shoulder 2 or More Views Left; Future; Expected date: 05/20/2024  -     diclofenac sodium (VOLTAREN ARTHRITIS PAIN) 1 % Gel; Apply 2 g to each affected area up to 4 times daily as needed; maximum dose per joint: 8  g/day; maximum total body dose (all combined joints): 32 g/day  Dispense: 100 g; Refill: 0  -     Ambulatory referral/consult to Orthopedics    History of osteoarthritis  -     diclofenac sodium (VOLTAREN ARTHRITIS PAIN) 1 % Gel; Apply 2 g to each affected area up to 4 times daily as needed; maximum dose per joint: 8 g/day; maximum total body dose (all combined joints): 32 g/day  Dispense: 100 g; Refill: 0  -     Ambulatory referral/consult to Orthopedics

## 2024-05-20 NOTE — PATIENT INSTRUCTIONS
Please drink plenty of fluids.  Please get plenty of rest.  Please return here or go to the Emergency Department for any concerns or worsening of condition.  If you were prescribed a narcotic medication, do not drive or operate heavy equipment or machinery while taking these medications.  If you were not prescribed an anti-inflammatory medication, and if you do not have any history of stomach/intestinal ulcers, or kidney disease, or are not taking a blood thinner such as Coumadin, Plavix, Pradaxa, Eloquis, or Xaralta for example, it is OK to take over the counter Ibuprofen or Advil or Motrin or Aleve as directed.  Do not take these medications on an empty stomach.  Rest, ice, compression and elevation to the affected joint or limb as needed.  Please follow up with your primary care doctor or specialist as needed.  A referral was placed for you, call [phone] to schedule appointment.      If you  smoke, please stop smoking.

## 2024-06-03 ENCOUNTER — OFFICE VISIT (OUTPATIENT)
Dept: URGENT CARE | Facility: CLINIC | Age: 84
End: 2024-06-03
Payer: MEDICARE

## 2024-06-03 VITALS
HEIGHT: 64 IN | OXYGEN SATURATION: 98 % | RESPIRATION RATE: 17 BRPM | HEART RATE: 79 BPM | TEMPERATURE: 98 F | DIASTOLIC BLOOD PRESSURE: 60 MMHG | BODY MASS INDEX: 25.44 KG/M2 | SYSTOLIC BLOOD PRESSURE: 123 MMHG | WEIGHT: 149 LBS

## 2024-06-03 DIAGNOSIS — M94.0 COSTOCHONDRITIS, ACUTE: Primary | ICD-10-CM

## 2024-06-03 PROCEDURE — 99213 OFFICE O/P EST LOW 20 MIN: CPT | Mod: S$GLB,,, | Performed by: PHYSICIAN ASSISTANT

## 2024-06-03 NOTE — PROGRESS NOTES
"Subjective:      Patient ID: Sofía Mcwilliams is a 83 y.o. female.    Vitals:  height is 5' 4" (1.626 m) and weight is 67.6 kg (149 lb). Her temperature is 98.3 °F (36.8 °C). Her blood pressure is 123/60 and her pulse is 79. Her respiration is 17 and oxygen saturation is 98%.     Chief Complaint: Abdominal Pain    This is a 83 y.o. female who presents today for evaluation pain of left upper quadrant area.  Patient states that she initially noticed a pain 4 days ago.  Patient states that she was lying down prone on an exercise machine doing hamstring curls, when she went to get up she felt a sharp pain in this area that lasted for about a second then resolved completely.  Patient states that she was able to go about the rest of her work out with her  without any issues or pain in the area.  Patient states that she did not feel the pain again until last night.  Patient states that she felt the sharp pain immediately when she changed position from sitting to lying in the bed, the pain was the same again and lasted for a split second.  Patient states that the pain then resolved, she felt fine throughout the rest of the night but sometimes when she lied on the area prone she felt discomfort to the area.  No radiation of pain, it is localized to upper left upper quadrant-sharp, stabbing, and each time provoked.  Denies any significant trauma to the area.  Patient states that since waking up this morning she has had no pain and currently has no pain.  She denies any other new associated symptoms with this pain since onset.  Denies any chest pain, shortness a breath, pleuritic pain, lower abdominal pain.  No recent GERD symptoms, no other GI or  symptoms.  No fever.  She just wanted to get checked out before she goes out of town next week.    Abdominal Pain  This is a new problem. The current episode started in the past 7 days. The onset quality is sudden. The problem occurs intermittently. The problem has been " resolved. The pain is located in the LUQ. The pain is at a severity of 7/10. The pain is moderate. The quality of the pain is sharp. The abdominal pain does not radiate. Pertinent negatives include no belching, constipation, diarrhea, dysuria, fever, frequency, headaches, hematochezia, nausea or vomiting. The pain is aggravated by certain positions. She has tried nothing for the symptoms. There is no history of abdominal surgery, colon cancer, Crohn's disease, gallstones, GERD, irritable bowel syndrome, pancreatitis, PUD or ulcerative colitis. Patient's medical history does not include kidney stones and UTI.     Patient Active Problem List   Diagnosis    Retina disorder - Both Eyes    Nuclear sclerosis - Both Eyes    Hypertension    Hyperlipidemia    Arthritis    Osteopenia    Anxiety    Mild carotid artery disease    Chronic pain    Osteoarthritis of hip    Primary osteoarthritis of both hands    Restless leg syndrome due to iron deficiency anemia    Decreased  strength    Decreased pinch strength    Decreased activities of daily living (ADL)    Reduced sensation    History of colon polyps    Lumbar radiculopathy    Decreased range of motion    Localized edema    Sensory ataxia    Foot drop, left foot    Flail joint, left ankle and foot    Numbness of left foot    Decreased range of motion of finger of right hand    Decreased finger strength    Tendon rupture of wrist, sequela     Past Medical History:   Diagnosis Date    Anxiety 5/2/2016    Arthritis     Basal cell carcinoma 2013    left buttock    Cataract     Hypertension     Other and unspecified hyperlipidemia          Constitution: Negative for chills, sweating, fatigue and fever.   HENT:  Negative for ear pain, foreign body in ear, congestion and sore throat.    Neck: Negative for neck pain and neck stiffness.   Cardiovascular:  Negative for chest pain, leg swelling and palpitations.   Eyes:  Negative for eye itching, eye pain and eye redness.    Respiratory:  Negative for cough, sputum production and shortness of breath.    Gastrointestinal:  Positive for abdominal pain. Negative for history of abdominal surgery, nausea, vomiting, constipation, diarrhea, bright red blood in stool, dark colored stools, rectal bleeding and rectal pain.   Genitourinary:  Negative for dysuria, frequency, urgency, flank pain and pelvic pain.   Musculoskeletal:  Positive for pain. Negative for joint swelling.   Skin:  Negative for color change and rash.   Neurological:  Negative for dizziness, headaches, disorientation, altered mental status, numbness and tingling.   Psychiatric/Behavioral:  Negative for altered mental status and disorientation.       Objective:     Physical Exam   Constitutional: She is oriented to person, place, and time. She appears well-developed. She is cooperative.  Non-toxic appearance. She does not appear ill. No distress.   HENT:   Head: Normocephalic and atraumatic.   Ears:   Right Ear: Hearing and external ear normal.   Left Ear: Hearing and external ear normal.   Nose: Nose normal. No mucosal edema, rhinorrhea or nasal deformity. No epistaxis.   Mouth/Throat: Uvula is midline, oropharynx is clear and moist and mucous membranes are normal. No trismus in the jaw. Normal dentition. No uvula swelling. No posterior oropharyngeal erythema.   Eyes: Conjunctivae and lids are normal. Right eye exhibits no discharge. Left eye exhibits no discharge. No scleral icterus.   Neck: Trachea normal and phonation normal. Neck supple.   Cardiovascular: Normal rate, regular rhythm, normal heart sounds and normal pulses.   Pulmonary/Chest: Effort normal and breath sounds normal. No accessory muscle usage or stridor. No tachypnea and no bradypnea. No respiratory distress. She has no decreased breath sounds. She has no wheezes. She has no rhonchi. She has no rales. She exhibits tenderness.   CTAB.  No evidence of respiratory distress.         Comments: CTAB.  No evidence  of respiratory distress.    Abdominal: Normal appearance and bowel sounds are normal. She exhibits no distension and no mass. Soft. There is abdominal tenderness (costal margin) in the left upper quadrant. There is no rebound, no guarding, no tenderness at McBurney's point, negative Painting's sign, no left CVA tenderness and no right CVA tenderness.     Musculoskeletal: Normal range of motion.         General: No deformity. Normal range of motion.      Right lower leg: No edema.      Left lower leg: No edema.   Neurological: She is alert and oriented to person, place, and time. She has normal strength. She exhibits normal muscle tone. Coordination normal.   Skin: Skin is warm, dry, intact, not diaphoretic and not pale.   Psychiatric: Her speech is normal and behavior is normal. Judgment and thought content normal.   Nursing note and vitals reviewed.      Assessment:     1. Costochondritis, acute        Plan:       Costochondritis, acute      Patient currently has no pain.  Last time that patient felt pain was last night, before that she felt it 4 days ago, sharp, stabbing, provoked with lying on the area or change in position, lasts for a split second.  Patient has no pleuritic pain, no chest pain or shortness for breath.  On exam, when patient points to area of pain it is more so on left lower costal margin over cartilage moreso than abdomen.  I do not feel that further workup or imaging is needed at this time.  However, I have discussed with patient that if pain recurs and has persistent to go to the ER for further evaluation and management.  At this time, pain is likely of patient's costal cartilage, however I have considered other differentials as discussed with patient.  Differentials include but are not limited to DDx include but is not limited to costochondritis, chest wall strain, abdominal strain, hernia, GERD, esophagitis,  MI, AAA, nephrolithiasis, myocarditis, pericarditis, gastritis, bronchitis,  pleurisy, psychogenic, PNA, PNX, pleural effusion, PE, mass, sickle cell, aortic dissection, splenic rupture or infarct, biliary colic, zoster, anxiety, pancreatitis, PUD, colitis, diverticulitis among others considered in differential.   I reviewed previous colonoscopy from 2021, sigmoid diverticulosis.  Patient's history and exam is not consistent with diverticulitis at this time. I have reviewed the patient's chart to view previous visits, labs, and imaging to assess PMH and look for any trends or previous treatments.

## 2024-07-11 DIAGNOSIS — E78.5 HYPERLIPIDEMIA, UNSPECIFIED HYPERLIPIDEMIA TYPE: ICD-10-CM

## 2024-07-11 RX ORDER — ATORVASTATIN CALCIUM 20 MG/1
TABLET, FILM COATED ORAL
Qty: 90 TABLET | Refills: 2 | Status: SHIPPED | OUTPATIENT
Start: 2024-07-11

## 2024-07-11 NOTE — TELEPHONE ENCOUNTER
No care due was identified.  [facility] Embedded Care Due Messages. Reference number: [id number].   7/11/2024 9:08:03 AM CDT

## 2024-07-11 NOTE — TELEPHONE ENCOUNTER
Refill Decision Note   Sofía Oj  is requesting a refill authorization.    Brief Assessment and Rationale for Refill:   Approve       Medication Therapy Plan:          Comments:     Note composed:12:06 PM 07/11/2024

## 2024-08-23 ENCOUNTER — OFFICE VISIT (OUTPATIENT)
Dept: URGENT CARE | Facility: CLINIC | Age: 84
End: 2024-08-23
Payer: MEDICARE

## 2024-08-23 VITALS
HEIGHT: 64 IN | SYSTOLIC BLOOD PRESSURE: 132 MMHG | DIASTOLIC BLOOD PRESSURE: 75 MMHG | RESPIRATION RATE: 16 BRPM | TEMPERATURE: 98 F | OXYGEN SATURATION: 95 % | WEIGHT: 149 LBS | BODY MASS INDEX: 25.44 KG/M2 | HEART RATE: 77 BPM

## 2024-08-23 DIAGNOSIS — N39.0 URINARY TRACT INFECTION WITH HEMATURIA, SITE UNSPECIFIED: Primary | ICD-10-CM

## 2024-08-23 DIAGNOSIS — R31.9 URINARY TRACT INFECTION WITH HEMATURIA, SITE UNSPECIFIED: Primary | ICD-10-CM

## 2024-08-23 LAB
BILIRUBIN, UA POC OHS: NEGATIVE
BLOOD, UA POC OHS: ABNORMAL
CLARITY, UA POC OHS: CLEAR
COLOR, UA POC OHS: YELLOW
GLUCOSE, UA POC OHS: NEGATIVE
KETONES, UA POC OHS: NEGATIVE
LEUKOCYTES, UA POC OHS: ABNORMAL
NITRITE, UA POC OHS: NEGATIVE
PH, UA POC OHS: 5
PROTEIN, UA POC OHS: NEGATIVE
SPECIFIC GRAVITY, UA POC OHS: <=1.005
UROBILINOGEN, UA POC OHS: 0.2

## 2024-08-23 PROCEDURE — 87086 URINE CULTURE/COLONY COUNT: CPT | Mod: HCNC | Performed by: NURSE PRACTITIONER

## 2024-08-23 RX ORDER — CEPHALEXIN 500 MG/1
500 CAPSULE ORAL EVERY 12 HOURS
Qty: 14 CAPSULE | Refills: 0 | Status: SHIPPED | OUTPATIENT
Start: 2024-08-23 | End: 2024-08-30

## 2024-08-23 NOTE — PROGRESS NOTES
"Subjective:      Patient ID: Sofía Mcwilliams is a 83 y.o. female.    Vitals:  height is 5' 4" (1.626 m) and weight is 67.6 kg (149 lb). Her temperature is 98.1 °F (36.7 °C). Her blood pressure is 132/75 and her pulse is 77. Her respiration is 16 and oxygen saturation is 95%.     Chief Complaint: Urinary Tract Infection    This is a 83 y.o. female who presents today with a chief complaint of UTI symptoms-- onset yesterday. Pt reports dysuria with urgency and frequency; and reports cloudy urine. Pt took azo, which relieved pain.     Urinary Tract Infection   This is a new problem. The current episode started yesterday. The problem occurs intermittently (after took azo, not every urination anymore). The problem has been unchanged. The pain is at a severity of 2/10. The pain is mild. There has been no fever. She is Not sexually active. Associated symptoms include frequency and urgency. Pertinent negatives include no chills, flank pain, hematuria, possible pregnancy, sweats, vomiting or constipation. Treatments tried: azo. The treatment provided moderate relief. There is no history of kidney stones or recurrent UTIs.     Constitution: Negative for chills and fever.   Gastrointestinal:  Negative for abdominal pain, vomiting, constipation and diarrhea.   Genitourinary:  Positive for frequency and urgency. Negative for flank pain and hematuria.   Musculoskeletal:  Negative for back pain.      Objective:     Physical Exam   Constitutional: She is oriented to person, place, and time.  Non-toxic appearance. She does not appear ill. No distress.   HENT:   Head: Normocephalic.   Nose: Nose normal.   Mouth/Throat: Mucous membranes are moist.   Cardiovascular: Normal rate.   Pulmonary/Chest: Effort normal.   Abdominal: Normal appearance. She exhibits no distension. Soft. flat abdomen There is no abdominal tenderness. There is no guarding, no left CVA tenderness and no right CVA tenderness.   Musculoskeletal: Normal range of motion.   "       General: Normal range of motion.   Neurological: She is alert and oriented to person, place, and time.   Skin: Skin is warm, dry and not diaphoretic.   Psychiatric: Her behavior is normal. Mood normal.   Nursing note and vitals reviewed.    Results for orders placed or performed in visit on 08/23/24   POCT Urinalysis(Instrument)   Result Value Ref Range    Color, POC UA Yellow Yellow, Straw, Colorless    Clarity, POC UA Clear Clear    Glucose, POC UA Negative Negative    Bilirubin, POC UA Negative Negative    Ketones, POC UA Negative Negative    Spec Grav POC UA <=1.005 1.005 - 1.030    Blood, POC UA Large (A) Negative    pH, POC UA 5.0 5.0 - 8.0    Protein, POC UA Negative Negative    Urobilinogen, POC UA 0.2 <=1.0    Nitrite, POC UA Negative Negative    WBC, POC UA Large (A) Negative        Assessment:     1. Urinary tract infection with hematuria, site unspecified        Plan:       Urinary tract infection with hematuria, site unspecified  -     POCT Urinalysis(Instrument)  -     CULTURE, URINE  -     cephALEXin (KEFLEX) 500 MG capsule; Take 1 capsule (500 mg total) by mouth every 12 (twelve) hours. for 7 days  Dispense: 14 capsule; Refill: 0      Patient Instructions   Urine culture is pending and should show up on your [facility] portal in 2-3 days  We will call if any changes need to be made  Increase water intake  Wipe front to back  Take time on toilet while voiding  Use gentle soap  Avoid scented soaps/gels/bubble baths

## 2024-08-23 NOTE — PATIENT INSTRUCTIONS
Urine culture is pending and should show up on your [facility] portal in 2-3 days  We will call if any changes need to be made  Increase water intake  Wipe front to back  Take time on toilet while voiding  Use gentle soap  Avoid scented soaps/gels/bubble baths

## 2024-08-24 LAB — BACTERIA UR CULT: ABNORMAL

## 2024-08-28 ENCOUNTER — TELEPHONE (OUTPATIENT)
Dept: OPTOMETRY | Facility: CLINIC | Age: 84
End: 2024-08-28
Payer: MEDICARE

## 2024-08-30 ENCOUNTER — LAB VISIT (OUTPATIENT)
Dept: LAB | Facility: HOSPITAL | Age: 84
End: 2024-08-30
Attending: INTERNAL MEDICINE
Payer: MEDICARE

## 2024-08-30 DIAGNOSIS — I10 PRIMARY HYPERTENSION: ICD-10-CM

## 2024-08-30 DIAGNOSIS — E78.5 HYPERLIPIDEMIA, UNSPECIFIED HYPERLIPIDEMIA TYPE: ICD-10-CM

## 2024-08-30 DIAGNOSIS — Z00.00 PREVENTATIVE HEALTH CARE: ICD-10-CM

## 2024-08-30 LAB
ALBUMIN SERPL BCP-MCNC: 3.8 G/DL (ref 3.5–5.2)
ALP SERPL-CCNC: 90 U/L (ref 55–135)
ALT SERPL W/O P-5'-P-CCNC: 26 U/L (ref 10–44)
ANION GAP SERPL CALC-SCNC: 13 MMOL/L (ref 8–16)
AST SERPL-CCNC: 26 U/L (ref 10–40)
BASOPHILS # BLD AUTO: 0.05 K/UL (ref 0–0.2)
BASOPHILS NFR BLD: 0.7 % (ref 0–1.9)
BILIRUB SERPL-MCNC: 0.5 MG/DL (ref 0.1–1)
BUN SERPL-MCNC: 14 MG/DL (ref 8–23)
CALCIUM SERPL-MCNC: 9.7 MG/DL (ref 8.7–10.5)
CHLORIDE SERPL-SCNC: 107 MMOL/L (ref 95–110)
CHOLEST SERPL-MCNC: 157 MG/DL (ref 120–199)
CHOLEST/HDLC SERPL: 2.9 {RATIO} (ref 2–5)
CO2 SERPL-SCNC: 23 MMOL/L (ref 23–29)
CREAT SERPL-MCNC: 0.8 MG/DL (ref 0.5–1.4)
DIFFERENTIAL METHOD BLD: ABNORMAL
EOSINOPHIL # BLD AUTO: 0.3 K/UL (ref 0–0.5)
EOSINOPHIL NFR BLD: 4 % (ref 0–8)
ERYTHROCYTE [DISTWIDTH] IN BLOOD BY AUTOMATED COUNT: 14.4 % (ref 11.5–14.5)
EST. GFR  (NO RACE VARIABLE): >60 ML/MIN/1.73 M^2
GLUCOSE SERPL-MCNC: 95 MG/DL (ref 70–110)
HCT VFR BLD AUTO: 43.3 % (ref 37–48.5)
HDLC SERPL-MCNC: 55 MG/DL (ref 40–75)
HDLC SERPL: 35 % (ref 20–50)
HGB BLD-MCNC: 13.5 G/DL (ref 12–16)
IMM GRANULOCYTES # BLD AUTO: 0.04 K/UL (ref 0–0.04)
IMM GRANULOCYTES NFR BLD AUTO: 0.5 % (ref 0–0.5)
LDLC SERPL CALC-MCNC: 76.4 MG/DL (ref 63–159)
LYMPHOCYTES # BLD AUTO: 3 K/UL (ref 1–4.8)
LYMPHOCYTES NFR BLD: 39.6 % (ref 18–48)
MCH RBC QN AUTO: 28.2 PG (ref 27–31)
MCHC RBC AUTO-ENTMCNC: 31.2 G/DL (ref 32–36)
MCV RBC AUTO: 90 FL (ref 82–98)
MONOCYTES # BLD AUTO: 0.6 K/UL (ref 0.3–1)
MONOCYTES NFR BLD: 8.3 % (ref 4–15)
NEUTROPHILS # BLD AUTO: 3.5 K/UL (ref 1.8–7.7)
NEUTROPHILS NFR BLD: 46.9 % (ref 38–73)
NONHDLC SERPL-MCNC: 102 MG/DL
NRBC BLD-RTO: 0 /100 WBC
PLATELET # BLD AUTO: 179 K/UL (ref 150–450)
PMV BLD AUTO: 11.2 FL (ref 9.2–12.9)
POTASSIUM SERPL-SCNC: 4.9 MMOL/L (ref 3.5–5.1)
PROT SERPL-MCNC: 6.4 G/DL (ref 6–8.4)
RBC # BLD AUTO: 4.79 M/UL (ref 4–5.4)
SODIUM SERPL-SCNC: 143 MMOL/L (ref 136–145)
TRIGL SERPL-MCNC: 128 MG/DL (ref 30–150)
TSH SERPL DL<=0.005 MIU/L-ACNC: 2.07 UIU/ML (ref 0.4–4)
WBC # BLD AUTO: 7.55 K/UL (ref 3.9–12.7)

## 2024-08-30 PROCEDURE — 85025 COMPLETE CBC W/AUTO DIFF WBC: CPT | Mod: HCNC | Performed by: INTERNAL MEDICINE

## 2024-08-30 PROCEDURE — 80061 LIPID PANEL: CPT | Mod: HCNC | Performed by: INTERNAL MEDICINE

## 2024-08-30 PROCEDURE — 84443 ASSAY THYROID STIM HORMONE: CPT | Mod: HCNC | Performed by: INTERNAL MEDICINE

## 2024-08-30 PROCEDURE — 36415 COLL VENOUS BLD VENIPUNCTURE: CPT | Mod: HCNC,PO | Performed by: INTERNAL MEDICINE

## 2024-08-30 PROCEDURE — 80053 COMPREHEN METABOLIC PANEL: CPT | Mod: HCNC | Performed by: INTERNAL MEDICINE

## 2024-09-04 ENCOUNTER — OFFICE VISIT (OUTPATIENT)
Dept: INTERNAL MEDICINE | Facility: CLINIC | Age: 84
End: 2024-09-04
Payer: MEDICARE

## 2024-09-04 VITALS
SYSTOLIC BLOOD PRESSURE: 130 MMHG | DIASTOLIC BLOOD PRESSURE: 70 MMHG | WEIGHT: 151.88 LBS | HEIGHT: 64 IN | BODY MASS INDEX: 25.93 KG/M2 | HEART RATE: 93 BPM | OXYGEN SATURATION: 98 %

## 2024-09-04 DIAGNOSIS — E78.5 HYPERLIPIDEMIA, UNSPECIFIED HYPERLIPIDEMIA TYPE: ICD-10-CM

## 2024-09-04 DIAGNOSIS — R20.1 REDUCED SENSATION: ICD-10-CM

## 2024-09-04 DIAGNOSIS — I10 PRIMARY HYPERTENSION: ICD-10-CM

## 2024-09-04 DIAGNOSIS — Z00.00 PREVENTATIVE HEALTH CARE: Primary | ICD-10-CM

## 2024-09-04 PROBLEM — M25.272: Status: RESOLVED | Noted: 2023-07-04 | Resolved: 2024-09-04

## 2024-09-04 PROBLEM — M21.372 FOOT DROP, LEFT FOOT: Status: RESOLVED | Noted: 2023-07-01 | Resolved: 2024-09-04

## 2024-09-04 PROCEDURE — 1159F MED LIST DOCD IN RCRD: CPT | Mod: HCNC,CPTII,S$GLB, | Performed by: INTERNAL MEDICINE

## 2024-09-04 PROCEDURE — 3288F FALL RISK ASSESSMENT DOCD: CPT | Mod: HCNC,CPTII,S$GLB, | Performed by: INTERNAL MEDICINE

## 2024-09-04 PROCEDURE — 1160F RVW MEDS BY RX/DR IN RCRD: CPT | Mod: HCNC,CPTII,S$GLB, | Performed by: INTERNAL MEDICINE

## 2024-09-04 PROCEDURE — 99397 PER PM REEVAL EST PAT 65+ YR: CPT | Mod: HCNC,S$GLB,, | Performed by: INTERNAL MEDICINE

## 2024-09-04 PROCEDURE — 1101F PT FALLS ASSESS-DOCD LE1/YR: CPT | Mod: HCNC,CPTII,S$GLB, | Performed by: INTERNAL MEDICINE

## 2024-09-04 PROCEDURE — 3078F DIAST BP <80 MM HG: CPT | Mod: HCNC,CPTII,S$GLB, | Performed by: INTERNAL MEDICINE

## 2024-09-04 PROCEDURE — 1126F AMNT PAIN NOTED NONE PRSNT: CPT | Mod: HCNC,CPTII,S$GLB, | Performed by: INTERNAL MEDICINE

## 2024-09-04 PROCEDURE — 99999 PR PBB SHADOW E&M-EST. PATIENT-LVL IV: CPT | Mod: PBBFAC,HCNC,, | Performed by: INTERNAL MEDICINE

## 2024-09-04 PROCEDURE — 3075F SYST BP GE 130 - 139MM HG: CPT | Mod: HCNC,CPTII,S$GLB, | Performed by: INTERNAL MEDICINE

## 2024-09-04 NOTE — PROGRESS NOTES
Patient ID: Sofía Mcwilliams is a 83 y.o. female    Chief Complaint: Annual Exam    History of Present Illness  The patient presents for evaluation of multiple medical concerns.    Her primary concern is balance, accompanied by foot pain. She has been informed by her neurologist that the pain indicates an ongoing issue. She continues to experience reduced sensation but reports significant improvement in her foot drop. She has found sandals that are comfortable for walking and exercising.    She has discontinued physical therapy after a prolonged period but remains diligent about her home exercise program. She participates in tap dancing twice a week, which she believes has slightly improved her condition. She has an upcoming appointment with her neurologist next week.    She recently embarked on a 12 to 14-day biking cruise but was unable to complete it due to her 's COVID-19 diagnosis. Additionally, she mentions that her handicap tag has  and needs renewal.  Patient reports that she developed some left shoulder pain when reaching into the washer and dryer  ROS: Otherwise Negative     Physical Exam  General: Well-appearing, well-nourished.  No distress  HEENT: conjunctivae are normal.  Pupils are equal and reative to light.  TM's are clear and intact bilaterally.  Hearing is grossly normal.  Nasopharynx is clear.  Oropharynx is clear.  Neck: Supple.  No thyroid megaly.  No bruits.  Lymph: No cervical or supraclavicular adenopathy.  Heart: Regular rate and rhythm, without murmur, rub or gallop.  Lungs: Clear to auscultation; respiratory effort normal.  Abdomen: Soft, nontender, nondistended.  Normoactive bowel sounds.  No hepatomegaly.  No masses.  Extremities: Good distal pulses.  No edema.  Psych: Oriented to time person place.  Judgment and insight seem unimpaired.  Mood and affect are appropriate.  Muscle skeletal pain in the anterior region of her left shoulder    Results      Assessment & Plan  1.  Balance issues.  She continues to experience balance issues and pain in the foot. She is no longer doing physical therapy but is maintaining a home exercise program and attending tap dancing classes twice a week. A permanent handicap tag will be provided to assist her mobility needs.    2. Foot pain.  She reports ongoing foot pain, which her neurologist has indicated is a sign of activity in the nerves. She will follow up with her neurologist next week for further evaluation.    3. Medication Management.  No new medications are requested at this time. She will request refills if needed.          Follow up in about 6 months (around 3/4/2025) for HTN cmp lipids.    Sofía was seen today for annual exam.    Diagnoses and all orders for this visit:    Preventative health care  Healthcare maintenance performed, reviewed, updated and counseled today.  Hyperlipidemia, unspecified hyperlipidemia type  -     Comprehensive Metabolic Panel; Future  -     Lipid Panel; Future  Controlled.  Continue current medical regimen.  Prescription refills addressed.  Followup advised. See after visit summary.  Primary hypertension  -     Comprehensive Metabolic Panel; Future  -     Lipid Panel; Future  Controlled.  Continue current medical regimen.  Prescription refills addressed.  Followup advised. See after visit summary.  Reduced sensation       Shoulder pain rotator cuff tendinopathy exercises and stretches handout provided follow-up if refractory  This note was generated with the assistance of ambient listening technology. Verbal consent was obtained by the patient and accompanying visitor(s) for the recording of patient appointment to facilitate this note. I attest to having reviewed and edited the generated note for accuracy, though some syntax or spelling errors may persist. Please contact the author of this note for any clarification.

## 2024-10-08 DIAGNOSIS — I10 ESSENTIAL HYPERTENSION: ICD-10-CM

## 2024-10-08 RX ORDER — LOSARTAN POTASSIUM 25 MG/1
TABLET ORAL
Qty: 90 TABLET | Refills: 3 | Status: SHIPPED | OUTPATIENT
Start: 2024-10-08

## 2024-10-08 NOTE — TELEPHONE ENCOUNTER
No care due was identified.  [facility] Embedded Care Due Messages. Reference number: [id number].   10/08/2024 7:26:17 AM CDT

## 2024-10-08 NOTE — TELEPHONE ENCOUNTER
Refill Decision Note   Sofía Oj  is requesting a refill authorization.  Brief Assessment and Rationale for Refill:  Approve     Medication Therapy Plan:        Comments:     Note composed:10:23 AM 10/08/2024

## 2024-10-19 ENCOUNTER — PATIENT MESSAGE (OUTPATIENT)
Dept: INTERNAL MEDICINE | Facility: CLINIC | Age: 84
End: 2024-10-19
Payer: MEDICARE

## 2024-10-26 NOTE — OP NOTE
Patient Name: Sofía Mcwilliams  MRN: [id number]    INFORMED CONSENT: The procedure, risks, benefits and options were discussed with patient. There are no contraindications to the procedure. The patient expressed understanding and agreed to proceed. The personnel performing the procedure was discussed. I verify that I personally obtained Sofía CARRILLO's consent prior to the start of the procedure and the signed consent can be found on the patient's chart.    Procedure Date: 03/13/2019    Anesthesia: Topical    Pre Procedure diagnosis: Chronic hip pain, unspecified laterality [M25.559, G89.29]  Primary osteoarthritis of hip, unspecified laterality [M16.10]  Greater trochanteric bursitis, unspecified laterality [M70.60]  1. Pain of left hip joint    2. Greater trochanteric bursitis of left hip    3. Chronic pain      Post-Procedure diagnosis: SAME    Sedation: None    PROCEDURE: Left ACETABULOFEMORAL JOINT INJECTION (HIP) & Greater Trochanteric Bursa Injection      DESCRIPTION OF PROCEDURE: The patient was brought to the procedure room.  After performing time out. IV access was obtained prior to the procedure.  The patient was positioned supine on the fluoroscopy table.  Continuous hemodynamic monitoring was initiated including blood pressure, EKG, and pulse oximetry. .  The skin overlying the hip was prepped with chlorhexidine and draped in a sterile fashion.  The skin and subcutaneous tissue was anesthetized using 3 cc of lidocaine 1%.  A 22 gauge, 3 1/2 spinal needle was slowly advanced through under fluoroscopic guidance into the acetabulofemoral joint. The needle position was confirmed using oblique, AP and lateral fluoroscopic imaging.  Negative aspiration was confirmed. 2 cc of Omnipaque 300 was injected confirming intra-articular contrast spread. A combination of 5 cc of bupivacaine 0.25% and 40 mg kenalog was easily injected. The needle was removed and bleeding was nill.  The skin overlying the left greater trochanter was  prepped with povidone-iodine three times and draped in a sterile fashion.  A A 22 gauge, 3 1/2 spinal needle was slowly advanced through intermittent fluoroscopy guidance into the left greater trochanteric bursa. . Negative aspiration was confirmed. . A combination of 5 cc of Bupivacaine 0.25% and 40 mg kenalog was easily injected. The needle was removed and bleeding was nil. A sterile dressing was applied. Patient tolerated procedure with no complications     A sterile dressing was applied. Sofía CARRILLO was taken back to the recovery room for further observation.     Blood Loss: Nill  Specimen: None    Heather Mederos MD         Wt: 88.5 kg (10/24)

## 2024-10-29 ENCOUNTER — OFFICE VISIT (OUTPATIENT)
Dept: OPTOMETRY | Facility: CLINIC | Age: 84
End: 2024-10-29
Payer: COMMERCIAL

## 2024-10-29 DIAGNOSIS — H52.4 PRESBYOPIA: Primary | ICD-10-CM

## 2024-10-29 DIAGNOSIS — H35.9 RETINA DISORDER: ICD-10-CM

## 2024-10-29 DIAGNOSIS — H25.13 NUCLEAR SCLEROSIS, BILATERAL: ICD-10-CM

## 2024-10-29 DIAGNOSIS — Z13.5 GLAUCOMA SCREENING: ICD-10-CM

## 2024-10-29 PROCEDURE — 99999 PR PBB SHADOW E&M-EST. PATIENT-LVL III: CPT | Mod: PBBFAC,,, | Performed by: OPTOMETRIST

## 2024-10-29 PROCEDURE — 92014 COMPRE OPH EXAM EST PT 1/>: CPT | Mod: S$GLB,,, | Performed by: OPTOMETRIST

## 2024-10-29 PROCEDURE — 92015 DETERMINE REFRACTIVE STATE: CPT | Mod: S$GLB,,, | Performed by: OPTOMETRIST

## 2024-11-13 ENCOUNTER — HOSPITAL ENCOUNTER (OUTPATIENT)
Dept: RADIOLOGY | Facility: HOSPITAL | Age: 84
Discharge: HOME | End: 2024-11-13
Attending: INTERNAL MEDICINE
Payer: MEDICARE

## 2024-11-13 DIAGNOSIS — Z12.31 ENCOUNTER FOR SCREENING MAMMOGRAM FOR BREAST CANCER: ICD-10-CM

## 2024-11-13 PROCEDURE — 77063 BREAST TOMOSYNTHESIS BI: CPT | Mod: TC,HCNC

## 2024-11-13 PROCEDURE — 77063 BREAST TOMOSYNTHESIS BI: CPT | Mod: 26,HCNC,, | Performed by: RADIOLOGY

## 2024-11-13 PROCEDURE — 77067 SCR MAMMO BI INCL CAD: CPT | Mod: 26,HCNC,, | Performed by: RADIOLOGY

## 2024-12-16 ENCOUNTER — PATIENT MESSAGE (OUTPATIENT)
Dept: INTERNAL MEDICINE | Facility: CLINIC | Age: 84
End: 2024-12-16
Payer: MEDICARE

## 2024-12-17 NOTE — TELEPHONE ENCOUNTER
No care due was identified.  [facility] Embedded Care Due Messages. Reference number: [id number].   12/17/2024 8:39:52 AM CST

## 2024-12-17 NOTE — TELEPHONE ENCOUNTER
Refill Routing Note   Medication(s) are not appropriate for processing by [facility] for the following reason(s):        No active prescription written by provider    [facility] action(s):  Defer      Medication Therapy Plan:  rx      Appointments  past 12m or future 3m with PCP    Date Provider   Last Visit   2024 Natalia Stubbs MD   Next Visit   3/5/2025 Natalia Stubbs MD   ED visits in past 90 days: 0        Note composed:4:24 PM 2024

## 2024-12-18 RX ORDER — ROPINIROLE 0.5 MG/1
TABLET, FILM COATED ORAL
Qty: 90 TABLET | Refills: 3 | Status: SHIPPED | OUTPATIENT
Start: 2024-12-18

## 2025-02-21 DIAGNOSIS — Z00.00 ENCOUNTER FOR MEDICARE ANNUAL WELLNESS EXAM: ICD-10-CM

## 2025-03-07 ENCOUNTER — LAB VISIT (OUTPATIENT)
Dept: LAB | Facility: HOSPITAL | Age: 85
End: 2025-03-07
Attending: INTERNAL MEDICINE
Payer: MEDICARE

## 2025-03-07 DIAGNOSIS — E78.5 HYPERLIPIDEMIA, UNSPECIFIED HYPERLIPIDEMIA TYPE: ICD-10-CM

## 2025-03-07 DIAGNOSIS — I10 PRIMARY HYPERTENSION: ICD-10-CM

## 2025-03-07 LAB
ALBUMIN SERPL BCP-MCNC: 3.7 G/DL (ref 3.5–5.2)
ALP SERPL-CCNC: 88 U/L (ref 40–150)
ALT SERPL W/O P-5'-P-CCNC: 29 U/L (ref 10–44)
ANION GAP SERPL CALC-SCNC: 7 MMOL/L (ref 8–16)
AST SERPL-CCNC: 32 U/L (ref 10–40)
BILIRUB SERPL-MCNC: 0.7 MG/DL (ref 0.1–1)
BUN SERPL-MCNC: 16 MG/DL (ref 8–23)
CALCIUM SERPL-MCNC: 9.3 MG/DL (ref 8.7–10.5)
CHLORIDE SERPL-SCNC: 106 MMOL/L (ref 95–110)
CHOLEST SERPL-MCNC: 163 MG/DL (ref 120–199)
CHOLEST/HDLC SERPL: 2.7 {RATIO} (ref 2–5)
CO2 SERPL-SCNC: 27 MMOL/L (ref 23–29)
CREAT SERPL-MCNC: 0.8 MG/DL (ref 0.5–1.4)
EST. GFR  (NO RACE VARIABLE): >60 ML/MIN/1.73 M^2
GLUCOSE SERPL-MCNC: 103 MG/DL (ref 70–110)
HDLC SERPL-MCNC: 60 MG/DL (ref 40–75)
HDLC SERPL: 36.8 % (ref 20–50)
LDLC SERPL CALC-MCNC: 79.2 MG/DL (ref 63–159)
NONHDLC SERPL-MCNC: 103 MG/DL
POTASSIUM SERPL-SCNC: 4.4 MMOL/L (ref 3.5–5.1)
PROT SERPL-MCNC: 6.4 G/DL (ref 6–8.4)
SODIUM SERPL-SCNC: 140 MMOL/L (ref 136–145)
TRIGL SERPL-MCNC: 119 MG/DL (ref 30–150)

## 2025-03-07 PROCEDURE — 80053 COMPREHEN METABOLIC PANEL: CPT | Mod: HCNC | Performed by: INTERNAL MEDICINE

## 2025-03-07 PROCEDURE — 80061 LIPID PANEL: CPT | Mod: HCNC | Performed by: INTERNAL MEDICINE

## 2025-03-07 PROCEDURE — 36415 COLL VENOUS BLD VENIPUNCTURE: CPT | Mod: HCNC | Performed by: INTERNAL MEDICINE

## 2025-03-10 ENCOUNTER — OFFICE VISIT (OUTPATIENT)
Dept: INTERNAL MEDICINE | Facility: CLINIC | Age: 85
End: 2025-03-10
Payer: MEDICARE

## 2025-03-10 ENCOUNTER — PATIENT MESSAGE (OUTPATIENT)
Dept: INTERNAL MEDICINE | Facility: CLINIC | Age: 85
End: 2025-03-10

## 2025-03-10 VITALS
HEART RATE: 77 BPM | BODY MASS INDEX: 25.92 KG/M2 | WEIGHT: 151 LBS | DIASTOLIC BLOOD PRESSURE: 68 MMHG | OXYGEN SATURATION: 98 % | SYSTOLIC BLOOD PRESSURE: 128 MMHG

## 2025-03-10 DIAGNOSIS — G25.81 RLS (RESTLESS LEGS SYNDROME): ICD-10-CM

## 2025-03-10 DIAGNOSIS — M21.372 LEFT FOOT DROP: ICD-10-CM

## 2025-03-10 DIAGNOSIS — I10 PRIMARY HYPERTENSION: Primary | ICD-10-CM

## 2025-03-10 DIAGNOSIS — G58.8 OTHER MONONEUROPATHY: ICD-10-CM

## 2025-03-10 DIAGNOSIS — Z00.00 PREVENTATIVE HEALTH CARE: ICD-10-CM

## 2025-03-10 DIAGNOSIS — G57.02 NEUROPATHY OF LEFT SCIATIC NERVE: ICD-10-CM

## 2025-03-10 PROCEDURE — 99999 PR PBB SHADOW E&M-EST. PATIENT-LVL IV: CPT | Mod: PBBFAC,HCNC,, | Performed by: INTERNAL MEDICINE

## 2025-03-11 NOTE — PROGRESS NOTES
Patient ID: Sofía Mcwilliams is a 84 y.o. female    Chief Complaint: Hypertension    History of Present Illness    CHIEF COMPLAINT:  Patient presents for hypertension follow-up and to discuss ongoing issues with her foot, including pain, balance problems, and limited function following a surgical complication.    HPI:  Patient reports ongoing issues with her foot following a surgical complication approximately 2 years ago. She had sudden loss of foot mobility upon waking from hand surgery, a significant change from her pre-surgical state. She describes her foot pain as burning and stabbing, particularly on the top of the foot. She has swelling at the end of the day and numbness in the outer part of her foot. Her primary concern is balance problems.    Her foot functionality is limited. She can ambulate and bear weight, but has difficulty with bending it and severe discomfort. Her affected foot is less functional and more swollen than the other foot at the end of the day.    She has undergone multiple evaluations for this issue. Nerve conduction studies were performed, one immediately after the incident at the main [facility] and another at the [facility] in 2023. The most recent study, ordered by Dr. Farfan, showed abnormalities suggestive of sciatic neuropathy, including absent response of the left sural nerve and evidence of active denervation in multiple lower extremity muscles.    For treatment, she was initially prescribed gabapentin, which she took for several months without significant improvement. She is currently taking Requip, which aids her sleep and was initially prescribed for restless leg syndrome. She completed a year of occupational therapy for her hand, which was the original reason for her surgery. She reports some ongoing issues with her hand, such as occasionally dropping objects, but feels it has improved as much as it is likely to.    She has been evaluated by a neurologist for these  issues, but reports that the neurologist has discontinued her care.    She denies back pain and pain radiating down her leg.    MEDICATIONS:  Patient is on Requip for restless leg syndrome and as a sleep aid, which helps with her sleep. She recently discontinued gabapentin.    MEDICAL HISTORY:  Patient has a history of hypertension.    FAMILY HISTORY:  Family history is significant for the patient's , Dhaval, who has Parkinson's disease and restless leg syndrome.    SURGICAL HISTORY:  Patient has undergone hand surgery. She reports dropping things and ongoing issues with hand function as outcomes of this surgery. She has had two hip replacements.    TEST RESULTS:  In 2023, the patient underwent a nerve conduction study, which revealed abnormal findings in multiple lower extremity muscles suggestive of sciatic neuropathy. The study showed an absent response of the left sural nerve, evidence of active denervation of the tibialis anterior, medial gastrocnemius, peroneus, tibialis p  osterior, and biceps femoris distal to the left knee, as well as reduced activation of the biceps femorus.    IMAGING:  An MRI revealed that the patient's sciatic nerve was thickened and intense.          ROS: Otherwise Negative     Physical Exam    General: Well-appearing. Well-nourished. No distress.  HEENT: Conjunctivae normal. Pupils are equal and reactive to light. TMs are clear and intact bilaterally. Hearing grossly normal. Nasopharynx clear. Oropharynx clear.  Neck: Supple. No thyromegaly. No bruits.  Lymph: No cervical adenopathy. No supraclavicular adenopathy.  Heart: Regular rate and rhythm. No murmur. No rub. No gallop.  Lungs: Clear to auscultation. Respiratory effort normal.  Abdomen: Soft. Nontender. Nondistended. Normoactive bowel sounds. No hepatomegaly. No masses.  Extremities: Good distal pulses. No edema.  Psych: Oriented to time person place. Judgment unimpaired. Insight unimpaired. Mood appropriate. Affect  appropriate.          Assessment & Plan    SCIATIC NEUROPATHY:  - Ordered a nerve conduction study for comparison to 2023 results.  - Previous nerve conduction study showed abnormalities suggestive of sciatic neuropathy, including absent response of the left sural nerve and evidence of active denervation in multiple lower extremity muscles.  - Recommend a consultation with a physical medicine and rehabilitation specialist for further evaluation and treatment options.  - Patient considering a consultation with Dr. Gagan Oliver in [place], a specialist in nerve repairs, for potential treatment options.  - Follow up nerve conduction study and recommended a consultation with a physical medicine and rehabilitation specialist.    LEFT FOOT PAIN AND SENSORY ISSUES:  - Patient reports ongoing pain, burning, and stabbing sensations in the left foot, with no sensation in the outer part and swelling at the end of the day.  - Patient reports ongoing pain, burning, and stabbing sensations in the left foot.  - Patient reports swelling in the left foot at the end of the day.  - Patient reports burning sensations and lack of feeling in parts of the left foot.    BALANCE ISSUES:  - Noted balance issues and difficulty with foot movement and functionality.  - Patient reports balance issues as a major concern.  - Recommend a consultation with a physical medicine and rehabilitation specialist to address balance issues.    LEFT FOOT MOBILITY:  - Current physical exam reveals limited foot movement and functionality compared to the unaffected foot.  - Observed limited movement and functionality in the left foot compared to the unaffected foot.    RESTLESS LEG SYNDROME:  - Patient reports having restless leg syndrome and is taking Requip for it.  - Continued Requip for restless leg syndrome and as a sleep aid.  - Acknowledged the effectiveness of Requip in helping the patient sleep.  - Patient is taking Requip, which helps with symptoms and  sleep.  - Noted that neurologist Dr. Ortiz suggested an alternative medication but did not prescribe it due to potential side effects.    HYPERTENSION:  - Scheduled a follow-up visit for hypertension management.    HAND FUNCTION:  - Patient reports dropping things and difficulty with hand function after surgery.  - Noted that the patient completed a year of occupational therapy and is now engaging in activities like knitting to maintain hand function.  - Advised the patient to continue knitting as a form of hand therapy.    MEDICATIONS/SUPPLEMENTS:  - Previously tried gabapentin but discontinued it.  - Discontinued gabapentin due to limited perceived benefit.            No follow-ups on file.    Sofía was seen today for hypertension.    Diagnoses and all orders for this visit:    Primary hypertension  -     CBC Auto Differential; Future  -     Comprehensive Metabolic Panel; Future  -     Lipid Panel; Future  -     TSH; Future  -     Ferritin; Future  -     CK; Future  -     Sedimentation rate; Future  -     C-Reactive Protein; Future  -     Vitamin B12; Future    Left foot drop  -     Ambulatory referral/consult to Physical Medicine Rehab; Future  -     EMG W/ ULTRASOUND AND NERVE CONDUCTION TEST 2 Extremities; Future    Neuropathy of left sciatic nerve  -     Ambulatory referral/consult to Physical Medicine Rehab; Future  -     EMG W/ ULTRASOUND AND NERVE CONDUCTION TEST 2 Extremities; Future  -     CBC Auto Differential; Future  -     Comprehensive Metabolic Panel; Future  -     Lipid Panel; Future  -     TSH; Future  -     Ferritin; Future  -     CK; Future  -     Sedimentation rate; Future  -     C-Reactive Protein; Future  -     Vitamin B12; Future    Other mononeuropathy    RLS (restless legs syndrome)  -     CBC Auto Differential; Future  -     Comprehensive Metabolic Panel; Future  -     Lipid Panel; Future  -     TSH; Future  -     Ferritin; Future  -     CK; Future  -     Sedimentation rate; Future  -      C-Reactive Protein; Future  -     Vitamin B12; Future    Preventative health care  -     CBC Auto Differential; Future  -     Comprehensive Metabolic Panel; Future  -     Lipid Panel; Future  -     TSH; Future  -     Ferritin; Future  -     CK; Future  -     Sedimentation rate; Future  -     C-Reactive Protein; Future  -     Vitamin B12; Future         This note was generated with the assistance of ambient listening technology. Verbal consent was obtained by the patient and accompanying visitor(s) for the recording of patient appointment to facilitate this note. I attest to having reviewed and edited the generated note for accuracy, though some syntax or spelling errors may persist. Please contact the author of this note for any clarification.

## 2025-03-14 NOTE — TELEPHONE ENCOUNTER
----- Message from Geraldine Coreas sent at 1/7/2022  4:06 PM CST -----  Needs advice from nurse:      Who Called:pt  Regarding:returning a call  Would the patient rather a call back or VIA [facility]?  Best Call Back number:[phone] Or [phone]    Additional Info:       

## 2025-04-10 NOTE — PROGRESS NOTES
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  She has numbness throughout the left lower extremity as well as some balance problems and falls.  She stays active with gardening, have dancing, strength training.  Exam:  4+ out of 5 left ankle dorsiflexion, toe extension, plantar flexion, knee flexion.  5/5 left hip flexion, knee extension, hip abduction.  Decreased sensation to light touch throughout peroneal, tibial, sural nerve distributions.      Sensory NCS      Nerve / Sites Rec. Site Onset Lat Peak Lat NP Amp PP Amp Segments Distance Velocity Comment     ms ms µV µV  mm m/s    R Superficial peroneal - (Antidromic)      Lat leg Ankle 3.02 3.75 3.7 4.1 Lat leg - Ankle 140 46    L Superficial peroneal - (Antidromic)      Lat leg Ankle 4.25 5.19 3.1 4.0 Lat leg - Ankle 140 33    R Sural - (Antidromic)      Calf Ankle 3.02 3.75 11.8 22.3 Calf - Ankle 140 46    L Sural - (Antidromic)      Calf Ankle NR NR NR NR Calf - Ankle 140 NR        Motor NCS      Nerve / Sites Muscle Latency Amplitude Segments Dist. Lat Diff Velocity Comments     ms mV  mm ms m/s    L Peroneal - EDB      Ankle EDB 6.06 3.4 Ankle - EDB 80         B. Fib Head EDB 11.85 3.2 B. Fib Head - Ankle 275 5.79 47.5       A. Fib Head EDB 15.15 2.5 A. Fib Head - B. Fib Head 125 3.29 38.0    L Tibial - AH      Ankle AH 5.88 3.9 Ankle - AH 80      R Tibial - AH      Ankle AH 4.58 7.4 Ankle - AH 80      R Peroneal - EDB      Ankle EDB 5.35 3.4 Ankle - EDB 80           H Reflex      Nerve H Latency    ms   R Tibial - Soleus 37.97   L Tibial - Soleus 43.39       EMG Summary Table     Spontaneous MUAP Recruitment   Muscle IA Fib PSW Fasc H.F. Amp Dur. PPP Pattern   L. Tibialis anterior N 2+ 2+ None None 1+ 2+ 1+ Reduced   L. Gastrocnemius (Medial head) N 3+ 3+ None 2+ 1+ 1+ 2+ Reduced   L. Peroneus longus N 1+ 1+ None None 2+ 2+ N Reduced   L. Vastus medialis N None None None None N N N N   L. Gluteus medius N None None None None N N N N   L. Semimembranosus N None None None None N N N N   L. Biceps femoris (long head) N 1+ 1+ None 1+ 1+ 1+ 1+ N       Summary    The sensory conduction test was performed on 4 nerve(s). There were no results within the specified normal range. Findings were unremarkable in 1 nerve(s): R Sural - (Antidromic). Results outside the specified normal range were found in 3 nerve(s), as follows:  In the R Superficial peroneal - (Antidromic) study  the peak amplitude result was reduced for Lat leg stimulation  In the L Superficial peroneal - (Antidromic) study  the peak latency result was increased for Lat leg stimulation  the peak amplitude result was reduced for Lat leg stimulation  In the L Sural - (Antidromic) study  the response was considered absent for Calf stimulation    The H-reflex was normal on the right, prolonged on the left.    Motor:  The left peroneal compound motor action potential to extensor digitorum brevis demonstrated prolonged latency with normal amplitude.  There was no conduction block or focal slowing across the fibular head.  The left tibial compound motor action potential to abductor hallucis demonstrated prolonged latency with decreased amplitude compared to the contralateral side.  The right peroneal and right tibial motor responses were normal.    The needle EMG examination was performed in 7 muscles. It was normal in 3 muscle(s): L. Vastus medialis, L. Gluteus medius, L. Semimembranosus. The study was abnormal in 4  muscle(s), with the following distribution:  Abnormal spontaneous/insertional activity was found in L. Tibialis anterior, L. Gastrocnemius (Medial head), L. Peroneus longus, L. Biceps femoris (long head).  The MUP waveform abnormality was found in L. Tibialis anterior, L. Gastrocnemius (Medial head), L. Peroneus longus, L. Biceps femoris (long head).  Abnormal interference pattern was found in L. Tibialis anterior, L. Gastrocnemius (Medial head), L. Peroneus longus.      Conclusion:  Abnormal study    There is continued electrodiagnostic evidence of a left sciatic mononeuropathy with improvement compared to the prior study.  There is signs of motor recovery in the tibialis anterior, medial head of the gastrocnemius, peroneus longus, and biceps femoris.  There are continued signs of active/ongoing motor denervation in these muscles as well.        The findings were explained to the patient.  Recommended following up with the referring physician.     ________________________  Yadi Ronquillo MD

## 2025-04-14 ENCOUNTER — OFFICE VISIT (OUTPATIENT)
Dept: PHYSICAL MEDICINE AND REHAB | Facility: CLINIC | Age: 85
End: 2025-04-14
Payer: MEDICARE

## 2025-04-14 VITALS — HEIGHT: 64 IN | BODY MASS INDEX: 25.78 KG/M2 | WEIGHT: 151 LBS

## 2025-04-14 DIAGNOSIS — G57.02 NEUROPATHY OF LEFT SCIATIC NERVE: ICD-10-CM

## 2025-04-14 DIAGNOSIS — M21.372 LEFT FOOT DROP: ICD-10-CM

## 2025-04-14 DIAGNOSIS — E78.5 HYPERLIPIDEMIA, UNSPECIFIED HYPERLIPIDEMIA TYPE: ICD-10-CM

## 2025-04-14 PROCEDURE — 99499 UNLISTED E&M SERVICE: CPT | Mod: S$GLB,,, | Performed by: STUDENT IN AN ORGANIZED HEALTH CARE EDUCATION/TRAINING PROGRAM

## 2025-04-14 PROCEDURE — 95911 NRV CNDJ TEST 9-10 STUDIES: CPT | Mod: S$GLB,,, | Performed by: STUDENT IN AN ORGANIZED HEALTH CARE EDUCATION/TRAINING PROGRAM

## 2025-04-14 PROCEDURE — 99999 PR PBB SHADOW E&M-EST. PATIENT-LVL I: CPT | Mod: PBBFAC,,, | Performed by: STUDENT IN AN ORGANIZED HEALTH CARE EDUCATION/TRAINING PROGRAM

## 2025-04-14 PROCEDURE — 95886 MUSC TEST DONE W/N TEST COMP: CPT | Mod: S$GLB,,, | Performed by: STUDENT IN AN ORGANIZED HEALTH CARE EDUCATION/TRAINING PROGRAM

## 2025-04-14 RX ORDER — ATORVASTATIN CALCIUM 20 MG/1
20 TABLET, FILM COATED ORAL
Qty: 90 TABLET | Refills: 3 | Status: SHIPPED | OUTPATIENT
Start: 2025-04-14

## 2025-04-14 NOTE — TELEPHONE ENCOUNTER
Refill Decision Note   Sofía Oj  is requesting a refill authorization.  Brief Assessment and Rationale for Refill:  Approve     Medication Therapy Plan:         Comments:     Note composed:12:50 PM 04/14/2025

## 2025-04-14 NOTE — TELEPHONE ENCOUNTER
No care due was identified.  [facility] Embedded Care Due Messages. Reference number: [id number].   4/14/2025 7:50:47 AM CDT

## 2025-04-21 ENCOUNTER — OFFICE VISIT (OUTPATIENT)
Facility: CLINIC | Age: 85
End: 2025-04-21
Payer: MEDICARE

## 2025-04-21 ENCOUNTER — PATIENT MESSAGE (OUTPATIENT)
Dept: INTERNAL MEDICINE | Facility: CLINIC | Age: 85
End: 2025-04-21
Payer: MEDICARE

## 2025-04-21 ENCOUNTER — LAB VISIT (OUTPATIENT)
Dept: LAB | Facility: HOSPITAL | Age: 85
End: 2025-04-21
Payer: MEDICARE

## 2025-04-21 VITALS
BODY MASS INDEX: 24.75 KG/M2 | HEIGHT: 64 IN | DIASTOLIC BLOOD PRESSURE: 66 MMHG | HEART RATE: 82 BPM | WEIGHT: 145 LBS | SYSTOLIC BLOOD PRESSURE: 120 MMHG

## 2025-04-21 DIAGNOSIS — R27.8 SENSORY ATAXIA: ICD-10-CM

## 2025-04-21 DIAGNOSIS — G57.00 SCIATIC NEUROPATHY, UNSPECIFIED LATERALITY: ICD-10-CM

## 2025-04-21 DIAGNOSIS — Z71.89 COUNSELING REGARDING GOALS OF CARE: ICD-10-CM

## 2025-04-21 DIAGNOSIS — D50.9 RESTLESS LEG SYNDROME DUE TO IRON DEFICIENCY ANEMIA: Primary | ICD-10-CM

## 2025-04-21 DIAGNOSIS — G25.81 RESTLESS LEG SYNDROME DUE TO IRON DEFICIENCY ANEMIA: Primary | ICD-10-CM

## 2025-04-21 DIAGNOSIS — G54.1 LUMBOSACRAL PLEXUS DISORDERS: ICD-10-CM

## 2025-04-21 PROCEDURE — 83921 ORGANIC ACID SINGLE QUANT: CPT

## 2025-04-21 PROCEDURE — 99999 PR PBB SHADOW E&M-EST. PATIENT-LVL III: CPT | Mod: PBBFAC,,, | Performed by: PHYSICIAN ASSISTANT

## 2025-04-21 PROCEDURE — 82607 VITAMIN B-12: CPT

## 2025-04-21 PROCEDURE — 82525 ASSAY OF COPPER: CPT

## 2025-04-21 PROCEDURE — 36415 COLL VENOUS BLD VENIPUNCTURE: CPT

## 2025-04-21 NOTE — PROGRESS NOTES
"    Name: Sofía Mcwilliams  MRN: [id number]   CSN: [id number]      Date: 04/21/2025    Referring physician:  No referring provider defined for this encounter.    Chief Complaint: RLS     Interval History:  - no longer taking 2 mg XL   - trial of gabapentin last visit, no improvement (100 mg)   - sometimes legs start around noon   - takes 0.5 requip prn, around 4 pm and this seems to work   - only time she takes it   - finds that this is the time that she is busy, takes it to let it get in her system   - symptoms are manageable   - no falls   - takes a mulivitamin daily that contains copper   - supplements with b12   - in the garden a lot         June 2023  - requip xl 2 mg   - requip 0.5 mg IR as needed up to TID for breakthrough symptoms   - may consider addition of gabapentin or lyrica next   - legs are worse than ever"  - sometimes start at noon   - requip IR causes daytime sleepiness   - feels off balance   - no falls, some close calls   - more off balance whenever she closes her eyes    - still able to dance without falling   - she does supplement with B vitamins           From July 2022  - doing better on ER but around 3 pm she starts to feel her legs bother her, has to get up and walk  - feels balance is slightly worse after she takes requip in the evening  - if she turns too quick, feels off balance   - exercises, weight training       From April 2022 Sofía Mcwilliams is a R HANDED 84 y.o. female with a medical issues significant for lumbar radiculopathy, HTN, HLD, osteoarthritis and anxiety who presents for RLS. First noticed symptoms when she was pregnant with her daughter over 40 years ago. She states that she was using hypnosis and noticed that she couldn't still. This improved after pregnancy. About 4 years ago, she had hip replacements, after both of those surgeries, RLS has been constant and annoying. Starts at 2 pm in the afternoon. This was the time she would sit and read. Takes requip about 7 pm, goes to sleep " about an hour after -- causes sleepiness. Takes 1mg qhs. She was previously on the xr requip felt that this helped better because it didn't cause sleepiness.  has PD. Feels imbalanced, asks if its due to medications. No falls. Denies feeling lightheaded or dizziness. She previously walked frequently but has decreased due to lumbar radiculopathy. When she wakes up in the morning, still feels off. Does not recall that she felt like this whenever she was on the ER of requip.   Riding in the car is difficult, going out to dinner is problematic.       Had gabapentin after hip surgery but did not take it so unclear if it helped. Also tried pramipexole in the past.     Supplements with multi vitamin and B vitamin.     Family History: adopted     Neuroleptic Exposure: none     Nonmotor/Premotor ROS:  Anosmia: normal   Dysarthria/Hypophonia: none   Dysphagia/Sialorrhea: none   Urinary changes: recent isolated UTI   Constipation: some  Falls: none   Micrographia: none   Sleep issues:  -RBD: none       Review of Systems:   Review of Systems   Constitutional:  Negative for chills, fever and malaise/fatigue.   HENT:  Negative for hearing loss.    Eyes:  Negative for blurred vision and double vision.   Respiratory:  Negative for cough, shortness of breath and stridor.    Cardiovascular:  Negative for chest pain and leg swelling.   Gastrointestinal:  Positive for constipation. Negative for diarrhea and nausea.   Genitourinary:  Negative for frequency and urgency.   Musculoskeletal:  Negative for falls.   Skin:  Negative for itching and rash.   Neurological:  Positive for sensory change. Negative for dizziness, tremors, loss of consciousness and weakness.   Psychiatric/Behavioral:  Negative for hallucinations and memory loss.            Past Medical History: The patient  has a past medical history of Anxiety (5/2/2016), Arthritis, Basal cell carcinoma (2013), Cataract, Hypertension, and Other and unspecified  "hyperlipidemia.    Social History: The patient  reports that she quit smoking about 59 years ago. Her smoking use included cigarettes. She has never used smokeless tobacco. She reports current alcohol use. She reports that she does not use drugs.    Family History: Their family history is not on file. She was adopted.    Allergies: Celecoxib and Sulfa (sulfonamide antibiotics)     Meds:   Current Outpatient Medications on File Prior to Visit   Medication Sig Dispense Refill    aspirin (ECOTRIN) 81 MG EC tablet Take 1 tablet by mouth.      atorvastatin (LIPITOR) 20 MG tablet TAKE 1 TABLET EVERY DAY 90 tablet 3    CALCIUM CARB/VIT D3/MINERALS (CALCIUM-VITAMIN D ORAL) once daily.       coenzyme Q10 10 mg capsule Take by mouth.      losartan (COZAAR) 25 MG tablet TAKE 1 TABLET EVERY DAY 90 tablet 3    multivitamin (THERAGRAN) per tablet Take 1 tablet by mouth.      rOPINIRole (REQUIP) 0.5 MG tablet Take 1 tablet as needed up to 3 times a day for breakthrough RLS symptoms. 90 tablet 3    VITAMIN B COMPLEX ORAL Take by mouth once daily.       vitamin D 1000 units Tab Take 185 mg by mouth once daily.      [DISCONTINUED] ciclopirox (PENLAC) 8 % Soln Apply topically nightly. To affected toenails.  Remove with rubbing alcohol after each week of use. 6.6 mL 6    [DISCONTINUED] rOPINIRole (REQUIP XL) 2 mg 24 hr tablet TAKE 1 TABLET BY MOUTH EVERY EVENING 90 tablet 3     No current facility-administered medications on file prior to visit.       Exam:  /66   Pulse 82   Ht 5' 4" (1.626 m)   Wt 65.8 kg (145 lb)   BMI 24.89 kg/m²     Constitutional  Well-developed, well-nourished, appears stated age   Ophthalmoscopic  No papilledema with no hemorrhages or exudates bilaterally   Cardiovascular  Radial pulses 2+ and symmetric, no LE edema bilaterally   Neurological    * Mental status  MOCA =      - Orientation  Oriented to person, place, time, and situation     - Memory   Intact recent and remote     - " Attention/concentration  Attentive, vigilant during exam     - Language  Naming & repetition intact, +2-step commands     - Fund of knowledge  Aware of current events     - Executive  Well-organized thoughts     - Other     * Cranial nerves       - CN II  PERRL, visual fields full to confrontation     - CN III, IV, VI  Extraocular movements full, normal pursuits and saccades     - CN V  Sensation V1 - V3 intact     - CN VII  Face strong and symmetric bilaterally     - CN VIII  Hearing intact bilaterally     - CN IX, X  Palate raises midline and symmetric     - CN XI  SCM and trapezius 5/5 bilaterally     - CN XII  Tongue midline   * Motor  Muscle bulk normal, strength 5/5 throughout   * Sensory   Intact to light touch, decreased vibratory sense to bilateral LE to the knees    * Coordination  No dysmetria with finger-to-nose or heel-to-shin   * Gait  See below.   * Deep tendon reflexes  3+ UE     pectoralis spreads    Left laughlin    Babinski downgoing on the L , possibly up on the R?    * Specialized movement exam  No hypophonic speech.    Spasmodic dysphonia?    No facial masking, appropriately animated    No cogwheel rigidity.     No bradykinesia.   No tremor with rest, posture, kinesis, or intention.    No other dystonia, chorea, athetosis, myoclonus, or tics.   No motor impersistence.   diminished arm swing bilaterally    No postural instability.    mild vocal tremor      Some swaying with romberg    Appears almost as if she has a L foot drop or decreased strength in L dorsiflexion however strength is normal on exam today      Laboratory/Radiological:  - Results:  Lab Visit on 03/07/2025   Component Date Value Ref Range Status    Sodium 03/07/2025 140  136 - 145 mmol/L Final    Potassium 03/07/2025 4.4  3.5 - 5.1 mmol/L Final    Chloride 03/07/2025 106  95 - 110 mmol/L Final    CO2 03/07/2025 27  23 - 29 mmol/L Final    Glucose 03/07/2025 103  70 - 110 mg/dL Final    BUN 03/07/2025 16  8 - 23 mg/dL Final     Creatinine 03/07/2025 0.8  0.5 - 1.4 mg/dL Final    Calcium 03/07/2025 9.3  8.7 - 10.5 mg/dL Final    Total Protein 03/07/2025 6.4  6.0 - 8.4 g/dL Final    Albumin 03/07/2025 3.7  3.5 - 5.2 g/dL Final    Total Bilirubin 03/07/2025 0.7  0.1 - 1.0 mg/dL Final    Alkaline Phosphatase 03/07/2025 88  40 - 150 U/L Final    AST 03/07/2025 32  10 - 40 U/L Final    ALT 03/07/2025 29  10 - 44 U/L Final    eGFR 03/07/2025 >60.0  >60 mL/min/1.73 m^2 Final    Anion Gap 03/07/2025 7 (L)  8 - 16 mmol/L Final    Cholesterol 03/07/2025 163  120 - 199 mg/dL Final    Triglycerides 03/07/2025 119  30 - 150 mg/dL Final    HDL 03/07/2025 60  40 - 75 mg/dL Final    LDL Cholesterol 03/07/2025 79.2  63.0 - 159.0 mg/dL Final    HDL/Cholesterol Ratio 03/07/2025 36.8  20.0 - 50.0 % Final    Total Cholesterol/HDL Ratio 03/07/2025 2.7  2.0 - 5.0 Final    Non-HDL Cholesterol 03/07/2025 103  mg/dL Final       - Independent review of images:    Reviewed brain MRI from April 2023   Few punctate foci of T2 FLAIR signal hyperintensity supratentorial white matter while nonspecific in light of history sequela migraine headaches to be included in differential       - Independent review of consultant's notes: [facility]     ASSESSMENT/PLAN:  1. Restless Leg Syndrome  - continue requip 0.5 mg prn   - lumbar radiculopathy likely worsening symptoms   - didn't find gabapentin to be helpful (only tried 100 mg)   - no changes to meds       2. Imbalance  - sensory ataxia, brisk dtrs, L laughlin   - cervical MRI without evidence of myelomalacia   - reviewed brain MRI, unremarkable   - low copper and b12 in the past, now supplementing   - rechecking labs today   - history of sciatic neuropathy     EMG April 2025  There is continued electrodiagnostic evidence of a left sciatic mononeuropathy with improvement compared to the prior study.  There is signs of motor recovery in the tibialis anterior, medial head of the gastrocnemius, peroneus longus, and biceps femoris.   There are continued signs of active/ongoing motor denervation in these muscles as well.        3. Vocal tremor  - possible spasmodic dysphonia? Discussed referral to Dr. Leahy. Hold off for now.       The patient has a documented history of hypertension, hyperlipidemia and/or hypercholesteremia with long-term complications such as cerebrovascular disease, peripheral vascular disease, and/or aortic atherosclerosis. Collectively these risk factors may contribute to cerebral atherosclerosis, and cerebral hypoperfusion compounded neurocognitive disorder. Rec'd maximizing cerebrovascular-related medical therapy, including but not limited to cholesterol medications and antiplatelet agents. Rec'd controlling vascular risk factors like hypertension, hyperlipidemia, and Diabetes SBP<130, LDL<100, and A1C<7.0. Rec'd lifestyle choices, including a heart-healthy diet (e.g., Mediterranean or DASH), increased cardiovascular exercise (goal 150 minutes of moderate-intensity per week), and staying cognitively and socially active.      Orders Placed This Encounter    Vitamin B12 Deficiency Panel    COPPER, SERUM           Follow up: 1 year    This is a patient with a neurologic diagnosis whose overall, ongoing care is being managed and monitored by me and our Neurology clinic.   As such, since 2024,  is the appropriate add-on code to accompany the other E/M billing for this visit.      Collaborating Physician, Dr. Charlton, was available during today's encounter. Any change to plan along with cosign to appear in the EMR.       Total time spent with the patient: 32 minutes.  23 minutes of face-to-face consultation and 9 minutes of chart review and coordination of care, on the day of the visit. This includes face to face time and non-face to face time preparing to see the patient (eg, review of tests), obtaining and/or reviewing separately obtained history, documenting clinical information in the electronic or other health  record, independently interpreting resultsand communicating results to the patient/family/caregiver, or care coordination.         Vi Jimenes PA-C   [facility]  Department of Neurology  Movement Disorders

## 2025-04-23 LAB — VIT B12 SERPL-MCNC: 346 NG/L (ref 180–914)

## 2025-04-24 LAB — W COPPER: 971 UG/L

## 2025-04-25 LAB — METHYLMALONATE SERPL-SCNC: 0.17 NMOL/ML

## 2025-04-30 ENCOUNTER — PATIENT MESSAGE (OUTPATIENT)
Facility: CLINIC | Age: 85
End: 2025-04-30
Payer: MEDICARE

## 2025-08-05 ENCOUNTER — OFFICE VISIT (OUTPATIENT)
Dept: PHYSICAL MEDICINE AND REHAB | Facility: CLINIC | Age: 85
End: 2025-08-05
Payer: MEDICARE

## 2025-08-05 VITALS — BODY MASS INDEX: 25.31 KG/M2 | OXYGEN SATURATION: 98 % | WEIGHT: 148.25 LBS | HEIGHT: 64 IN

## 2025-08-05 DIAGNOSIS — M79.2 NEUROPATHIC PAIN OF LEFT FOOT: ICD-10-CM

## 2025-08-05 DIAGNOSIS — M48.061 NEURAL FORAMINAL STENOSIS OF LUMBAR SPINE: ICD-10-CM

## 2025-08-05 DIAGNOSIS — M54.50 CHRONIC RIGHT-SIDED LOW BACK PAIN WITHOUT SCIATICA: ICD-10-CM

## 2025-08-05 DIAGNOSIS — M47.816 LUMBAR FACET ARTHROPATHY: ICD-10-CM

## 2025-08-05 DIAGNOSIS — G89.29 CHRONIC RIGHT-SIDED LOW BACK PAIN WITHOUT SCIATICA: ICD-10-CM

## 2025-08-05 DIAGNOSIS — M47.816 SPONDYLOSIS OF LUMBAR REGION WITHOUT MYELOPATHY OR RADICULOPATHY: ICD-10-CM

## 2025-08-05 DIAGNOSIS — G57.02 NEUROPATHY OF LEFT SCIATIC NERVE: Primary | ICD-10-CM

## 2025-08-05 PROCEDURE — 99999 PR PBB SHADOW E&M-EST. PATIENT-LVL III: CPT | Mod: PBBFAC,HCNC,, | Performed by: PHYSICAL MEDICINE & REHABILITATION

## 2025-08-05 RX ORDER — ACETAMINOPHEN 500 MG
500-1000 TABLET ORAL 3 TIMES DAILY PRN
COMMUNITY
Start: 2025-08-05

## 2025-08-05 RX ORDER — GABAPENTIN 100 MG/1
100 CAPSULE ORAL 3 TIMES DAILY
Qty: 90 CAPSULE | Refills: 3 | Status: SHIPPED | OUTPATIENT
Start: 2025-08-05 | End: 2026-08-05

## 2025-08-05 NOTE — PROGRESS NOTES
Subjective:       Patient ID: Sofía Mcwilliams is a 84 y.o. female.    Chief Complaint: No chief complaint on file.      HPI      Mrs. Mcwilliams is an 84-year-old female with past medical history of hypertension, hyperlipidemia, basal cell carcinoma, osteoarthritis, status post bilateral total hip replacement (the left on 5/22/2019 and on the right on 8/10/2020), restless leg syndrome, extensor tendon ruptures of the right hand status post tendon transfer surgeries (right 5th digit on 8/16/2022, right 3rd and 4th digits on 06/2022), anxiety, chronic low back pain with lumbar radiculopathy status post multiple epidural steroid injections.  She was referred to the [facility] for help with spastic pain.      The patient has history of chronic low back pain of several years duration.  She had intermittent sciatica.  Imaging studies were positive for DJD.  She has been followed up at the pain clinic.  On 8/25/2021 she underwent left L5 transforaminal Epidural Steroid Injection by Dr. Heather Mederos.  3/20/2022 she underwent bilateral L5 transforaminal Epidural Steroid Injection by Dr. Mederos. On 2/23/2023 she underwent right SI joint injection and right trochanteric bursa injection by Dr. Amanda.  Her last MRI of the lumbar spine was done on 7/1/2023 and showed chronic degenerative changes (including disc osteophyte complexes and facet arthropathy), with moderate left L5-S1 neural foraminal stenosis.  Low back pain has been recently stable.  It is an intermittent aching mostly in the right buttock and SI joint area.  She denies any radiation to her legs.  It is worse with prolonged standing or walking and with getting up after sitting for too long.  Her maximum pain 7-8/10 and minimum 1-2/10.  Today it is 1-2/10.  She denies any lower extremity weakness.  She denies bowel or bladder incontinence.  She denies any leg claudications.    The patient has been followed up at the [facility]opedic[facility] by Dr. Prabhakar.  On  8/16/2022 she underwent tendon transferred due to extensor tendon rupture of the right small finger.  On 06/2023 she underwent tendon transferred due to rupture of right 3rd and 4th extensor tendons.  She reports that after her surgery, she woke up with severe weakness of her left lower extremity.  She was told it may be positional during her surgery.  In 07/2023 she had EMG that showed sciatic neuropathy.  EMG was also repeated more recently on 4/10/2025 and again showed left sciatic mononeuropathy without significant findings of lumbosacral radiculopathy.  The patient underwent course of physical therapy, the last session in 01/2024.  She reports gradual improvement in her left lower extremity weakness including footdrop.  She reports that currently she has little to no weakness.  He is trying to stay active.  She exercises regularly.  She takes tap dancing classes.  She thinks she has mild balance problems.  She still complains of persistent numbness, tingling and stabbing sensations in her left foot.  It is not connected to any back pain.  Her symptoms are worse at night unrelated to activity.  It is better to some extent with ice compresses.  Her maximum pain is 7/10 and minimum 2-3/10.  Today it is 5-6/10.    She is currently on:   Tylenol Extra Strength p.r.n., usually once per week  She was previously on gabapentin but stopped due to lack of relief.  However, she did not take it consistently.  She indicates that NSAIDs cause stomach upset.    Past Medical History:   Diagnosis Date    Anxiety 5/2/2016    Arthritis     Basal cell carcinoma 2013    left buttock    Cataract     Hypertension     Other and unspecified hyperlipidemia         Review of patient's allergies indicates:   Allergen Reactions    Celecoxib      Other reaction(s): Swelling    Sulfa (sulfonamide antibiotics)      Other reaction(s): Hives        Review of Systems   Constitutional:  Negative for chills, fatigue and fever.   Eyes:  Negative for  visual disturbance.   Respiratory:  Negative for shortness of breath.    Cardiovascular:  Negative for chest pain.   Gastrointestinal:  Positive for abdominal pain. Negative for blood in stool, nausea and vomiting.   Genitourinary:  Negative for difficulty urinating.   Musculoskeletal:  Positive for arthralgias, back pain and gait problem. Negative for neck pain.   Neurological:  Negative for dizziness, weakness and headaches.   Psychiatric/Behavioral:  Positive for sleep disturbance. Negative for behavioral problems.              Objective:      Physical Exam  Vitals reviewed.   Constitutional:       Appearance: She is well-developed.   HENT:      Head: Normocephalic and atraumatic.   Eyes:      Extraocular Movements: Extraocular movements intact.   Musculoskeletal:      Cervical back: Normal range of motion. No tenderness.      Comments: BUE:  ROM:   RUE: full.   LUE: full.  +ve Heberden's & Tammy's nodes.  Strength:    RUE: 5/5 at shoulder abduction, 5 elbow flexion, 5 elbow extension, 5 hand .   LUE: 5/5 at shoulder abduction, 5 elbow flexion, 5 elbow extension, 5 hand .  Sensation to pinprick:   RUE: intact.   LUE: intact.  DTR:    RUE: +2 biceps, +2 triceps.   LUE:  +2 biceps, +2 triceps.  .    BLE:  ROM:   RLE: full.   LLE: full.  Knee crepitus:   RLE: +ve.   LLE: +ve.   Strength:    RLE: 5/5 at hip flexion, 5 knee extension, 5 ankle DF, 5 PF, 5 EHL.   LLE: 5/5 at hip flexion, 5 knee extension, 5 ankle DF, 5 PF, 5 EHL.  Sensation to pinprick:     RLE: intact.      LLE: decreased in foot (L4, L5, S1).   DTR:     RLE: +2 knee, +2 ankle.    LLE: +2 knee, +1 ankle.  Clonus:    Rt ankle: -ve.    Lt ankle: -ve.  SLR:      RLE: -ve at 70 degrees.      LLE: -ve at 70 degrees.   MARTIN:     RLE: -ve.      LLE: -ve.  -ve tenderness over lumbar spine.  +ve tenderness over Right Si joint  No leg length discrepancy.    Directional Preference:  Spine flexion: 90 degrees , no pain in back.  Spine extension: 20  degrees, no pain in back.  Lateral bending: no pain to Right, no pain to Left.      Heel walking: WNL.  Toe walking: WNL.  Gait: WNL     Skin:     General: Skin is warm.   Neurological:      General: No focal deficit present.      Mental Status: She is alert.   Psychiatric:         Behavior: Behavior normal.           IMAGING STUDIES:      MRI LUMBAR SPINE WITHOUT CONTRAST     CLINICAL HISTORY:  Spinal stenosis, lumbar; Spinal stenosis, lumbosacral region, emergency MRI     TECHNIQUE:  Multiplanar, multisequence MR images were acquired from the thoracolumbar junction to the sacrum without the administration of contrast.     COMPARISON:  X-ray 07/01/2023, MRI 08/14/2021     FINDINGS:  Alignment: Grade 1 spondylolisthesis of L5 on S1. Possible transitional S1 segment.     Vertebrae: No acute fractures.  No significant marrow edema or replacement.     Discs: Moderate disc space narrowing and desiccation at L2-3 with mild degenerative disc changes elsewhere.  Endplate degenerative changes are noted also, most prominent at L2-3.     Cord: Normal.  Conus terminates at approximately L2.     Degenerative findings:     T12-L1: No significant abnormality.     L1-L2: No significant abnormality.     L2-L3: Mild diffuse posterior disc osteophyte complex.  Mild central canal narrowing.  Mild bilateral foraminal narrowing.  The degenerative disc and endplate changes at L2-3 have increased from the prior study.     L3-L4: Mild diffuse posterior disc osteophyte complex.  Mild central canal narrowing.  Neural foramen are adequately maintained.     L4-L5: Mild diffuse disc bulge.  Mild facet arthropathy and ligamentum flavum hypertrophy.  Mild central canal narrowing.  Neural foramen are adequately maintained.     L5-S1: Grade 1 spondylolisthesis with mild uncovering of the disc with minimal protrusion.  Moderate left foraminal narrowing.  Moderate central canal narrowing.  Moderate bilateral facet arthropathy.     Paraspinal muscles  & soft tissues: Unremarkable.     Impression:     1. No acute abnormality  2. Multilevel chronic degenerative changes.  The degenerative disc and endplate changes at L2-3 have increased from the prior study.        Electronically signed by:Kobe Mccall  Date:                                            07/01/2023    Assessment:       1. Neuropathy of left sciatic nerve    2. Neuropathic pain of left foot    3. Chronic right-sided low back pain without sciatica    4. Spondylosis of lumbar region without myelopathy or radiculopathy    5. Lumbar facet arthropathy    6. Neural foraminal stenosis of lumbar spine        Plan:         Start gabapentin (NEURONTIN) 100 MG capsule; Take 1 capsule (100 mg total) by mouth 3 (three) times daily. Start with 1 capsule daily and gradually increased dose of 3 times per day.  Call prescribing MD to do further adjustment of dose.  Increase acetaminophen (TYLENOL) 500 MG tablet; Take 1-2 tablets (500-1,000 mg total) by mouth 3 (three) times daily as needed for Pain.  Regular home exercise program was encouraged.  Follow up in about 4 months (around 12/5/2025).        This was a 60 minute visit  (including review of records), 50% of which was spent educating the patient about the diagnosis and the treatment plan.    This note was partly generated with [employer] voice recognition software. I apologize for any possible typographical errors.

## 2025-08-09 ENCOUNTER — PATIENT MESSAGE (OUTPATIENT)
Dept: PHYSICAL MEDICINE AND REHAB | Facility: CLINIC | Age: 85
End: 2025-08-09
Payer: MEDICARE

## (undated) DEVICE — ALCOHOL 70% ISOP W/GREEN 16OZ

## (undated) DEVICE — MANIFOLD 4 PORT

## (undated) DEVICE — SPONGE GAUZE 4X4 12 PLY STRL

## (undated) DEVICE — PADDING CAST 4IN SPECIALIST

## (undated) DEVICE — PACK SURGERY START

## (undated) DEVICE — PAD PREP CUFFED NS 24X48IN

## (undated) DEVICE — SEE L#120831

## (undated) DEVICE — APPLICATOR CHLORAPREP ORN 26ML

## (undated) DEVICE — DRESSING LEUKOPLAST FLEX 1X3IN

## (undated) DEVICE — COVER OVERHEAD SURG LT BLUE

## (undated) DEVICE — BANDAGE MATRIX HK LOOP 2IN 5YD

## (undated) DEVICE — GAUZE SPONGE 4X4 12PLY

## (undated) DEVICE — SUT FIBERWIRE 0 38 W/NDL

## (undated) DEVICE — BANDAGE MATRIX HK LOOP 3IN 5YD

## (undated) DEVICE — GLOVE SURG BIOGEL LATEX SZ 7.5

## (undated) DEVICE — SUT VICRYL 3-0 27 SH

## (undated) DEVICE — BANDAGE ESMARK ELASTIC ST 4X9

## (undated) DEVICE — PAD CAST 2 IN X 4YDS STERILE

## (undated) DEVICE — PAD CAST SPECIALIST STRL 3

## (undated) DEVICE — BLADE SURG #15 CARBON STEEL

## (undated) DEVICE — SUT VICRYL 4-0 ANTIBACT

## (undated) DEVICE — SUT ETHIBOND XTRA 3-0 SH 30

## (undated) DEVICE — SUT ETHILON 5-0 PS-2 18IN

## (undated) DEVICE — DRESSING XEROFORM NONADH 1X8IN

## (undated) DEVICE — STOCKINET 4INX48

## (undated) DEVICE — GOWN POLY REINF BRTH SLV XL

## (undated) DEVICE — TOWEL OR DISP STRL BLUE 4/PK

## (undated) DEVICE — SLING ARM COMFT NAVY BLU LG

## (undated) DEVICE — DRESSING XEROFORM FOIL PK 1X8

## (undated) DEVICE — GOWN POLY REINF BRTH SLV LG

## (undated) DEVICE — DRESSING XEROFORM 1X8IN

## (undated) DEVICE — GAUZE AVANT SPNG 4PLY STRL 4X4

## (undated) DEVICE — BLADE SCALP OPHTL BEVEL STR

## (undated) DEVICE — ELECTRODE REM PLYHSV RETURN 9

## (undated) DEVICE — DRAPE HAND STERILE

## (undated) DEVICE — SUT BLU BR 4-0 W/DMD PT 3/8

## (undated) DEVICE — BANDAGE ELASTIC 3IN ACE NS

## (undated) DEVICE — TOURNIQUET SB QC DP 18X4IN

## (undated) DEVICE — PACK BASIC